# Patient Record
Sex: MALE | Race: BLACK OR AFRICAN AMERICAN | URBAN - METROPOLITAN AREA
[De-identification: names, ages, dates, MRNs, and addresses within clinical notes are randomized per-mention and may not be internally consistent; named-entity substitution may affect disease eponyms.]

---

## 2024-07-23 ENCOUNTER — LAB REQUISITION (OUTPATIENT)
Dept: LAB | Facility: HOSPITAL | Age: 69
End: 2024-07-23

## 2024-07-23 DIAGNOSIS — Z29.9 ENCOUNTER FOR PROPHYLACTIC MEASURES, UNSPECIFIED: ICD-10-CM

## 2024-07-23 LAB — PSA SERPL-MCNC: 0.88 NG/ML

## 2024-07-23 PROCEDURE — 84153 ASSAY OF PSA TOTAL: CPT | Performed by: FAMILY MEDICINE

## 2024-08-23 ENCOUNTER — LAB REQUISITION (OUTPATIENT)
Dept: LAB | Facility: HOSPITAL | Age: 69
End: 2024-08-23

## 2024-08-23 DIAGNOSIS — E11.9 TYPE 2 DIABETES MELLITUS WITHOUT COMPLICATIONS: ICD-10-CM

## 2024-08-23 LAB — GLUCOSE SERPL-MCNC: 113 MG/DL (ref 82–115)

## 2024-08-23 PROCEDURE — 82947 ASSAY GLUCOSE BLOOD QUANT: CPT | Performed by: FAMILY MEDICINE

## 2024-09-17 ENCOUNTER — LAB REQUISITION (OUTPATIENT)
Dept: LAB | Facility: HOSPITAL | Age: 69
End: 2024-09-17

## 2024-09-17 DIAGNOSIS — E11.9 TYPE 2 DIABETES MELLITUS WITHOUT COMPLICATIONS: ICD-10-CM

## 2024-09-17 LAB — GLUCOSE SERPL-MCNC: 67 MG/DL (ref 82–115)

## 2024-09-17 PROCEDURE — 82947 ASSAY GLUCOSE BLOOD QUANT: CPT | Performed by: FAMILY MEDICINE

## 2024-10-16 ENCOUNTER — LAB REQUISITION (OUTPATIENT)
Dept: LAB | Facility: HOSPITAL | Age: 69
End: 2024-10-16
Payer: MEDICARE

## 2024-10-16 DIAGNOSIS — E11.9 TYPE 2 DIABETES MELLITUS WITHOUT COMPLICATIONS: ICD-10-CM

## 2024-10-16 LAB
EST. AVERAGE GLUCOSE BLD GHB EST-MCNC: 142.7 MG/DL
GLUCOSE SERPL-MCNC: 48 MG/DL (ref 82–115)
HBA1C MFR BLD: 6.6 %

## 2024-10-16 PROCEDURE — 82947 ASSAY GLUCOSE BLOOD QUANT: CPT | Performed by: FAMILY MEDICINE

## 2024-10-16 PROCEDURE — 83036 HEMOGLOBIN GLYCOSYLATED A1C: CPT | Performed by: FAMILY MEDICINE

## 2024-11-25 ENCOUNTER — LAB REQUISITION (OUTPATIENT)
Dept: LAB | Facility: HOSPITAL | Age: 69
End: 2024-11-25
Payer: MEDICARE

## 2024-11-25 DIAGNOSIS — E11.9 TYPE 2 DIABETES MELLITUS WITHOUT COMPLICATIONS: ICD-10-CM

## 2024-11-25 LAB — GLUCOSE SERPL-MCNC: 218 MG/DL (ref 82–115)

## 2024-11-25 PROCEDURE — 82947 ASSAY GLUCOSE BLOOD QUANT: CPT | Performed by: FAMILY MEDICINE

## 2024-12-03 ENCOUNTER — LAB REQUISITION (OUTPATIENT)
Dept: LAB | Facility: HOSPITAL | Age: 69
End: 2024-12-03
Payer: MEDICARE

## 2024-12-03 DIAGNOSIS — E11.9 TYPE 2 DIABETES MELLITUS WITHOUT COMPLICATIONS: ICD-10-CM

## 2024-12-03 LAB — GLUCOSE SERPL-MCNC: 224 MG/DL (ref 82–115)

## 2024-12-03 PROCEDURE — 82947 ASSAY GLUCOSE BLOOD QUANT: CPT | Performed by: FAMILY MEDICINE

## 2025-01-09 ENCOUNTER — LAB REQUISITION (OUTPATIENT)
Dept: LAB | Facility: HOSPITAL | Age: 70
End: 2025-01-09
Payer: MEDICARE

## 2025-01-09 DIAGNOSIS — Z29.9 ENCOUNTER FOR PROPHYLACTIC MEASURES, UNSPECIFIED: ICD-10-CM

## 2025-01-09 LAB
FLUAV AG UPPER RESP QL IA.RAPID: NOT DETECTED
FLUBV AG UPPER RESP QL IA.RAPID: NOT DETECTED

## 2025-01-09 PROCEDURE — 87502 INFLUENZA DNA AMP PROBE: CPT | Performed by: FAMILY MEDICINE

## 2025-01-24 ENCOUNTER — LAB REQUISITION (OUTPATIENT)
Dept: LAB | Facility: HOSPITAL | Age: 70
End: 2025-01-24
Payer: MEDICARE

## 2025-01-24 DIAGNOSIS — E11.9 TYPE 2 DIABETES MELLITUS WITHOUT COMPLICATIONS: ICD-10-CM

## 2025-01-24 LAB
EST. AVERAGE GLUCOSE BLD GHB EST-MCNC: 151.3 MG/DL
GLUCOSE SERPL-MCNC: 163 MG/DL (ref 82–115)
HBA1C MFR BLD: 6.9 %

## 2025-01-24 PROCEDURE — 82947 ASSAY GLUCOSE BLOOD QUANT: CPT | Performed by: FAMILY MEDICINE

## 2025-01-24 PROCEDURE — 83036 HEMOGLOBIN GLYCOSYLATED A1C: CPT | Performed by: FAMILY MEDICINE

## 2025-02-18 ENCOUNTER — LAB REQUISITION (OUTPATIENT)
Dept: LAB | Facility: HOSPITAL | Age: 70
End: 2025-02-18
Payer: MEDICARE

## 2025-02-18 DIAGNOSIS — E11.9 TYPE 2 DIABETES MELLITUS WITHOUT COMPLICATIONS: ICD-10-CM

## 2025-02-18 LAB — GLUCOSE SERPL-MCNC: 62 MG/DL (ref 82–115)

## 2025-02-18 PROCEDURE — 82947 ASSAY GLUCOSE BLOOD QUANT: CPT | Performed by: FAMILY MEDICINE

## 2025-03-14 ENCOUNTER — LAB REQUISITION (OUTPATIENT)
Dept: LAB | Facility: HOSPITAL | Age: 70
End: 2025-03-14
Payer: MEDICARE

## 2025-03-14 DIAGNOSIS — E11.9 TYPE 2 DIABETES MELLITUS WITHOUT COMPLICATIONS: ICD-10-CM

## 2025-03-14 LAB — GLUCOSE SERPL-MCNC: 190 MG/DL (ref 82–115)

## 2025-03-14 PROCEDURE — 82947 ASSAY GLUCOSE BLOOD QUANT: CPT | Performed by: FAMILY MEDICINE

## 2025-04-15 ENCOUNTER — LAB REQUISITION (OUTPATIENT)
Dept: LAB | Facility: HOSPITAL | Age: 70
End: 2025-04-15
Payer: MEDICARE

## 2025-04-15 DIAGNOSIS — E11.9 TYPE 2 DIABETES MELLITUS WITHOUT COMPLICATIONS: ICD-10-CM

## 2025-04-15 LAB
EST. AVERAGE GLUCOSE BLD GHB EST-MCNC: 174.3 MG/DL
GLUCOSE SERPL-MCNC: 88 MG/DL (ref 82–115)
HBA1C MFR BLD: 7.7 %

## 2025-04-15 PROCEDURE — 82947 ASSAY GLUCOSE BLOOD QUANT: CPT | Performed by: FAMILY MEDICINE

## 2025-04-15 PROCEDURE — 83036 HEMOGLOBIN GLYCOSYLATED A1C: CPT | Performed by: FAMILY MEDICINE

## 2025-05-21 ENCOUNTER — LAB REQUISITION (OUTPATIENT)
Dept: LAB | Facility: HOSPITAL | Age: 70
End: 2025-05-21
Payer: MEDICARE

## 2025-05-21 DIAGNOSIS — E11.9 TYPE 2 DIABETES MELLITUS WITHOUT COMPLICATIONS: ICD-10-CM

## 2025-05-21 LAB — GLUCOSE SERPL-MCNC: 106 MG/DL (ref 82–115)

## 2025-05-21 PROCEDURE — 82947 ASSAY GLUCOSE BLOOD QUANT: CPT | Performed by: FAMILY MEDICINE

## 2025-06-12 ENCOUNTER — LAB REQUISITION (OUTPATIENT)
Dept: LAB | Facility: HOSPITAL | Age: 70
End: 2025-06-12
Payer: MEDICARE

## 2025-06-12 DIAGNOSIS — E11.9 TYPE 2 DIABETES MELLITUS WITHOUT COMPLICATIONS: ICD-10-CM

## 2025-06-12 LAB — GLUCOSE SERPL-MCNC: 89 MG/DL (ref 82–115)

## 2025-06-12 PROCEDURE — 82947 ASSAY GLUCOSE BLOOD QUANT: CPT | Performed by: FAMILY MEDICINE

## 2025-06-25 ENCOUNTER — LAB REQUISITION (OUTPATIENT)
Dept: LAB | Facility: HOSPITAL | Age: 70
End: 2025-06-25
Payer: MEDICARE

## 2025-06-25 DIAGNOSIS — Z29.9 ENCOUNTER FOR PROPHYLACTIC MEASURES, UNSPECIFIED: ICD-10-CM

## 2025-06-25 LAB
ALBUMIN SERPL-MCNC: 3.2 G/DL (ref 3.4–4.8)
ALBUMIN/GLOB SERPL: 0.8 RATIO (ref 1.1–2)
ALP SERPL-CCNC: 60 UNIT/L (ref 40–150)
ALT SERPL-CCNC: 17 UNIT/L (ref 0–55)
ANION GAP SERPL CALC-SCNC: 7 MEQ/L
AST SERPL-CCNC: 19 UNIT/L (ref 11–45)
BASOPHILS # BLD AUTO: 0.04 X10(3)/MCL
BASOPHILS NFR BLD AUTO: 0.6 %
BILIRUB SERPL-MCNC: 0.3 MG/DL
BUN SERPL-MCNC: 49.4 MG/DL (ref 8.4–25.7)
CALCIUM SERPL-MCNC: 8.5 MG/DL (ref 8.8–10)
CHLORIDE SERPL-SCNC: 107 MMOL/L (ref 98–107)
CO2 SERPL-SCNC: 24 MMOL/L (ref 23–31)
CREAT SERPL-MCNC: 2.92 MG/DL (ref 0.72–1.25)
CREAT/UREA NIT SERPL: 17
EOSINOPHIL # BLD AUTO: 0.13 X10(3)/MCL (ref 0–0.9)
EOSINOPHIL NFR BLD AUTO: 1.8 %
ERYTHROCYTE [DISTWIDTH] IN BLOOD BY AUTOMATED COUNT: 14.7 % (ref 11.5–17)
GFR SERPLBLD CREATININE-BSD FMLA CKD-EPI: 23 ML/MIN/1.73/M2
GLOBULIN SER-MCNC: 3.9 GM/DL (ref 2.4–3.5)
GLUCOSE SERPL-MCNC: 37 MG/DL (ref 82–115)
HCT VFR BLD AUTO: 43.3 % (ref 42–52)
HGB BLD-MCNC: 13.5 G/DL (ref 14–18)
IMM GRANULOCYTES # BLD AUTO: 0.02 X10(3)/MCL (ref 0–0.04)
IMM GRANULOCYTES NFR BLD AUTO: 0.3 %
LYMPHOCYTES # BLD AUTO: 2.94 X10(3)/MCL (ref 0.6–4.6)
LYMPHOCYTES NFR BLD AUTO: 40.8 %
MCH RBC QN AUTO: 29.2 PG (ref 27–31)
MCHC RBC AUTO-ENTMCNC: 31.2 G/DL (ref 33–36)
MCV RBC AUTO: 93.5 FL (ref 80–94)
MONOCYTES # BLD AUTO: 0.72 X10(3)/MCL (ref 0.1–1.3)
MONOCYTES NFR BLD AUTO: 10 %
NEUTROPHILS # BLD AUTO: 3.35 X10(3)/MCL (ref 2.1–9.2)
NEUTROPHILS NFR BLD AUTO: 46.5 %
NRBC BLD AUTO-RTO: 0 %
PLATELET # BLD AUTO: 195 X10(3)/MCL (ref 130–400)
PMV BLD AUTO: 11 FL (ref 7.4–10.4)
POTASSIUM SERPL-SCNC: 6 MMOL/L (ref 3.5–5.1)
PROT SERPL-MCNC: 7.1 GM/DL (ref 5.8–7.6)
RBC # BLD AUTO: 4.63 X10(6)/MCL (ref 4.7–6.1)
SODIUM SERPL-SCNC: 138 MMOL/L (ref 136–145)
WBC # BLD AUTO: 7.2 X10(3)/MCL (ref 4.5–11.5)

## 2025-06-25 PROCEDURE — 80053 COMPREHEN METABOLIC PANEL: CPT | Performed by: FAMILY MEDICINE

## 2025-06-25 PROCEDURE — 85025 COMPLETE CBC W/AUTO DIFF WBC: CPT | Performed by: FAMILY MEDICINE

## 2025-07-02 ENCOUNTER — LAB REQUISITION (OUTPATIENT)
Dept: LAB | Facility: HOSPITAL | Age: 70
End: 2025-07-02
Payer: MEDICARE

## 2025-07-02 DIAGNOSIS — Z29.9 ENCOUNTER FOR PROPHYLACTIC MEASURES, UNSPECIFIED: ICD-10-CM

## 2025-07-02 LAB
ALBUMIN SERPL-MCNC: 3 G/DL (ref 3.4–4.8)
ALBUMIN/GLOB SERPL: 0.7 RATIO (ref 1.1–2)
ALP SERPL-CCNC: 53 UNIT/L (ref 40–150)
ALT SERPL-CCNC: 14 UNIT/L (ref 0–55)
ANION GAP SERPL CALC-SCNC: 7 MEQ/L
AST SERPL-CCNC: 10 UNIT/L (ref 11–45)
BASOPHILS # BLD AUTO: 0.03 X10(3)/MCL
BASOPHILS NFR BLD AUTO: 0.4 %
BILIRUB SERPL-MCNC: 0.5 MG/DL
BUN SERPL-MCNC: 44.8 MG/DL (ref 8.4–25.7)
CALCIUM SERPL-MCNC: 8.3 MG/DL (ref 8.8–10)
CHLORIDE SERPL-SCNC: 106 MMOL/L (ref 98–107)
CHOLEST SERPL-MCNC: 169 MG/DL
CHOLEST/HDLC SERPL: 6 {RATIO} (ref 0–5)
CO2 SERPL-SCNC: 26 MMOL/L (ref 23–31)
CREAT SERPL-MCNC: 1.65 MG/DL (ref 0.72–1.25)
CREAT/UREA NIT SERPL: 27
EOSINOPHIL # BLD AUTO: 0.16 X10(3)/MCL (ref 0–0.9)
EOSINOPHIL NFR BLD AUTO: 2 %
ERYTHROCYTE [DISTWIDTH] IN BLOOD BY AUTOMATED COUNT: 14.3 % (ref 11.5–17)
EST. AVERAGE GLUCOSE BLD GHB EST-MCNC: 162.8 MG/DL
GFR SERPLBLD CREATININE-BSD FMLA CKD-EPI: 45 ML/MIN/1.73/M2
GLOBULIN SER-MCNC: 4.1 GM/DL (ref 2.4–3.5)
GLUCOSE SERPL-MCNC: 118 MG/DL (ref 82–115)
HBA1C MFR BLD: 7.3 %
HCT VFR BLD AUTO: 42.4 % (ref 42–52)
HDLC SERPL-MCNC: 30 MG/DL (ref 35–60)
HGB BLD-MCNC: 13.2 G/DL (ref 14–18)
IMM GRANULOCYTES # BLD AUTO: 0.02 X10(3)/MCL (ref 0–0.04)
IMM GRANULOCYTES NFR BLD AUTO: 0.2 %
LDLC SERPL CALC-MCNC: 117 MG/DL (ref 50–140)
LYMPHOCYTES # BLD AUTO: 1.83 X10(3)/MCL (ref 0.6–4.6)
LYMPHOCYTES NFR BLD AUTO: 22.4 %
MCH RBC QN AUTO: 29.1 PG (ref 27–31)
MCHC RBC AUTO-ENTMCNC: 31.1 G/DL (ref 33–36)
MCV RBC AUTO: 93.4 FL (ref 80–94)
MONOCYTES # BLD AUTO: 1.01 X10(3)/MCL (ref 0.1–1.3)
MONOCYTES NFR BLD AUTO: 12.4 %
NEUTROPHILS # BLD AUTO: 5.11 X10(3)/MCL (ref 2.1–9.2)
NEUTROPHILS NFR BLD AUTO: 62.6 %
NRBC BLD AUTO-RTO: 0 %
PHOSPHATE SERPL-MCNC: 3.5 MG/DL (ref 2.3–4.7)
PLATELET # BLD AUTO: 173 X10(3)/MCL (ref 130–400)
PMV BLD AUTO: 11 FL (ref 7.4–10.4)
POTASSIUM SERPL-SCNC: 5.6 MMOL/L (ref 3.5–5.1)
PREALB SERPL-MCNC: 19.8 MG/DL (ref 16–42)
PROT SERPL-MCNC: 7.1 GM/DL (ref 5.8–7.6)
RBC # BLD AUTO: 4.54 X10(6)/MCL (ref 4.7–6.1)
SODIUM SERPL-SCNC: 139 MMOL/L (ref 136–145)
TRIGL SERPL-MCNC: 108 MG/DL (ref 34–140)
TSH SERPL-ACNC: 0.24 UIU/ML (ref 0.35–4.94)
VLDLC SERPL CALC-MCNC: 22 MG/DL
WBC # BLD AUTO: 8.16 X10(3)/MCL (ref 4.5–11.5)

## 2025-07-02 PROCEDURE — 84134 ASSAY OF PREALBUMIN: CPT | Performed by: FAMILY MEDICINE

## 2025-07-02 PROCEDURE — 80061 LIPID PANEL: CPT | Performed by: FAMILY MEDICINE

## 2025-07-02 PROCEDURE — 83036 HEMOGLOBIN GLYCOSYLATED A1C: CPT | Performed by: FAMILY MEDICINE

## 2025-07-02 PROCEDURE — 84100 ASSAY OF PHOSPHORUS: CPT | Performed by: FAMILY MEDICINE

## 2025-07-02 PROCEDURE — 84443 ASSAY THYROID STIM HORMONE: CPT | Performed by: FAMILY MEDICINE

## 2025-07-02 PROCEDURE — 85025 COMPLETE CBC W/AUTO DIFF WBC: CPT | Performed by: FAMILY MEDICINE

## 2025-07-02 PROCEDURE — 80053 COMPREHEN METABOLIC PANEL: CPT | Performed by: FAMILY MEDICINE

## 2025-07-03 ENCOUNTER — LAB REQUISITION (OUTPATIENT)
Dept: LAB | Facility: HOSPITAL | Age: 70
End: 2025-07-03
Payer: MEDICARE

## 2025-07-03 DIAGNOSIS — Z29.9 ENCOUNTER FOR PROPHYLACTIC MEASURES, UNSPECIFIED: ICD-10-CM

## 2025-07-03 LAB — AMMONIA PLAS-MSCNC: 104 UMOL/L (ref 18–72)

## 2025-07-03 PROCEDURE — 82140 ASSAY OF AMMONIA: CPT | Performed by: NURSE PRACTITIONER

## 2025-07-05 ENCOUNTER — HOSPITAL ENCOUNTER (INPATIENT)
Facility: HOSPITAL | Age: 70
LOS: 33 days | Discharge: NURSING, REHAB OR LONG-TERM CARE | DRG: 207 | End: 2025-08-07
Attending: EMERGENCY MEDICINE | Admitting: INTERNAL MEDICINE
Payer: MEDICARE

## 2025-07-05 DIAGNOSIS — J96.00 ACUTE RESPIRATORY FAILURE: ICD-10-CM

## 2025-07-05 DIAGNOSIS — R53.1 GENERALIZED WEAKNESS: Primary | ICD-10-CM

## 2025-07-05 DIAGNOSIS — J96.02 ACUTE RESPIRATORY FAILURE WITH HYPERCAPNIA: ICD-10-CM

## 2025-07-05 DIAGNOSIS — N17.9 AKI (ACUTE KIDNEY INJURY): ICD-10-CM

## 2025-07-05 DIAGNOSIS — R33.8 ACUTE URINARY RETENTION: ICD-10-CM
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
## 2025-07-05 DIAGNOSIS — R13.10 DYSPHAGIA, UNSPECIFIED TYPE: ICD-10-CM

## 2025-07-05 DIAGNOSIS — R94.31 QT PROLONGATION: ICD-10-CM

## 2025-07-05 DIAGNOSIS — R50.9 PERSISTENT FEVER: ICD-10-CM

## 2025-07-05 LAB
ALBUMIN SERPL-MCNC: 3 G/DL (ref 3.4–4.8)
ALBUMIN/GLOB SERPL: 0.6 RATIO (ref 1.1–2)
ALLENS TEST BLOOD GAS (OHS): YES
ALLENS TEST BLOOD GAS (OHS): YES
ALP SERPL-CCNC: 54 UNIT/L (ref 40–150)
ALT SERPL-CCNC: 13 UNIT/L (ref 0–55)
AMMONIA PLAS-MSCNC: 23.9 UMOL/L (ref 18–72)
ANION GAP SERPL CALC-SCNC: 6 MEQ/L
APTT PPP: 21.5 SECONDS (ref 23.2–33.7)
AST SERPL-CCNC: 16 UNIT/L (ref 11–45)
B PERT.PT PRMT NPH QL NAA+NON-PROBE: NOT DETECTED
BACTERIA #/AREA URNS AUTO: ABNORMAL /HPF
BASE EXCESS BLD CALC-SCNC: -1.6 MMOL/L (ref -2–2)
BASE EXCESS BLD CALC-SCNC: -2 MMOL/L
BASOPHILS # BLD AUTO: 0.02 X10(3)/MCL
BASOPHILS NFR BLD AUTO: 0.4 %
BILIRUB SERPL-MCNC: 0.2 MG/DL
BILIRUB UR QL STRIP.AUTO: NEGATIVE
BLOOD GAS SAMPLE TYPE (OHS): ABNORMAL
BLOOD GAS SAMPLE TYPE (OHS): ABNORMAL
BNP BLD-MCNC: 98.8 PG/ML
BUN SERPL-MCNC: 75 MG/DL (ref 8.4–25.7)
C PNEUM DNA NPH QL NAA+NON-PROBE: NOT DETECTED
CA-I BLD-SCNC: 1.12 MMOL/L (ref 1.12–1.23)
CA-I BLD-SCNC: 1.18 MMOL/L (ref 1.12–1.23)
CALCIUM SERPL-MCNC: 8.5 MG/DL (ref 8.8–10)
CHLORIDE SERPL-SCNC: 105 MMOL/L (ref 98–107)
CLARITY UR: CLEAR
CO2 BLDA-SCNC: 26.2 MMOL/L
CO2 BLDA-SCNC: 30.1 MMOL/L
CO2 SERPL-SCNC: 26 MMOL/L (ref 23–31)
COHGB MFR BLDA: 2.5 % (ref 0.5–1.5)
COLOR UR AUTO: YELLOW
CREAT SERPL-MCNC: 2.5 MG/DL (ref 0.72–1.25)
CREAT UR-MCNC: 94.2 MG/DL (ref 63–166)
CREAT/UREA NIT SERPL: 30
DRAWN BY BLOOD GAS (OHS): ABNORMAL
DRAWN BY BLOOD GAS (OHS): ABNORMAL
EOSINOPHIL # BLD AUTO: 0.12 X10(3)/MCL (ref 0–0.9)
EOSINOPHIL NFR BLD AUTO: 2.2 %
ERYTHROCYTE [DISTWIDTH] IN BLOOD BY AUTOMATED COUNT: 14.2 % (ref 11.5–17)
FLUAV AG UPPER RESP QL IA.RAPID: NOT DETECTED
FLUBV AG UPPER RESP QL IA.RAPID: NOT DETECTED
GFR SERPLBLD CREATININE-BSD FMLA CKD-EPI: 27 ML/MIN/1.73/M2
GLOBULIN SER-MCNC: 4.9 GM/DL (ref 2.4–3.5)
GLUCOSE SERPL-MCNC: 114 MG/DL (ref 82–115)
GLUCOSE UR QL STRIP: ABNORMAL
HADV DNA NPH QL NAA+NON-PROBE: NOT DETECTED
HCO3 BLDA-SCNC: 24.7 MMOL/L (ref 22–26)
HCO3 BLDA-SCNC: 28 MMOL/L (ref 22–26)
HCOV 229E RNA NPH QL NAA+NON-PROBE: NOT DETECTED
HCOV HKU1 RNA NPH QL NAA+NON-PROBE: NOT DETECTED
HCOV NL63 RNA NPH QL NAA+NON-PROBE: NOT DETECTED
HCOV OC43 RNA NPH QL NAA+NON-PROBE: NOT DETECTED
HCT VFR BLD AUTO: 43.5 % (ref 42–52)
HGB BLD-MCNC: 13.4 G/DL (ref 14–18)
HGB UR QL STRIP: ABNORMAL
HMPV RNA NPH QL NAA+NON-PROBE: NOT DETECTED
HPIV1 RNA NPH QL NAA+NON-PROBE: NOT DETECTED
HPIV2 RNA NPH QL NAA+NON-PROBE: NOT DETECTED
HPIV3 RNA NPH QL NAA+NON-PROBE: NOT DETECTED
HPIV4 RNA NPH QL NAA+NON-PROBE: NOT DETECTED
HYALINE CASTS #/AREA URNS LPF: ABNORMAL /LPF
IMM GRANULOCYTES # BLD AUTO: 0.02 X10(3)/MCL (ref 0–0.04)
IMM GRANULOCYTES NFR BLD AUTO: 0.4 %
INHALED O2 CONCENTRATION: 80 %
INR PPP: 1.2
KETONES UR QL STRIP: NEGATIVE
LACTATE SERPL-SCNC: 1.1 MMOL/L (ref 0.5–2.2)
LEUKOCYTE ESTERASE UR QL STRIP: 75
LPM (OHS): 4
LYMPHOCYTES # BLD AUTO: 2.03 X10(3)/MCL (ref 0.6–4.6)
LYMPHOCYTES NFR BLD AUTO: 37.7 %
M PNEUMO DNA NPH QL NAA+NON-PROBE: NOT DETECTED
MAGNESIUM SERPL-MCNC: 2.9 MG/DL (ref 1.6–2.6)
MCH RBC QN AUTO: 29.1 PG (ref 27–31)
MCHC RBC AUTO-ENTMCNC: 30.8 G/DL (ref 33–36)
MCV RBC AUTO: 94.6 FL (ref 80–94)
MECH RR (OHS): 22 B/MIN
METHGB MFR BLDA: 1.5 % (ref 0.4–1.5)
MODE (OHS): AC
MONOCYTES # BLD AUTO: 0.55 X10(3)/MCL (ref 0.1–1.3)
MONOCYTES NFR BLD AUTO: 10.2 %
MUCOUS THREADS URNS QL MICRO: ABNORMAL /LPF
NEUTROPHILS # BLD AUTO: 2.65 X10(3)/MCL (ref 2.1–9.2)
NEUTROPHILS NFR BLD AUTO: 49.1 %
NITRITE UR QL STRIP: NEGATIVE
NRBC BLD AUTO-RTO: 0 %
O2 HB BLOOD GAS (OHS): 92.9 % (ref 94–97)
OHS QRS DURATION: 80 MS
OHS QTC CALCULATION: 417 MS
OXYGEN DEVICE BLOOD GAS (OHS): ABNORMAL
OXYHGB MFR BLDA: 13.9 G/DL (ref 12–16)
PCO2 BLDA: 49 MMHG (ref 35–45)
PCO2 BLDA: 70 MMHG (ref 35–45)
PEEP RESPIRATORY: 8 CMH2O
PH BLDA: 7.21 [PH] (ref 7.35–7.45)
PH BLDA: 7.31 [PH] (ref 7.35–7.45)
PH UR STRIP: 5.5 [PH]
PLATELET # BLD AUTO: 199 X10(3)/MCL (ref 130–400)
PMV BLD AUTO: 10.4 FL (ref 7.4–10.4)
PO2 BLDA: 66 MMHG (ref 80–100)
PO2 BLDA: 85 MMHG (ref 80–100)
POCT GLUCOSE: 146 MG/DL (ref 70–110)
POCT GLUCOSE: 346 MG/DL (ref 70–110)
POCT GLUCOSE: 360 MG/DL (ref 70–110)
POCT GLUCOSE: 414 MG/DL (ref 70–110)
POCT GLUCOSE: 416 MG/DL (ref 70–110)
POCT GLUCOSE: 416 MG/DL (ref 70–110)
POTASSIUM BLOOD GAS (OHS): 4.7 MMOL/L (ref 3.5–5)
POTASSIUM BLOOD GAS (OHS): 5.2 MMOL/L (ref 3.5–5)
POTASSIUM SERPL-SCNC: 5.5 MMOL/L (ref 3.5–5.1)
PROT SERPL-MCNC: 7.9 GM/DL (ref 5.8–7.6)
PROT UR QL STRIP: ABNORMAL
PROT UR STRIP-MCNC: 20.2 MG/DL
PROTHROMBIN TIME: 15.1 SECONDS (ref 12.5–14.5)
RBC # BLD AUTO: 4.6 X10(6)/MCL (ref 4.7–6.1)
RBC #/AREA URNS AUTO: ABNORMAL /HPF
RSV A 5' UTR RNA NPH QL NAA+PROBE: NOT DETECTED
RSV RNA NPH QL NAA+NON-PROBE: NOT DETECTED
RV+EV RNA NPH QL NAA+NON-PROBE: NOT DETECTED
SAMPLE SITE BLOOD GAS (OHS): ABNORMAL
SAMPLE SITE BLOOD GAS (OHS): ABNORMAL
SAO2 % BLDA: 91 %
SAO2 % BLDA: 94 %
SARS-COV-2 RNA RESP QL NAA+PROBE: NOT DETECTED
SODIUM BLOOD GAS (OHS): 133 MMOL/L (ref 137–145)
SODIUM BLOOD GAS (OHS): 135 MMOL/L (ref 137–145)
SODIUM SERPL-SCNC: 137 MMOL/L (ref 136–145)
SODIUM UR-SCNC: 64 MMOL/L
SP GR UR STRIP.AUTO: 1.02 (ref 1–1.03)
SPONT+MECH VT ON VENT: 500 ML
SQUAMOUS #/AREA URNS LPF: ABNORMAL /HPF
TROPONIN I SERPL-MCNC: 0.04 NG/ML (ref 0–0.04)
TSH SERPL-ACNC: 0.53 UIU/ML (ref 0.35–4.94)
URINE PROTEIN/CREATININE RATIO (OLG): 0.2
UROBILINOGEN UR STRIP-ACNC: NORMAL
WBC # BLD AUTO: 5.39 X10(3)/MCL (ref 4.5–11.5)
WBC #/AREA URNS AUTO: ABNORMAL /HPF

## 2025-07-05 PROCEDURE — 85730 THROMBOPLASTIN TIME PARTIAL: CPT | Performed by: EMERGENCY MEDICINE

## 2025-07-05 PROCEDURE — 93005 ELECTROCARDIOGRAM TRACING: CPT

## 2025-07-05 PROCEDURE — 0BH17EZ INSERTION OF ENDOTRACHEAL AIRWAY INTO TRACHEA, VIA NATURAL OR ARTIFICIAL OPENING: ICD-10-PCS | Performed by: EMERGENCY MEDICINE

## 2025-07-05 PROCEDURE — 99900031 HC PATIENT EDUCATION (STAT)

## 2025-07-05 PROCEDURE — 82570 ASSAY OF URINE CREATININE: CPT | Performed by: INTERNAL MEDICINE

## 2025-07-05 PROCEDURE — 63600175 PHARM REV CODE 636 W HCPCS

## 2025-07-05 PROCEDURE — 25000003 PHARM REV CODE 250: Performed by: EMERGENCY MEDICINE

## 2025-07-05 PROCEDURE — 36600 WITHDRAWAL OF ARTERIAL BLOOD: CPT

## 2025-07-05 PROCEDURE — 87070 CULTURE OTHR SPECIMN AEROBIC: CPT | Performed by: INTERNAL MEDICINE

## 2025-07-05 PROCEDURE — 27000221 HC OXYGEN, UP TO 24 HOURS

## 2025-07-05 PROCEDURE — 84300 ASSAY OF URINE SODIUM: CPT | Performed by: INTERNAL MEDICINE

## 2025-07-05 PROCEDURE — 83880 ASSAY OF NATRIURETIC PEPTIDE: CPT | Performed by: EMERGENCY MEDICINE

## 2025-07-05 PROCEDURE — 87086 URINE CULTURE/COLONY COUNT: CPT | Performed by: EMERGENCY MEDICINE

## 2025-07-05 PROCEDURE — 31500 INSERT EMERGENCY AIRWAY: CPT

## 2025-07-05 PROCEDURE — 94002 VENT MGMT INPAT INIT DAY: CPT

## 2025-07-05 PROCEDURE — 87040 BLOOD CULTURE FOR BACTERIA: CPT | Performed by: EMERGENCY MEDICINE

## 2025-07-05 PROCEDURE — 63600175 PHARM REV CODE 636 W HCPCS: Performed by: INTERNAL MEDICINE

## 2025-07-05 PROCEDURE — 94761 N-INVAS EAR/PLS OXIMETRY MLT: CPT | Mod: XB

## 2025-07-05 PROCEDURE — 81001 URINALYSIS AUTO W/SCOPE: CPT | Performed by: EMERGENCY MEDICINE

## 2025-07-05 PROCEDURE — 84484 ASSAY OF TROPONIN QUANT: CPT | Performed by: EMERGENCY MEDICINE

## 2025-07-05 PROCEDURE — 25000003 PHARM REV CODE 250: Performed by: INTERNAL MEDICINE

## 2025-07-05 PROCEDURE — 99900035 HC TECH TIME PER 15 MIN (STAT)

## 2025-07-05 PROCEDURE — 93010 ELECTROCARDIOGRAM REPORT: CPT | Mod: ,,, | Performed by: INTERNAL MEDICINE

## 2025-07-05 PROCEDURE — 5A1955Z RESPIRATORY VENTILATION, GREATER THAN 96 CONSECUTIVE HOURS: ICD-10-PCS | Performed by: INTERNAL MEDICINE

## 2025-07-05 PROCEDURE — 27100171 HC OXYGEN HIGH FLOW UP TO 24 HOURS

## 2025-07-05 PROCEDURE — 82140 ASSAY OF AMMONIA: CPT | Performed by: EMERGENCY MEDICINE

## 2025-07-05 PROCEDURE — 99900026 HC AIRWAY MAINTENANCE (STAT)

## 2025-07-05 PROCEDURE — 83605 ASSAY OF LACTIC ACID: CPT | Performed by: EMERGENCY MEDICINE

## 2025-07-05 PROCEDURE — 94640 AIRWAY INHALATION TREATMENT: CPT

## 2025-07-05 PROCEDURE — 85025 COMPLETE CBC W/AUTO DIFF WBC: CPT | Performed by: EMERGENCY MEDICINE

## 2025-07-05 PROCEDURE — 27000190 HC CPAP FULL FACE MASK W/VALVE

## 2025-07-05 PROCEDURE — 27200966 HC CLOSED SUCTION SYSTEM

## 2025-07-05 PROCEDURE — 80053 COMPREHEN METABOLIC PANEL: CPT | Performed by: EMERGENCY MEDICINE

## 2025-07-05 PROCEDURE — 94760 N-INVAS EAR/PLS OXIMETRY 1: CPT

## 2025-07-05 PROCEDURE — 43753 TX GASTRO INTUB W/ASP: CPT

## 2025-07-05 PROCEDURE — 87486 CHLMYD PNEUM DNA AMP PROBE: CPT | Performed by: INTERNAL MEDICINE

## 2025-07-05 PROCEDURE — 82803 BLOOD GASES ANY COMBINATION: CPT

## 2025-07-05 PROCEDURE — 63600175 PHARM REV CODE 636 W HCPCS: Mod: JZ,TB | Performed by: EMERGENCY MEDICINE

## 2025-07-05 PROCEDURE — 25000003 PHARM REV CODE 250

## 2025-07-05 PROCEDURE — 3E1G78Z IRRIGATION OF UPPER GI USING IRRIGATING SUBSTANCE, VIA NATURAL OR ARTIFICIAL OPENING: ICD-10-PCS | Performed by: EMERGENCY MEDICINE

## 2025-07-05 PROCEDURE — 87637 SARSCOV2&INF A&B&RSV AMP PRB: CPT | Performed by: EMERGENCY MEDICINE

## 2025-07-05 PROCEDURE — 25500020 PHARM REV CODE 255: Performed by: INTERNAL MEDICINE

## 2025-07-05 PROCEDURE — 85610 PROTHROMBIN TIME: CPT | Performed by: EMERGENCY MEDICINE

## 2025-07-05 PROCEDURE — 96374 THER/PROPH/DIAG INJ IV PUSH: CPT

## 2025-07-05 PROCEDURE — 99291 CRITICAL CARE FIRST HOUR: CPT

## 2025-07-05 PROCEDURE — 94660 CPAP INITIATION&MGMT: CPT | Mod: XB

## 2025-07-05 PROCEDURE — 0T9B70Z DRAINAGE OF BLADDER WITH DRAINAGE DEVICE, VIA NATURAL OR ARTIFICIAL OPENING: ICD-10-PCS | Performed by: EMERGENCY MEDICINE

## 2025-07-05 PROCEDURE — 25000242 PHARM REV CODE 250 ALT 637 W/ HCPCS: Performed by: EMERGENCY MEDICINE

## 2025-07-05 PROCEDURE — 63600175 PHARM REV CODE 636 W HCPCS: Performed by: EMERGENCY MEDICINE

## 2025-07-05 PROCEDURE — 83735 ASSAY OF MAGNESIUM: CPT | Performed by: EMERGENCY MEDICINE

## 2025-07-05 PROCEDURE — 84443 ASSAY THYROID STIM HORMONE: CPT | Performed by: INTERNAL MEDICINE

## 2025-07-05 PROCEDURE — 20000000 HC ICU ROOM

## 2025-07-05 PROCEDURE — 5A09357 ASSISTANCE WITH RESPIRATORY VENTILATION, LESS THAN 24 CONSECUTIVE HOURS, CONTINUOUS POSITIVE AIRWAY PRESSURE: ICD-10-PCS | Performed by: EMERGENCY MEDICINE

## 2025-07-05 RX ORDER — INSULIN ASPART 100 [IU]/ML
10 INJECTION, SOLUTION INTRAVENOUS; SUBCUTANEOUS ONCE
Status: COMPLETED | OUTPATIENT
Start: 2025-07-05 | End: 2025-07-05

## 2025-07-05 RX ORDER — INSULIN GLARGINE 100 [IU]/ML
10 INJECTION, SOLUTION SUBCUTANEOUS NIGHTLY
Status: DISCONTINUED | OUTPATIENT
Start: 2025-07-05 | End: 2025-07-06

## 2025-07-05 RX ORDER — GLUCAGON 1 MG
1 KIT INJECTION
Status: DISCONTINUED | OUTPATIENT
Start: 2025-07-05 | End: 2025-08-07 | Stop reason: HOSPADM

## 2025-07-05 RX ORDER — ETOMIDATE 2 MG/ML
INJECTION INTRAVENOUS CODE/TRAUMA/SEDATION MEDICATION
Status: DISCONTINUED | OUTPATIENT
Start: 2025-07-05 | End: 2025-07-05

## 2025-07-05 RX ORDER — INSULIN ASPART 100 [IU]/ML
0-10 INJECTION, SOLUTION INTRAVENOUS; SUBCUTANEOUS EVERY 6 HOURS PRN
Status: DISCONTINUED | OUTPATIENT
Start: 2025-07-05 | End: 2025-07-08

## 2025-07-05 RX ORDER — PROPOFOL 10 MG/ML
INJECTION, EMULSION INTRAVENOUS
Status: COMPLETED
Start: 2025-07-05 | End: 2025-07-05

## 2025-07-05 RX ORDER — IPRATROPIUM BROMIDE AND ALBUTEROL SULFATE 2.5; .5 MG/3ML; MG/3ML
3 SOLUTION RESPIRATORY (INHALATION)
Status: COMPLETED | OUTPATIENT
Start: 2025-07-05 | End: 2025-07-05

## 2025-07-05 RX ORDER — GLUCAGON 1 MG
1 KIT INJECTION
Status: DISCONTINUED | OUTPATIENT
Start: 2025-07-05 | End: 2025-07-08

## 2025-07-05 RX ORDER — DEXMEDETOMIDINE HYDROCHLORIDE 4 UG/ML
INJECTION, SOLUTION INTRAVENOUS
Status: COMPLETED
Start: 2025-07-05 | End: 2025-07-05

## 2025-07-05 RX ORDER — DEXMEDETOMIDINE HYDROCHLORIDE 4 UG/ML
0-1.4 INJECTION, SOLUTION INTRAVENOUS CONTINUOUS
Status: DISCONTINUED | OUTPATIENT
Start: 2025-07-05 | End: 2025-07-27

## 2025-07-05 RX ORDER — MUPIROCIN 20 MG/G
OINTMENT TOPICAL 2 TIMES DAILY
Status: COMPLETED | OUTPATIENT
Start: 2025-07-06 | End: 2025-07-10

## 2025-07-05 RX ORDER — METHYLPREDNISOLONE SOD SUCC 125 MG
125 VIAL (EA) INJECTION
Status: COMPLETED | OUTPATIENT
Start: 2025-07-05 | End: 2025-07-05

## 2025-07-05 RX ORDER — PROPOFOL 10 MG/ML
0-50 INJECTION, EMULSION INTRAVENOUS CONTINUOUS
Status: DISCONTINUED | OUTPATIENT
Start: 2025-07-05 | End: 2025-07-27

## 2025-07-05 RX ORDER — ROCURONIUM BROMIDE 10 MG/ML
INJECTION, SOLUTION INTRAVENOUS CODE/TRAUMA/SEDATION MEDICATION
Status: DISCONTINUED | OUTPATIENT
Start: 2025-07-05 | End: 2025-07-05

## 2025-07-05 RX ADMIN — SODIUM CHLORIDE 1000 ML: 9 INJECTION, SOLUTION INTRAVENOUS at 08:07

## 2025-07-05 RX ADMIN — PIPERACILLIN SODIUM AND TAZOBACTAM SODIUM 4.5 G: 4; .5 INJECTION, POWDER, LYOPHILIZED, FOR SOLUTION INTRAVENOUS at 07:07

## 2025-07-05 RX ADMIN — PIPERACILLIN SODIUM AND TAZOBACTAM SODIUM 4.5 G: 4; .5 INJECTION, POWDER, LYOPHILIZED, FOR SOLUTION INTRAVENOUS at 11:07

## 2025-07-05 RX ADMIN — DEXMEDETOMIDINE HYDROCHLORIDE 1 MCG/KG/HR: 400 INJECTION INTRAVENOUS at 10:07

## 2025-07-05 RX ADMIN — PERFLUTREN 1 ML: 6.52 INJECTION, SUSPENSION INTRAVENOUS at 04:07

## 2025-07-05 RX ADMIN — ROCURONIUM BROMIDE 100 MG: 10 SOLUTION INTRAVENOUS at 08:07

## 2025-07-05 RX ADMIN — PROPOFOL 50 MCG/KG/MIN: 10 INJECTION, EMULSION INTRAVENOUS at 05:07

## 2025-07-05 RX ADMIN — DEXMEDETOMIDINE HYDROCHLORIDE 0.6 MCG/KG/HR: 4 INJECTION, SOLUTION INTRAVENOUS at 07:07

## 2025-07-05 RX ADMIN — INSULIN ASPART 10 UNITS: 100 INJECTION, SOLUTION INTRAVENOUS; SUBCUTANEOUS at 08:07

## 2025-07-05 RX ADMIN — PROPOFOL 30 MCG/KG/MIN: 10 INJECTION, EMULSION INTRAVENOUS at 02:07

## 2025-07-05 RX ADMIN — PROPOFOL 40 MCG/KG/MIN: 10 INJECTION, EMULSION INTRAVENOUS at 08:07

## 2025-07-05 RX ADMIN — INSULIN ASPART 10 UNITS: 100 INJECTION, SOLUTION INTRAVENOUS; SUBCUTANEOUS at 05:07

## 2025-07-05 RX ADMIN — DEXMEDETOMIDINE HYDROCHLORIDE 0.6 MCG/KG/HR: 4 INJECTION, SOLUTION INTRAVENOUS at 02:07

## 2025-07-05 RX ADMIN — PROPOFOL 10 MCG/KG/MIN: 10 INJECTION, EMULSION INTRAVENOUS at 08:07

## 2025-07-05 RX ADMIN — METHYLPREDNISOLONE SODIUM SUCCINATE 125 MG: 125 INJECTION, POWDER, FOR SOLUTION INTRAMUSCULAR; INTRAVENOUS at 08:07

## 2025-07-05 RX ADMIN — INSULIN ASPART 4 UNITS: 100 INJECTION, SOLUTION INTRAVENOUS; SUBCUTANEOUS at 11:07

## 2025-07-05 RX ADMIN — DEXMEDETOMIDINE HYDROCHLORIDE 0.6 MCG/KG/HR: 4 INJECTION, SOLUTION INTRAVENOUS at 08:07

## 2025-07-05 RX ADMIN — ETOMIDATE 20 MG: 2 INJECTION INTRAVENOUS at 08:07

## 2025-07-05 RX ADMIN — INSULIN GLARGINE 10 UNITS: 100 INJECTION, SOLUTION SUBCUTANEOUS at 09:07

## 2025-07-05 RX ADMIN — AZITHROMYCIN MONOHYDRATE 500 MG: 500 INJECTION, POWDER, LYOPHILIZED, FOR SOLUTION INTRAVENOUS at 11:07

## 2025-07-05 RX ADMIN — DEXMEDETOMIDINE HYDROCHLORIDE 1 MCG/KG/HR: 4 INJECTION, SOLUTION INTRAVENOUS at 10:07

## 2025-07-05 RX ADMIN — IPRATROPIUM BROMIDE AND ALBUTEROL SULFATE 3 ML: .5; 3 SOLUTION RESPIRATORY (INHALATION) at 08:07

## 2025-07-05 NOTE — H&P
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  He presents to ED per EMS 2025 after multiple falls at nursing home with reported generalized weakness.  He is reported as having a Benítez catheter placed about 1 week ago for urinary retention.  Patient was reported as having pulled this out himself while at nursing home, on a.m. of presentation.  Benítez catheter was placed in ED, returning 700 cc of urine.  While in ER, he was reported as becoming more somnolent with poor overall respiratory effort and decreasing level of consciousness.  ABG revealed acute respiratory acidosis.  He was intubated at 8:55 a.m., placed on mechanical ventilatory support.  CT brain obtained in ED reveals chronic microvascular ischemic changes with no acute intracranial abnormalities.  SARS-COVID-2 PCR negative, influenza A/B PCR negative.  He is now admitted to ICU for ongoing medical care.    Allergies:  Review of patient's allergies indicates:  No Known Allergies    Home Medications:  Prior to Admission medications    Not on File       Past Medical History:  No past medical history on file.    Past Surgical History:  No past surgical history on file.    Family History:  No family history on file.    Social History:  Social History[1]        Review of Systems   Unobtainable     Objective:   Last 24 Hour Vital Signs:  BP  Min: 103/73  Max: 174/98  Temp  Av.3 °F (36.3  "°C)  Min: 97.3 °F (36.3 °C)  Max: 97.3 °F (36.3 °C)  Pulse  Av.9  Min: 74  Max: 91  Resp  Av.4  Min: 13  Max: 25  SpO2  Av.7 %  Min: 90 %  Max: 100 %  Height  Av' 8" (172.7 cm)  Min: 5' 8" (172.7 cm)  Max: 5' 8" (172.7 cm)  Weight  Av.4 kg (250 lb)  Min: 113.4 kg (250 lb)  Max: 113.4 kg (250 lb)  Body mass index is 38.01 kg/m².  No intake/output data recorded.      Physical Exam   Constitutional:   Intubated on mechanical ventilatory support, sedated on propofol   HENT:   Mouth/Throat: Endotracheal tube secured to external os  Eyes: Pupils are equal, round, and reactive to light.   Cardiovascular: Normal rate and regular rhythm.   No murmur heard.Pulmonary:      Breath sounds: Rhonchi (Scattered bilateral) present. No wheezing or rales.     Abdominal: Soft. Bowel sounds are normal. He exhibits distension (Moderate). There is no abdominal tenderness.   Musculoskeletal:      Right lower leg: No edema.      Left lower leg: No edema.   Lymphadenopathy:     He has no cervical adenopathy.   Neurological:   Sedated on propofol, intubated on mechanical ventilatory support.   Skin: Skin is warm and dry.        Laboratory:  Lab Results   Component Value Date/Time    WBC 5.39 2025 07:27 AM    HGB 13.4 (L) 2025 07:27 AM    HCT 43.5 2025 07:27 AM     2025 07:27 AM    MCV 94.6 (H) 2025 07:27 AM    RDW 14.2 2025 07:27 AM     Lab Results   Component Value Date/Time     2025 06:36 AM    K 5.5 (H) 2025 06:36 AM     2025 06:36 AM    CO2 26 2025 06:36 AM    BUN 75.0 (H) 2025 06:36 AM    CREATININE 2.50 (H) 2025 06:36 AM     2025 06:36 AM    CALCIUM 8.5 (L) 2025 06:36 AM    MG 2.90 (H) 2025 06:36 AM    PHOS 3.5 2025 07:06 AM    HGBA1C 7.3 (H) 2025 07:06 AM     Lab Results   Component Value Date/Time    PROT 7.9 (H) 2025 06:36 AM    ALBUMIN 3.0 (L) 2025 06:36 AM    BILITOT " 0.2 07/05/2025 06:36 AM    AST 16 07/05/2025 06:36 AM    ALKPHOS 54 07/05/2025 06:36 AM    ALT 13 07/05/2025 06:36 AM     Recent Labs   Lab 07/05/25 0636   TROPONINI 0.035   BNP 98.8     Lab Results   Component Value Date/Time    INR 1.2 07/05/2025 07:27 AM    PROTIME 15.1 (H) 07/05/2025 07:27 AM     Lab Results   Component Value Date/Time    TSH 0.238 (L) 07/02/2025 07:06 AM       Urinalysis:   Lab Results   Component Value Date/Time    COLORU Yellow 07/05/2025 07:18 AM    NITRITE Negative 07/05/2025 07:18 AM    UROBILINOGEN Normal 07/05/2025 07:18 AM    WBCUA 11-20 (A) 07/05/2025 07:18 AM       Microbiology Data:  Microbiology Results (last 7 days)       Procedure Component Value Units Date/Time    Blood culture #2 **CANNOT BE ORDERED STAT** [[id number]] Collected: 07/05/25 0823    Order Status: Sent Specimen: Blood from Hand, Right Updated: 07/05/25 0827    Blood culture #1 **CANNOT BE ORDERED STAT** [[id number]] Collected: 07/05/25 0823    Order Status: Sent Specimen: Blood from Forearm, Left Updated: 07/05/25 0827    Urine culture [[id number]] Collected: 07/05/25 0718    Order Status: Sent Specimen: Urine Updated: 07/05/25 0737          Radiology:  Chest x-ray (07/05/2025, my reading of images):  Endotracheal tube is adequate in placement.  Prominent bilateral pulmonary vascular markings with patchy infiltrates scattered bilateral.  Costophrenic angles appear sharp.      EKG (07/05/2025):  Sinus rhythm with normal axis and no ischemia or injury currents    Assessment:     Acute combined hypoxic and hypercapnic respiratory failure.  Chest x-ray with bilateral patchy infiltrates, differential diagnostic possibilities including hydrostatic/cardiogenic versus non hydrostatic pulmonary edema versus infection, aspiration, etc.  History of cerebrovascular disease with right hemiparesis  Acute kidney injury superimposed on stage II chronic kidney disease.  He has a reported obstructive uropathy with Benítez catheter in  place at nursing home, although no details of this are available.  Type 2 diabetes mellitus with polyneuropathy  Hypertension  Hyperlipidemia  Glaucoma    Plan:     Transthoracic echocardiogram and serial cardiac enzymes  Benítez catheter has been replaced, currently with good urine output.  Will obtain renal ultrasound and urine indices.  Blood and urine cultures obtained, begin broad-spectrum IV antibiotic coverage with IV Zosyn and IV azithromycin.  MRSA PCR pending  Continue current mechanical ventilatory settings and level of sedation for time being.  Repeat ABG pending    35 minutes of critical care was time spent personally by me on the following activities: development of treatment plan with patient or surrogate and bedside caregivers, discussions with consultants, evaluation of patient's response to treatment, examination of patient, ordering and performing treatments and interventions, ordering and review of laboratory studies, ordering and review of radiographic studies, pulse oximetry, re-evaluation of patient's condition.  This patient demonstrates a high probability for further clinical decompensation due to ongoing critical illness.  Critical care time did not overlap with that of any other provider or involve time for any procedures.      Jamee Alanis MD, FCCP  Pulmonary/Critical Care       [1]

## 2025-07-05 NOTE — ED NOTES
One gold and one silver ring removed from the ring and pinky finger of the left hand. Secured and handed off to security to be locked in safe.

## 2025-07-05 NOTE — ED NOTES
Respiratory at bedside to apply BIPAP. Pt is breathing irregularly and lung sounds are diminished. Patient not staying awake and has to be coached on taking deeper breaths.

## 2025-07-06 LAB
ALBUMIN SERPL-MCNC: 2.9 G/DL (ref 3.4–4.8)
ALBUMIN SERPL-MCNC: 2.9 G/DL (ref 3.4–4.8)
ALBUMIN/GLOB SERPL: 0.6 RATIO (ref 1.1–2)
ALBUMIN/GLOB SERPL: 0.7 RATIO (ref 1.1–2)
ALLENS TEST BLOOD GAS (OHS): YES
ALP SERPL-CCNC: 50 UNIT/L (ref 40–150)
ALP SERPL-CCNC: 54 UNIT/L (ref 40–150)
ALT SERPL-CCNC: 17 UNIT/L (ref 0–55)
ALT SERPL-CCNC: 18 UNIT/L (ref 0–55)
ANION GAP SERPL CALC-SCNC: 6 MEQ/L
ANION GAP SERPL CALC-SCNC: 7 MEQ/L
AORTIC SIZE INDEX (SOV): 1.5 CM/M2
APICAL FOUR CHAMBER EJECTION FRACTION: 43 %
APICAL TWO CHAMBER EJECTION FRACTION: 55 %
AST SERPL-CCNC: 12 UNIT/L (ref 11–45)
AST SERPL-CCNC: 15 UNIT/L (ref 11–45)
AV INDEX (PROSTH): 0.73
AV MEAN GRADIENT: 2 MMHG
AV PEAK GRADIENT: 3 MMHG
AV VALVE AREA BY VELOCITY RATIO: 2.1 CM²
AV VALVE AREA: 2.3 CM²
AV VELOCITY RATIO: 0.67
BASE EXCESS BLD CALC-SCNC: -1.8 MMOL/L
BASOPHILS # BLD AUTO: 0.01 X10(3)/MCL
BASOPHILS NFR BLD AUTO: 0.1 %
BILIRUB SERPL-MCNC: 0.2 MG/DL
BILIRUB SERPL-MCNC: 0.2 MG/DL
BLOOD GAS SAMPLE TYPE (OHS): ABNORMAL
BSA FOR ECHO PROCEDURE: 2.33 M2
BUN SERPL-MCNC: 70.4 MG/DL (ref 8.4–25.7)
BUN SERPL-MCNC: 72.5 MG/DL (ref 8.4–25.7)
CA-I BLD-SCNC: 1.07 MMOL/L (ref 1.12–1.23)
CALCIUM SERPL-MCNC: 8.2 MG/DL (ref 8.8–10)
CALCIUM SERPL-MCNC: 8.2 MG/DL (ref 8.8–10)
CHLORIDE SERPL-SCNC: 106 MMOL/L (ref 98–107)
CHLORIDE SERPL-SCNC: 108 MMOL/L (ref 98–107)
CO2 BLDA-SCNC: 24.3 MMOL/L
CO2 SERPL-SCNC: 22 MMOL/L (ref 23–31)
CO2 SERPL-SCNC: 22 MMOL/L (ref 23–31)
CREAT SERPL-MCNC: 2.08 MG/DL (ref 0.72–1.25)
CREAT SERPL-MCNC: 2.18 MG/DL (ref 0.72–1.25)
CREAT/UREA NIT SERPL: 33
CREAT/UREA NIT SERPL: 34
CV ECHO LV RWT: 0.5 CM
DOP CALC AO PEAK VEL: 0.9 M/S
DOP CALC AO VTI: 19.8 CM
DOP CALC LVOT AREA: 3.1 CM2
DOP CALC LVOT DIAMETER: 2 CM
DOP CALC LVOT PEAK VEL: 0.6 M/S
DOP CALC LVOT STROKE VOLUME: 45.2 CM3
DOP CALC MV VTI: 31.7 CM
DOP CALCLVOT PEAK VEL VTI: 14.4 CM
DRAWN BY BLOOD GAS (OHS): ABNORMAL
E WAVE DECELERATION TIME: 185 MSEC
E/A RATIO: 1.56
E/E' RATIO: 11 M/S
ECHO LV POSTERIOR WALL: 1.2 CM (ref 0.6–1.1)
EJECTION FRACTION: 50 %
EOSINOPHIL # BLD AUTO: 0 X10(3)/MCL (ref 0–0.9)
EOSINOPHIL NFR BLD AUTO: 0 %
ERYTHROCYTE [DISTWIDTH] IN BLOOD BY AUTOMATED COUNT: 14.3 % (ref 11.5–17)
FRACTIONAL SHORTENING: 18.8 % (ref 28–44)
GFR SERPLBLD CREATININE-BSD FMLA CKD-EPI: 32 ML/MIN/1.73/M2
GFR SERPLBLD CREATININE-BSD FMLA CKD-EPI: 34 ML/MIN/1.73/M2
GLOBULIN SER-MCNC: 4.4 GM/DL (ref 2.4–3.5)
GLOBULIN SER-MCNC: 4.7 GM/DL (ref 2.4–3.5)
GLUCOSE SERPL-MCNC: 261 MG/DL (ref 82–115)
GLUCOSE SERPL-MCNC: 327 MG/DL (ref 82–115)
HCO3 BLDA-SCNC: 23.1 MMOL/L (ref 22–26)
HCT VFR BLD AUTO: 44.1 % (ref 42–52)
HGB BLD-MCNC: 14.3 G/DL (ref 14–18)
HR MV ECHO: 64 BPM
IMM GRANULOCYTES # BLD AUTO: 0.03 X10(3)/MCL (ref 0–0.04)
IMM GRANULOCYTES NFR BLD AUTO: 0.4 %
INHALED O2 CONCENTRATION: 40 %
INTERVENTRICULAR SEPTUM: 1.1 CM (ref 0.6–1.1)
LEFT ATRIUM AREA SYSTOLIC (APICAL 2 CHAMBER): 23.9 CM2
LEFT ATRIUM AREA SYSTOLIC (APICAL 4 CHAMBER): 19.1 CM2
LEFT ATRIUM SIZE: 3.8 CM
LEFT ATRIUM VOLUME INDEX MOD: 26 ML/M2
LEFT ATRIUM VOLUME MOD: 58 ML
LEFT INTERNAL DIMENSION IN SYSTOLE: 3.9 CM (ref 2.1–4)
LEFT VENTRICLE DIASTOLIC VOLUME INDEX: 48.89 ML/M2
LEFT VENTRICLE DIASTOLIC VOLUME: 110 ML
LEFT VENTRICLE END DIASTOLIC VOLUME APICAL 2 CHAMBER: 136 ML
LEFT VENTRICLE END DIASTOLIC VOLUME APICAL 4 CHAMBER: 101 ML
LEFT VENTRICLE END SYSTOLIC VOLUME APICAL 2 CHAMBER: 76.9 ML
LEFT VENTRICLE END SYSTOLIC VOLUME APICAL 4 CHAMBER: 45.6 ML
LEFT VENTRICLE MASS INDEX: 91.7 G/M2
LEFT VENTRICLE SYSTOLIC VOLUME INDEX: 29.3 ML/M2
LEFT VENTRICLE SYSTOLIC VOLUME: 66 ML
LEFT VENTRICULAR INTERNAL DIMENSION IN DIASTOLE: 4.8 CM (ref 3.5–6)
LEFT VENTRICULAR MASS: 206.4 G
LV LATERAL E/E' RATIO: 9.9 M/S
LV SEPTAL E/E' RATIO: 12.7 M/S
LVED V (TEICH): 110 ML
LVES V (TEICH): 66.3 ML
LVOT MG: 1 MMHG
LVOT MV: 0.42 CM/S
LYMPHOCYTES # BLD AUTO: 1.4 X10(3)/MCL (ref 0.6–4.6)
LYMPHOCYTES NFR BLD AUTO: 20.8 %
MAGNESIUM SERPL-MCNC: 2.6 MG/DL (ref 1.6–2.6)
MCH RBC QN AUTO: 28.8 PG (ref 27–31)
MCHC RBC AUTO-ENTMCNC: 32.4 G/DL (ref 33–36)
MCV RBC AUTO: 88.9 FL (ref 80–94)
MECH RR (OHS): 22 B/MIN
MODE (OHS): AC
MONOCYTES # BLD AUTO: 0.77 X10(3)/MCL (ref 0.1–1.3)
MONOCYTES NFR BLD AUTO: 11.4 %
MV MEAN GRADIENT: 2 MMHG
MV PEAK A VEL: 0.57 M/S
MV PEAK E VEL: 0.89 M/S
MV PEAK GRADIENT: 4 MMHG
MV STENOSIS PRESSURE HALF TIME: 102 MS
MV VALVE AREA BY CONTINUITY EQUATION: 1.43 CM2
MV VALVE AREA P 1/2 METHOD: 2.16 CM2
NEUTROPHILS # BLD AUTO: 4.53 X10(3)/MCL (ref 2.1–9.2)
NEUTROPHILS NFR BLD AUTO: 67.3 %
NRBC BLD AUTO-RTO: 0 %
OHS LV EJECTION FRACTION SIMPSONS BIPLANE MOD: 50 %
OXYGEN DEVICE BLOOD GAS (OHS): ABNORMAL
PCO2 BLDA: 39 MMHG (ref 35–45)
PEEP RESPIRATORY: 5 CMH2O
PH BLDA: 7.38 [PH] (ref 7.35–7.45)
PHOSPHATE SERPL-MCNC: 2.3 MG/DL (ref 2.3–4.7)
PISA TR MAX VEL: 2.6 M/S
PLATELET # BLD AUTO: 221 X10(3)/MCL (ref 130–400)
PMV BLD AUTO: 10.6 FL (ref 7.4–10.4)
PO2 BLDA: 59 MMHG (ref 80–100)
POCT GLUCOSE: 167 MG/DL (ref 70–110)
POCT GLUCOSE: 212 MG/DL (ref 70–110)
POCT GLUCOSE: 213 MG/DL (ref 70–110)
POCT GLUCOSE: 272 MG/DL (ref 70–110)
POCT GLUCOSE: 300 MG/DL (ref 70–110)
POCT GLUCOSE: 333 MG/DL (ref 70–110)
POTASSIUM BLOOD GAS (OHS): 5.7 MMOL/L (ref 3.5–5)
POTASSIUM SERPL-SCNC: 5.7 MMOL/L (ref 3.5–5.1)
POTASSIUM SERPL-SCNC: 6.1 MMOL/L (ref 3.5–5.1)
POTASSIUM SERPL-SCNC: 6.6 MMOL/L (ref 3.5–5.1)
PROT SERPL-MCNC: 7.3 GM/DL (ref 5.8–7.6)
PROT SERPL-MCNC: 7.6 GM/DL (ref 5.8–7.6)
RA PRESSURE ESTIMATED: 3 MMHG
RBC # BLD AUTO: 4.96 X10(6)/MCL (ref 4.7–6.1)
RV TB RVSP: 6 MMHG
SAMPLE SITE BLOOD GAS (OHS): ABNORMAL
SAO2 % BLDA: 90 %
SINUS: 3.4 CM
SODIUM BLOOD GAS (OHS): 134 MMOL/L (ref 137–145)
SODIUM SERPL-SCNC: 134 MMOL/L (ref 136–145)
SODIUM SERPL-SCNC: 137 MMOL/L (ref 136–145)
SPONT+MECH VT ON VENT: 500 ML
TDI LATERAL: 0.09 M/S
TDI SEPTAL: 0.07 M/S
TDI: 0.08 M/S
TR MAX PG: 27 MMHG
TRICUSPID ANNULAR PLANE SYSTOLIC EXCURSION: 1.9 CM
TRIGL SERPL-MCNC: 276 MG/DL (ref 34–140)
TV REST PULMONARY ARTERY PRESSURE: 30 MMHG
WBC # BLD AUTO: 6.74 X10(3)/MCL (ref 4.5–11.5)
Z-SCORE OF LEFT VENTRICULAR DIMENSION IN END DIASTOLE: -5.29
Z-SCORE OF LEFT VENTRICULAR DIMENSION IN END SYSTOLE: -1.86

## 2025-07-06 PROCEDURE — 99900035 HC TECH TIME PER 15 MIN (STAT)

## 2025-07-06 PROCEDURE — 82803 BLOOD GASES ANY COMBINATION: CPT

## 2025-07-06 PROCEDURE — 84100 ASSAY OF PHOSPHORUS: CPT | Performed by: INTERNAL MEDICINE

## 2025-07-06 PROCEDURE — 63600175 PHARM REV CODE 636 W HCPCS

## 2025-07-06 PROCEDURE — 20000000 HC ICU ROOM

## 2025-07-06 PROCEDURE — 25000003 PHARM REV CODE 250

## 2025-07-06 PROCEDURE — 94761 N-INVAS EAR/PLS OXIMETRY MLT: CPT | Mod: XB

## 2025-07-06 PROCEDURE — 80053 COMPREHEN METABOLIC PANEL: CPT | Performed by: INTERNAL MEDICINE

## 2025-07-06 PROCEDURE — 83735 ASSAY OF MAGNESIUM: CPT | Performed by: INTERNAL MEDICINE

## 2025-07-06 PROCEDURE — 63600175 PHARM REV CODE 636 W HCPCS: Performed by: EMERGENCY MEDICINE

## 2025-07-06 PROCEDURE — 36415 COLL VENOUS BLD VENIPUNCTURE: CPT | Performed by: INTERNAL MEDICINE

## 2025-07-06 PROCEDURE — 94003 VENT MGMT INPAT SUBQ DAY: CPT

## 2025-07-06 PROCEDURE — 36600 WITHDRAWAL OF ARTERIAL BLOOD: CPT

## 2025-07-06 PROCEDURE — 94760 N-INVAS EAR/PLS OXIMETRY 1: CPT | Mod: XB

## 2025-07-06 PROCEDURE — 84132 ASSAY OF SERUM POTASSIUM: CPT | Performed by: INTERNAL MEDICINE

## 2025-07-06 PROCEDURE — 99900031 HC PATIENT EDUCATION (STAT)

## 2025-07-06 PROCEDURE — 25000003 PHARM REV CODE 250: Performed by: INTERNAL MEDICINE

## 2025-07-06 PROCEDURE — 84478 ASSAY OF TRIGLYCERIDES: CPT | Performed by: INTERNAL MEDICINE

## 2025-07-06 PROCEDURE — 85025 COMPLETE CBC W/AUTO DIFF WBC: CPT | Performed by: INTERNAL MEDICINE

## 2025-07-06 PROCEDURE — 31720 CLEARANCE OF AIRWAYS: CPT

## 2025-07-06 PROCEDURE — 99900026 HC AIRWAY MAINTENANCE (STAT)

## 2025-07-06 PROCEDURE — 36415 COLL VENOUS BLD VENIPUNCTURE: CPT

## 2025-07-06 PROCEDURE — 63600175 PHARM REV CODE 636 W HCPCS: Performed by: INTERNAL MEDICINE

## 2025-07-06 PROCEDURE — 80053 COMPREHEN METABOLIC PANEL: CPT

## 2025-07-06 PROCEDURE — 27100171 HC OXYGEN HIGH FLOW UP TO 24 HOURS

## 2025-07-06 RX ORDER — ACETAMINOPHEN 325 MG/1
650 TABLET ORAL EVERY 6 HOURS PRN
Status: DISCONTINUED | OUTPATIENT
Start: 2025-07-06 | End: 2025-07-17

## 2025-07-06 RX ORDER — INSULIN GLARGINE 100 [IU]/ML
15 INJECTION, SOLUTION SUBCUTANEOUS 2 TIMES DAILY
Status: DISCONTINUED | OUTPATIENT
Start: 2025-07-06 | End: 2025-07-11

## 2025-07-06 RX ORDER — NOREPINEPHRINE BITARTRATE/D5W 8 MG/250ML
0-3 PLASTIC BAG, INJECTION (ML) INTRAVENOUS CONTINUOUS
Status: DISCONTINUED | OUTPATIENT
Start: 2025-07-06 | End: 2025-07-27

## 2025-07-06 RX ORDER — CALCIUM GLUCONATE 20 MG/ML
1 INJECTION, SOLUTION INTRAVENOUS ONCE
Status: COMPLETED | OUTPATIENT
Start: 2025-07-06 | End: 2025-07-06

## 2025-07-06 RX ADMIN — PROPOFOL 40 MCG/KG/MIN: 10 INJECTION, EMULSION INTRAVENOUS at 12:07

## 2025-07-06 RX ADMIN — INSULIN GLARGINE 15 UNITS: 100 INJECTION, SOLUTION SUBCUTANEOUS at 07:07

## 2025-07-06 RX ADMIN — PROPOFOL 50 MCG/KG/MIN: 10 INJECTION, EMULSION INTRAVENOUS at 10:07

## 2025-07-06 RX ADMIN — MUPIROCIN: 20 OINTMENT TOPICAL at 09:07

## 2025-07-06 RX ADMIN — PIPERACILLIN SODIUM AND TAZOBACTAM SODIUM 4.5 G: 4; .5 INJECTION, POWDER, LYOPHILIZED, FOR SOLUTION INTRAVENOUS at 02:07

## 2025-07-06 RX ADMIN — PROPOFOL 25 MCG/KG/MIN: 10 INJECTION, EMULSION INTRAVENOUS at 03:07

## 2025-07-06 RX ADMIN — ACETAMINOPHEN 650 MG: 325 TABLET ORAL at 04:07

## 2025-07-06 RX ADMIN — DEXMEDETOMIDINE HYDROCHLORIDE 0.4 MCG/KG/HR: 4 INJECTION, SOLUTION INTRAVENOUS at 02:07

## 2025-07-06 RX ADMIN — DEXMEDETOMIDINE HYDROCHLORIDE 0.4 MCG/KG/HR: 4 INJECTION, SOLUTION INTRAVENOUS at 07:07

## 2025-07-06 RX ADMIN — PROPOFOL 40 MCG/KG/MIN: 10 INJECTION, EMULSION INTRAVENOUS at 04:07

## 2025-07-06 RX ADMIN — DEXMEDETOMIDINE HYDROCHLORIDE 0.4 MCG/KG/HR: 4 INJECTION, SOLUTION INTRAVENOUS at 12:07

## 2025-07-06 RX ADMIN — NOREPINEPHRINE BITARTRATE 0.02 MCG/KG/MIN: 8 INJECTION, SOLUTION INTRAVENOUS at 07:07

## 2025-07-06 RX ADMIN — PROPOFOL 45 MCG/KG/MIN: 10 INJECTION, EMULSION INTRAVENOUS at 02:07

## 2025-07-06 RX ADMIN — INSULIN GLARGINE 15 UNITS: 100 INJECTION, SOLUTION SUBCUTANEOUS at 08:07

## 2025-07-06 RX ADMIN — AZITHROMYCIN MONOHYDRATE 500 MG: 500 INJECTION, POWDER, LYOPHILIZED, FOR SOLUTION INTRAVENOUS at 11:07

## 2025-07-06 RX ADMIN — INSULIN ASPART 4 UNITS: 100 INJECTION, SOLUTION INTRAVENOUS; SUBCUTANEOUS at 12:07

## 2025-07-06 RX ADMIN — PIPERACILLIN SODIUM AND TAZOBACTAM SODIUM 4.5 G: 4; .5 INJECTION, POWDER, LYOPHILIZED, FOR SOLUTION INTRAVENOUS at 11:07

## 2025-07-06 RX ADMIN — INSULIN ASPART 4 UNITS: 100 INJECTION, SOLUTION INTRAVENOUS; SUBCUTANEOUS at 04:07

## 2025-07-06 RX ADMIN — SODIUM ZIRCONIUM CYCLOSILICATE 10 G: 10 POWDER, FOR SUSPENSION ORAL at 07:07

## 2025-07-06 RX ADMIN — CALCIUM GLUCONATE 1 G: 20 INJECTION, SOLUTION INTRAVENOUS at 04:07

## 2025-07-06 RX ADMIN — DEXMEDETOMIDINE HYDROCHLORIDE 0.6 MCG/KG/HR: 4 INJECTION, SOLUTION INTRAVENOUS at 02:07

## 2025-07-06 RX ADMIN — SODIUM ZIRCONIUM CYCLOSILICATE 10 G: 10 POWDER, FOR SUSPENSION ORAL at 04:07

## 2025-07-06 RX ADMIN — PROPOFOL 25 MCG/KG/MIN: 10 INJECTION, EMULSION INTRAVENOUS at 07:07

## 2025-07-06 RX ADMIN — HUMAN INSULIN 10 UNITS: 100 INJECTION, SOLUTION SUBCUTANEOUS at 04:07

## 2025-07-06 RX ADMIN — PIPERACILLIN SODIUM AND TAZOBACTAM SODIUM 4.5 G: 4; .5 INJECTION, POWDER, LYOPHILIZED, FOR SOLUTION INTRAVENOUS at 07:07

## 2025-07-06 RX ADMIN — INSULIN ASPART 6 UNITS: 100 INJECTION, SOLUTION INTRAVENOUS; SUBCUTANEOUS at 06:07

## 2025-07-06 RX ADMIN — MUPIROCIN: 20 OINTMENT TOPICAL at 08:07

## 2025-07-06 NOTE — PROGRESS NOTES
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  He presents to ED per EMS 07/05/2025 after multiple falls at nursing home with reported generalized weakness.  He is reported as having a Benítez catheter placed about 1 week ago for urinary retention.  Patient was reported as having pulled this out himself while at nursing home, on a.m. of presentation.  Benítez catheter was placed in ED, returning 700 cc of urine.  While in ER, he was reported as becoming more somnolent with poor overall respiratory effort and decreasing level of consciousness.  ABG revealed acute respiratory acidosis.  He was intubated at 8:55 a.m., placed on mechanical ventilatory support.  CT brain obtained in ED reveals chronic microvascular ischemic changes with no acute intracranial abnormalities.  SARS-COVID-2 PCR negative, influenza A/B PCR negative.  He is now admitted to ICU for ongoing medical care.     Hospital Course:  07/05/2025: Intubation/mechanical ventilation   TTE (07/05/2025: Normal LV size with decreased LV SF, EF 45-50%; trace MR, mild TR and mild PI.  PASP estimated 30 mm Hg; no pericardial effusion      24hr Interval History:  T-max 100.9° oral over the previous 24 hours.  Intake 2490 cc, output 3550 cc over the previous 24 hours.  Influenza A/B PCR negative, RSV antigen negative, respiratory PCR panel negative.  He requires sedation with propofol 30 mcg/kg per minute and Precedex 0.6 mcg/kg per hour due to ventilatory discordance with coughing paroxysms and desaturation.  He is not follow commands, exam is  clouded by need for sedation.  He remains sinus rhythm with no hypotension and no vasopressor requirements.  Small amount of nonpurulent secretions per ET.  CBGS noted greater than 400 yesterday, now to 03/30/2072 on current insulin regimen.    AC 22/500 cc/peep +5/40%    Scheduled Medications:   azithromycin  500 mg Intravenous Q24H    insulin glargine U-100  10 Units Subcutaneous QHS    mupirocin   Nasal BID    piperacillin-tazobactam (Zosyn) IV (PEDS and ADULTS) (extended infusion is not appropriate)  4.5 g Intravenous Q8H     PRN Medications:    Current Facility-Administered Medications:     acetaminophen, 650 mg, Per OG tube, Q6H PRN    dextrose 50%, 12.5 g, Intravenous, PRN    dextrose 50%, 12.5 g, Intravenous, PRN    glucagon (human recombinant), 1 mg, Intramuscular, PRN    glucagon (human recombinant), 1 mg, Intramuscular, PRN    insulin aspart U-100, 0-10 Units, Subcutaneous, Q6H PRN  Continuous Infusions:   dexmedeTOMIDine (Precedex) infusion (titrating)  0-1.4 mcg/kg/hr Intravenous Continuous 16.95 mL/hr at 07/06/25 0627 0.6 mcg/kg/hr at 07/06/25 0627    propofoL  0-50 mcg/kg/min (Dosing Weight) Intravenous Continuous 20.3 mL/hr at 07/06/25 0627 30 mcg/kg/min at 07/06/25 0627       No past medical history on file.    No past surgical history on file.    Objective:     Input/output:    Intake/Output Summary (Last 24 hours) at 7/6/2025 0658  Last data filed at 7/6/2025 0627  Gross per 24 hour   Intake 2491.48 ml   Output 3550 ml   Net -1058.52 ml       Vital Signs (Most Recent):  Temp: 100 °F (37.8 °C) (07/06/25 0615)  Pulse: 63 (07/06/25 0600)  Resp: (!) 22 (07/06/25 0600)  BP: 129/81 (07/06/25 0600)  SpO2: (!) 92 % (07/06/25 0600)  Body mass index is 38.01 kg/m².  Weight: 113.4 kg (250 lb) Vital Signs (24h Range):  Temp:  [97.3 °F (36.3 °C)-100.9 °F (38.3 °C)] 100 °F (37.8 °C)  Pulse:  [59-89] 63  Resp:  [13-25] 22  SpO2:  [91 %-100 %] 92 %  BP: ()/(61-98) 129/81     Physical Exam  Constitutional:        Comments: Intubated on mechanical ventilatory support, sedated on propofol and Precedex.   HENT:      Mouth/Throat:      Comments: ET secured to external os  Eyes:      Pupils: Pupils are equal, round, and reactive to light.   Cardiovascular:      Rate and Rhythm: Normal rate and regular rhythm.      Heart sounds: No murmur heard.  Pulmonary:      Breath sounds: Rhonchi (Bilateral scattered) present. No wheezing or rales.   Abdominal:      General: Bowel sounds are normal. There is distension (Moderate distention, soft.  Easily reducible ventral hernia.).      Palpations: Abdomen is soft.      Comments: Obese   Musculoskeletal:      Right lower leg: No edema.      Left lower leg: No edema.   Neurological:      Comments: Neurologic exam clouded by need for sedation with propofol and Precedex.  Pupils equal round at 1 mm and reactive  Resists eye opening.  Spontaneous respiratory effort noted.  Cough reflex intact to deep endotracheal suctioning  Minimal left upper and left lower extremity movement to pain         Lines/Drains/Airways       Drain  Duration                  NG/OG Tube 07/05/25 0851 [place] sump 18 Fr. Left mouth <1 day         Urethral Catheter 07/05/25 0800 Non-latex 16 Fr. <1 day              Airway  Duration                  Airway - Non-Surgical 07/05/25 0848 Endotracheal Tube <1 day              Peripheral Intravenous Line  Duration             Peripheral IV Single Lumen 07/05/25 0638 18 G Left;Posterior Forearm 1 day    Peripheral IV Single Lumen 07/05/25 0847 18 G Right Antecubital <1 day                    Vent:  Vent Mode: A/C (07/06/25 0407)  Ventilator Initiated: Yes (07/05/25 0850)  Set Rate: 22 BPM (07/06/25 0407)  Vt Set: 500 mL (07/06/25 0407)  PEEP/CPAP: 5 cmH20 (07/06/25 0407)  Oxygen Concentration (%): 40 (07/06/25 0407)  Peak Airway Pressure: 23 cmH20 (07/06/25 0407)  Total Ve: 11.2 L/m (07/06/25 0407)  F/VT Ratio<105 (RSBI): (!) 43.82 (07/06/25 0407)    ABGs:  Lab Results    Component Value Date    PH 7.380 07/06/2025    PO2 59.0 (L) 07/06/2025    PCO2 39.0 07/06/2025         Significant Labs:    Lab Results   Component Value Date    WBC 6.74 07/06/2025    HGB 14.3 07/06/2025    HCT 44.1 07/06/2025    MCV 88.9 07/06/2025     07/06/2025         Recent Labs   Lab 07/05/25  0727 07/06/25  0316   NA  --  134*   K  --  6.6*   CL  --  106   CO2  --  22*   BUN  --  72.5*   CREATININE  --  2.18*   CALCIUM  --  8.2*   MG  --  2.60   PHOS  --  2.3   TRIG  --  276*   AST  --  12   ALT  --  18   ALKPHOS  --  54   ALBUMIN  --  2.9*   INR 1.2  --    PROTIME 15.1*  --      Imaging:   Chest x-ray (07/06/2025, my reading of images):  ET appears adequate in placement.  Poor inspiration with vascular crowding and bilateral linear basilar atelectasis.  Mild decreased penetration of the bases, can not rule out bilateral basilar infiltrates left greater than right.    Assessment:     Acute combined hypoxic and hypercapnic respiratory failure.  Chest x-ray with bilateral patchy infiltrates, differential diagnostic possibilities including hydrostatic/cardiogenic versus non hydrostatic pulmonary edema versus infection, aspiration, etc.  History of cerebrovascular disease with right hemiparesis  Acute kidney injury superimposed on stage II chronic kidney disease, obstructive uropathy component.  He remains nonoliguric, some improvement in BUN/creatinine over the past 24 hours.  Type 2 diabetes mellitus with polyneuropathy, marked hyperglycemia  Hyperkalemia associated with combination of above  Hypertension  Hyperlipidemia  Glaucoma    Plan:     Continue IV Zosyn (day 2) and IV azithromycin (day 2).  Continue to closely follow all cultures.  Single dose of Lokelma this a.m.  Increase Lantus dosage to q.12 hours, continuing high-dose regular insulin sliding scale.  Continue current mechanical ventilatory support  Attempt to wean sedation as tolerated.       35 minutes of critical care was time spent  personally by me on the following activities: development of treatment plan with patient or surrogate and bedside caregivers, discussions with consultants, evaluation of patient's response to treatment, examination of patient, ordering and performing treatments and interventions, ordering and review of laboratory studies, ordering and review of radiographic studies, pulse oximetry, re-evaluation of patient's condition.  This patient demonstrates a high probability for further clinical decompensation due to ongoing critical illness.  Critical care time did not overlap with that of any other provider or involve time for any procedures.       Jamee Alanis MD, [facility]  Pulmonary/Critical Care

## 2025-07-06 NOTE — PLAN OF CARE
Problem: Infection  Goal: Absence of Infection Signs and Symptoms  Outcome: Progressing     Problem: Adult Inpatient Plan of Care  Goal: Plan of Care Review  Outcome: Progressing  Goal: Patient-Specific Goal (Individualized)  Outcome: Progressing  Goal: Absence of Hospital-Acquired Illness or Injury  Outcome: Progressing  Goal: Optimal Comfort and Wellbeing  Outcome: Progressing  Goal: Readiness for Transition of Care  Outcome: Progressing     Problem: Mechanical Ventilation Invasive  Goal: Effective Communication  Outcome: Progressing  Goal: Optimal Device Function  Outcome: Progressing  Goal: Mechanical Ventilation Liberation  Outcome: Progressing  Goal: Optimal Nutrition Delivery  Outcome: Progressing  Goal: Absence of Device-Related Skin and Tissue Injury  Outcome: Progressing  Goal: Absence of Ventilator-Induced Lung Injury  Outcome: Progressing     Problem: Artificial Airway  Goal: Effective Communication  Outcome: Progressing  Goal: Optimal Device Function  Outcome: Progressing  Goal: Absence of Device-Related Skin or Tissue Injury  Outcome: Progressing     Problem: Noninvasive Ventilation Acute  Goal: Effective Unassisted Ventilation and Oxygenation  Outcome: Progressing     Problem: Skin Injury Risk Increased  Goal: Skin Health and Integrity  Outcome: Progressing

## 2025-07-07 LAB
ALBUMIN SERPL-MCNC: 2.8 G/DL (ref 3.4–4.8)
ALBUMIN/GLOB SERPL: 0.6 RATIO (ref 1.1–2)
ALLENS TEST BLOOD GAS (OHS): YES
ALP SERPL-CCNC: 53 UNIT/L (ref 40–150)
ALT SERPL-CCNC: 15 UNIT/L (ref 0–55)
ANION GAP SERPL CALC-SCNC: 7 MEQ/L
AST SERPL-CCNC: 10 UNIT/L (ref 11–45)
BACTERIA SPT CULT: NORMAL
BACTERIA UR CULT: NORMAL
BASE EXCESS BLD CALC-SCNC: -1.3 MMOL/L
BASOPHILS # BLD AUTO: 0.04 X10(3)/MCL
BASOPHILS NFR BLD AUTO: 0.4 %
BILIRUB SERPL-MCNC: 0.3 MG/DL
BLOOD GAS SAMPLE TYPE (OHS): ABNORMAL
BUN SERPL-MCNC: 60.8 MG/DL (ref 8.4–25.7)
CA-I BLD-SCNC: 1.14 MMOL/L (ref 1.12–1.23)
CALCIUM SERPL-MCNC: 8.3 MG/DL (ref 8.8–10)
CHLORIDE SERPL-SCNC: 110 MMOL/L (ref 98–107)
CO2 BLDA-SCNC: 25.6 MMOL/L
CO2 SERPL-SCNC: 21 MMOL/L (ref 23–31)
CREAT SERPL-MCNC: 1.81 MG/DL (ref 0.72–1.25)
CREAT/UREA NIT SERPL: 34
DRAWN BY BLOOD GAS (OHS): ABNORMAL
EOSINOPHIL # BLD AUTO: 0.13 X10(3)/MCL (ref 0–0.9)
EOSINOPHIL NFR BLD AUTO: 1.3 %
ERYTHROCYTE [DISTWIDTH] IN BLOOD BY AUTOMATED COUNT: 14.5 % (ref 11.5–17)
GFR SERPLBLD CREATININE-BSD FMLA CKD-EPI: 40 ML/MIN/1.73/M2
GLOBULIN SER-MCNC: 4.5 GM/DL (ref 2.4–3.5)
GLUCOSE SERPL-MCNC: 167 MG/DL (ref 82–115)
GRAM STN SPEC: NORMAL
GRAM STN SPEC: NORMAL
HCO3 BLDA-SCNC: 24.3 MMOL/L (ref 22–26)
HCT VFR BLD AUTO: 46.1 % (ref 42–52)
HGB BLD-MCNC: 14.3 G/DL (ref 14–18)
IMM GRANULOCYTES # BLD AUTO: 0.03 X10(3)/MCL (ref 0–0.04)
IMM GRANULOCYTES NFR BLD AUTO: 0.3 %
INHALED O2 CONCENTRATION: 40 %
LYMPHOCYTES # BLD AUTO: 2.68 X10(3)/MCL (ref 0.6–4.6)
LYMPHOCYTES NFR BLD AUTO: 26.3 %
MAGNESIUM SERPL-MCNC: 2.4 MG/DL (ref 1.6–2.6)
MCH RBC QN AUTO: 28.5 PG (ref 27–31)
MCHC RBC AUTO-ENTMCNC: 31 G/DL (ref 33–36)
MCV RBC AUTO: 92 FL (ref 80–94)
MECH RR (OHS): 22 B/MIN
MODE (OHS): AC
MONOCYTES # BLD AUTO: 0.93 X10(3)/MCL (ref 0.1–1.3)
MONOCYTES NFR BLD AUTO: 9.1 %
NEUTROPHILS # BLD AUTO: 6.37 X10(3)/MCL (ref 2.1–9.2)
NEUTROPHILS NFR BLD AUTO: 62.6 %
NRBC BLD AUTO-RTO: 0 %
OXYGEN DEVICE BLOOD GAS (OHS): ABNORMAL
PCO2 BLDA: 43 MMHG (ref 35–45)
PEEP RESPIRATORY: 5 CMH2O
PH BLDA: 7.36 [PH] (ref 7.35–7.45)
PHOSPHATE SERPL-MCNC: 2.7 MG/DL (ref 2.3–4.7)
PLATELET # BLD AUTO: 203 X10(3)/MCL (ref 130–400)
PMV BLD AUTO: 10.6 FL (ref 7.4–10.4)
PO2 BLDA: 60 MMHG (ref 80–100)
POCT GLUCOSE: 161 MG/DL (ref 70–110)
POCT GLUCOSE: 161 MG/DL (ref 70–110)
POCT GLUCOSE: 162 MG/DL (ref 70–110)
POCT GLUCOSE: 184 MG/DL (ref 70–110)
POCT GLUCOSE: 188 MG/DL (ref 70–110)
POTASSIUM BLOOD GAS (OHS): 4.7 MMOL/L (ref 3.5–5)
POTASSIUM SERPL-SCNC: 4.8 MMOL/L (ref 3.5–5.1)
PROT SERPL-MCNC: 7.3 GM/DL (ref 5.8–7.6)
RBC # BLD AUTO: 5.01 X10(6)/MCL (ref 4.7–6.1)
SAMPLE SITE BLOOD GAS (OHS): ABNORMAL
SAO2 % BLDA: 89 %
SODIUM BLOOD GAS (OHS): 139 MMOL/L (ref 137–145)
SODIUM SERPL-SCNC: 138 MMOL/L (ref 136–145)
SPONT+MECH VT ON VENT: 500 ML
TRIGL SERPL-MCNC: 424 MG/DL (ref 34–140)
WBC # BLD AUTO: 10.18 X10(3)/MCL (ref 4.5–11.5)

## 2025-07-07 PROCEDURE — 63600175 PHARM REV CODE 636 W HCPCS: Performed by: INTERNAL MEDICINE

## 2025-07-07 PROCEDURE — 99900035 HC TECH TIME PER 15 MIN (STAT)

## 2025-07-07 PROCEDURE — 94003 VENT MGMT INPAT SUBQ DAY: CPT

## 2025-07-07 PROCEDURE — 27200966 HC CLOSED SUCTION SYSTEM

## 2025-07-07 PROCEDURE — 63600175 PHARM REV CODE 636 W HCPCS

## 2025-07-07 PROCEDURE — 85025 COMPLETE CBC W/AUTO DIFF WBC: CPT | Performed by: INTERNAL MEDICINE

## 2025-07-07 PROCEDURE — 99900031 HC PATIENT EDUCATION (STAT)

## 2025-07-07 PROCEDURE — 63600175 PHARM REV CODE 636 W HCPCS: Performed by: EMERGENCY MEDICINE

## 2025-07-07 PROCEDURE — 99900026 HC AIRWAY MAINTENANCE (STAT)

## 2025-07-07 PROCEDURE — 94761 N-INVAS EAR/PLS OXIMETRY MLT: CPT | Mod: XB

## 2025-07-07 PROCEDURE — 82803 BLOOD GASES ANY COMBINATION: CPT

## 2025-07-07 PROCEDURE — 94760 N-INVAS EAR/PLS OXIMETRY 1: CPT | Mod: XB

## 2025-07-07 PROCEDURE — 25000003 PHARM REV CODE 250: Performed by: INTERNAL MEDICINE

## 2025-07-07 PROCEDURE — 25000003 PHARM REV CODE 250

## 2025-07-07 PROCEDURE — 80053 COMPREHEN METABOLIC PANEL: CPT | Performed by: INTERNAL MEDICINE

## 2025-07-07 PROCEDURE — 36600 WITHDRAWAL OF ARTERIAL BLOOD: CPT

## 2025-07-07 PROCEDURE — 27100171 HC OXYGEN HIGH FLOW UP TO 24 HOURS

## 2025-07-07 PROCEDURE — 84478 ASSAY OF TRIGLYCERIDES: CPT | Performed by: INTERNAL MEDICINE

## 2025-07-07 PROCEDURE — 36415 COLL VENOUS BLD VENIPUNCTURE: CPT | Performed by: INTERNAL MEDICINE

## 2025-07-07 PROCEDURE — 20000000 HC ICU ROOM

## 2025-07-07 PROCEDURE — 84100 ASSAY OF PHOSPHORUS: CPT | Performed by: INTERNAL MEDICINE

## 2025-07-07 PROCEDURE — 83735 ASSAY OF MAGNESIUM: CPT | Performed by: INTERNAL MEDICINE

## 2025-07-07 RX ORDER — FENTANYL CITRATE 50 UG/ML
100 INJECTION, SOLUTION INTRAMUSCULAR; INTRAVENOUS
Refills: 0 | Status: DISCONTINUED | OUTPATIENT
Start: 2025-07-07 | End: 2025-07-18

## 2025-07-07 RX ADMIN — AZITHROMYCIN MONOHYDRATE 500 MG: 500 INJECTION, POWDER, LYOPHILIZED, FOR SOLUTION INTRAVENOUS at 10:07

## 2025-07-07 RX ADMIN — DEXMEDETOMIDINE HYDROCHLORIDE 0.8 MCG/KG/HR: 4 INJECTION, SOLUTION INTRAVENOUS at 07:07

## 2025-07-07 RX ADMIN — DEXMEDETOMIDINE HYDROCHLORIDE 0.8 MCG/KG/HR: 4 INJECTION, SOLUTION INTRAVENOUS at 02:07

## 2025-07-07 RX ADMIN — INSULIN ASPART 2 UNITS: 100 INJECTION, SOLUTION INTRAVENOUS; SUBCUTANEOUS at 05:07

## 2025-07-07 RX ADMIN — FENTANYL CITRATE 100 MCG: 50 INJECTION INTRAMUSCULAR; INTRAVENOUS at 01:07

## 2025-07-07 RX ADMIN — INSULIN GLARGINE 15 UNITS: 100 INJECTION, SOLUTION SUBCUTANEOUS at 08:07

## 2025-07-07 RX ADMIN — PIPERACILLIN SODIUM AND TAZOBACTAM SODIUM 4.5 G: 4; .5 INJECTION, POWDER, LYOPHILIZED, FOR SOLUTION INTRAVENOUS at 10:07

## 2025-07-07 RX ADMIN — PROPOFOL 50 MCG/KG/MIN: 10 INJECTION, EMULSION INTRAVENOUS at 04:07

## 2025-07-07 RX ADMIN — PIPERACILLIN SODIUM AND TAZOBACTAM SODIUM 4.5 G: 4; .5 INJECTION, POWDER, LYOPHILIZED, FOR SOLUTION INTRAVENOUS at 07:07

## 2025-07-07 RX ADMIN — DEXMEDETOMIDINE HYDROCHLORIDE 0.8 MCG/KG/HR: 4 INJECTION, SOLUTION INTRAVENOUS at 11:07

## 2025-07-07 RX ADMIN — INSULIN ASPART 1 UNITS: 100 INJECTION, SOLUTION INTRAVENOUS; SUBCUTANEOUS at 12:07

## 2025-07-07 RX ADMIN — DEXMEDETOMIDINE HYDROCHLORIDE 0.6 MCG/KG/HR: 4 INJECTION, SOLUTION INTRAVENOUS at 02:07

## 2025-07-07 RX ADMIN — MUPIROCIN: 20 OINTMENT TOPICAL at 08:07

## 2025-07-07 RX ADMIN — PROPOFOL 50 MCG/KG/MIN: 10 INJECTION, EMULSION INTRAVENOUS at 12:07

## 2025-07-07 RX ADMIN — PROPOFOL 50 MCG/KG/MIN: 10 INJECTION, EMULSION INTRAVENOUS at 09:07

## 2025-07-07 RX ADMIN — PROPOFOL 50 MCG/KG/MIN: 10 INJECTION, EMULSION INTRAVENOUS at 02:07

## 2025-07-07 RX ADMIN — PROPOFOL 50 MCG/KG/MIN: 10 INJECTION, EMULSION INTRAVENOUS at 11:07

## 2025-07-07 RX ADMIN — INSULIN ASPART 2 UNITS: 100 INJECTION, SOLUTION INTRAVENOUS; SUBCUTANEOUS at 11:07

## 2025-07-07 RX ADMIN — PROPOFOL 50 MCG/KG/MIN: 10 INJECTION, EMULSION INTRAVENOUS at 07:07

## 2025-07-07 RX ADMIN — PIPERACILLIN SODIUM AND TAZOBACTAM SODIUM 4.5 G: 4; .5 INJECTION, POWDER, LYOPHILIZED, FOR SOLUTION INTRAVENOUS at 02:07

## 2025-07-07 NOTE — PLAN OF CARE
Resident at [facility]  Initial DC Assessment info obtained for pt's dgt Suzanne  Pt is ; 4 living children; Dgt Suzannesailaja Padilla [facility]  Pt currently on vent   07/07/25 0976   Discharge Assessment   Assessment Type Discharge Planning Assessment   Confirmed/corrected address, phone number and insurance Yes   Confirmed Demographics Correct on Facesheet   Source of Information family   Communicated JANELL with patient/caregiver Date not available/Unable to determine   People in Home facility resident   Facility Arrived From: [facility]   Do you expect to return to your current living situation? Yes   Current cognitive status: Coma/Sedated/Intubated   Walking or Climbing Stairs Difficulty yes   Walking or Climbing Stairs ambulation difficulty, requires equipment;ambulation difficulty, assistance 1 person   Mobility Management per dgt pt was walking with rollator for Father's day this year and then the next week has been in w/c with need of assistance for transfers.   Dressing/Bathing Difficulty yes   Dressing/Bathing bathing difficulty, assistance 1 person;dressing difficulty, assistance 1 person   Equipment Currently Used at Home wheelchair   Readmission within 30 days? Yes   Are you on dialysis? No   Do you take coumadin? No   Discharge Plan A Return to nursing home   Discharge Plan B Return to Nursing Home   Discharge Plan discussed with: Adult children   Transition of Care Barriers None   Financial Resource Strain   How hard is it for you to pay for the very basics like food, housing, medical care, and heating? Pt Unable   Housing Stability   At any time in the past 12 months, were you homeless or living in a shelter (including now)? Pt Unable   Transportation Needs   In the past 12 months, has lack of transportation kept you from medical appointments or from getting medications? Pt Unable   In the past 12 months, has lack of transportation kept you from meetings, work, or from getting things needed for daily living? Pt Unable    Food Insecurity   Within the past 12 months, you worried that your food would run out before you got the money to buy more. Pt Unable   Within the past 12 months, the food you bought just didn't last and you didn't have money to get more. Pt Unable   Stress   Do you feel stress - tense, restless, nervous, or anxious, or unable to sleep at night because your mind is troubled all the time - these days? Pt Unable   Social Isolation   How often do you feel lonely or isolated from those around you?  Patient unable to answer   Alcohol Use   Q1: How often do you have a drink containing alcohol? Pt Unable   Q2: How many drinks containing alcohol do you have on a typical day when you are drinking? Pt Unable   Q3: How often do you have six or more drinks on one occasion? Pt Unable   Utilities   In the past 12 months has the electric, gas, oil, or water company threatened to shut off services in your home? Pt Unable   Health Literacy   How often do you need to have someone help you when you read instructions, pamphlets, or other written material from your doctor or pharmacy? Patient unable to respond

## 2025-07-07 NOTE — CONSULTS
Inpatient Nutrition Assessment    Admit Date: 7/5/2025   Total duration of encounter: 2 days   Patient Age: 69 y.o.    Nutrition Recommendation/Prescription     Start tube feeding as appropriate. Recommend:  Peptamen Intense VHP @ 65 mL/hr + 1 pckt ProSource TF20 to provide:  1380 kcals   (100% needs, 104% needs with Diprivan considered)  140 g Pro   (100% needs)  98 g CHO  (70% needs)  1092 mL free water  (48% needs)  Estimated based off 20 hr/day run time.     Recommend 30 mL/hr FWF to aid in meeting fluid needs. Provides 1692 mL total free water daily, 75% estimated needs.     Communication of Recommendations: reviewed with nurse    Nutrition Assessment     Malnutrition Assessment/Nutrition-Focused Physical Exam       Malnutrition Level: other (see comments) (Does not meet criteria) (07/07/25 1041)                                                        A minimum of two characteristics is recommended for diagnosis of either severe or non-severe malnutrition.    Chart Review    Reason Seen: continuous nutrition monitoring and physician consult for RD to calculate goal TF rate and optimal formula    Malnutrition Screening Tool Results   Have you recently lost weight without trying?: No  Have you been eating poorly because of a decreased appetite?: No   MST Score: 0   Diagnosis:  Acute combined hypoxic and hypercapnic respiratory failure.    History of cerebrovascular disease with right hemiparesis  Acute kidney injury superimposed on stage II chronic kidney disease, obstructive uropathy component.  He remains nonoliguric, daily improvement in BUN/creatinine   Type 2 diabetes mellitus with polyneuropathy, hyperglycemia improved.  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, glaucoma, hypertension, and hyperlipidemia     Scheduled  Medications:  azithromycin, 500 mg, Q24H  insulin glargine U-100, 15 Units, BID  mupirocin, , BID  piperacillin-tazobactam (Zosyn) IV (PEDS and ADULTS) (extended infusion is not appropriate), 4.5 g, Q8H    Continuous Infusions:  dexmedeTOMIDine (Precedex) infusion (titrating), Last Rate: 0.4 mcg/kg/hr (07/07/25 0631)  NORepinephrine bitartrate-D5W, Last Rate: 0.02 mcg/kg/min (07/07/25 0631)  propofoL, Last Rate: 35 mcg/kg/min (07/07/25 0631)    PRN Medications:  acetaminophen, 650 mg, Q6H PRN  dextrose 50%, 12.5 g, PRN  dextrose 50%, 12.5 g, PRN  glucagon (human recombinant), 1 mg, PRN  glucagon (human recombinant), 1 mg, PRN  insulin aspart U-100, 0-10 Units, Q6H PRN    Calorie Containing IV Medications: Diprivan @ 23.7 ml/hr (provides 266 kcal/d)    Recent Labs   Lab 07/02/25  0706 07/03/25  1330 07/05/25  0636 07/05/25  0727 07/05/25  0941 07/06/25  0316 07/06/25  0649 07/06/25  1404 07/07/25  0311     --  137  --   --  134* 137  --  138   K 5.6*  --  5.5*  --   --  6.6* 6.1* 5.7* 4.8   CALCIUM 8.3*  --  8.5*  --   --  8.2* 8.2*  --  8.3*   PHOS 3.5  --   --   --   --  2.3  --   --  2.7   MG  --   --  2.90*  --   --  2.60  --   --  2.40     --  105  --   --  106 108*  --  110*   CO2 26  --  26  --   --  22* 22*  --  21*   BUN 44.8*  --  75.0*  --   --  72.5* 70.4*  --  60.8*   CREATININE 1.65*  --  2.50*  --   --  2.18* 2.08*  --  1.81*   EGFRNORACEVR 45  --  27  --   --  32 34  --  40   *  --  114  --   --  327* 261*  --  167*   BILITOT 0.5  --  0.2  --   --  0.2 0.2  --  0.3   ALKPHOS 53  --  54  --   --  54 50  --  53   ALT 14  --  13  --   --  18 17  --  15   AST 10*  --  16  --   --  12 15  --  10*   ALBUMIN 3.0*  --  3.0*  --   --  2.9* 2.9*  --  2.8*   PREALB 19.8  --   --   --   --   --   --   --   --    TRIG 108  --   --   --   --  276*  --   --  424*   HGBA1C 7.3*  --   --   --   --   --   --   --   --    AMMONIA  --  104.0*  --   --  23.9  --   --   --   --    WBC 8.16  --   --  5.39  " --  6.74  --   --  10.18   HGB 13.2*  --   --  13.4*  --  14.3  --   --  14.3   HCT 42.4  --   --  43.5  --  44.1  --   --  46.1     Nutrition Orders:  No diet orders on file  Tube Feedings/Formulas 25; 500; Peptamen AF; OG; 50; Every 4 hours    Appetite/Oral Intake: NPO/NPO  Factors Affecting Nutritional Intake: NPO and on mechanical ventilation  Social Needs Impacting Access to Food: none identified; NH resident   Food/Rastafarian/Cultural Preferences: unable to obtain  Food Allergies: no known food allergies  Last Bowel Movement:  (ISI-intubated/sedated)  Wound(s):  no partial or full thickness pressure injuries documented at this time.     Comments    7/7/25: Pt intubated on mechanical ventilation, receiving kcals from diprivan. Not currently receiving enteral nutrition. Tube feeding orders updated.     Anthropometrics    Height: 5' 8" (172.7 cm), Height Method: Stated  Last Weight: 113.4 kg (250 lb) (07/05/25 0605), Weight Method: Stated  BMI (Calculated): 38  BMI Classification: obese grade II (BMI 35-39.9)        Ideal Body Weight (IBW), Male: 154 lb     % Ideal Body Weight, Male (lb): 162.34 %                          Usual Weight Provided By: EMR weight history    Wt Readings from Last 5 Encounters:   07/05/25 113.4 kg (250 lb)     Weight Change(s) Since Admission:   Wt Readings from Last 1 Encounters:   07/05/25 0605 113.4 kg (250 lb)   Admit Weight: 113.4 kg (250 lb) (07/05/25 0605), Weight Method: Stated    Estimated Needs    Weight Used For Calorie Calculations: 113.4 kg (250 lb)  Energy Calorie Requirements (kcal): 2507-5098 kcals (11-14 kcal/kg)  Energy Need Method: Kcal/kg  Weight Used For Protein Calculations: 70 kg (154 lb 5.2 oz) (IBW)  Protein Requirements: 140 g (2 g/kg IBW)  Fluid Requirements (mL): 2268 mL (20 mL/kg)  CHO Requirement: 140-178 g/day (45% of total EEN)     Enteral Nutrition     Patient not receiving enteral nutrition at this time.    Parenteral Nutrition     Patient not receiving " parenteral nutrition support at this time.    Evaluation of Received Nutrient Intake    Calories: not meeting estimated needs  Protein: not meeting estimated needs    Patient Education     Not applicable.    Nutrition Diagnosis     PES: Inadequate energy intake related to inability to consume sufficient nutrients as evidenced by on mechanical ventilation, need for NPO status. (new)     PES:            Nutrition Interventions     Intervention(s): modified composition of enteral nutrition, modified rate of enteral nutrition, and collaboration with other providers  Intervention(s):      Goal: Meet greater than 80% of nutritional needs by follow-up. (new)  Goal: Tolerate enteral feeding at goal rate by follow-up. (new)    Nutrition Goals & Monitoring     Dietitian will monitor: energy intake, enteral nutrition intake, weight, electrolyte/renal panel, glucose/endocrine profile, and gastrointestinal profile  Discharge planning: too early to determine; pending clinical course  Nutrition Risk/Follow-Up: patient at increased nutrition risk; dietitian will follow-up twice weekly   Please consult if re-assessment needed sooner.

## 2025-07-07 NOTE — PROGRESS NOTES
[facility] - 7th Floor ICU  Pulmonary/Critical Care  Progress Note  7/7/2025    Patient Name: Charly Padilla  MRN: [id number]  Admission Date: 7/5/2025  Code Status: No Order      Subjective:     HPI:  The patient is a 69-year-old male nursing home resident with a history of type 2 diabetes mellitus with polyneuropathy, stage II chronic kidney disease, cerebrovascular disease with previous stroke and right hemiparesis, glaucoma, hypertension, and hyperlipidemia.  He presents to ED per EMS 07/05/2025 after multiple falls at nursing home with reported generalized weakness.  He is reported as having a Benítez catheter placed about 1 week ago for urinary retention.  Patient was reported as having pulled this out himself while at nursing home, on a.m. of presentation.  Benítez catheter was placed in ED, returning 700 cc of urine.  While in ER, he was reported as becoming more somnolent with poor overall respiratory effort and decreasing level of consciousness.  ABG revealed acute respiratory acidosis.  He was intubated at 8:55 a.m., placed on mechanical ventilatory support.  CT brain obtained in ED reveals chronic microvascular ischemic changes with no acute intracranial abnormalities.  SARS-COVID-2 PCR negative, influenza A/B PCR negative.  He is now admitted to ICU for ongoing medical care.     Hospital Course:  07/05/2025: Intubation/mechanical ventilation   TTE (07/05/2025: Normal LV size with decreased LV SF, EF 45-50%; trace MR, mild TR and mild PI.  PASP estimated 30 mm Hg; no pericardial effusion      24hr Interval History:  T-max 100.8° oral over the previous 24 hours.  Intake 930 cc, output 1550 cc over the previous 24 hours.  Blood cultures no growth, respiratory cultures normal URF.  He remains on Zosyn and Zithromax.  CBGS improved, running 161-212.  He remains sinus rhythm/sinus bradycardia.  He remains sedated on propofol at 35 mcg/kg per minute and Precedex 0.4 mcg/kg per hour.  He has occasional  coughing and respiratory discordance with desaturation requiring re sedation on attempts at holding.  Higher doses of propofol result in hypotension which required Levophed infusion.  Suctioning moderate amount of clear to yellow secretions per ET.    AC 22/500 cc/peep +5/40%    Scheduled Medications:   azithromycin  500 mg Intravenous Q24H    insulin glargine U-100  15 Units Subcutaneous BID    mupirocin   Nasal BID    piperacillin-tazobactam (Zosyn) IV (PEDS and ADULTS) (extended infusion is not appropriate)  4.5 g Intravenous Q8H     PRN Medications:    Current Facility-Administered Medications:     acetaminophen, 650 mg, Per OG tube, Q6H PRN    dextrose 50%, 12.5 g, Intravenous, PRN    dextrose 50%, 12.5 g, Intravenous, PRN    glucagon (human recombinant), 1 mg, Intramuscular, PRN    glucagon (human recombinant), 1 mg, Intramuscular, PRN    insulin aspart U-100, 0-10 Units, Subcutaneous, Q6H PRN  Continuous Infusions:   dexmedeTOMIDine (Precedex) infusion (titrating)  0-1.4 mcg/kg/hr Intravenous Continuous 17.01 mL/hr at 07/07/25 0246 0.6 mcg/kg/hr at 07/07/25 0246    NORepinephrine bitartrate-D5W  0-3 mcg/kg/min (Dosing Weight) Intravenous Continuous   Stopped at 07/06/25 2131    propofoL  0-50 mcg/kg/min (Dosing Weight) Intravenous Continuous 34 mL/hr at 07/07/25 0433 50 mcg/kg/min at 07/07/25 0433       No past medical history on file.    No past surgical history on file.    Objective:     Input/output:    Intake/Output Summary (Last 24 hours) at 7/7/2025 0620  Last data filed at 7/7/2025 0000  Gross per 24 hour   Intake 1217.95 ml   Output 1550 ml   Net -332.05 ml       Vital Signs (Most Recent):  Temp: 98.7 °F (37.1 °C) (07/07/25 0400)  Pulse: (!) 56 (07/07/25 0604)  Resp: (!) 24 (07/07/25 0604)  BP: (!) 144/79 (07/07/25 0604)  SpO2: 96 % (07/07/25 0604)  Body mass index is 38.01 kg/m².  Weight: 113.4 kg (250 lb) Vital Signs (24h Range):  Temp:  [98.5 °F (36.9 °C)-100.8 °F (38.2 °C)] 98.7 °F (37.1  °C)  Pulse:  [51-73] 56  Resp:  [17-31] 24  SpO2:  [87 %-96 %] 96 %  BP: ()/(46-94) 144/79     Physical Exam  Constitutional:       Comments: Intubated on mechanical ventilatory support, sedated on propofol and Precedex.   HENT:      Mouth/Throat:      Comments: ET secured to external os  Eyes:      Pupils: Pupils are equal, round, and reactive to light.   Cardiovascular:      Rate and Rhythm: Normal rate and regular rhythm.      Heart sounds: No murmur heard.  Pulmonary:      Breath sounds: Rhonchi (Bilateral scattered) present. No wheezing or rales.   Abdominal:      General: Bowel sounds are normal. There is distension (Moderate distention, soft.  Easily reducible ventral hernia.).      Palpations: Abdomen is soft.      Comments: Obese   Musculoskeletal:      Right lower leg: No edema.      Left lower leg: No edema.   Neurological:      Comments: Neurologic exam clouded by need for sedation with propofol and Precedex.  Pupils equal round at 1 mm and reactive  Resists eye opening.  Spontaneous respiratory effort noted.  Cough reflex intact to deep endotracheal suctioning  Minimal left upper and left lower extremity movement to pain         Lines/Drains/Airways       Drain  Duration                  NG/OG Tube 07/05/25 0851 [place] sump 18 Fr. Left mouth 1 day         Urethral Catheter 07/05/25 0800 Non-latex 16 Fr. 1 day              Airway  Duration                  Airway - Non-Surgical 07/05/25 0848 Endotracheal Tube 1 day              Peripheral Intravenous Line  Duration             Peripheral IV Single Lumen 07/05/25 0638 18 G Left;Posterior Forearm 1 day    Peripheral IV Single Lumen 07/05/25 0847 18 G Right Antecubital 1 day    Peripheral IV Single Lumen 07/06/25 2000 20 G Left;Posterior Hand <1 day                    Vent:  Vent Mode: A/C (07/07/25 0604)  Ventilator Initiated: Yes (07/05/25 0850)  Set Rate: 22 BPM (07/07/25 0604)  Vt Set: 500 mL (07/07/25 0604)  PEEP/CPAP: 5 cmH20 (07/07/25  0604)  Oxygen Concentration (%): 40 (07/07/25 0604)  Peak Airway Pressure: 21 cmH20 (07/07/25 0604)  Total Ve: 12 L/m (07/07/25 0604)  F/VT Ratio<105 (RSBI): (!) 50.1 (07/07/25 0604)    ABGs:  Lab Results   Component Value Date    PH 7.380 07/06/2025    PO2 59.0 (L) 07/06/2025    PCO2 39.0 07/06/2025         Significant Labs:    Lab Results   Component Value Date    WBC 10.18 07/07/2025    HGB 14.3 07/07/2025    HCT 46.1 07/07/2025    MCV 92.0 07/07/2025     07/07/2025         Recent Labs   Lab 07/07/25  0311      K 4.8   *   CO2 21*   BUN 60.8*   CREATININE 1.81*   CALCIUM 8.3*   MG 2.40   PHOS 2.7   TRIG 424*   AST 10*   ALT 15   ALKPHOS 53   ALBUMIN 2.8*     Imaging:   Chest x-ray (07/07/2025, my reading of images):  ET adequate in placement.  Decreased penetration of film.  Mild prominence of bilateral pulmonary vascular markings, slight blunting of .    Assessment:     Acute combined hypoxic and hypercapnic respiratory failure.  Chest x-ray with bilateral patchy infiltrates, differential diagnostic possibilities including hydrostatic/cardiogenic versus non hydrostatic pulmonary edema versus infection, aspiration, etc.  History of cerebrovascular disease with right hemiparesis  Acute kidney injury superimposed on stage II chronic kidney disease, obstructive uropathy component.  He remains nonoliguric, daily improvement in BUN/creatinine   Type 2 diabetes mellitus with polyneuropathy, hyperglycemia improved.  Hypertension  Hyperlipidemia  Glaucoma    Plan:     Continue IV Zosyn (day 3) and IV azithromycin (day 3).  Continue to closely follow all cultures.  Increase Lantus dosage to q.12 hours, continuing high-dose regular insulin sliding scale.  Continue current mechanical ventilatory support, not ready for significant decrease at this point in time.  Continue attempts at weaning off sedation as tolerated.       35 minutes of critical care was time spent personally by me on the following  activities: development of treatment plan with patient or surrogate and bedside caregivers, discussions with consultants, evaluation of patient's response to treatment, examination of patient, ordering and performing treatments and interventions, ordering and review of laboratory studies, ordering and review of radiographic studies, pulse oximetry, re-evaluation of patient's condition.  This patient demonstrates a high probability for further clinical decompensation due to ongoing critical illness.  Critical care time did not overlap with that of any other provider or involve time for any procedures.       Jamee Alanis MD, [facility]  Pulmonary/Critical Care

## 2025-07-07 NOTE — LOPA/MORA/SWTA/AOC/AEB
[facility] ([facility])  Notification of Referral  [facility] Contact # [phone]        Thank you for the referral of this patient to determine suitability for organ, tissue, and eye donation.  A chart review has been conducted (date):2025 at (time) 11:37 AM.    ? Potential candidate for organ donation - JACOB following patient. Any changes in patients condition, discussion of withdrawing the vent or brain death exams, or family mention of donation immediately call [phone]. Refer all organ referrals within 1 hour of meeting the clinical triger of a patient with a neurological, anoxic, or life threatening injury and ONE of the following:    * GCS </= 8    * Loss of 2 or more brain stem reflexes    * Hypothermic Protocol Initiated    * Withdrawal of support discussion regardless of GCS    * Family mention of Donation    ? Potential for candidate for tissue and eye donation- call JACOB at [phone] within 2 hours of death for screening as a potential tissue and/or eye donor.      ? Potential candidate for eye donation - call JACOB at [phone] within 2 hours of death for screening as a potential eye donor.    ? NOT a candidate for organ/tissue/eye donation- call JACOB at [phone] within 2 hours of death to report the time of death.    ? Potential candidate for donation/ Referral Closed- Any changes in patients condition, GCS of 5 or less, discussion of withdrawing the vent, brain death exams, or family mention of donation immediately call [phone].      Screened by: Wojciech Reddy    [facility] Referral Number:  [id number]    Suitable for:  [x]Organ  [x] Tissue  [x] Eye  []Not suitable for Donation    Rule out Reason:     Patient Name: Charly Padilla                   69 y.o. male  Patient MRN: [id number]  : 1955  DOD:  TOD:  Cause of Death:     [x]Vented Patient  [facility] representative to approach family if appropriate  []DNR status obtained Referral of critical care patients  when family initiates Do Not Resuscitate  []Cardiac Death   Clinical Support Center to approach family via telephone if appropriate  [x]Donor Registry  Patient is listed in the Donor Registry    Completed by: Wojciech Reddy

## 2025-07-08 LAB
ALLENS TEST BLOOD GAS (OHS): YES
ANION GAP SERPL CALC-SCNC: 8 MEQ/L
BASE EXCESS BLD CALC-SCNC: -1.5 MMOL/L
BLOOD GAS SAMPLE TYPE (OHS): ABNORMAL
BUN SERPL-MCNC: 49.7 MG/DL (ref 8.4–25.7)
CA-I BLD-SCNC: 1.15 MMOL/L (ref 1.12–1.23)
CALCIUM SERPL-MCNC: 8 MG/DL (ref 8.8–10)
CHLORIDE SERPL-SCNC: 109 MMOL/L (ref 98–107)
CO2 BLDA-SCNC: 25.7 MMOL/L
CO2 SERPL-SCNC: 22 MMOL/L (ref 23–31)
CREAT SERPL-MCNC: 1.76 MG/DL (ref 0.72–1.25)
CREAT/UREA NIT SERPL: 28
DRAWN BY BLOOD GAS (OHS): ABNORMAL
GFR SERPLBLD CREATININE-BSD FMLA CKD-EPI: 42 ML/MIN/1.73/M2
GLUCOSE SERPL-MCNC: 186 MG/DL (ref 82–115)
HCO3 BLDA-SCNC: 24.3 MMOL/L (ref 22–26)
INHALED O2 CONCENTRATION: 40 %
ITIME (SEC) (OHS): 1 SEC
MAGNESIUM SERPL-MCNC: 2.3 MG/DL (ref 1.6–2.6)
MECH RR (OHS): 22 B/MIN
MODE (OHS): AC
OXYGEN DEVICE BLOOD GAS (OHS): ABNORMAL
PCO2 BLDA: 44 MMHG (ref 35–45)
PEEP RESPIRATORY: 5 CMH2O
PH BLDA: 7.35 [PH] (ref 7.35–7.45)
PHOSPHATE SERPL-MCNC: 3.6 MG/DL (ref 2.3–4.7)
PO2 BLDA: 58 MMHG (ref 80–100)
POCT GLUCOSE: 159 MG/DL (ref 70–110)
POCT GLUCOSE: 179 MG/DL (ref 70–110)
POCT GLUCOSE: 185 MG/DL (ref 70–110)
POCT GLUCOSE: 192 MG/DL (ref 70–110)
POCT GLUCOSE: 197 MG/DL (ref 70–110)
POCT GLUCOSE: 228 MG/DL (ref 70–110)
POTASSIUM BLOOD GAS (OHS): 5.1 MMOL/L (ref 3.5–5)
POTASSIUM SERPL-SCNC: 5.3 MMOL/L (ref 3.5–5.1)
SAMPLE SITE BLOOD GAS (OHS): ABNORMAL
SAO2 % BLDA: 88 %
SODIUM BLOOD GAS (OHS): 135 MMOL/L (ref 137–145)
SODIUM SERPL-SCNC: 139 MMOL/L (ref 136–145)
SPONT+MECH VT ON VENT: 500 ML
TRIGL SERPL-MCNC: 596 MG/DL (ref 34–140)

## 2025-07-08 PROCEDURE — 27200966 HC CLOSED SUCTION SYSTEM

## 2025-07-08 PROCEDURE — 99900026 HC AIRWAY MAINTENANCE (STAT)

## 2025-07-08 PROCEDURE — 99900035 HC TECH TIME PER 15 MIN (STAT)

## 2025-07-08 PROCEDURE — 94760 N-INVAS EAR/PLS OXIMETRY 1: CPT | Mod: XB

## 2025-07-08 PROCEDURE — 63600175 PHARM REV CODE 636 W HCPCS: Performed by: INTERNAL MEDICINE

## 2025-07-08 PROCEDURE — 80048 BASIC METABOLIC PNL TOTAL CA: CPT | Performed by: INTERNAL MEDICINE

## 2025-07-08 PROCEDURE — 84100 ASSAY OF PHOSPHORUS: CPT | Performed by: INTERNAL MEDICINE

## 2025-07-08 PROCEDURE — 63600175 PHARM REV CODE 636 W HCPCS: Performed by: EMERGENCY MEDICINE

## 2025-07-08 PROCEDURE — 27100171 HC OXYGEN HIGH FLOW UP TO 24 HOURS

## 2025-07-08 PROCEDURE — 94003 VENT MGMT INPAT SUBQ DAY: CPT

## 2025-07-08 PROCEDURE — 25000003 PHARM REV CODE 250

## 2025-07-08 PROCEDURE — 20000000 HC ICU ROOM

## 2025-07-08 PROCEDURE — 83735 ASSAY OF MAGNESIUM: CPT | Performed by: INTERNAL MEDICINE

## 2025-07-08 PROCEDURE — 94761 N-INVAS EAR/PLS OXIMETRY MLT: CPT | Mod: XB

## 2025-07-08 PROCEDURE — 99900031 HC PATIENT EDUCATION (STAT)

## 2025-07-08 PROCEDURE — 25000003 PHARM REV CODE 250: Performed by: INTERNAL MEDICINE

## 2025-07-08 PROCEDURE — 36415 COLL VENOUS BLD VENIPUNCTURE: CPT | Performed by: INTERNAL MEDICINE

## 2025-07-08 PROCEDURE — 63600175 PHARM REV CODE 636 W HCPCS

## 2025-07-08 PROCEDURE — 82803 BLOOD GASES ANY COMBINATION: CPT

## 2025-07-08 PROCEDURE — 84478 ASSAY OF TRIGLYCERIDES: CPT | Performed by: INTERNAL MEDICINE

## 2025-07-08 PROCEDURE — 36600 WITHDRAWAL OF ARTERIAL BLOOD: CPT

## 2025-07-08 RX ORDER — INSULIN ASPART 100 [IU]/ML
0-10 INJECTION, SOLUTION INTRAVENOUS; SUBCUTANEOUS EVERY 6 HOURS PRN
Status: DISCONTINUED | OUTPATIENT
Start: 2025-07-08 | End: 2025-08-07 | Stop reason: HOSPADM

## 2025-07-08 RX ORDER — GLUCAGON 1 MG
1 KIT INJECTION
Status: DISCONTINUED | OUTPATIENT
Start: 2025-07-08 | End: 2025-08-07 | Stop reason: HOSPADM

## 2025-07-08 RX ADMIN — MUPIROCIN: 20 OINTMENT TOPICAL at 08:07

## 2025-07-08 RX ADMIN — DEXMEDETOMIDINE HYDROCHLORIDE 1.4 MCG/KG/HR: 4 INJECTION, SOLUTION INTRAVENOUS at 09:07

## 2025-07-08 RX ADMIN — DEXMEDETOMIDINE HYDROCHLORIDE 1 MCG/KG/HR: 4 INJECTION, SOLUTION INTRAVENOUS at 11:07

## 2025-07-08 RX ADMIN — PROPOFOL 35 MCG/KG/MIN: 10 INJECTION, EMULSION INTRAVENOUS at 05:07

## 2025-07-08 RX ADMIN — MUPIROCIN: 20 OINTMENT TOPICAL at 09:07

## 2025-07-08 RX ADMIN — PROPOFOL 20 MCG/KG/MIN: 10 INJECTION, EMULSION INTRAVENOUS at 11:07

## 2025-07-08 RX ADMIN — DEXMEDETOMIDINE HYDROCHLORIDE 1.4 MCG/KG/HR: 4 INJECTION, SOLUTION INTRAVENOUS at 04:07

## 2025-07-08 RX ADMIN — INSULIN GLARGINE 15 UNITS: 100 INJECTION, SOLUTION SUBCUTANEOUS at 08:07

## 2025-07-08 RX ADMIN — INSULIN ASPART 2 UNITS: 100 INJECTION, SOLUTION INTRAVENOUS; SUBCUTANEOUS at 11:07

## 2025-07-08 RX ADMIN — PROPOFOL 50 MCG/KG/MIN: 10 INJECTION, EMULSION INTRAVENOUS at 05:07

## 2025-07-08 RX ADMIN — PIPERACILLIN SODIUM AND TAZOBACTAM SODIUM 4.5 G: 4; .5 INJECTION, POWDER, LYOPHILIZED, FOR SOLUTION INTRAVENOUS at 03:07

## 2025-07-08 RX ADMIN — INSULIN ASPART 2 UNITS: 100 INJECTION, SOLUTION INTRAVENOUS; SUBCUTANEOUS at 02:07

## 2025-07-08 RX ADMIN — INSULIN ASPART 2 UNITS: 100 INJECTION, SOLUTION INTRAVENOUS; SUBCUTANEOUS at 08:07

## 2025-07-08 RX ADMIN — DEXMEDETOMIDINE HYDROCHLORIDE 1.4 MCG/KG/HR: 4 INJECTION, SOLUTION INTRAVENOUS at 08:07

## 2025-07-08 RX ADMIN — DEXMEDETOMIDINE HYDROCHLORIDE 1.4 MCG/KG/HR: 4 INJECTION, SOLUTION INTRAVENOUS at 05:07

## 2025-07-08 RX ADMIN — DEXMEDETOMIDINE HYDROCHLORIDE 0.8 MCG/KG/HR: 4 INJECTION, SOLUTION INTRAVENOUS at 02:07

## 2025-07-08 RX ADMIN — INSULIN ASPART 2 UNITS: 100 INJECTION, SOLUTION INTRAVENOUS; SUBCUTANEOUS at 05:07

## 2025-07-08 RX ADMIN — PROPOFOL 35 MCG/KG/MIN: 10 INJECTION, EMULSION INTRAVENOUS at 04:07

## 2025-07-08 RX ADMIN — ACETAMINOPHEN 650 MG: 325 TABLET ORAL at 08:07

## 2025-07-08 RX ADMIN — PIPERACILLIN SODIUM AND TAZOBACTAM SODIUM 4.5 G: 4; .5 INJECTION, POWDER, LYOPHILIZED, FOR SOLUTION INTRAVENOUS at 11:07

## 2025-07-08 RX ADMIN — AZITHROMYCIN MONOHYDRATE 500 MG: 500 INJECTION, POWDER, LYOPHILIZED, FOR SOLUTION INTRAVENOUS at 10:07

## 2025-07-08 RX ADMIN — PROPOFOL 50 MCG/KG/MIN: 10 INJECTION, EMULSION INTRAVENOUS at 02:07

## 2025-07-08 RX ADMIN — PIPERACILLIN SODIUM AND TAZOBACTAM SODIUM 4.5 G: 4; .5 INJECTION, POWDER, LYOPHILIZED, FOR SOLUTION INTRAVENOUS at 07:07

## 2025-07-08 NOTE — PLAN OF CARE
Problem: Infection  Goal: Absence of Infection Signs and Symptoms  Outcome: Progressing     Problem: Adult Inpatient Plan of Care  Goal: Plan of Care Review  Outcome: Progressing  Goal: Patient-Specific Goal (Individualized)  Outcome: Progressing  Goal: Absence of Hospital-Acquired Illness or Injury  Outcome: Progressing  Goal: Optimal Comfort and Wellbeing  Outcome: Progressing  Goal: Readiness for Transition of Care  Outcome: Progressing     Problem: Mechanical Ventilation Invasive  Goal: Effective Communication  Outcome: Progressing  Goal: Optimal Device Function  Outcome: Progressing  Goal: Mechanical Ventilation Liberation  Outcome: Progressing  Goal: Optimal Nutrition Delivery  Outcome: Progressing  Goal: Absence of Device-Related Skin and Tissue Injury  Outcome: Progressing  Goal: Absence of Ventilator-Induced Lung Injury  Outcome: Progressing     Problem: Artificial Airway  Goal: Effective Communication  Outcome: Progressing  Goal: Optimal Device Function  Outcome: Progressing  Goal: Absence of Device-Related Skin or Tissue Injury  Outcome: Progressing     Problem: Noninvasive Ventilation Acute  Goal: Effective Unassisted Ventilation and Oxygenation  Outcome: Progressing     Problem: Skin Injury Risk Increased  Goal: Skin Health and Integrity  Outcome: Progressing     Problem: Fall Injury Risk  Goal: Absence of Fall and Fall-Related Injury  Outcome: Progressing     Problem: Delirium  Goal: Optimal Coping  Outcome: Progressing  Goal: Improved Behavioral Control  Outcome: Progressing  Goal: Improved Attention and Thought Clarity  Outcome: Progressing  Goal: Improved Sleep  Outcome: Progressing

## 2025-07-08 NOTE — PROGRESS NOTES
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  He presents to ED per EMS 07/05/2025 after multiple falls at nursing home with reported generalized weakness.  He is reported as having a Benítez catheter placed about 1 week ago for urinary retention.  Patient was reported as having pulled this out himself while at nursing home, on a.m. of presentation.  Benítez catheter was placed in ED, returning 700 cc of urine.  While in ER, he was reported as becoming more somnolent with poor overall respiratory effort and decreasing level of consciousness.  ABG revealed acute respiratory acidosis.  He was intubated at 8:55 a.m., placed on mechanical ventilatory support.  CT brain obtained in ED reveals chronic microvascular ischemic changes with no acute intracranial abnormalities.  SARS-COVID-2 PCR negative, influenza A/B PCR negative.  He is now admitted to ICU for ongoing medical care.     Hospital Course:  07/05/2025: Intubation/mechanical ventilation   TTE (07/05/2025: Normal LV size with decreased LV SF, EF 45-50%; trace MR, mild TR and mild PI.  PASP estimated 30 mm Hg; no pericardial effusion      24hr Interval History:  T-max 100.1° oral over the previous 24 hours.  Intake 1760 cc, output 1150 cc over the previous 24 hours.  Blood cultures 07/05/2025 no growth, respiratory cultures normal URF.  He remains on Zosyn and Zithromax.  CBGs improved, running 161-186.  He remains sinus rhythm/sinus bradycardia.  He remains sedated on propofol at 50 mcg/kg per minute and Precedex 0.8 mcg/kg per hour.  Attempts  at decreasing propofol infusion results in severe coughing paroxysms with ventilatory discordance and desaturation, resulting in need for re sedation.  He has not tolerated sedation holiday to determine underlying neurologic status due to respiratory decompensation on each attempt at decreasing.  Higher doses of propofol result in hypotension which required Levophed infusion (required about 1 hour yesterday).  Small amount of nonpurulent secretions per ET.  He is receiving Peptamen VHP at 20 cc/hour, 30 cc q.1h water flushes.  No bowel movements past 24 hours.    AC 22/500 cc/peep +5/40%    Scheduled Medications:   azithromycin  500 mg Intravenous Q24H    insulin glargine U-100  15 Units Subcutaneous BID    mupirocin   Nasal BID    piperacillin-tazobactam (Zosyn) IV (PEDS and ADULTS) (extended infusion is not appropriate)  4.5 g Intravenous Q8H     PRN Medications:    Current Facility-Administered Medications:     acetaminophen, 650 mg, Per OG tube, Q6H PRN    dextrose 50%, 12.5 g, Intravenous, PRN    dextrose 50%, 12.5 g, Intravenous, PRN    fentaNYL, 100 mcg, Intravenous, Q1H PRN    glucagon (human recombinant), 1 mg, Intramuscular, PRN    glucagon (human recombinant), 1 mg, Intramuscular, PRN    insulin aspart U-100, 0-10 Units, Subcutaneous, Q6H PRN  Continuous Infusions:   dexmedeTOMIDine (Precedex) infusion (titrating)  0-1.4 mcg/kg/hr Intravenous Continuous 22.6 mL/hr at 07/08/25 0351 0.8 mcg/kg/hr at 07/08/25 0351    NORepinephrine bitartrate-D5W  0-3 mcg/kg/min (Dosing Weight) Intravenous Continuous   Stopped at 07/07/25 0704    propofoL  0-50 mcg/kg/min (Dosing Weight) Intravenous Continuous 34 mL/hr at 07/08/25 0538 50 mcg/kg/min at 07/08/25 0538       No past medical history on file.    No past surgical history on file.    Objective:     Input/output:    Intake/Output Summary (Last 24 hours) at 7/8/2025 0703  Last data filed at 7/8/2025 0600  Gross per 24 hour   Intake 1580.5 ml   Output 1150 ml   Net  430.5 ml       Vital Signs (Most Recent):  Temp: 98.9 °F (37.2 °C) (07/08/25 0400)  Pulse: (!) 57 (07/08/25 0630)  Resp: (!) 25 (07/08/25 0630)  BP: 107/66 (07/08/25 0630)  SpO2: 95 % (07/08/25 0630)  Body mass index is 38.01 kg/m².  Weight: 113.4 kg (250 lb) Vital Signs (24h Range):  Temp:  [98.9 °F (37.2 °C)-100.1 °F (37.8 °C)] 98.9 °F (37.2 °C)  Pulse:  [57-67] 57  Resp:  [20-30] 25  SpO2:  [90 %-96 %] 95 %  BP: ()/() 107/66     Physical Exam  Constitutional:       Comments: Intubated on mechanical ventilatory support, sedated on propofol and Precedex.   HENT:      Mouth/Throat:      Comments: ET secured to external os  Eyes:      Pupils: Pupils are equal, round, and reactive to light.   Cardiovascular:      Rate and Rhythm: Normal rate and regular rhythm.      Heart sounds: No murmur heard.  Pulmonary:      Breath sounds: Rhonchi (Bilateral scattered) present. No wheezing or rales.   Abdominal:      General: Bowel sounds are normal. There is distension (Moderate distention, soft.  Easily reducible ventral hernia.).      Palpations: Abdomen is soft.      Comments: Obese   Musculoskeletal:      Right lower leg: No edema.      Left lower leg: No edema.   Neurological:      Comments: Neurologic exam clouded by need for sedation with propofol and Precedex.  Pupils equal round at 1 mm and reactive  Resists eye opening.  Spontaneous respiratory effort noted.  Cough reflex intact to deep endotracheal suctioning  Minimal left upper and left lower extremity movement to pain         Lines/Drains/Airways       Drain  Duration                  Urethral Catheter 07/05/25 0800 Non-latex 16 Fr. 2 days              Airway  Duration                  Airway - Non-Surgical 07/05/25 0848 Endotracheal Tube 2 days              Peripheral Intravenous Line  Duration             Peripheral IV Single Lumen 07/05/25 0638 18 G Left;Posterior Forearm 3 days    Peripheral IV Single Lumen 07/05/25 0847 18 G Right Antecubital 2 days     Peripheral IV Single Lumen 07/06/25 2000 20 G Left;Posterior Hand 1 day                    Vent:  Vent Mode: A/C (07/08/25 0443)  Ventilator Initiated: Yes (07/05/25 0850)  Set Rate: 22 BPM (07/08/25 0443)  Vt Set: 500 mL (07/08/25 0443)  PEEP/CPAP: 5 cmH20 (07/08/25 0443)  Oxygen Concentration (%): 40 (07/08/25 0443)  Peak Airway Pressure: 18 cmH20 (07/08/25 0443)  Total Ve: 12.2 L/m (07/08/25 0443)  F/VT Ratio<105 (RSBI): (!) 42.75 (07/08/25 0443)    ABGs:  Lab Results   Component Value Date    PH 7.350 07/08/2025    PO2 58.0 (L) 07/08/2025    PCO2 44.0 07/08/2025         Significant Labs:    Lab Results   Component Value Date    WBC 10.18 07/07/2025    HGB 14.3 07/07/2025    HCT 46.1 07/07/2025    MCV 92.0 07/07/2025     07/07/2025         Recent Labs   Lab 07/08/25  0250      K 5.3*   *   CO2 22*   BUN 49.7*   CREATININE 1.76*   CALCIUM 8.0*   MG 2.30   PHOS 3.6   TRIG 596*     Imaging:   None this a.m.    Assessment:     Acute combined hypoxic and hypercapnic respiratory failure.  Chest x-ray with bilateral patchy infiltrates, differential diagnostic possibilities including hydrostatic/cardiogenic versus non hydrostatic pulmonary edema versus infection, aspiration, etc.  History of cerebrovascular disease with right hemiparesis.  Current neurologic exam is clouded by need for heavy sedation due to ventilatory discordance on attempts at decreasing.  Acute kidney injury superimposed on stage II chronic kidney disease, obstructive uropathy component.  He remains nonoliguric, daily improvement in BUN/creatinine   Type 2 diabetes mellitus with polyneuropathy, hyperglycemia improved.  Hypertension  Hyperlipidemia  Glaucoma    Plan:     Continue IV Zosyn (day 4) and IV azithromycin (day 4).  Continue to closely follow all cultures.  Decrease minute ventilation as tolerated.  Continue attempts at decreasing sedation as tolerated.       35 minutes of critical care was time spent personally by me on  the following activities: development of treatment plan with patient or surrogate and bedside caregivers, discussions with consultants, evaluation of patient's response to treatment, examination of patient, ordering and performing treatments and interventions, ordering and review of laboratory studies, ordering and review of radiographic studies, pulse oximetry, re-evaluation of patient's condition.  This patient demonstrates a high probability for further clinical decompensation due to ongoing critical illness.  Critical care time did not overlap with that of any other provider or involve time for any procedures.       Jamee Alanis MD, [facility]  Pulmonary/Critical Care

## 2025-07-09 LAB
ALLENS TEST BLOOD GAS (OHS): YES
ANION GAP SERPL CALC-SCNC: 11 MEQ/L
BASE EXCESS BLD CALC-SCNC: -1 MMOL/L
BASOPHILS # BLD AUTO: 0.02 X10(3)/MCL
BASOPHILS NFR BLD AUTO: 0.2 %
BLOOD GAS SAMPLE TYPE (OHS): ABNORMAL
BUN SERPL-MCNC: 40.8 MG/DL (ref 8.4–25.7)
CA-I BLD-SCNC: 1.14 MMOL/L (ref 1.12–1.23)
CALCIUM SERPL-MCNC: 8.3 MG/DL (ref 8.8–10)
CHLORIDE SERPL-SCNC: 109 MMOL/L (ref 98–107)
CO2 BLDA-SCNC: 25.6 MMOL/L
CO2 SERPL-SCNC: 18 MMOL/L (ref 23–31)
CREAT SERPL-MCNC: 1.34 MG/DL (ref 0.72–1.25)
CREAT/UREA NIT SERPL: 30
DRAWN BY BLOOD GAS (OHS): ABNORMAL
EOSINOPHIL # BLD AUTO: 0.21 X10(3)/MCL (ref 0–0.9)
EOSINOPHIL NFR BLD AUTO: 1.9 %
ERYTHROCYTE [DISTWIDTH] IN BLOOD BY AUTOMATED COUNT: 14.6 % (ref 11.5–17)
GFR SERPLBLD CREATININE-BSD FMLA CKD-EPI: 58 ML/MIN/1.73/M2
GLUCOSE SERPL-MCNC: 204 MG/DL (ref 82–115)
HCO3 BLDA-SCNC: 24.3 MMOL/L (ref 22–26)
HCT VFR BLD AUTO: 43.3 % (ref 42–52)
HGB BLD-MCNC: 13.6 G/DL (ref 14–18)
IMM GRANULOCYTES # BLD AUTO: 0.04 X10(3)/MCL (ref 0–0.04)
IMM GRANULOCYTES NFR BLD AUTO: 0.4 %
INHALED O2 CONCENTRATION: 45 %
ITIME (SEC) (OHS): 1 SEC
LYMPHOCYTES # BLD AUTO: 1.69 X10(3)/MCL (ref 0.6–4.6)
LYMPHOCYTES NFR BLD AUTO: 15.5 %
MAGNESIUM SERPL-MCNC: 2.1 MG/DL (ref 1.6–2.6)
MCH RBC QN AUTO: 28.6 PG (ref 27–31)
MCHC RBC AUTO-ENTMCNC: 31.4 G/DL (ref 33–36)
MCV RBC AUTO: 91.2 FL (ref 80–94)
MECH RR (OHS): 12 B/MIN
MODE (OHS): AC
MONOCYTES # BLD AUTO: 0.77 X10(3)/MCL (ref 0.1–1.3)
MONOCYTES NFR BLD AUTO: 7.1 %
NEUTROPHILS # BLD AUTO: 8.18 X10(3)/MCL (ref 2.1–9.2)
NEUTROPHILS NFR BLD AUTO: 74.9 %
NRBC BLD AUTO-RTO: 0 %
OXYGEN DEVICE BLOOD GAS (OHS): ABNORMAL
PCO2 BLDA: 42 MMHG (ref 35–45)
PEEP RESPIRATORY: 5 CMH2O
PH BLDA: 7.37 [PH] (ref 7.35–7.45)
PLATELET # BLD AUTO: 160 X10(3)/MCL (ref 130–400)
PMV BLD AUTO: 11.3 FL (ref 7.4–10.4)
PO2 BLDA: 53 MMHG (ref 80–100)
POCT GLUCOSE: 184 MG/DL (ref 70–110)
POCT GLUCOSE: 189 MG/DL (ref 70–110)
POCT GLUCOSE: 200 MG/DL (ref 70–110)
POCT GLUCOSE: 212 MG/DL (ref 70–110)
POCT GLUCOSE: 219 MG/DL (ref 70–110)
POTASSIUM BLOOD GAS (OHS): 5.1 MMOL/L (ref 3.5–5)
POTASSIUM SERPL-SCNC: 5.1 MMOL/L (ref 3.5–5.1)
RBC # BLD AUTO: 4.75 X10(6)/MCL (ref 4.7–6.1)
SAMPLE SITE BLOOD GAS (OHS): ABNORMAL
SAO2 % BLDA: 86 %
SODIUM BLOOD GAS (OHS): 135 MMOL/L (ref 137–145)
SODIUM SERPL-SCNC: 138 MMOL/L (ref 136–145)
SPONT+MECH VT ON VENT: 500 ML
WBC # BLD AUTO: 10.91 X10(3)/MCL (ref 4.5–11.5)

## 2025-07-09 PROCEDURE — 63600175 PHARM REV CODE 636 W HCPCS: Performed by: INTERNAL MEDICINE

## 2025-07-09 PROCEDURE — 25000003 PHARM REV CODE 250: Performed by: INTERNAL MEDICINE

## 2025-07-09 PROCEDURE — 36415 COLL VENOUS BLD VENIPUNCTURE: CPT | Performed by: INTERNAL MEDICINE

## 2025-07-09 PROCEDURE — 94761 N-INVAS EAR/PLS OXIMETRY MLT: CPT | Mod: XB

## 2025-07-09 PROCEDURE — 25000003 PHARM REV CODE 250

## 2025-07-09 PROCEDURE — 99900035 HC TECH TIME PER 15 MIN (STAT)

## 2025-07-09 PROCEDURE — 99900026 HC AIRWAY MAINTENANCE (STAT)

## 2025-07-09 PROCEDURE — 63600175 PHARM REV CODE 636 W HCPCS: Performed by: EMERGENCY MEDICINE

## 2025-07-09 PROCEDURE — 83735 ASSAY OF MAGNESIUM: CPT | Performed by: INTERNAL MEDICINE

## 2025-07-09 PROCEDURE — 27200966 HC CLOSED SUCTION SYSTEM

## 2025-07-09 PROCEDURE — 80048 BASIC METABOLIC PNL TOTAL CA: CPT | Performed by: INTERNAL MEDICINE

## 2025-07-09 PROCEDURE — 94003 VENT MGMT INPAT SUBQ DAY: CPT

## 2025-07-09 PROCEDURE — 82803 BLOOD GASES ANY COMBINATION: CPT

## 2025-07-09 PROCEDURE — 99900031 HC PATIENT EDUCATION (STAT)

## 2025-07-09 PROCEDURE — 63600175 PHARM REV CODE 636 W HCPCS

## 2025-07-09 PROCEDURE — 36600 WITHDRAWAL OF ARTERIAL BLOOD: CPT

## 2025-07-09 PROCEDURE — 20000000 HC ICU ROOM

## 2025-07-09 PROCEDURE — 27100171 HC OXYGEN HIGH FLOW UP TO 24 HOURS

## 2025-07-09 PROCEDURE — 85025 COMPLETE CBC W/AUTO DIFF WBC: CPT | Performed by: INTERNAL MEDICINE

## 2025-07-09 PROCEDURE — 94760 N-INVAS EAR/PLS OXIMETRY 1: CPT

## 2025-07-09 RX ORDER — FAMOTIDINE 20 MG/1
20 TABLET, FILM COATED ORAL 2 TIMES DAILY
Status: DISCONTINUED | OUTPATIENT
Start: 2025-07-09 | End: 2025-08-07 | Stop reason: HOSPADM

## 2025-07-09 RX ORDER — ENOXAPARIN SODIUM 100 MG/ML
40 INJECTION SUBCUTANEOUS EVERY 24 HOURS
Status: DISCONTINUED | OUTPATIENT
Start: 2025-07-09 | End: 2025-08-07 | Stop reason: HOSPADM

## 2025-07-09 RX ADMIN — INSULIN ASPART 1 UNITS: 100 INJECTION, SOLUTION INTRAVENOUS; SUBCUTANEOUS at 12:07

## 2025-07-09 RX ADMIN — DEXMEDETOMIDINE HYDROCHLORIDE 1.4 MCG/KG/HR: 4 INJECTION, SOLUTION INTRAVENOUS at 03:07

## 2025-07-09 RX ADMIN — ENOXAPARIN SODIUM 40 MG: 40 INJECTION SUBCUTANEOUS at 04:07

## 2025-07-09 RX ADMIN — PROPOFOL 35 MCG/KG/MIN: 10 INJECTION, EMULSION INTRAVENOUS at 11:07

## 2025-07-09 RX ADMIN — PIPERACILLIN SODIUM AND TAZOBACTAM SODIUM 4.5 G: 4; .5 INJECTION, POWDER, LYOPHILIZED, FOR SOLUTION INTRAVENOUS at 07:07

## 2025-07-09 RX ADMIN — FAMOTIDINE 20 MG: 20 TABLET, FILM COATED ORAL at 09:07

## 2025-07-09 RX ADMIN — MUPIROCIN: 20 OINTMENT TOPICAL at 08:07

## 2025-07-09 RX ADMIN — PROPOFOL 35 MCG/KG/MIN: 10 INJECTION, EMULSION INTRAVENOUS at 04:07

## 2025-07-09 RX ADMIN — PROPOFOL 35 MCG/KG/MIN: 10 INJECTION, EMULSION INTRAVENOUS at 09:07

## 2025-07-09 RX ADMIN — DEXMEDETOMIDINE HYDROCHLORIDE 1.4 MCG/KG/HR: 4 INJECTION, SOLUTION INTRAVENOUS at 08:07

## 2025-07-09 RX ADMIN — PROPOFOL 35 MCG/KG/MIN: 10 INJECTION, EMULSION INTRAVENOUS at 03:07

## 2025-07-09 RX ADMIN — DEXMEDETOMIDINE HYDROCHLORIDE 1.4 MCG/KG/HR: 4 INJECTION, SOLUTION INTRAVENOUS at 07:07

## 2025-07-09 RX ADMIN — INSULIN GLARGINE 15 UNITS: 100 INJECTION, SOLUTION SUBCUTANEOUS at 08:07

## 2025-07-09 RX ADMIN — DEXMEDETOMIDINE HYDROCHLORIDE 1.4 MCG/KG/HR: 4 INJECTION, SOLUTION INTRAVENOUS at 10:07

## 2025-07-09 RX ADMIN — FENTANYL CITRATE 100 MCG: 50 INJECTION INTRAMUSCULAR; INTRAVENOUS at 09:07

## 2025-07-09 RX ADMIN — INSULIN ASPART 4 UNITS: 100 INJECTION, SOLUTION INTRAVENOUS; SUBCUTANEOUS at 12:07

## 2025-07-09 RX ADMIN — INSULIN ASPART 2 UNITS: 100 INJECTION, SOLUTION INTRAVENOUS; SUBCUTANEOUS at 08:07

## 2025-07-09 RX ADMIN — DEXMEDETOMIDINE HYDROCHLORIDE 1.4 MCG/KG/HR: 4 INJECTION, SOLUTION INTRAVENOUS at 11:07

## 2025-07-09 RX ADMIN — INSULIN ASPART 2 UNITS: 100 INJECTION, SOLUTION INTRAVENOUS; SUBCUTANEOUS at 05:07

## 2025-07-09 RX ADMIN — DEXMEDETOMIDINE HYDROCHLORIDE 1.4 MCG/KG/HR: 4 INJECTION, SOLUTION INTRAVENOUS at 05:07

## 2025-07-09 RX ADMIN — PIPERACILLIN SODIUM AND TAZOBACTAM SODIUM 4.5 G: 4; .5 INJECTION, POWDER, LYOPHILIZED, FOR SOLUTION INTRAVENOUS at 11:07

## 2025-07-09 RX ADMIN — INSULIN ASPART 2 UNITS: 100 INJECTION, SOLUTION INTRAVENOUS; SUBCUTANEOUS at 06:07

## 2025-07-09 RX ADMIN — ACETAMINOPHEN 650 MG: 325 TABLET ORAL at 08:07

## 2025-07-09 RX ADMIN — FENTANYL CITRATE 100 MCG: 50 INJECTION INTRAMUSCULAR; INTRAVENOUS at 10:07

## 2025-07-09 RX ADMIN — FAMOTIDINE 20 MG: 20 TABLET, FILM COATED ORAL at 08:07

## 2025-07-09 RX ADMIN — DEXMEDETOMIDINE HYDROCHLORIDE 1.4 MCG/KG/HR: 4 INJECTION, SOLUTION INTRAVENOUS at 01:07

## 2025-07-09 RX ADMIN — PIPERACILLIN SODIUM AND TAZOBACTAM SODIUM 4.5 G: 4; .5 INJECTION, POWDER, LYOPHILIZED, FOR SOLUTION INTRAVENOUS at 03:07

## 2025-07-09 RX ADMIN — AZITHROMYCIN MONOHYDRATE 500 MG: 500 INJECTION, POWDER, LYOPHILIZED, FOR SOLUTION INTRAVENOUS at 11:07

## 2025-07-09 RX ADMIN — PROPOFOL 35 MCG/KG/MIN: 10 INJECTION, EMULSION INTRAVENOUS at 01:07

## 2025-07-09 NOTE — PLAN OF CARE
Problem: Infection  Goal: Absence of Infection Signs and Symptoms  Outcome: Progressing     Problem: Adult Inpatient Plan of Care  Goal: Plan of Care Review  Outcome: Progressing  Goal: Patient-Specific Goal (Individualized)  Outcome: Progressing  Goal: Absence of Hospital-Acquired Illness or Injury  Outcome: Progressing  Goal: Optimal Comfort and Wellbeing  Outcome: Progressing  Goal: Readiness for Transition of Care  Outcome: Progressing     Problem: Mechanical Ventilation Invasive  Goal: Absence of Device-Related Skin and Tissue Injury  Outcome: Progressing  Goal: Absence of Ventilator-Induced Lung Injury  Outcome: Progressing     Problem: Fall Injury Risk  Goal: Absence of Fall and Fall-Related Injury  Outcome: Progressing     Problem: Delirium  Goal: Optimal Coping  Outcome: Progressing  Goal: Improved Behavioral Control  Outcome: Progressing  Goal: Improved Attention and Thought Clarity  Outcome: Progressing  Goal: Improved Sleep  Outcome: Progressing     Problem: Mechanical Ventilation Invasive  Goal: Mechanical Ventilation Liberation  Outcome: Not Progressing

## 2025-07-09 NOTE — PROGRESS NOTES
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  He presents to ED per EMS 07/05/2025 after multiple falls at nursing home with reported generalized weakness.  He is reported as having a Benítez catheter placed about 1 week ago for urinary retention.  Patient was reported as having pulled this out himself while at nursing home, on a.m. of presentation.  Benítez catheter was placed in ED, returning 700 cc of urine.  While in ER, he was reported as becoming more somnolent with poor overall respiratory effort and decreasing level of consciousness.  ABG revealed acute respiratory acidosis.  He was intubated at 8:55 a.m., placed on mechanical ventilatory support.  CT brain obtained in ED reveals chronic microvascular ischemic changes with no acute intracranial abnormalities.  SARS-COVID-2 PCR negative, influenza A/B PCR negative.  He is now admitted to ICU for ongoing medical care.     Hospital Course:  07/05/2025: Intubation/mechanical ventilation   TTE (07/05/2025: Normal LV size with decreased LV SF, EF 45-50%; trace MR, mild TR and mild PI.  PASP estimated 30 mm Hg; no pericardial effusion      24hr Interval History:  T-max 101.0° oral over the previous 24 hours.  Intake 2730 cc, output 1575cc over the previous 24 hours.  Blood cultures 07/05/2025 no growth, respiratory cultures normal URF.  He remains on Zosyn and Zithromax.  CBGs 170-228.  He remains sinus rhythm/sinus bradycardia.  Propofol on hold over the past few hours.  Precedex 1.4 mcg/kg per hour infusion.  He is now beginning to follow some  commands.  He remains sinus rhythm, moderate amount of yellow to white thick secretions per ET with good cough effort.  He is receiving Peptamen VHP at 40 cc/hour, 30 cc q.1h water flushes.  No bowel movements past 24 hours.    PRVC AC 12/500 cc/peep +5/40%    Scheduled Medications:   azithromycin  500 mg Intravenous Q24H    insulin glargine U-100  15 Units Subcutaneous BID    mupirocin   Nasal BID    piperacillin-tazobactam (Zosyn) IV (PEDS and ADULTS) (extended infusion is not appropriate)  4.5 g Intravenous Q8H     PRN Medications:    Current Facility-Administered Medications:     acetaminophen, 650 mg, Per OG tube, Q6H PRN    dextrose 50%, 12.5 g, Intravenous, PRN    dextrose 50%, 12.5 g, Intravenous, PRN    fentaNYL, 100 mcg, Intravenous, Q1H PRN    glucagon (human recombinant), 1 mg, Intramuscular, PRN    glucagon (human recombinant), 1 mg, Intramuscular, PRN    insulin aspart U-100, 0-10 Units, Subcutaneous, Q6H PRN  Continuous Infusions:   dexmedeTOMIDine (Precedex) infusion (titrating)  0-1.4 mcg/kg/hr Intravenous Continuous 39.69 mL/hr at 07/09/25 0550 1.4 mcg/kg/hr at 07/09/25 0550    NORepinephrine bitartrate-D5W  0-3 mcg/kg/min (Dosing Weight) Intravenous Continuous   Stopped at 07/07/25 0704    propofoL  0-50 mcg/kg/min (Dosing Weight) Intravenous Continuous 23.8 mL/hr at 07/09/25 0343 35 mcg/kg/min at 07/09/25 0343       No past medical history on file.    No past surgical history on file.    Objective:     Input/output:    Intake/Output Summary (Last 24 hours) at 7/9/2025 0759  Last data filed at 7/9/2025 0500  Gross per 24 hour   Intake 2702.87 ml   Output 1575 ml   Net 1127.87 ml       Vital Signs (Most Recent):  Temp: 99 °F (37.2 °C) (07/09/25 0400)  Pulse: (!) 55 (07/09/25 0600)  Resp: (!) 23 (07/09/25 0600)  BP: 129/75 (07/09/25 0600)  SpO2: 96 % (07/09/25 0600)  Body mass index is 38.01 kg/m².  Weight: 113.4 kg (250 lb) Vital Signs (24h Range):  Temp:  [98.6 °F (37 °C)-101 °F (38.3 °C)] 99 °F  (37.2 °C)  Pulse:  [55-73] 55  Resp:  [21-30] 23  SpO2:  [96 %-99 %] 96 %  BP: (117-139)/(62-78) 129/75     Physical Exam  Constitutional:       Comments: Intubated on mechanical ventilatory support, on Precedex infusion.   HENT:      Mouth/Throat:      Comments: ET secured to external os  Eyes:      Pupils: Pupils are equal, round, and reactive to light.   Cardiovascular:      Rate and Rhythm: Normal rate and regular rhythm.      Heart sounds: No murmur heard.  Pulmonary:      Breath sounds: Rhonchi (Bilateral scattered) present. No wheezing or rales.   Abdominal:      General: Bowel sounds are normal. There is distension (Moderate distention, soft.  Easily reducible ventral hernia.).      Palpations: Abdomen is soft.      Comments: Obese   Musculoskeletal:      Right lower leg: No edema.      Left lower leg: No edema.   Neurological:      Comments: Intubated on Precedex infusion.  Eyes are open, fixes and follows, and follows simple commands.  Pupils equal round at 1-2 mm and reactive  Spontaneous respiratory effort noted.  Moves lower extremities and left upper extremity to command         Lines/Drains/Airways       Drain  Duration                  NG/OG Tube 07/05/25 0848 3 days         Urethral Catheter 07/05/25 0800 Non-latex 16 Fr. 3 days              Airway  Duration                  Airway - Non-Surgical 07/05/25 0848 Endotracheal Tube 3 days              Peripheral Intravenous Line  Duration             Peripheral IV Single Lumen 07/05/25 0638 18 G Left;Posterior Forearm 4 days    Peripheral IV Single Lumen 07/05/25 0847 18 G Right Antecubital 3 days    Peripheral IV Single Lumen 07/06/25 2000 20 G Left;Posterior Hand 2 days                    Vent:  Vent Mode: A/C (07/09/25 0355)  Ventilator Initiated: Yes (07/05/25 0850)  Set Rate: 12 BPM (07/09/25 0355)  Vt Set: 500 mL (07/09/25 0355)  PEEP/CPAP: 5 cmH20 (07/09/25 0355)  Oxygen Concentration (%): 60 (07/09/25 0400)  Peak Airway Pressure: 21 cmH20  (07/09/25 0355)  Total Ve: 12.2 L/m (07/09/25 0355)  F/VT Ratio<105 (RSBI): (!) 44.78 (07/08/25 1724)    ABGs:  Lab Results   Component Value Date    PH 7.370 07/09/2025    PO2 53.0 (LL) 07/09/2025    PCO2 42.0 07/09/2025         Significant Labs:    Lab Results   Component Value Date    WBC 10.91 07/09/2025    HGB 13.6 (L) 07/09/2025    HCT 43.3 07/09/2025    MCV 91.2 07/09/2025     07/09/2025         Recent Labs   Lab 07/09/25  0300      K 5.1   *   CO2 18*   BUN 40.8*   CREATININE 1.34*   CALCIUM 8.3*   MG 2.10     Imaging:   Chest x-ray (07/09/2025, my reading of images):  ET high in trachea.  Bilateral reticular predominant infiltrates bases.    Assessment:     Acute combined hypoxic and hypercapnic respiratory failure.  Chest x-ray with bilateral patchy infiltrates, differential diagnostic possibilities including hydrostatic/cardiogenic versus non hydrostatic pulmonary edema versus infection, aspiration, etc.  History of cerebrovascular disease with right hemiparesis.  Neurologic status significantly improved today, now following commands.    Acute kidney injury superimposed on stage II chronic kidney disease, obstructive uropathy component.  He remains nonoliguric, daily improvement in BUN/creatinine   Type 2 diabetes mellitus with polyneuropathy, hyperglycemia improved.  Hypertension  Hyperlipidemia  Glaucoma    Plan:     Discontinue IV Zosyn (day 5) and IV azithromycin (day 5) after today's doses.  Continue to closely follow all cultures.  Continue to hold propofol as tolerated  Continue attempts at decreasing sedation as tolerated.       35 minutes of critical care was time spent personally by me on the following activities: development of treatment plan with patient or surrogate and bedside caregivers, discussions with consultants, evaluation of patient's response to treatment, examination of patient, ordering and performing treatments and interventions, ordering and review of  laboratory studies, ordering and review of radiographic studies, pulse oximetry, re-evaluation of patient's condition.  This patient demonstrates a high probability for further clinical decompensation due to ongoing critical illness.  Critical care time did not overlap with that of any other provider or involve time for any procedures.       Jamee Alanis MD, [facility]  Pulmonary/Critical Care

## 2025-07-10 LAB
ALLENS TEST BLOOD GAS (OHS): YES
ALLENS TEST BLOOD GAS (OHS): YES
ANION GAP SERPL CALC-SCNC: 9 MEQ/L
BACTERIA BLD CULT: NORMAL
BACTERIA BLD CULT: NORMAL
BASE EXCESS BLD CALC-SCNC: -2 MMOL/L
BASE EXCESS BLD CALC-SCNC: -3 MMOL/L
BLOOD GAS SAMPLE TYPE (OHS): ABNORMAL
BLOOD GAS SAMPLE TYPE (OHS): ABNORMAL
BUN SERPL-MCNC: 35.5 MG/DL (ref 8.4–25.7)
CA-I BLD-SCNC: 1.15 MMOL/L (ref 1.12–1.23)
CA-I BLD-SCNC: 1.17 MMOL/L (ref 1.12–1.23)
CALCIUM SERPL-MCNC: 7.9 MG/DL (ref 8.8–10)
CHLORIDE SERPL-SCNC: 110 MMOL/L (ref 98–107)
CO2 BLDA-SCNC: 25 MMOL/L
CO2 BLDA-SCNC: 25.5 MMOL/L
CO2 SERPL-SCNC: 20 MMOL/L (ref 23–31)
CREAT SERPL-MCNC: 1.28 MG/DL (ref 0.72–1.25)
CREAT/UREA NIT SERPL: 28
DRAWN BY BLOOD GAS (OHS): ABNORMAL
DRAWN BY BLOOD GAS (OHS): ABNORMAL
GFR SERPLBLD CREATININE-BSD FMLA CKD-EPI: >60 ML/MIN/1.73/M2
GLUCOSE SERPL-MCNC: 229 MG/DL (ref 82–115)
HCO3 BLDA-SCNC: 23.7 MMOL/L (ref 22–26)
HCO3 BLDA-SCNC: 24 MMOL/L (ref 22–26)
INHALED O2 CONCENTRATION: 45 %
INHALED O2 CONCENTRATION: 50 %
ITIME (SEC) (OHS): 1 SEC
MAGNESIUM SERPL-MCNC: 2 MG/DL (ref 1.6–2.6)
MECH RR (OHS): 12 B/MIN
MODE (OHS): ABNORMAL
MODE (OHS): AC
OXYGEN DEVICE BLOOD GAS (OHS): ABNORMAL
OXYGEN DEVICE BLOOD GAS (OHS): ABNORMAL
PCO2 BLDA: 43 MMHG (ref 35–45)
PCO2 BLDA: 50 MMHG (ref 35–45)
PEEP RESPIRATORY: 5 CMH2O
PEEP RESPIRATORY: 5 CMH2O
PH BLDA: 7.29 [PH] (ref 7.35–7.45)
PH BLDA: 7.35 [PH] (ref 7.35–7.45)
PHOSPHATE SERPL-MCNC: 2.5 MG/DL (ref 2.3–4.7)
PO2 BLDA: 60 MMHG (ref 80–100)
PO2 BLDA: 76 MMHG (ref 80–100)
POCT GLUCOSE: 197 MG/DL (ref 70–110)
POCT GLUCOSE: 198 MG/DL (ref 70–110)
POCT GLUCOSE: 211 MG/DL (ref 70–110)
POCT GLUCOSE: 217 MG/DL (ref 70–110)
POCT GLUCOSE: 230 MG/DL (ref 70–110)
POTASSIUM BLOOD GAS (OHS): 4.9 MMOL/L (ref 3.5–5)
POTASSIUM BLOOD GAS (OHS): 5 MMOL/L (ref 3.5–5)
POTASSIUM SERPL-SCNC: 5.1 MMOL/L (ref 3.5–5.1)
PS (OHS): 10 CMH2O
SAMPLE SITE BLOOD GAS (OHS): ABNORMAL
SAMPLE SITE BLOOD GAS (OHS): ABNORMAL
SAO2 % BLDA: 89 %
SAO2 % BLDA: 93 %
SODIUM BLOOD GAS (OHS): 135 MMOL/L (ref 137–145)
SODIUM BLOOD GAS (OHS): 135 MMOL/L (ref 137–145)
SODIUM SERPL-SCNC: 139 MMOL/L (ref 136–145)
SPONT+MECH VT ON VENT: 500 ML

## 2025-07-10 PROCEDURE — 25000003 PHARM REV CODE 250: Performed by: INTERNAL MEDICINE

## 2025-07-10 PROCEDURE — 82803 BLOOD GASES ANY COMBINATION: CPT

## 2025-07-10 PROCEDURE — 94003 VENT MGMT INPAT SUBQ DAY: CPT

## 2025-07-10 PROCEDURE — 25000003 PHARM REV CODE 250

## 2025-07-10 PROCEDURE — 83735 ASSAY OF MAGNESIUM: CPT | Performed by: INTERNAL MEDICINE

## 2025-07-10 PROCEDURE — 27100171 HC OXYGEN HIGH FLOW UP TO 24 HOURS

## 2025-07-10 PROCEDURE — 99900026 HC AIRWAY MAINTENANCE (STAT)

## 2025-07-10 PROCEDURE — 27200966 HC CLOSED SUCTION SYSTEM

## 2025-07-10 PROCEDURE — 99900031 HC PATIENT EDUCATION (STAT)

## 2025-07-10 PROCEDURE — 36415 COLL VENOUS BLD VENIPUNCTURE: CPT | Performed by: INTERNAL MEDICINE

## 2025-07-10 PROCEDURE — 94760 N-INVAS EAR/PLS OXIMETRY 1: CPT | Mod: XB

## 2025-07-10 PROCEDURE — 99900035 HC TECH TIME PER 15 MIN (STAT)

## 2025-07-10 PROCEDURE — 20000000 HC ICU ROOM

## 2025-07-10 PROCEDURE — 84100 ASSAY OF PHOSPHORUS: CPT | Performed by: INTERNAL MEDICINE

## 2025-07-10 PROCEDURE — 94761 N-INVAS EAR/PLS OXIMETRY MLT: CPT | Mod: XB

## 2025-07-10 PROCEDURE — 63600175 PHARM REV CODE 636 W HCPCS: Performed by: EMERGENCY MEDICINE

## 2025-07-10 PROCEDURE — 63600175 PHARM REV CODE 636 W HCPCS: Performed by: INTERNAL MEDICINE

## 2025-07-10 PROCEDURE — 80048 BASIC METABOLIC PNL TOTAL CA: CPT | Performed by: INTERNAL MEDICINE

## 2025-07-10 PROCEDURE — 36600 WITHDRAWAL OF ARTERIAL BLOOD: CPT

## 2025-07-10 RX ADMIN — INSULIN ASPART 1 UNITS: 100 INJECTION, SOLUTION INTRAVENOUS; SUBCUTANEOUS at 01:07

## 2025-07-10 RX ADMIN — INSULIN GLARGINE 15 UNITS: 100 INJECTION, SOLUTION SUBCUTANEOUS at 09:07

## 2025-07-10 RX ADMIN — DEXMEDETOMIDINE HYDROCHLORIDE 0.8 MCG/KG/HR: 4 INJECTION, SOLUTION INTRAVENOUS at 04:07

## 2025-07-10 RX ADMIN — DEXMEDETOMIDINE HYDROCHLORIDE 1.4 MCG/KG/HR: 4 INJECTION, SOLUTION INTRAVENOUS at 03:07

## 2025-07-10 RX ADMIN — FAMOTIDINE 20 MG: 20 TABLET, FILM COATED ORAL at 09:07

## 2025-07-10 RX ADMIN — DEXMEDETOMIDINE HYDROCHLORIDE 1.2 MCG/KG/HR: 4 INJECTION, SOLUTION INTRAVENOUS at 06:07

## 2025-07-10 RX ADMIN — MUPIROCIN: 20 OINTMENT TOPICAL at 09:07

## 2025-07-10 RX ADMIN — DEXMEDETOMIDINE HYDROCHLORIDE 0.8 MCG/KG/HR: 4 INJECTION, SOLUTION INTRAVENOUS at 08:07

## 2025-07-10 RX ADMIN — INSULIN ASPART 4 UNITS: 100 INJECTION, SOLUTION INTRAVENOUS; SUBCUTANEOUS at 05:07

## 2025-07-10 RX ADMIN — PROPOFOL 30 MCG/KG/MIN: 10 INJECTION, EMULSION INTRAVENOUS at 03:07

## 2025-07-10 RX ADMIN — PROPOFOL 30 MCG/KG/MIN: 10 INJECTION, EMULSION INTRAVENOUS at 04:07

## 2025-07-10 RX ADMIN — FAMOTIDINE 20 MG: 20 TABLET, FILM COATED ORAL at 08:07

## 2025-07-10 RX ADMIN — PROPOFOL 40 MCG/KG/MIN: 10 INJECTION, EMULSION INTRAVENOUS at 03:07

## 2025-07-10 RX ADMIN — MUPIROCIN: 20 OINTMENT TOPICAL at 08:07

## 2025-07-10 RX ADMIN — ACETAMINOPHEN 650 MG: 325 TABLET ORAL at 08:07

## 2025-07-10 RX ADMIN — PROPOFOL 30 MCG/KG/MIN: 10 INJECTION, EMULSION INTRAVENOUS at 08:07

## 2025-07-10 RX ADMIN — ENOXAPARIN SODIUM 40 MG: 40 INJECTION SUBCUTANEOUS at 04:07

## 2025-07-10 RX ADMIN — DEXMEDETOMIDINE HYDROCHLORIDE 0.8 MCG/KG/HR: 4 INJECTION, SOLUTION INTRAVENOUS at 03:07

## 2025-07-10 RX ADMIN — INSULIN ASPART 4 UNITS: 100 INJECTION, SOLUTION INTRAVENOUS; SUBCUTANEOUS at 06:07

## 2025-07-10 RX ADMIN — INSULIN GLARGINE 15 UNITS: 100 INJECTION, SOLUTION SUBCUTANEOUS at 08:07

## 2025-07-10 RX ADMIN — INSULIN ASPART 4 UNITS: 100 INJECTION, SOLUTION INTRAVENOUS; SUBCUTANEOUS at 12:07

## 2025-07-10 RX ADMIN — DEXMEDETOMIDINE HYDROCHLORIDE 1 MCG/KG/HR: 4 INJECTION, SOLUTION INTRAVENOUS at 12:07

## 2025-07-10 NOTE — PLAN OF CARE
Problem: Infection  Goal: Absence of Infection Signs and Symptoms  Outcome: Not Progressing     Problem: Adult Inpatient Plan of Care  Goal: Plan of Care Review  Outcome: Not Progressing  Goal: Patient-Specific Goal (Individualized)  Outcome: Not Progressing  Goal: Absence of Hospital-Acquired Illness or Injury  Outcome: Not Progressing  Goal: Optimal Comfort and Wellbeing  Outcome: Not Progressing  Goal: Readiness for Transition of Care  Outcome: Not Progressing     Problem: Mechanical Ventilation Invasive  Goal: Effective Communication  Outcome: Not Progressing  Goal: Optimal Device Function  Outcome: Not Progressing

## 2025-07-10 NOTE — PROGRESS NOTES
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  He presents to ED per EMS 07/05/2025 after multiple falls at nursing home with reported generalized weakness.  He is reported as having a Benítez catheter placed about 1 week ago for urinary retention.  Patient was reported as having pulled this out himself while at nursing home, on a.m. of presentation.  Benítez catheter was placed in ED, returning 700 cc of urine.  While in ER, he was reported as becoming more somnolent with poor overall respiratory effort and decreasing level of consciousness.  ABG revealed acute respiratory acidosis.  He was intubated at 8:55 a.m., placed on mechanical ventilatory support.  CT brain obtained in ED reveals chronic microvascular ischemic changes with no acute intracranial abnormalities.  SARS-COVID-2 PCR negative, influenza A/B PCR negative.  He is now admitted to ICU for ongoing medical care.     Hospital Course:  07/05/2025: Intubation/mechanical ventilation   TTE (07/05/2025: Normal LV size with decreased LV SF, EF 45-50%; trace MR, mild TR and mild PI.  PASP estimated 30 mm Hg; no pericardial effusion      24hr Interval History:  T-max 101.2° oral over the previous 24 hours.  Intake 2845 cc, output 1850cc over the previous 24 hours.  Blood cultures 07/05/2025 no growth, respiratory cultures normal URF.  Zosyn and Zithromax discontinued last p.m..  CBGs 190-230.  He is currently receiving propofol 35 mcg/kg per minute and Precedex 1 mcg/kg per hour infusions, both necessary due to ventilatory discordance with  coughing paroxysms.  He does follow simple commands on holding.  Secretions reported as small-to-moderate amount, nonpurulent.  He is receiving Peptamen VHP at 60 cc/hour, 30 cc q.1h water flushes.  No bowel movements past 24 hours.    PRVC AC 12/500 cc/peep +5/45%    Scheduled Medications:   enoxparin  40 mg Subcutaneous Q24H (prophylaxis, 1700)    famotidine  20 mg Per NG tube BID    insulin glargine U-100  15 Units Subcutaneous BID    mupirocin   Nasal BID     PRN Medications:    Current Facility-Administered Medications:     acetaminophen, 650 mg, Per OG tube, Q6H PRN    dextrose 50%, 12.5 g, Intravenous, PRN    dextrose 50%, 12.5 g, Intravenous, PRN    fentaNYL, 100 mcg, Intravenous, Q1H PRN    glucagon (human recombinant), 1 mg, Intramuscular, PRN    glucagon (human recombinant), 1 mg, Intramuscular, PRN    insulin aspart U-100, 0-10 Units, Subcutaneous, Q6H PRN  Continuous Infusions:   dexmedeTOMIDine (Precedex) infusion (titrating)  0-1.4 mcg/kg/hr Intravenous Continuous 33.9 mL/hr at 07/10/25 0609 1.2 mcg/kg/hr at 07/10/25 0609    NORepinephrine bitartrate-D5W  0-3 mcg/kg/min (Dosing Weight) Intravenous Continuous   Stopped at 07/07/25 0704    propofoL  0-50 mcg/kg/min (Dosing Weight) Intravenous Continuous 20.3 mL/hr at 07/10/25 0609 30 mcg/kg/min at 07/10/25 0609       No past medical history on file.    No past surgical history on file.    Objective:     Input/output:    Intake/Output Summary (Last 24 hours) at 7/10/2025 0820  Last data filed at 7/10/2025 0609  Gross per 24 hour   Intake 2845.39 ml   Output 1850 ml   Net 995.39 ml       Vital Signs (Most Recent):  Temp: 98.6 °F (37 °C) (07/10/25 0400)  Pulse: (!) 57 (07/10/25 0615)  Resp: (!) 21 (07/10/25 0615)  BP: 124/70 (07/10/25 0600)  SpO2: (!) 94 % (07/10/25 0615)  Body mass index is 38.01 kg/m².  Weight: 113.4 kg (250 lb) Vital Signs (24h Range):  Temp:  [98.6 °F (37 °C)-101.2 °F (38.4 °C)] 98.6 °F (37 °C)  Pulse:  [52-79] 57  Resp:  [12-33]  21  SpO2:  [90 %-99 %] 94 %  BP: (110-174)/(60-90) 124/70     Physical Exam  Constitutional:       Comments: Intubated on mechanical ventilatory support, on propofol and Precedex infusions.   HENT:      Mouth/Throat:      Comments: ET secured to external os  Eyes:      Pupils: Pupils are equal, round, and reactive to light.   Cardiovascular:      Rate and Rhythm: Normal rate and regular rhythm.      Heart sounds: No murmur heard.  Pulmonary:      Breath sounds: Rhonchi (Bilateral scattered) present. No wheezing or rales.   Abdominal:      General: Bowel sounds are normal. There is distension (Moderate distention, soft.  Easily reducible ventral hernia.).      Palpations: Abdomen is soft.      Comments: Obese   Musculoskeletal:      Right lower leg: No edema.      Left lower leg: No edema.   Neurological:      Comments: Intubated on Precedex and propofol infusions.  Eyes are open, fixes and follows, and follows simple commands.  Pupils equal round at 1-2 mm and reactive  Spontaneous respiratory effort noted.  Moves lower extremities and left upper extremity to command         Lines/Drains/Airways       Drain  Duration                  Urethral Catheter 07/05/25 0800 Non-latex 16 Fr. 5 days         NG/OG Tube 07/05/25 0848 4 days              Airway  Duration                  Airway - Non-Surgical 07/05/25 0848 Endotracheal Tube 4 days              Peripheral Intravenous Line  Duration             Peripheral IV Single Lumen 07/05/25 0638 18 G Left;Posterior Forearm 5 days    Peripheral IV Single Lumen 07/05/25 0847 18 G Right Antecubital 4 days    Peripheral IV Single Lumen 07/06/25 2000 20 G Left;Posterior Hand 3 days                    Vent:  Vent Mode: A/C (07/10/25 0447)  Ventilator Initiated: Yes (07/05/25 0850)  Set Rate: 12 BPM (07/10/25 0447)  Vt Set: 500 mL (07/10/25 0447)  PEEP/CPAP: 5 cmH20 (07/10/25 0447)  Oxygen Concentration (%): 50 (07/10/25 0447)  Peak Airway Pressure: 23 cmH20 (07/10/25 0447)  Total  Ve: 10.2 L/m (07/10/25 0447)  F/VT Ratio<105 (RSBI): (!) 49.04 (07/09/25 1642)    ABGs:  Lab Results   Component Value Date    PH 7.350 07/10/2025    PO2 60.0 (L) 07/10/2025    PCO2 43.0 07/10/2025         Significant Labs:    Lab Results   Component Value Date    WBC 10.91 07/09/2025    HGB 13.6 (L) 07/09/2025    HCT 43.3 07/09/2025    MCV 91.2 07/09/2025     07/09/2025         Recent Labs   Lab 07/10/25  0252      K 5.1   *   CO2 20*   BUN 35.5*   CREATININE 1.28*   CALCIUM 7.9*   MG 2.00   PHOS 2.5     Imaging:   Chest x-ray (07/10/2025, my reading of images):  ET is adequate in placement.  Borderline inspiration.  Decreased penetration of film with mild prominence of bilateral pulmonary vascular markings, unable to accurately image left base due to LV structures.    Assessment:     Acute combined hypoxic and hypercapnic respiratory failure.  Chest x-ray with bilateral patchy infiltrates, differential diagnostic possibilities including hydrostatic/cardiogenic versus non hydrostatic pulmonary edema versus infection, aspiration, etc.  History of cerebrovascular disease with right hemiparesis.  Neurologic status significantly improved today, now following commands.    Acute kidney injury superimposed on stage II chronic kidney disease, obstructive uropathy component.  He remains nonoliguric, daily improvement in BUN/creatinine   Type 2 diabetes mellitus with polyneuropathy, hyperglycemia improved.  Hypertension  Hyperlipidemia  Glaucoma    Plan:     Continue attempts at decreasing sedation as tolerated  Decrease mechanical ventilation to SBT as tolerated     35 minutes of critical care was time spent personally by me on the following activities: development of treatment plan with patient or surrogate and bedside caregivers, discussions with consultants, evaluation of patient's response to treatment, examination of patient, ordering and performing treatments and interventions, ordering and review  of laboratory studies, ordering and review of radiographic studies, pulse oximetry, re-evaluation of patient's condition.  This patient demonstrates a high probability for further clinical decompensation due to ongoing critical illness.  Critical care time did not overlap with that of any other provider or involve time for any procedures.       Jamee Alanis MD, [facility]  Pulmonary/Critical Care

## 2025-07-11 LAB
ALLENS TEST BLOOD GAS (OHS): YES
ANION GAP SERPL CALC-SCNC: 8 MEQ/L
BASE EXCESS BLD CALC-SCNC: -0.5 MMOL/L
BLOOD GAS SAMPLE TYPE (OHS): ABNORMAL
BUN SERPL-MCNC: 32.7 MG/DL (ref 8.4–25.7)
CA-I BLD-SCNC: 1.16 MMOL/L (ref 1.12–1.23)
CALCIUM SERPL-MCNC: 8.1 MG/DL (ref 8.8–10)
CHLORIDE SERPL-SCNC: 108 MMOL/L (ref 98–107)
CO2 BLDA-SCNC: 26.2 MMOL/L
CO2 SERPL-SCNC: 21 MMOL/L (ref 23–31)
CREAT SERPL-MCNC: 1.08 MG/DL (ref 0.72–1.25)
CREAT/UREA NIT SERPL: 30
DRAWN BY BLOOD GAS (OHS): ABNORMAL
GFR SERPLBLD CREATININE-BSD FMLA CKD-EPI: >60 ML/MIN/1.73/M2
GLUCOSE SERPL-MCNC: 231 MG/DL (ref 82–115)
HCO3 BLDA-SCNC: 24.9 MMOL/L (ref 22–26)
INHALED O2 CONCENTRATION: 45 %
MAGNESIUM SERPL-MCNC: 2.2 MG/DL (ref 1.6–2.6)
MECH RR (OHS): 18 B/MIN
MODE (OHS): AC
PCO2 BLDA: 43 MMHG (ref 35–45)
PEEP RESPIRATORY: 5 CMH2O
PH BLDA: 7.37 [PH] (ref 7.35–7.45)
PHOSPHATE SERPL-MCNC: 2.1 MG/DL (ref 2.3–4.7)
PO2 BLDA: 70 MMHG (ref 80–100)
POCT GLUCOSE: 200 MG/DL (ref 70–110)
POCT GLUCOSE: 200 MG/DL (ref 70–110)
POCT GLUCOSE: 216 MG/DL (ref 70–110)
POCT GLUCOSE: 220 MG/DL (ref 70–110)
POCT GLUCOSE: 223 MG/DL (ref 70–110)
POCT GLUCOSE: 250 MG/DL (ref 70–110)
POTASSIUM BLOOD GAS (OHS): 4.6 MMOL/L (ref 3.5–5)
POTASSIUM SERPL-SCNC: 4.8 MMOL/L (ref 3.5–5.1)
SAMPLE SITE BLOOD GAS (OHS): ABNORMAL
SAO2 % BLDA: 93 %
SODIUM BLOOD GAS (OHS): 133 MMOL/L (ref 137–145)
SODIUM SERPL-SCNC: 137 MMOL/L (ref 136–145)
SPONT+MECH VT ON VENT: 500 ML
TRIGL SERPL-MCNC: 429 MG/DL (ref 34–140)

## 2025-07-11 PROCEDURE — 87070 CULTURE OTHR SPECIMN AEROBIC: CPT | Performed by: INTERNAL MEDICINE

## 2025-07-11 PROCEDURE — 20000000 HC ICU ROOM

## 2025-07-11 PROCEDURE — 36600 WITHDRAWAL OF ARTERIAL BLOOD: CPT

## 2025-07-11 PROCEDURE — 25000003 PHARM REV CODE 250: Performed by: INTERNAL MEDICINE

## 2025-07-11 PROCEDURE — 83735 ASSAY OF MAGNESIUM: CPT | Performed by: INTERNAL MEDICINE

## 2025-07-11 PROCEDURE — 36415 COLL VENOUS BLD VENIPUNCTURE: CPT | Performed by: INTERNAL MEDICINE

## 2025-07-11 PROCEDURE — 27200966 HC CLOSED SUCTION SYSTEM

## 2025-07-11 PROCEDURE — 80048 BASIC METABOLIC PNL TOTAL CA: CPT | Performed by: INTERNAL MEDICINE

## 2025-07-11 PROCEDURE — 82803 BLOOD GASES ANY COMBINATION: CPT

## 2025-07-11 PROCEDURE — 84100 ASSAY OF PHOSPHORUS: CPT | Performed by: INTERNAL MEDICINE

## 2025-07-11 PROCEDURE — 99900026 HC AIRWAY MAINTENANCE (STAT)

## 2025-07-11 PROCEDURE — 63600175 PHARM REV CODE 636 W HCPCS: Performed by: INTERNAL MEDICINE

## 2025-07-11 PROCEDURE — 25000003 PHARM REV CODE 250

## 2025-07-11 PROCEDURE — 87040 BLOOD CULTURE FOR BACTERIA: CPT | Performed by: INTERNAL MEDICINE

## 2025-07-11 PROCEDURE — 94760 N-INVAS EAR/PLS OXIMETRY 1: CPT

## 2025-07-11 PROCEDURE — 27100171 HC OXYGEN HIGH FLOW UP TO 24 HOURS

## 2025-07-11 PROCEDURE — 84478 ASSAY OF TRIGLYCERIDES: CPT | Performed by: INTERNAL MEDICINE

## 2025-07-11 PROCEDURE — 99900031 HC PATIENT EDUCATION (STAT)

## 2025-07-11 PROCEDURE — 94761 N-INVAS EAR/PLS OXIMETRY MLT: CPT

## 2025-07-11 PROCEDURE — 94003 VENT MGMT INPAT SUBQ DAY: CPT

## 2025-07-11 PROCEDURE — 63600175 PHARM REV CODE 636 W HCPCS: Performed by: EMERGENCY MEDICINE

## 2025-07-11 PROCEDURE — 99900035 HC TECH TIME PER 15 MIN (STAT)

## 2025-07-11 RX ORDER — INSULIN GLARGINE 100 [IU]/ML
20 INJECTION, SOLUTION SUBCUTANEOUS 2 TIMES DAILY
Status: DISCONTINUED | OUTPATIENT
Start: 2025-07-11 | End: 2025-07-13

## 2025-07-11 RX ORDER — POLYETHYLENE GLYCOL 3350 17 G/17G
17 POWDER, FOR SOLUTION ORAL DAILY
Status: DISCONTINUED | OUTPATIENT
Start: 2025-07-11 | End: 2025-07-14

## 2025-07-11 RX ORDER — AMOXICILLIN 250 MG
2 CAPSULE ORAL DAILY
Status: DISCONTINUED | OUTPATIENT
Start: 2025-07-11 | End: 2025-08-07 | Stop reason: HOSPADM

## 2025-07-11 RX ADMIN — INSULIN ASPART 4 UNITS: 100 INJECTION, SOLUTION INTRAVENOUS; SUBCUTANEOUS at 12:07

## 2025-07-11 RX ADMIN — FAMOTIDINE 20 MG: 20 TABLET, FILM COATED ORAL at 09:07

## 2025-07-11 RX ADMIN — INSULIN ASPART 4 UNITS: 100 INJECTION, SOLUTION INTRAVENOUS; SUBCUTANEOUS at 05:07

## 2025-07-11 RX ADMIN — DEXMEDETOMIDINE HYDROCHLORIDE 0.8 MCG/KG/HR: 4 INJECTION, SOLUTION INTRAVENOUS at 12:07

## 2025-07-11 RX ADMIN — DEXMEDETOMIDINE HYDROCHLORIDE 0.8 MCG/KG/HR: 4 INJECTION, SOLUTION INTRAVENOUS at 05:07

## 2025-07-11 RX ADMIN — INSULIN GLARGINE 15 UNITS: 100 INJECTION, SOLUTION SUBCUTANEOUS at 09:07

## 2025-07-11 RX ADMIN — INSULIN ASPART 1 UNITS: 100 INJECTION, SOLUTION INTRAVENOUS; SUBCUTANEOUS at 12:07

## 2025-07-11 RX ADMIN — PROPOFOL 35 MCG/KG/MIN: 10 INJECTION, EMULSION INTRAVENOUS at 09:07

## 2025-07-11 RX ADMIN — SENNOSIDES, DOCUSATE SODIUM 2 TABLET: 8.6; 5 TABLET ORAL at 03:07

## 2025-07-11 RX ADMIN — PROPOFOL 30 MCG/KG/MIN: 10 INJECTION, EMULSION INTRAVENOUS at 12:07

## 2025-07-11 RX ADMIN — ENOXAPARIN SODIUM 40 MG: 40 INJECTION SUBCUTANEOUS at 05:07

## 2025-07-11 RX ADMIN — PROPOFOL 35 MCG/KG/MIN: 10 INJECTION, EMULSION INTRAVENOUS at 05:07

## 2025-07-11 RX ADMIN — POLYETHYLENE GLYCOL 3350 17 G: 17 POWDER, FOR SOLUTION ORAL at 03:07

## 2025-07-11 RX ADMIN — DEXMEDETOMIDINE HYDROCHLORIDE 1 MCG/KG/HR: 4 INJECTION, SOLUTION INTRAVENOUS at 12:07

## 2025-07-11 RX ADMIN — PROPOFOL 40 MCG/KG/MIN: 10 INJECTION, EMULSION INTRAVENOUS at 12:07

## 2025-07-11 RX ADMIN — DEXMEDETOMIDINE HYDROCHLORIDE 0.8 MCG/KG/HR: 4 INJECTION, SOLUTION INTRAVENOUS at 09:07

## 2025-07-11 RX ADMIN — PROPOFOL 30 MCG/KG/MIN: 10 INJECTION, EMULSION INTRAVENOUS at 05:07

## 2025-07-11 RX ADMIN — INSULIN GLARGINE 20 UNITS: 100 INJECTION, SOLUTION SUBCUTANEOUS at 09:07

## 2025-07-11 NOTE — PLAN OF CARE
Problem: Infection  Goal: Absence of Infection Signs and Symptoms  Outcome: Not Progressing     Problem: Adult Inpatient Plan of Care  Goal: Plan of Care Review  Outcome: Not Progressing  Goal: Patient-Specific Goal (Individualized)  Outcome: Not Progressing  Goal: Absence of Hospital-Acquired Illness or Injury  Outcome: Not Progressing  Goal: Optimal Comfort and Wellbeing  Outcome: Not Progressing  Goal: Readiness for Transition of Care  Outcome: Not Progressing     Problem: Mechanical Ventilation Invasive  Goal: Effective Communication  Outcome: Not Progressing  Goal: Optimal Device Function  Outcome: Not Progressing  Goal: Mechanical Ventilation Liberation  Outcome: Not Progressing  Goal: Optimal Nutrition Delivery  Outcome: Not Progressing  Goal: Absence of Device-Related Skin and Tissue Injury  Outcome: Not Progressing  Goal: Absence of Ventilator-Induced Lung Injury  Outcome: Not Progressing     Problem: Artificial Airway  Goal: Effective Communication  Outcome: Not Progressing  Goal: Optimal Device Function  Outcome: Not Progressing

## 2025-07-11 NOTE — PROGRESS NOTES
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  He presents to ED per EMS 07/05/2025 after multiple falls at nursing home with reported generalized weakness.  He is reported as having a Benítez catheter placed about 1 week ago for urinary retention.  Patient was reported as having pulled this out himself while at nursing home, on a.m. of presentation.  Benítez catheter was placed in ED, returning 700 cc of urine.  While in ER, he was reported as becoming more somnolent with poor overall respiratory effort and decreasing level of consciousness.  ABG revealed acute respiratory acidosis.  He was intubated at 8:55 a.m., placed on mechanical ventilatory support.  CT brain obtained in ED reveals chronic microvascular ischemic changes with no acute intracranial abnormalities.  SARS-COVID-2 PCR negative, influenza A/B PCR negative.  He is now admitted to ICU for ongoing medical care.     Hospital Course:  07/05/2025: Intubation/mechanical ventilation   TTE (07/05/2025: Normal LV size with decreased LV SF, EF 45-50%; trace MR, mild TR and mild PI.  PASP estimated 30 mm Hg; no pericardial effusion      24hr Interval History:  Remains intubated and sedated.  Febrile overnight with T-max 101.4° F. last culture data 07/05/2025 remains negative.      T-max 101.2° oral over the previous 24 hours.  Intake 2845 cc, output 1850cc over the previous 24 hours.  Blood cultures 07/05/2025 no growth, respiratory cultures normal URF.  Zosyn and Zithromax discontinued last p.m..  CBGs 190-230.  He is currently receiving  propofol 35 mcg/kg per minute and Precedex 1 mcg/kg per hour infusions, both necessary due to ventilatory discordance with coughing paroxysms.  He does follow simple commands on holding.  Secretions reported as small-to-moderate amount, nonpurulent.  He is receiving Peptamen VHP at 60 cc/hour, 30 cc q.1h water flushes.  No bowel movements past 24 hours.    PRVC AC 12/500 cc/peep +5/45%    Scheduled Medications:   enoxparin  40 mg Subcutaneous Q24H (prophylaxis, 1700)    famotidine  20 mg Per NG tube BID    insulin glargine U-100  15 Units Subcutaneous BID     PRN Medications:    Current Facility-Administered Medications:     acetaminophen, 650 mg, Per OG tube, Q6H PRN    dextrose 50%, 12.5 g, Intravenous, PRN    dextrose 50%, 12.5 g, Intravenous, PRN    fentaNYL, 100 mcg, Intravenous, Q1H PRN    glucagon (human recombinant), 1 mg, Intramuscular, PRN    glucagon (human recombinant), 1 mg, Intramuscular, PRN    insulin aspart U-100, 0-10 Units, Subcutaneous, Q6H PRN  Continuous Infusions:   dexmedeTOMIDine (Precedex) infusion (titrating)  0-1.4 mcg/kg/hr Intravenous Continuous 28.35 mL/hr at 07/11/25 1200 1 mcg/kg/hr at 07/11/25 1200    NORepinephrine bitartrate-D5W  0-3 mcg/kg/min (Dosing Weight) Intravenous Continuous   Stopped at 07/07/25 0704    propofoL  0-50 mcg/kg/min (Dosing Weight) Intravenous Continuous 27.2 mL/hr at 07/11/25 1200 40 mcg/kg/min at 07/11/25 1200       No past medical history on file.    No past surgical history on file.    Objective:     Input/output:    Intake/Output Summary (Last 24 hours) at 7/11/2025 1237  Last data filed at 7/11/2025 1000  Gross per 24 hour   Intake 4689.19 ml   Output 1950 ml   Net 2739.19 ml       Vital Signs (Most Recent):  Temp: 98.4 °F (36.9 °C) (07/11/25 0800)  Pulse: (!) 59 (07/11/25 1100)  Resp: (!) 27 (07/11/25 1100)  BP: (!) 157/84 (07/11/25 1100)  SpO2: (!) 94 % (07/11/25 1100)  Body mass index is 38.01 kg/m².  Weight: 113.4 kg (250 lb) Vital Signs (24h  Range):  Temp:  [98.4 °F (36.9 °C)-101.4 °F (38.6 °C)] 98.4 °F (36.9 °C)  Pulse:  [55-70] 59  Resp:  [] 27  SpO2:  [91 %-96 %] 94 %  BP: ()/(54-84) 157/84       Physical exam:  Gen- A intubated, minimal unresponsive on no sedation  HENT- ATNC, MMM, ETT in place  CV- RRR  Resp- CTA, compliant with mechanical ventilation  MSK- WWP, trace edema  Neuro- intubated, sedated  Psych- unable to assess 2/2 intubation, sedation       Lines/Drains/Airways       Drain  Duration                  NG/OG Tube 07/05/25 0848 6 days         Urethral Catheter 07/05/25 0800 Non-latex 16 Fr. 6 days              Airway  Duration                  Airway - Non-Surgical 07/05/25 0848 Endotracheal Tube 6 days              Peripheral Intravenous Line  Duration             Peripheral IV Single Lumen 07/05/25 0638 18 G Left;Posterior Forearm 6 days    Peripheral IV Single Lumen 07/05/25 0847 18 G Right Antecubital 6 days    Peripheral IV Single Lumen 07/06/25 2000 20 G Left;Posterior Hand 4 days                    Vent:  Vent Mode: A/C (07/11/25 1027)  Ventilator Initiated: Yes (07/05/25 0850)  Set Rate: 18 BPM (07/11/25 1027)  Vt Set: 500 mL (07/11/25 1027)  Pressure Support: 10 cmH20 (07/10/25 0951)  PEEP/CPAP: 5 cmH20 (07/11/25 1027)  Oxygen Concentration (%): 45 (07/11/25 1027)  Peak Airway Pressure: 29 cmH20 (07/11/25 1027)  Total Ve: 13.7 L/m (07/11/25 1027)  F/VT Ratio<105 (RSBI): (!) 61.44 (07/11/25 1027)    ABGs:  Lab Results   Component Value Date    PH 7.370 07/11/2025    PO2 70.0 (L) 07/11/2025    PCO2 43.0 07/11/2025       Significant Labs:  Lab Results   Component Value Date    WBC 10.91 07/09/2025    HGB 13.6 (L) 07/09/2025    HCT 43.3 07/09/2025    MCV 91.2 07/09/2025     07/09/2025       Recent Labs   Lab 07/11/25  0234      K 4.8   *   CO2 21*   BUN 32.7*   CREATININE 1.08   CALCIUM 8.1*   MG 2.20   PHOS 2.1*   TRIG 429*         Assessment:     Acute combined hypoxic and hypercapnic respiratory  failure.  Chest x-ray with bilateral patchy infiltrates, differential diagnostic possibilities including hydrostatic/cardiogenic versus non hydrostatic pulmonary edema versus infection, aspiration, etc.  History of cerebrovascular disease with right hemiparesis.  Neurologic status significantly improved today, now following commands.    Acute kidney injury superimposed on stage II chronic kidney disease, obstructive uropathy component.  He remains nonoliguric, daily improvement in BUN/creatinine   Type 2 diabetes mellitus with polyneuropathy, hyperglycemia improved.  Hypertension  Hyperlipidemia  Glaucoma    Plan:     Continue attempts at decreasing sedation as tolerated  Repeat blood and respiratory cultures ordered today given persistent fevers, repeat CBC ordered for tomorrow  Also order RUQ U/S and BLE DVT scans for evaluation of persistent fever   Decrease mechanical ventilation to SBT as tolerated       DVT ppx: LMWH   GI ppx: famotidine       I spent 33 minutes providing critical care services to this patient.  This does not include time spent for separately billed procedures.       Jose Antonio Arora MD  Pulmonary/Critical Care

## 2025-07-11 NOTE — PROGRESS NOTES
Inpatient Nutrition Assessment    Admit Date: 7/5/2025   Total duration of encounter: 6 days   Patient Age: 69 y.o.    Nutrition Recommendation/Prescription     Decrease tube feeding rate to 45 ml/hr and increase protein modular frequency to three times daily:  Peptamen Intense VHP goal rate 45 ml/hr   [id number] three times daily  to provide  1140 kcal 91% needs, 111% with propofol  143 g protein 98% needs  68 g CHO 49% needs  756 ml water 33% needs, 60% with current 30 ml/hr water flushes  1362 mg K  620 mg phos  calculations based on estimated 20 hour run time     Communication of Recommendations: reviewed with nurse    Nutrition Assessment     Malnutrition Assessment/Nutrition-Focused Physical Exam       Malnutrition Level: other (see comments) (Does not meet criteria) (07/07/25 1041)                                                        A minimum of two characteristics is recommended for diagnosis of either severe or non-severe malnutrition.    Chart Review    Reason Seen: follow-up    Malnutrition Screening Tool Results   Have you recently lost weight without trying?: No  Have you been eating poorly because of a decreased appetite?: No   MST Score: 0   Diagnosis:  Acute combined hypoxic and hypercapnic respiratory failure  History of cerebrovascular disease with right hemiparesis  Acute kidney injury superimposed on stage II chronic kidney disease, obstructive uropathy component.  He remains nonoliguric, daily improvement in BUN/creatinine   Type 2 diabetes mellitus with polyneuropathy, hyperglycemia improved.  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, glaucoma, hypertension, and hyperlipidemia     Scheduled Medications:  enoxparin, 40 mg, Q24H (prophylaxis, 1700)  famotidine, 20 mg, BID  insulin glargine U-100, 15 Units,  BID    Continuous Infusions:  dexmedeTOMIDine (Precedex) infusion (titrating), Last Rate: 0.8 mcg/kg/hr (07/11/25 0649)  NORepinephrine bitartrate-D5W, Last Rate: Stopped (07/07/25 0704)  propofoL, Last Rate: 30 mcg/kg/min (07/11/25 0649)    PRN Medications:  acetaminophen, 650 mg, Q6H PRN  dextrose 50%, 12.5 g, PRN  dextrose 50%, 12.5 g, PRN  fentaNYL, 100 mcg, Q1H PRN  glucagon (human recombinant), 1 mg, PRN  glucagon (human recombinant), 1 mg, PRN  insulin aspart U-100, 0-10 Units, Q6H PRN    Calorie Containing IV Medications: Diprivan @ 23.7 ml/hr (provides 626 kcal/d)    Recent Labs   Lab 07/05/25  0636 07/05/25  0727 07/05/25  0941 07/06/25  0316 07/06/25  0649 07/06/25  1404 07/07/25  0311 07/08/25  0250 07/09/25  0300 07/10/25  0252 07/11/25  0234     --   --  134* 137  --  138 139 138 139 137   K 5.5*  --   --  6.6* 6.1* 5.7* 4.8 5.3* 5.1 5.1 4.8   CALCIUM 8.5*  --   --  8.2* 8.2*  --  8.3* 8.0* 8.3* 7.9* 8.1*   PHOS  --   --   --  2.3  --   --  2.7 3.6  --  2.5 2.1*   MG 2.90*  --   --  2.60  --   --  2.40 2.30 2.10 2.00 2.20     --   --  106 108*  --  110* 109* 109* 110* 108*   CO2 26  --   --  22* 22*  --  21* 22* 18* 20* 21*   BUN 75.0*  --   --  72.5* 70.4*  --  60.8* 49.7* 40.8* 35.5* 32.7*   CREATININE 2.50*  --   --  2.18* 2.08*  --  1.81* 1.76* 1.34* 1.28* 1.08   EGFRNORACEVR 27  --   --  32 34  --  40 42 58 >60 >60     --   --  327* 261*  --  167* 186* 204* 229* 231*   BILITOT 0.2  --   --  0.2 0.2  --  0.3  --   --   --   --    ALKPHOS 54  --   --  54 50  --  53  --   --   --   --    ALT 13  --   --  18 17  --  15  --   --   --   --    AST 16  --   --  12 15  --  10*  --   --   --   --    ALBUMIN 3.0*  --   --  2.9* 2.9*  --  2.8*  --   --   --   --    TRIG  --   --   --  276*  --   --  424* 596*  --   --  429*   AMMONIA  --   --  23.9  --   --   --   --   --   --   --   --    WBC  --  5.39  --  6.74  --   --  10.18  --  10.91  --   --    HGB  --  13.4*  --  14.3  --   --  14.3  " --  13.6*  --   --    HCT  --  43.5  --  44.1  --   --  46.1  --  43.3  --   --      Nutrition Orders:  No diet orders on file  Tube Feedings/Formulas 65; 1,300; Peptamen Intense VHP; OG; ProSource TF20; 1 time daily; 30; Every hour    Appetite/Oral Intake: NPO/NPO  Factors Affecting Nutritional Intake: NPO and on mechanical ventilation  Social Needs Impacting Access to Food: none identified; NH resident   Food/Caodaism/Cultural Preferences: unable to obtain  Food Allergies: no known food allergies  Last Bowel Movement: 07/05/25  Wound(s):  no partial or full thickness pressure injuries documented at this time.     Comments    7/7/25: Pt intubated on mechanical ventilation, receiving kcals from diprivan. Not currently receiving enteral nutrition. Tube feeding orders updated.     7/11/25 Patient remains on ventilator with propofol, tube feeding at previous goal rate; recommend decreasing tube feeding rate due to overfeeding with propofol currently, will need to increase protein modular.    Anthropometrics    Height: 5' 8" (172.7 cm), Height Method: Stated  Last Weight: 113.4 kg (250 lb) (07/05/25 0605), Weight Method: Stated  BMI (Calculated): 38  BMI Classification: obese grade II (BMI 35-39.9)        Ideal Body Weight (IBW), Male: 154 lb     % Ideal Body Weight, Male (lb): 162.34 %                          Usual Weight Provided By: EMR weight history    Wt Readings from Last 5 Encounters:   07/05/25 113.4 kg (250 lb)     Weight Change(s) Since Admission:   Wt Readings from Last 1 Encounters:   07/05/25 0605 113.4 kg (250 lb)   Admit Weight: 113.4 kg (250 lb) (07/05/25 0605), Weight Method: Stated    Estimated Needs    Weight Used For Calorie Calculations: 113.4 kg (250 lb)  Energy Calorie Requirements (kcal): 1957-2056 kcals (11-14 kcal/kg)  Energy Need Method: Kcal/kg  Weight Used For Protein Calculations: 70 kg (154 lb 5.2 oz) (IBW)  Protein Requirements: 140 g (2 g/kg IBW)  Fluid Requirements (mL): 2268 mL (20 " mL/kg)  CHO Requirement: 140-178 g/day (45% of total EEN)     Enteral Nutrition     Formula: Peptamen Intense VHP  Rate/Volume: 65 ml/hr  Water Flushes: 30 ml/hr  Additives/Modulars: Prosource TF20 once daily  Route: orogastric tube  Method: continuous  Total Nutrition Provided by Tube Feeding, Additives, and Flushes:  Calories Provided  1380 kcal/d, 100% needs   Protein Provided  140 g/d, 100% needs   Fluid Provided  1692 ml/d, 75% needs   Continuous feeding calculations based on estimated 20 hr/d run time unless otherwise stated.    Parenteral Nutrition     Patient not receiving parenteral nutrition support at this time.    Evaluation of Received Nutrient Intake    Calories: exceeding estimated needs  Protein: meeting estimated needs    Patient Education     Not applicable.    Nutrition Diagnosis     PES: Inadequate energy intake related to inability to consume sufficient nutrients as evidenced by on mechanical ventilation, need for NPO status. (resolved)     PES:            Nutrition Interventions     Intervention(s): Enteral nutrition management  Intervention(s):      Goal: Meet greater than 80% of nutritional needs by follow-up. (goal met)  Goal: Tolerate enteral feeding at goal rate by follow-up. (goal met)    Nutrition Goals & Monitoring     Dietitian will monitor: energy intake, enteral nutrition intake, weight, electrolyte/renal panel, glucose/endocrine profile, and gastrointestinal profile  Discharge planning: too early to determine; pending clinical course  Nutrition Risk/Follow-Up: patient at increased nutrition risk; dietitian will follow-up twice weekly   Please consult if re-assessment needed sooner.

## 2025-07-12 LAB
ALBUMIN SERPL-MCNC: 2.2 G/DL (ref 3.4–4.8)
ALBUMIN/GLOB SERPL: 0.5 RATIO (ref 1.1–2)
ALLENS TEST BLOOD GAS (OHS): YES
ALP SERPL-CCNC: 52 UNIT/L (ref 40–150)
ALT SERPL-CCNC: 21 UNIT/L (ref 0–55)
ANION GAP SERPL CALC-SCNC: 9 MEQ/L
AST SERPL-CCNC: 19 UNIT/L (ref 11–45)
BASE EXCESS BLD CALC-SCNC: -2.4 MMOL/L (ref -2–2)
BASOPHILS # BLD AUTO: 0.04 X10(3)/MCL
BASOPHILS NFR BLD AUTO: 0.5 %
BILIRUB SERPL-MCNC: 0.4 MG/DL
BLOOD GAS SAMPLE TYPE (OHS): ABNORMAL
BUN SERPL-MCNC: 34.9 MG/DL (ref 8.4–25.7)
CA-I BLD-SCNC: 1.23 MMOL/L (ref 1.12–1.23)
CALCIUM SERPL-MCNC: 8.2 MG/DL (ref 8.8–10)
CHLORIDE SERPL-SCNC: 107 MMOL/L (ref 98–107)
CO2 BLDA-SCNC: 25.1 MMOL/L
CO2 SERPL-SCNC: 21 MMOL/L (ref 23–31)
COHGB MFR BLDA: 1.8 % (ref 0.5–1.5)
CREAT SERPL-MCNC: 0.96 MG/DL (ref 0.72–1.25)
CREAT/UREA NIT SERPL: 36
DRAWN BY BLOOD GAS (OHS): ABNORMAL
EOSINOPHIL # BLD AUTO: 0.27 X10(3)/MCL (ref 0–0.9)
EOSINOPHIL NFR BLD AUTO: 3.3 %
ERYTHROCYTE [DISTWIDTH] IN BLOOD BY AUTOMATED COUNT: 14.6 % (ref 11.5–17)
GFR SERPLBLD CREATININE-BSD FMLA CKD-EPI: >60 ML/MIN/1.73/M2
GLOBULIN SER-MCNC: 4.6 GM/DL (ref 2.4–3.5)
GLUCOSE SERPL-MCNC: 228 MG/DL (ref 82–115)
HCO3 BLDA-SCNC: 23.7 MMOL/L (ref 22–26)
HCT VFR BLD AUTO: 39.4 % (ref 42–52)
HGB BLD-MCNC: 12.4 G/DL (ref 14–18)
IMM GRANULOCYTES # BLD AUTO: 0.03 X10(3)/MCL (ref 0–0.04)
IMM GRANULOCYTES NFR BLD AUTO: 0.4 %
INHALED O2 CONCENTRATION: 45 %
LYMPHOCYTES # BLD AUTO: 1.66 X10(3)/MCL (ref 0.6–4.6)
LYMPHOCYTES NFR BLD AUTO: 20.4 %
MCH RBC QN AUTO: 28.6 PG (ref 27–31)
MCHC RBC AUTO-ENTMCNC: 31.5 G/DL (ref 33–36)
MCV RBC AUTO: 90.8 FL (ref 80–94)
MECH RR (OHS): 18 B/MIN
METHGB MFR BLDA: 1.2 % (ref 0.4–1.5)
MODE (OHS): AC
MONOCYTES # BLD AUTO: 0.59 X10(3)/MCL (ref 0.1–1.3)
MONOCYTES NFR BLD AUTO: 7.3 %
NEUTROPHILS # BLD AUTO: 5.53 X10(3)/MCL (ref 2.1–9.2)
NEUTROPHILS NFR BLD AUTO: 68.1 %
NRBC BLD AUTO-RTO: 0 %
O2 HB BLOOD GAS (OHS): 90.8 % (ref 94–97)
OXYHGB MFR BLDA: 13.5 G/DL (ref 12–16)
PCO2 BLDA: 45 MMHG (ref 35–45)
PEEP RESPIRATORY: 5 CMH2O
PH BLDA: 7.33 [PH] (ref 7.35–7.45)
PLATELET # BLD AUTO: 147 X10(3)/MCL (ref 130–400)
PMV BLD AUTO: 11 FL (ref 7.4–10.4)
PO2 BLDA: 66 MMHG (ref 80–100)
POCT GLUCOSE: 217 MG/DL (ref 70–110)
POCT GLUCOSE: 220 MG/DL (ref 70–110)
POCT GLUCOSE: 232 MG/DL (ref 70–110)
POCT GLUCOSE: 248 MG/DL (ref 70–110)
POTASSIUM BLOOD GAS (OHS): 5.1 MMOL/L (ref 3.5–5)
POTASSIUM SERPL-SCNC: 5 MMOL/L (ref 3.5–5.1)
PROT SERPL-MCNC: 6.8 GM/DL (ref 5.8–7.6)
RBC # BLD AUTO: 4.34 X10(6)/MCL (ref 4.7–6.1)
SAMPLE SITE BLOOD GAS (OHS): ABNORMAL
SAO2 % BLDA: 91.1 %
SODIUM BLOOD GAS (OHS): 133 MMOL/L (ref 137–145)
SODIUM SERPL-SCNC: 137 MMOL/L (ref 136–145)
SPONT+MECH VT ON VENT: 500 ML
WBC # BLD AUTO: 8.12 X10(3)/MCL (ref 4.5–11.5)

## 2025-07-12 PROCEDURE — 99900031 HC PATIENT EDUCATION (STAT)

## 2025-07-12 PROCEDURE — 25000003 PHARM REV CODE 250: Performed by: INTERNAL MEDICINE

## 2025-07-12 PROCEDURE — 36600 WITHDRAWAL OF ARTERIAL BLOOD: CPT

## 2025-07-12 PROCEDURE — 94003 VENT MGMT INPAT SUBQ DAY: CPT

## 2025-07-12 PROCEDURE — 63600175 PHARM REV CODE 636 W HCPCS: Performed by: EMERGENCY MEDICINE

## 2025-07-12 PROCEDURE — 85025 COMPLETE CBC W/AUTO DIFF WBC: CPT | Performed by: INTERNAL MEDICINE

## 2025-07-12 PROCEDURE — 99900035 HC TECH TIME PER 15 MIN (STAT)

## 2025-07-12 PROCEDURE — 36415 COLL VENOUS BLD VENIPUNCTURE: CPT | Performed by: INTERNAL MEDICINE

## 2025-07-12 PROCEDURE — 80053 COMPREHEN METABOLIC PANEL: CPT | Performed by: INTERNAL MEDICINE

## 2025-07-12 PROCEDURE — 20000000 HC ICU ROOM

## 2025-07-12 PROCEDURE — 63600175 PHARM REV CODE 636 W HCPCS: Performed by: INTERNAL MEDICINE

## 2025-07-12 PROCEDURE — 82803 BLOOD GASES ANY COMBINATION: CPT

## 2025-07-12 PROCEDURE — 94761 N-INVAS EAR/PLS OXIMETRY MLT: CPT

## 2025-07-12 PROCEDURE — 27200966 HC CLOSED SUCTION SYSTEM

## 2025-07-12 PROCEDURE — 25000003 PHARM REV CODE 250

## 2025-07-12 PROCEDURE — 27100171 HC OXYGEN HIGH FLOW UP TO 24 HOURS

## 2025-07-12 PROCEDURE — 94760 N-INVAS EAR/PLS OXIMETRY 1: CPT

## 2025-07-12 PROCEDURE — 99900026 HC AIRWAY MAINTENANCE (STAT)

## 2025-07-12 RX ORDER — OXYCODONE HYDROCHLORIDE 10 MG/1
10 TABLET ORAL EVERY 6 HOURS
Refills: 0 | Status: DISCONTINUED | OUTPATIENT
Start: 2025-07-12 | End: 2025-07-17

## 2025-07-12 RX ADMIN — PROPOFOL 40 MCG/KG/MIN: 10 INJECTION, EMULSION INTRAVENOUS at 05:07

## 2025-07-12 RX ADMIN — ENOXAPARIN SODIUM 40 MG: 40 INJECTION SUBCUTANEOUS at 05:07

## 2025-07-12 RX ADMIN — PROPOFOL 15 MCG/KG/MIN: 10 INJECTION, EMULSION INTRAVENOUS at 01:07

## 2025-07-12 RX ADMIN — INSULIN GLARGINE 20 UNITS: 100 INJECTION, SOLUTION SUBCUTANEOUS at 09:07

## 2025-07-12 RX ADMIN — PROPOFOL 40 MCG/KG/MIN: 10 INJECTION, EMULSION INTRAVENOUS at 04:07

## 2025-07-12 RX ADMIN — OXYCODONE HYDROCHLORIDE 10 MG: 10 TABLET ORAL at 05:07

## 2025-07-12 RX ADMIN — DEXMEDETOMIDINE HYDROCHLORIDE 0.8 MCG/KG/HR: 4 INJECTION, SOLUTION INTRAVENOUS at 07:07

## 2025-07-12 RX ADMIN — DEXMEDETOMIDINE HYDROCHLORIDE 0.8 MCG/KG/HR: 4 INJECTION, SOLUTION INTRAVENOUS at 11:07

## 2025-07-12 RX ADMIN — POLYETHYLENE GLYCOL 3350 17 G: 17 POWDER, FOR SOLUTION ORAL at 09:07

## 2025-07-12 RX ADMIN — OXYCODONE HYDROCHLORIDE 10 MG: 10 TABLET ORAL at 11:07

## 2025-07-12 RX ADMIN — DEXMEDETOMIDINE HYDROCHLORIDE 1 MCG/KG/HR: 4 INJECTION, SOLUTION INTRAVENOUS at 01:07

## 2025-07-12 RX ADMIN — DEXMEDETOMIDINE HYDROCHLORIDE 0.8 MCG/KG/HR: 4 INJECTION, SOLUTION INTRAVENOUS at 04:07

## 2025-07-12 RX ADMIN — INSULIN ASPART 4 UNITS: 100 INJECTION, SOLUTION INTRAVENOUS; SUBCUTANEOUS at 01:07

## 2025-07-12 RX ADMIN — SENNOSIDES, DOCUSATE SODIUM 2 TABLET: 8.6; 5 TABLET ORAL at 09:07

## 2025-07-12 RX ADMIN — FAMOTIDINE 20 MG: 20 TABLET, FILM COATED ORAL at 09:07

## 2025-07-12 RX ADMIN — INSULIN ASPART 4 UNITS: 100 INJECTION, SOLUTION INTRAVENOUS; SUBCUTANEOUS at 05:07

## 2025-07-12 RX ADMIN — PROPOFOL 20 MCG/KG/MIN: 10 INJECTION, EMULSION INTRAVENOUS at 09:07

## 2025-07-12 RX ADMIN — INSULIN ASPART 2 UNITS: 100 INJECTION, SOLUTION INTRAVENOUS; SUBCUTANEOUS at 12:07

## 2025-07-12 RX ADMIN — OXYCODONE HYDROCHLORIDE 10 MG: 10 TABLET ORAL at 01:07

## 2025-07-12 NOTE — PROGRESS NOTES
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  He presents to ED per EMS 07/05/2025 after multiple falls at nursing home with reported generalized weakness.  He is reported as having a Benítez catheter placed about 1 week ago for urinary retention.  Patient was reported as having pulled this out himself while at nursing home, on a.m. of presentation.  Benítez catheter was placed in ED, returning 700 cc of urine.  While in ER, he was reported as becoming more somnolent with poor overall respiratory effort and decreasing level of consciousness.  ABG revealed acute respiratory acidosis.  He was intubated at 8:55 a.m., placed on mechanical ventilatory support.  CT brain obtained in ED reveals chronic microvascular ischemic changes with no acute intracranial abnormalities.  SARS-COVID-2 PCR negative, influenza A/B PCR negative.  He is now admitted to ICU for ongoing medical care.     Hospital Course:  07/05/2025: Intubation/mechanical ventilation   TTE (07/05/2025: Normal LV size with decreased LV SF, EF 45-50%; trace MR, mild TR and mild PI.  PASP estimated 30 mm Hg; no pericardial effusion      24hr Interval History:  Remains intubated and sedated.  Febrile overnight with T-max 101.4° F. last culture data 07/05/2025 remains negative.        Review of systems unobtainable due to intubation, sedation.        Scheduled Medications:   enoxparin  40 mg Subcutaneous Q24H (prophylaxis, 1700)    famotidine  20 mg Per NG tube BID    insulin glargine U-100  20 Units Subcutaneous BID    polyethylene glycol  17  g Per NG tube Daily    senna-docusate  2 tablet Per NG tube Daily     PRN Medications:    Current Facility-Administered Medications:     acetaminophen, 650 mg, Per OG tube, Q6H PRN    dextrose 50%, 12.5 g, Intravenous, PRN    dextrose 50%, 12.5 g, Intravenous, PRN    fentaNYL, 100 mcg, Intravenous, Q1H PRN    glucagon (human recombinant), 1 mg, Intramuscular, PRN    glucagon (human recombinant), 1 mg, Intramuscular, PRN    insulin aspart U-100, 0-10 Units, Subcutaneous, Q6H PRN  Continuous Infusions:   dexmedeTOMIDine (Precedex) infusion (titrating)  0-1.4 mcg/kg/hr Intravenous Continuous 22.6 mL/hr at 07/12/25 0538 0.8 mcg/kg/hr at 07/12/25 0538    NORepinephrine bitartrate-D5W  0-3 mcg/kg/min (Dosing Weight) Intravenous Continuous   Stopped at 07/07/25 0704    propofoL  0-50 mcg/kg/min (Dosing Weight) Intravenous Continuous 27.1 mL/hr at 07/12/25 0538 40 mcg/kg/min at 07/12/25 0538       No past medical history on file.    No past surgical history on file.    Objective:     Input/output:    Intake/Output Summary (Last 24 hours) at 7/12/2025 0837  Last data filed at 7/12/2025 0538  Gross per 24 hour   Intake 3037.61 ml   Output 1650 ml   Net 1387.61 ml       Vital Signs (Most Recent):  Temp: 98.5 °F (36.9 °C) (07/12/25 0400)  Pulse: (!) 58 (07/12/25 0529)  Resp: (!) 27 (07/12/25 0529)  BP: 122/68 (07/12/25 0430)  SpO2: (!) 93 % (07/12/25 0529)  Body mass index is 38.01 kg/m².  Weight: 113.4 kg (250 lb) Vital Signs (24h Range):  Temp:  [98.4 °F (36.9 °C)-98.8 °F (37.1 °C)] 98.5 °F (36.9 °C)  Pulse:  [53-69] 58  Resp:  [16-30] 27  SpO2:  [90 %-96 %] 93 %  BP: (119-157)/(67-84) 122/68       Physical exam:  Gen- intubated, minimally responsive on no sedation  HENT- ATNC, MMM, ETT in place  CV- RRR  Resp- CTA, compliant with mechanical ventilation  MSK- WWP, trace edema  Neuro- intubated, sedated  Psych- unable to assess 2/2 intubation, sedation       Lines/Drains/Airways       Drain  Duration                  Urethral  Catheter 07/05/25 0800 Non-latex 16 Fr. 7 days         NG/OG Tube 07/05/25 0848 6 days              Airway  Duration                  Airway - Non-Surgical 07/05/25 0848 Endotracheal Tube 6 days              Peripheral Intravenous Line  Duration             Peripheral IV Single Lumen 07/05/25 0638 18 G Left;Posterior Forearm 7 days    Peripheral IV Single Lumen 07/05/25 0847 18 G Right Antecubital 6 days    Peripheral IV Single Lumen 07/06/25 2000 20 G Left;Posterior Hand 5 days                    Vent:  Vent Mode: A/C (07/12/25 0529)  Ventilator Initiated: Yes (07/05/25 0850)  Set Rate: 18 BPM (07/12/25 0529)  Vt Set: 500 mL (07/12/25 0529)  Pressure Support: 10 cmH20 (07/10/25 0951)  PEEP/CPAP: 5 cmH20 (07/12/25 0529)  Oxygen Concentration (%): 45 (07/12/25 0529)  Peak Airway Pressure: 22 cmH20 (07/12/25 0529)  Total Ve: 12.4 L/m (07/12/25 0529)  F/VT Ratio<105 (RSBI): (!) 54.88 (07/12/25 0529)    ABGs:  Lab Results   Component Value Date    PH 7.330 (L) 07/12/2025    PO2 66.0 (L) 07/12/2025    PCO2 45.0 07/12/2025       Significant Labs:  Lab Results   Component Value Date    WBC 8.12 07/12/2025    HGB 12.4 (L) 07/12/2025    HCT 39.4 (L) 07/12/2025    MCV 90.8 07/12/2025     07/12/2025       Recent Labs   Lab 07/12/25  0250      K 5.0      CO2 21*   BUN 34.9*   CREATININE 0.96   CALCIUM 8.2*   AST 19   ALT 21   ALKPHOS 52   ALBUMIN 2.2*         Assessment:     Acute combined hypoxic and hypercapnic respiratory failure.  Chest x-ray with bilateral patchy infiltrates, differential diagnostic possibilities including hydrostatic/cardiogenic versus non hydrostatic pulmonary edema versus infection, aspiration, etc.  History of cerebrovascular disease with right hemiparesis  Acute kidney injury superimposed on stage II chronic kidney disease, obstructive uropathy component.  He remains nonoliguric, daily improvement in BUN/creatinine   Type 2 diabetes mellitus with polyneuropathy, hyperglycemia  improved.  Hypertension  Hyperlipidemia  Glaucoma    Plan:     Continue attempts at decreasing sedation as tolerated by agitation--> start scheduled oxycodone today and wean propofol as able  Repeat blood and respiratory cultures negative without significant leukocytosis today, now afebrile  Abdominal ultrasound for evaluation of acalculous cholecystitis performed with final read pending, no DVT identified on scans  Decrease mechanical ventilation to SBT as tolerated       DVT ppx: LMWH   GI ppx: famotidine       I spent 32 minutes providing critical care services to this patient.  This does not include time spent for separately billed procedures.       Jose Antonio Arora MD  Pulmonary/Critical Care

## 2025-07-12 NOTE — PLAN OF CARE
Problem: Infection  Goal: Absence of Infection Signs and Symptoms  Outcome: Not Progressing     Problem: Adult Inpatient Plan of Care  Goal: Plan of Care Review  Outcome: Not Progressing  Goal: Patient-Specific Goal (Individualized)  Outcome: Not Progressing  Goal: Absence of Hospital-Acquired Illness or Injury  Outcome: Not Progressing  Goal: Optimal Comfort and Wellbeing  Outcome: Not Progressing  Goal: Readiness for Transition of Care  Outcome: Not Progressing     Problem: Mechanical Ventilation Invasive  Goal: Effective Communication  Outcome: Not Progressing  Goal: Optimal Device Function  Outcome: Not Progressing  Goal: Mechanical Ventilation Liberation  Outcome: Not Progressing  Goal: Optimal Nutrition Delivery  Outcome: Not Progressing  Goal: Absence of Device-Related Skin and Tissue Injury  Outcome: Not Progressing  Goal: Absence of Ventilator-Induced Lung Injury  Outcome: Not Progressing     Problem: Artificial Airway  Goal: Effective Communication  Outcome: Not Progressing  Goal: Optimal Device Function  Outcome: Not Progressing  Goal: Absence of Device-Related Skin or Tissue Injury  Outcome: Not Progressing

## 2025-07-13 LAB
ALBUMIN SERPL-MCNC: 2.2 G/DL (ref 3.4–4.8)
ALBUMIN/GLOB SERPL: 0.5 RATIO (ref 1.1–2)
ALLENS TEST BLOOD GAS (OHS): YES
ALP SERPL-CCNC: 56 UNIT/L (ref 40–150)
ALT SERPL-CCNC: 39 UNIT/L (ref 0–55)
ANION GAP SERPL CALC-SCNC: 7 MEQ/L
AST SERPL-CCNC: 30 UNIT/L (ref 11–45)
BACTERIA SPT CULT: NORMAL
BASE EXCESS BLD CALC-SCNC: -3.6 MMOL/L (ref -2–2)
BASOPHILS # BLD AUTO: 0.04 X10(3)/MCL
BASOPHILS NFR BLD AUTO: 0.4 %
BILIRUB SERPL-MCNC: 0.3 MG/DL
BLOOD GAS SAMPLE TYPE (OHS): ABNORMAL
BUN SERPL-MCNC: 42.9 MG/DL (ref 8.4–25.7)
CA-I BLD-SCNC: 1.2 MMOL/L (ref 1.12–1.23)
CALCIUM SERPL-MCNC: 8.3 MG/DL (ref 8.8–10)
CHLORIDE SERPL-SCNC: 105 MMOL/L (ref 98–107)
CO2 BLDA-SCNC: 24.5 MMOL/L
CO2 SERPL-SCNC: 21 MMOL/L (ref 23–31)
COHGB MFR BLDA: 2.5 % (ref 0.5–1.5)
CREAT SERPL-MCNC: 1.14 MG/DL (ref 0.72–1.25)
CREAT/UREA NIT SERPL: 38
DRAWN BY BLOOD GAS (OHS): ABNORMAL
EOSINOPHIL # BLD AUTO: 0.24 X10(3)/MCL (ref 0–0.9)
EOSINOPHIL NFR BLD AUTO: 2.7 %
ERYTHROCYTE [DISTWIDTH] IN BLOOD BY AUTOMATED COUNT: 14.6 % (ref 11.5–17)
GFR SERPLBLD CREATININE-BSD FMLA CKD-EPI: >60 ML/MIN/1.73/M2
GLOBULIN SER-MCNC: 4.3 GM/DL (ref 2.4–3.5)
GLUCOSE SERPL-MCNC: 232 MG/DL (ref 82–115)
GRAM STN SPEC: NORMAL
GRAM STN SPEC: NORMAL
HCO3 BLDA-SCNC: 23.1 MMOL/L (ref 22–26)
HCT VFR BLD AUTO: 39.6 % (ref 42–52)
HGB BLD-MCNC: 12.2 G/DL (ref 14–18)
IMM GRANULOCYTES # BLD AUTO: 0.03 X10(3)/MCL (ref 0–0.04)
IMM GRANULOCYTES NFR BLD AUTO: 0.3 %
INHALED O2 CONCENTRATION: 55 %
LYMPHOCYTES # BLD AUTO: 2.26 X10(3)/MCL (ref 0.6–4.6)
LYMPHOCYTES NFR BLD AUTO: 25.2 %
MAGNESIUM SERPL-MCNC: 1.8 MG/DL (ref 1.6–2.6)
MCH RBC QN AUTO: 28.6 PG (ref 27–31)
MCHC RBC AUTO-ENTMCNC: 30.8 G/DL (ref 33–36)
MCV RBC AUTO: 92.7 FL (ref 80–94)
MECH RR (OHS): 18 B/MIN
METHGB MFR BLDA: 1.5 % (ref 0.4–1.5)
MODE (OHS): AC
MONOCYTES # BLD AUTO: 0.86 X10(3)/MCL (ref 0.1–1.3)
MONOCYTES NFR BLD AUTO: 9.6 %
NEUTROPHILS # BLD AUTO: 5.54 X10(3)/MCL (ref 2.1–9.2)
NEUTROPHILS NFR BLD AUTO: 61.8 %
NRBC BLD AUTO-RTO: 0 %
O2 HB BLOOD GAS (OHS): 91.1 % (ref 94–97)
OXYHGB MFR BLDA: 14.2 G/DL (ref 12–16)
PCO2 BLDA: 47 MMHG (ref 35–45)
PEEP RESPIRATORY: 5 CMH2O
PH BLDA: 7.3 [PH] (ref 7.35–7.45)
PHOSPHATE SERPL-MCNC: 3.4 MG/DL (ref 2.3–4.7)
PLATELET # BLD AUTO: 178 X10(3)/MCL (ref 130–400)
PMV BLD AUTO: 11.3 FL (ref 7.4–10.4)
PO2 BLDA: 66 MMHG (ref 80–100)
POCT GLUCOSE: 187 MG/DL (ref 70–110)
POCT GLUCOSE: 225 MG/DL (ref 70–110)
POCT GLUCOSE: 247 MG/DL (ref 70–110)
POTASSIUM BLOOD GAS (OHS): 5.1 MMOL/L (ref 3.5–5)
POTASSIUM SERPL-SCNC: 5.4 MMOL/L (ref 3.5–5.1)
PROT SERPL-MCNC: 6.5 GM/DL (ref 5.8–7.6)
RBC # BLD AUTO: 4.27 X10(6)/MCL (ref 4.7–6.1)
SAMPLE SITE BLOOD GAS (OHS): ABNORMAL
SAO2 % BLDA: 90.4 %
SODIUM BLOOD GAS (OHS): 130 MMOL/L (ref 137–145)
SODIUM SERPL-SCNC: 133 MMOL/L (ref 136–145)
SPONT+MECH VT ON VENT: 500 ML
WBC # BLD AUTO: 8.97 X10(3)/MCL (ref 4.5–11.5)

## 2025-07-13 PROCEDURE — 94761 N-INVAS EAR/PLS OXIMETRY MLT: CPT

## 2025-07-13 PROCEDURE — 25000003 PHARM REV CODE 250: Performed by: INTERNAL MEDICINE

## 2025-07-13 PROCEDURE — 99900031 HC PATIENT EDUCATION (STAT)

## 2025-07-13 PROCEDURE — 84100 ASSAY OF PHOSPHORUS: CPT

## 2025-07-13 PROCEDURE — 25000003 PHARM REV CODE 250: Performed by: NURSE PRACTITIONER

## 2025-07-13 PROCEDURE — 27100171 HC OXYGEN HIGH FLOW UP TO 24 HOURS

## 2025-07-13 PROCEDURE — 99900035 HC TECH TIME PER 15 MIN (STAT)

## 2025-07-13 PROCEDURE — 94003 VENT MGMT INPAT SUBQ DAY: CPT

## 2025-07-13 PROCEDURE — 63600175 PHARM REV CODE 636 W HCPCS: Performed by: INTERNAL MEDICINE

## 2025-07-13 PROCEDURE — 36415 COLL VENOUS BLD VENIPUNCTURE: CPT

## 2025-07-13 PROCEDURE — 94760 N-INVAS EAR/PLS OXIMETRY 1: CPT | Mod: XB

## 2025-07-13 PROCEDURE — 99900026 HC AIRWAY MAINTENANCE (STAT)

## 2025-07-13 PROCEDURE — 83735 ASSAY OF MAGNESIUM: CPT

## 2025-07-13 PROCEDURE — 63600175 PHARM REV CODE 636 W HCPCS: Performed by: EMERGENCY MEDICINE

## 2025-07-13 PROCEDURE — 20000000 HC ICU ROOM

## 2025-07-13 PROCEDURE — 85025 COMPLETE CBC W/AUTO DIFF WBC: CPT

## 2025-07-13 PROCEDURE — 25000003 PHARM REV CODE 250

## 2025-07-13 PROCEDURE — 80053 COMPREHEN METABOLIC PANEL: CPT

## 2025-07-13 PROCEDURE — 36600 WITHDRAWAL OF ARTERIAL BLOOD: CPT

## 2025-07-13 PROCEDURE — 82803 BLOOD GASES ANY COMBINATION: CPT

## 2025-07-13 RX ORDER — LABETALOL HYDROCHLORIDE 5 MG/ML
20 INJECTION, SOLUTION INTRAVENOUS EVERY 6 HOURS PRN
Status: DISCONTINUED | OUTPATIENT
Start: 2025-07-13 | End: 2025-08-07 | Stop reason: HOSPADM

## 2025-07-13 RX ORDER — HYDRALAZINE HYDROCHLORIDE 20 MG/ML
10 INJECTION INTRAMUSCULAR; INTRAVENOUS EVERY 6 HOURS PRN
Status: DISCONTINUED | OUTPATIENT
Start: 2025-07-13 | End: 2025-08-07 | Stop reason: HOSPADM

## 2025-07-13 RX ORDER — INSULIN GLARGINE 100 [IU]/ML
25 INJECTION, SOLUTION SUBCUTANEOUS 2 TIMES DAILY
Status: DISCONTINUED | OUTPATIENT
Start: 2025-07-13 | End: 2025-07-14

## 2025-07-13 RX ORDER — LISINOPRIL 20 MG/1
20 TABLET ORAL DAILY
Status: DISCONTINUED | OUTPATIENT
Start: 2025-07-13 | End: 2025-07-27

## 2025-07-13 RX ORDER — AMLODIPINE BESYLATE 5 MG/1
5 TABLET ORAL DAILY
Status: DISCONTINUED | OUTPATIENT
Start: 2025-07-13 | End: 2025-08-07 | Stop reason: HOSPADM

## 2025-07-13 RX ADMIN — LISINOPRIL 20 MG: 20 TABLET ORAL at 08:07

## 2025-07-13 RX ADMIN — INSULIN ASPART 2 UNITS: 100 INJECTION, SOLUTION INTRAVENOUS; SUBCUTANEOUS at 12:07

## 2025-07-13 RX ADMIN — SENNOSIDES, DOCUSATE SODIUM 2 TABLET: 8.6; 5 TABLET ORAL at 10:07

## 2025-07-13 RX ADMIN — DEXMEDETOMIDINE HYDROCHLORIDE 1 MCG/KG/HR: 4 INJECTION, SOLUTION INTRAVENOUS at 06:07

## 2025-07-13 RX ADMIN — INSULIN GLARGINE 20 UNITS: 100 INJECTION, SOLUTION SUBCUTANEOUS at 09:07

## 2025-07-13 RX ADMIN — INSULIN GLARGINE 25 UNITS: 100 INJECTION, SOLUTION SUBCUTANEOUS at 08:07

## 2025-07-13 RX ADMIN — POLYETHYLENE GLYCOL 3350 17 G: 17 POWDER, FOR SOLUTION ORAL at 10:07

## 2025-07-13 RX ADMIN — PROPOFOL 30 MCG/KG/MIN: 10 INJECTION, EMULSION INTRAVENOUS at 02:07

## 2025-07-13 RX ADMIN — DEXMEDETOMIDINE HYDROCHLORIDE 1.2 MCG/KG/HR: 4 INJECTION, SOLUTION INTRAVENOUS at 09:07

## 2025-07-13 RX ADMIN — INSULIN ASPART 4 UNITS: 100 INJECTION, SOLUTION INTRAVENOUS; SUBCUTANEOUS at 01:07

## 2025-07-13 RX ADMIN — OXYCODONE HYDROCHLORIDE 10 MG: 10 TABLET ORAL at 12:07

## 2025-07-13 RX ADMIN — SODIUM ZIRCONIUM CYCLOSILICATE 5 G: 5 POWDER, FOR SUSPENSION ORAL at 02:07

## 2025-07-13 RX ADMIN — DEXMEDETOMIDINE HYDROCHLORIDE 1.2 MCG/KG/HR: 4 INJECTION, SOLUTION INTRAVENOUS at 02:07

## 2025-07-13 RX ADMIN — DEXMEDETOMIDINE HYDROCHLORIDE 1.2 MCG/KG/HR: 4 INJECTION, SOLUTION INTRAVENOUS at 12:07

## 2025-07-13 RX ADMIN — OXYCODONE HYDROCHLORIDE 10 MG: 10 TABLET ORAL at 11:07

## 2025-07-13 RX ADMIN — INSULIN ASPART 4 UNITS: 100 INJECTION, SOLUTION INTRAVENOUS; SUBCUTANEOUS at 05:07

## 2025-07-13 RX ADMIN — DEXMEDETOMIDINE HYDROCHLORIDE 1 MCG/KG/HR: 4 INJECTION, SOLUTION INTRAVENOUS at 11:07

## 2025-07-13 RX ADMIN — FAMOTIDINE 20 MG: 20 TABLET, FILM COATED ORAL at 08:07

## 2025-07-13 RX ADMIN — ENOXAPARIN SODIUM 40 MG: 40 INJECTION SUBCUTANEOUS at 05:07

## 2025-07-13 RX ADMIN — OXYCODONE HYDROCHLORIDE 10 MG: 10 TABLET ORAL at 05:07

## 2025-07-13 RX ADMIN — AMLODIPINE BESYLATE 5 MG: 5 TABLET ORAL at 08:07

## 2025-07-13 RX ADMIN — FENTANYL CITRATE 100 MCG: 50 INJECTION INTRAMUSCULAR; INTRAVENOUS at 12:07

## 2025-07-13 RX ADMIN — DEXMEDETOMIDINE HYDROCHLORIDE 0.8 MCG/KG/HR: 4 INJECTION, SOLUTION INTRAVENOUS at 02:07

## 2025-07-13 RX ADMIN — FAMOTIDINE 20 MG: 20 TABLET, FILM COATED ORAL at 10:07

## 2025-07-13 RX ADMIN — DEXMEDETOMIDINE HYDROCHLORIDE 1 MCG/KG/HR: 4 INJECTION, SOLUTION INTRAVENOUS at 09:07

## 2025-07-13 NOTE — PROGRESS NOTES
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  He presents to ED per EMS 07/05/2025 after multiple falls at nursing home with reported generalized weakness.  He is reported as having a Benítez catheter placed about 1 week ago for urinary retention.  Patient was reported as having pulled this out himself while at nursing home, on a.m. of presentation.  Benítez catheter was placed in ED, returning 700 cc of urine.  While in ER, he was reported as becoming more somnolent with poor overall respiratory effort and decreasing level of consciousness.  ABG revealed acute respiratory acidosis.  He was intubated at 8:55 a.m., placed on mechanical ventilatory support.  CT brain obtained in ED reveals chronic microvascular ischemic changes with no acute intracranial abnormalities.  SARS-COVID-2 PCR negative, influenza A/B PCR negative.  He is now admitted to ICU for ongoing medical care.     Hospital Course:  07/05/2025: Intubation/mechanical ventilation   TTE (07/05/2025: Normal LV size with decreased LV SF, EF 45-50%; trace MR, mild TR and mild PI.  PASP estimated 30 mm Hg; no pericardial effusion      24hr Interval History:  Remains intubated and sedated. Propofol off, now only on precedex. Not following commands on my exam but nursing reports intermittently following commands. ABG this AM with worsening acidosis. CXR with continued bilateral lower airspace opacities and ET tube above the level of clavicular heads.         Review of systems unobtainable due to intubation, sedation.        Scheduled  Medications:   enoxparin  40 mg Subcutaneous Q24H (prophylaxis, 1700)    famotidine  20 mg Per NG tube BID    insulin glargine U-100  20 Units Subcutaneous BID    oxyCODONE  10 mg Oral Q6H    polyethylene glycol  17 g Per NG tube Daily    senna-docusate  2 tablet Per NG tube Daily     PRN Medications:    Current Facility-Administered Medications:     acetaminophen, 650 mg, Per OG tube, Q6H PRN    dextrose 50%, 12.5 g, Intravenous, PRN    dextrose 50%, 12.5 g, Intravenous, PRN    fentaNYL, 100 mcg, Intravenous, Q1H PRN    glucagon (human recombinant), 1 mg, Intramuscular, PRN    glucagon (human recombinant), 1 mg, Intramuscular, PRN    insulin aspart U-100, 0-10 Units, Subcutaneous, Q6H PRN  Continuous Infusions:   dexmedeTOMIDine (Precedex) infusion (titrating)  0-1.4 mcg/kg/hr Intravenous Continuous 34.02 mL/hr at 07/13/25 0900 1.2 mcg/kg/hr at 07/13/25 0900    NORepinephrine bitartrate-D5W  0-3 mcg/kg/min (Dosing Weight) Intravenous Continuous   Stopped at 07/07/25 0704    propofoL  0-50 mcg/kg/min (Dosing Weight) Intravenous Continuous 20.4 mL/hr at 07/13/25 0244 30 mcg/kg/min at 07/13/25 0244       No past medical history on file.    No past surgical history on file.    Objective:     Input/output:    Intake/Output Summary (Last 24 hours) at 7/13/2025 1106  Last data filed at 7/13/2025 1000  Gross per 24 hour   Intake 2405.36 ml   Output 2100 ml   Net 305.36 ml       Vital Signs (Most Recent):  Temp: 99 °F (37.2 °C) (07/13/25 0800)  Pulse: 66 (07/13/25 1000)  Resp: (!) 22 (07/13/25 1000)  BP: (!) 142/118 (07/13/25 1000)  SpO2: 96 % (07/13/25 1000)  Body mass index is 38.01 kg/m².  Weight: 113.4 kg (250 lb) Vital Signs (24h Range):  Temp:  [99 °F (37.2 °C)-100 °F (37.8 °C)] 99 °F (37.2 °C)  Pulse:  [51-74] 66  Resp:  [17-34] 22  SpO2:  [91 %-96 %] 96 %  BP: (124-158)/() 142/118       Physical exam:  Gen- intubated, minimally responsive on no sedation  HENT- ATNC, MMM, ETT in place  CV- RRR  Resp- CTA,  compliant with mechanical ventilation  MSK- WWP, trace edema  Neuro- intubated, sedated  Psych- unable to assess 2/2 intubation, sedation       Lines/Drains/Airways       Drain  Duration                  NG/OG Tube 07/05/25 0848 8 days         Urethral Catheter 07/05/25 0800 Non-latex 16 Fr. 8 days              Airway  Duration                  Airway - Non-Surgical 07/05/25 0848 Endotracheal Tube 8 days              Peripheral Intravenous Line  Duration             Peripheral IV Single Lumen 07/05/25 0638 18 G Left;Posterior Forearm 8 days    Peripheral IV Single Lumen 07/05/25 0847 18 G Right Antecubital 8 days                    Vent:  Vent Mode: A/C (07/13/25 0747)  Ventilator Initiated: Yes (07/05/25 0850)  Set Rate: 18 BPM (07/13/25 0747)  Vt Set: 500 mL (07/13/25 0747)  Pressure Support: 10 cmH20 (07/10/25 0951)  PEEP/CPAP: 5 cmH20 (07/13/25 0747)  Oxygen Concentration (%): 55 (07/13/25 0800)  Peak Airway Pressure: 19 cmH20 (07/13/25 0747)  Total Ve: 10.4 L/m (07/13/25 0747)  F/VT Ratio<105 (RSBI): (!) 36.07 (07/13/25 0747)    ABGs:  Lab Results   Component Value Date    PH 7.300 (L) 07/13/2025    PO2 66.0 (L) 07/13/2025    PCO2 47.0 (H) 07/13/2025       Significant Labs:  Lab Results   Component Value Date    WBC 8.97 07/13/2025    HGB 12.2 (L) 07/13/2025    HCT 39.6 (L) 07/13/2025    MCV 92.7 07/13/2025     07/13/2025       Recent Labs   Lab 07/13/25 0620   *   K 5.4*      CO2 21*   BUN 42.9*   CREATININE 1.14   CALCIUM 8.3*   MG 1.80   PHOS 3.4   AST 30   ALT 39   ALKPHOS 56   ALBUMIN 2.2*         Assessment:     Acute combined hypoxic and hypercapnic respiratory failure.  Chest x-ray with bilateral patchy infiltrates, differential diagnostic possibilities including hydrostatic/cardiogenic versus non hydrostatic pulmonary edema versus infection, aspiration, etc.  History of cerebrovascular disease with right hemiparesis  Acute kidney injury superimposed on stage II chronic kidney  disease, obstructive uropathy component.  He remains nonoliguric, daily improvement in BUN/creatinine   Type 2 diabetes mellitus with polyneuropathy, hyperglycemia improved.  Hypertension  Hyperlipidemia  Glaucoma    Plan:     Completed zosyn and zithromax  Increased vent rate based on AM ABGs  Continue attempts at decreasing precedex  Tube feeds for nutrition  Continue bowel regimen  Continue sliding scale and long acting, may need to increase long acting dose again  Will treat with lokelma today for potassium level  Advance ET tube 2-3 cm based off AM cxr       DVT ppx: LMWH   GI ppx: famotidine       I spent 32 minutes providing critical care services to this patient.  This does not include time spent for separately billed procedures.       Sherry Barrera, TANI  Pulmonary/Critical Care

## 2025-07-13 NOTE — PLAN OF CARE
Problem: Infection  Goal: Absence of Infection Signs and Symptoms  Outcome: Not Progressing     Problem: Adult Inpatient Plan of Care  Goal: Plan of Care Review  Outcome: Not Progressing  Goal: Patient-Specific Goal (Individualized)  Outcome: Not Progressing  Goal: Absence of Hospital-Acquired Illness or Injury  Outcome: Not Progressing  Goal: Optimal Comfort and Wellbeing  Outcome: Not Progressing  Goal: Readiness for Transition of Care  Outcome: Not Progressing     Problem: Mechanical Ventilation Invasive  Goal: Effective Communication  Outcome: Not Progressing  Goal: Optimal Device Function  Outcome: Not Progressing  Goal: Mechanical Ventilation Liberation  Outcome: Not Progressing  Goal: Optimal Nutrition Delivery  Outcome: Not Progressing  Goal: Absence of Device-Related Skin and Tissue Injury  Outcome: Not Progressing  Goal: Absence of Ventilator-Induced Lung Injury  Outcome: Not Progressing

## 2025-07-14 LAB
ALBUMIN SERPL-MCNC: 2.3 G/DL (ref 3.4–4.8)
ALBUMIN/GLOB SERPL: 0.5 RATIO (ref 1.1–2)
ALLENS TEST BLOOD GAS (OHS): YES
ALP SERPL-CCNC: 60 UNIT/L (ref 40–150)
ALT SERPL-CCNC: 41 UNIT/L (ref 0–55)
ANION GAP SERPL CALC-SCNC: 9 MEQ/L
AST SERPL-CCNC: 24 UNIT/L (ref 11–45)
BASE EXCESS BLD CALC-SCNC: -0.4 MMOL/L (ref -2–2)
BASOPHILS # BLD AUTO: 0.04 X10(3)/MCL
BASOPHILS NFR BLD AUTO: 0.5 %
BILIRUB SERPL-MCNC: 0.6 MG/DL
BLOOD GAS SAMPLE TYPE (OHS): ABNORMAL
BUN SERPL-MCNC: 46.2 MG/DL (ref 8.4–25.7)
CA-I BLD-SCNC: 1.28 MMOL/L (ref 1.12–1.23)
CALCIUM SERPL-MCNC: 8.7 MG/DL (ref 8.8–10)
CHLORIDE SERPL-SCNC: 104 MMOL/L (ref 98–107)
CO2 BLDA-SCNC: 26.8 MMOL/L
CO2 SERPL-SCNC: 21 MMOL/L (ref 23–31)
COHGB MFR BLDA: 1.6 % (ref 0.5–1.5)
CREAT SERPL-MCNC: 0.99 MG/DL (ref 0.72–1.25)
CREAT/UREA NIT SERPL: 47
DRAWN BY BLOOD GAS (OHS): ABNORMAL
EOSINOPHIL # BLD AUTO: 0.24 X10(3)/MCL (ref 0–0.9)
EOSINOPHIL NFR BLD AUTO: 2.7 %
ERYTHROCYTE [DISTWIDTH] IN BLOOD BY AUTOMATED COUNT: 14.3 % (ref 11.5–17)
GFR SERPLBLD CREATININE-BSD FMLA CKD-EPI: >60 ML/MIN/1.73/M2
GLOBULIN SER-MCNC: 4.5 GM/DL (ref 2.4–3.5)
GLUCOSE SERPL-MCNC: 245 MG/DL (ref 82–115)
HCO3 BLDA-SCNC: 25.4 MMOL/L (ref 22–26)
HCT VFR BLD AUTO: 39.7 % (ref 42–52)
HGB BLD-MCNC: 12.5 G/DL (ref 14–18)
IMM GRANULOCYTES # BLD AUTO: 0.04 X10(3)/MCL (ref 0–0.04)
IMM GRANULOCYTES NFR BLD AUTO: 0.5 %
INHALED O2 CONCENTRATION: 50 %
LYMPHOCYTES # BLD AUTO: 1.82 X10(3)/MCL (ref 0.6–4.6)
LYMPHOCYTES NFR BLD AUTO: 20.6 %
MAGNESIUM SERPL-MCNC: 1.7 MG/DL (ref 1.6–2.6)
MCH RBC QN AUTO: 28.7 PG (ref 27–31)
MCHC RBC AUTO-ENTMCNC: 31.5 G/DL (ref 33–36)
MCV RBC AUTO: 91.1 FL (ref 80–94)
MECH RR (OHS): 22 B/MIN
METHGB MFR BLDA: 1.7 % (ref 0.4–1.5)
MODE (OHS): AC
MONOCYTES # BLD AUTO: 0.74 X10(3)/MCL (ref 0.1–1.3)
MONOCYTES NFR BLD AUTO: 8.4 %
NEUTROPHILS # BLD AUTO: 5.97 X10(3)/MCL (ref 2.1–9.2)
NEUTROPHILS NFR BLD AUTO: 67.3 %
NRBC BLD AUTO-RTO: 0 %
O2 HB BLOOD GAS (OHS): 92.4 % (ref 94–97)
OXYHGB MFR BLDA: 13.2 G/DL (ref 12–16)
PCO2 BLDA: 45 MMHG (ref 35–45)
PEEP RESPIRATORY: 5 CMH2O
PH BLDA: 7.36 [PH] (ref 7.35–7.45)
PHOSPHATE SERPL-MCNC: 2.6 MG/DL (ref 2.3–4.7)
PLATELET # BLD AUTO: 197 X10(3)/MCL (ref 130–400)
PMV BLD AUTO: 11.5 FL (ref 7.4–10.4)
PO2 BLDA: 75 MMHG (ref 80–100)
POCT GLUCOSE: 201 MG/DL (ref 70–110)
POCT GLUCOSE: 219 MG/DL (ref 70–110)
POCT GLUCOSE: 243 MG/DL (ref 70–110)
POCT GLUCOSE: 245 MG/DL (ref 70–110)
POTASSIUM BLOOD GAS (OHS): 5.4 MMOL/L (ref 3.5–5)
POTASSIUM SERPL-SCNC: 5 MMOL/L (ref 3.5–5.1)
PROT SERPL-MCNC: 6.8 GM/DL (ref 5.8–7.6)
RBC # BLD AUTO: 4.36 X10(6)/MCL (ref 4.7–6.1)
SAMPLE SITE BLOOD GAS (OHS): ABNORMAL
SAO2 % BLDA: 94.3 %
SODIUM BLOOD GAS (OHS): 133 MMOL/L (ref 137–145)
SODIUM SERPL-SCNC: 134 MMOL/L (ref 136–145)
SPONT+MECH VT ON VENT: 500 ML
WBC # BLD AUTO: 8.85 X10(3)/MCL (ref 4.5–11.5)

## 2025-07-14 PROCEDURE — 80053 COMPREHEN METABOLIC PANEL: CPT

## 2025-07-14 PROCEDURE — 25000003 PHARM REV CODE 250: Performed by: INTERNAL MEDICINE

## 2025-07-14 PROCEDURE — 27200966 HC CLOSED SUCTION SYSTEM

## 2025-07-14 PROCEDURE — 94003 VENT MGMT INPAT SUBQ DAY: CPT

## 2025-07-14 PROCEDURE — 99900031 HC PATIENT EDUCATION (STAT)

## 2025-07-14 PROCEDURE — 84100 ASSAY OF PHOSPHORUS: CPT

## 2025-07-14 PROCEDURE — 94760 N-INVAS EAR/PLS OXIMETRY 1: CPT

## 2025-07-14 PROCEDURE — 83735 ASSAY OF MAGNESIUM: CPT

## 2025-07-14 PROCEDURE — 82803 BLOOD GASES ANY COMBINATION: CPT

## 2025-07-14 PROCEDURE — 20000000 HC ICU ROOM

## 2025-07-14 PROCEDURE — 63600175 PHARM REV CODE 636 W HCPCS: Performed by: INTERNAL MEDICINE

## 2025-07-14 PROCEDURE — 99900035 HC TECH TIME PER 15 MIN (STAT)

## 2025-07-14 PROCEDURE — 36600 WITHDRAWAL OF ARTERIAL BLOOD: CPT

## 2025-07-14 PROCEDURE — 94761 N-INVAS EAR/PLS OXIMETRY MLT: CPT

## 2025-07-14 PROCEDURE — 25000003 PHARM REV CODE 250

## 2025-07-14 PROCEDURE — 27100171 HC OXYGEN HIGH FLOW UP TO 24 HOURS

## 2025-07-14 PROCEDURE — 36415 COLL VENOUS BLD VENIPUNCTURE: CPT

## 2025-07-14 PROCEDURE — 99900026 HC AIRWAY MAINTENANCE (STAT)

## 2025-07-14 PROCEDURE — 85025 COMPLETE CBC W/AUTO DIFF WBC: CPT

## 2025-07-14 RX ORDER — POLYETHYLENE GLYCOL 3350 17 G/17G
17 POWDER, FOR SOLUTION ORAL 2 TIMES DAILY
Status: DISCONTINUED | OUTPATIENT
Start: 2025-07-14 | End: 2025-08-07 | Stop reason: HOSPADM

## 2025-07-14 RX ORDER — LACTULOSE 10 G/15ML
200 SOLUTION ORAL; RECTAL ONCE
Status: COMPLETED | OUTPATIENT
Start: 2025-07-14 | End: 2025-07-14

## 2025-07-14 RX ORDER — INSULIN GLARGINE 100 [IU]/ML
30 INJECTION, SOLUTION SUBCUTANEOUS 2 TIMES DAILY
Status: DISCONTINUED | OUTPATIENT
Start: 2025-07-14 | End: 2025-08-07 | Stop reason: HOSPADM

## 2025-07-14 RX ADMIN — INSULIN ASPART 4 UNITS: 100 INJECTION, SOLUTION INTRAVENOUS; SUBCUTANEOUS at 06:07

## 2025-07-14 RX ADMIN — OXYCODONE HYDROCHLORIDE 10 MG: 10 TABLET ORAL at 06:07

## 2025-07-14 RX ADMIN — DEXMEDETOMIDINE HYDROCHLORIDE 0.8 MCG/KG/HR: 4 INJECTION, SOLUTION INTRAVENOUS at 02:07

## 2025-07-14 RX ADMIN — FAMOTIDINE 20 MG: 20 TABLET, FILM COATED ORAL at 09:07

## 2025-07-14 RX ADMIN — OXYCODONE HYDROCHLORIDE 10 MG: 10 TABLET ORAL at 01:07

## 2025-07-14 RX ADMIN — FENTANYL CITRATE 100 MCG: 50 INJECTION INTRAMUSCULAR; INTRAVENOUS at 09:07

## 2025-07-14 RX ADMIN — INSULIN GLARGINE 30 UNITS: 100 INJECTION, SOLUTION SUBCUTANEOUS at 08:07

## 2025-07-14 RX ADMIN — DEXMEDETOMIDINE HYDROCHLORIDE 1.4 MCG/KG/HR: 4 INJECTION, SOLUTION INTRAVENOUS at 10:07

## 2025-07-14 RX ADMIN — INSULIN GLARGINE 25 UNITS: 100 INJECTION, SOLUTION SUBCUTANEOUS at 09:07

## 2025-07-14 RX ADMIN — DEXMEDETOMIDINE HYDROCHLORIDE 1 MCG/KG/HR: 4 INJECTION, SOLUTION INTRAVENOUS at 04:07

## 2025-07-14 RX ADMIN — LACTULOSE SOLUTION USP, 10 G/15 ML 200 G: 10 SOLUTION ORAL; RECTAL at 10:07

## 2025-07-14 RX ADMIN — LISINOPRIL 20 MG: 20 TABLET ORAL at 09:07

## 2025-07-14 RX ADMIN — SENNOSIDES, DOCUSATE SODIUM 2 TABLET: 8.6; 5 TABLET ORAL at 09:07

## 2025-07-14 RX ADMIN — DEXMEDETOMIDINE HYDROCHLORIDE 0.6 MCG/KG/HR: 4 INJECTION, SOLUTION INTRAVENOUS at 09:07

## 2025-07-14 RX ADMIN — ENOXAPARIN SODIUM 40 MG: 40 INJECTION SUBCUTANEOUS at 06:07

## 2025-07-14 RX ADMIN — OXYCODONE HYDROCHLORIDE 10 MG: 10 TABLET ORAL at 05:07

## 2025-07-14 RX ADMIN — AMLODIPINE BESYLATE 5 MG: 5 TABLET ORAL at 09:07

## 2025-07-14 RX ADMIN — INSULIN ASPART 4 UNITS: 100 INJECTION, SOLUTION INTRAVENOUS; SUBCUTANEOUS at 01:07

## 2025-07-14 RX ADMIN — POLYETHYLENE GLYCOL 3350 17 G: 17 POWDER, FOR SOLUTION ORAL at 09:07

## 2025-07-14 RX ADMIN — INSULIN ASPART 4 UNITS: 100 INJECTION, SOLUTION INTRAVENOUS; SUBCUTANEOUS at 12:07

## 2025-07-14 NOTE — PLAN OF CARE
Problem: Infection  Goal: Absence of Infection Signs and Symptoms  Outcome: Progressing     Problem: Adult Inpatient Plan of Care  Goal: Plan of Care Review  Outcome: Progressing  Goal: Patient-Specific Goal (Individualized)  Outcome: Progressing  Goal: Absence of Hospital-Acquired Illness or Injury  Outcome: Progressing  Goal: Optimal Comfort and Wellbeing  Outcome: Progressing  Goal: Readiness for Transition of Care  Outcome: Progressing     Problem: Mechanical Ventilation Invasive  Goal: Effective Communication  Outcome: Progressing  Goal: Optimal Device Function  Outcome: Progressing  Goal: Mechanical Ventilation Liberation  Outcome: Progressing  Goal: Optimal Nutrition Delivery  Outcome: Progressing  Goal: Absence of Device-Related Skin and Tissue Injury  Outcome: Progressing  Goal: Absence of Ventilator-Induced Lung Injury  Outcome: Progressing     Problem: Artificial Airway  Goal: Effective Communication  Outcome: Progressing  Goal: Optimal Device Function  Outcome: Progressing  Goal: Absence of Device-Related Skin or Tissue Injury  Outcome: Progressing     Problem: Noninvasive Ventilation Acute  Goal: Effective Unassisted Ventilation and Oxygenation  Outcome: Progressing

## 2025-07-14 NOTE — PLAN OF CARE
in to apply colectomy bag. Site is clean and dry, skin intact and bag reapplied with out issue. [facility]

## 2025-07-14 NOTE — PROGRESS NOTES
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  He presents to ED per EMS 07/05/2025 after multiple falls at nursing home with reported generalized weakness.  He is reported as having a Benítez catheter placed about 1 week ago for urinary retention.  Patient was reported as having pulled this out himself while at nursing home, on a.m. of presentation.  Benítez catheter was placed in ED, returning 700 cc of urine.  While in ER, he was reported as becoming more somnolent with poor overall respiratory effort and decreasing level of consciousness.  ABG revealed acute respiratory acidosis.  He was intubated at 8:55 a.m., placed on mechanical ventilatory support.  CT brain obtained in ED reveals chronic microvascular ischemic changes with no acute intracranial abnormalities.  SARS-COVID-2 PCR negative, influenza A/B PCR negative.  He is now admitted to ICU for ongoing medical care.     Hospital Course:  07/05/2025: Intubation/mechanical ventilation   TTE (07/05/2025: Normal LV size with decreased LV SF, EF 45-50%; trace MR, mild TR and mild PI.  PASP estimated 30 mm Hg; no pericardial effusion      24hr Interval History:  Remains intubated and sedated. Propofol off, now only on precedex. Not following commands on my exam but nursing reports intermittently following commands. ABG this AM with worsening acidosis. CXR with continued bilateral lower airspace opacities and ET tube above the level of clavicular heads.         Review of systems unobtainable due to intubation, sedation.        Scheduled  Medications:   amLODIPine  5 mg Per NG tube Daily    enoxparin  40 mg Subcutaneous Q24H (prophylaxis, 1700)    famotidine  20 mg Per NG tube BID    insulin glargine U-100  25 Units Subcutaneous BID    lisinopriL  20 mg Per NG tube Daily    oxyCODONE  10 mg Oral Q6H    polyethylene glycol  17 g Per NG tube Daily    senna-docusate  2 tablet Per NG tube Daily     PRN Medications:    Current Facility-Administered Medications:     acetaminophen, 650 mg, Per OG tube, Q6H PRN    dextrose 50%, 12.5 g, Intravenous, PRN    dextrose 50%, 12.5 g, Intravenous, PRN    fentaNYL, 100 mcg, Intravenous, Q1H PRN    glucagon (human recombinant), 1 mg, Intramuscular, PRN    glucagon (human recombinant), 1 mg, Intramuscular, PRN    hydrALAZINE, 10 mg, Intravenous, Q6H PRN    insulin aspart U-100, 0-10 Units, Subcutaneous, Q6H PRN    labetalol, 20 mg, Intravenous, Q6H PRN  Continuous Infusions:   dexmedeTOMIDine (Precedex) infusion (titrating)  0-1.4 mcg/kg/hr Intravenous Continuous 17.01 mL/hr at 07/14/25 0905 0.6 mcg/kg/hr at 07/14/25 0905    NORepinephrine bitartrate-D5W  0-3 mcg/kg/min (Dosing Weight) Intravenous Continuous   Stopped at 07/07/25 0704    propofoL  0-50 mcg/kg/min (Dosing Weight) Intravenous Continuous   Stopped at 07/13/25 1046       No past medical history on file.    No past surgical history on file.    Objective:     Input/output:    Intake/Output Summary (Last 24 hours) at 7/14/2025 1151  Last data filed at 7/14/2025 0800  Gross per 24 hour   Intake 2552.32 ml   Output 2400 ml   Net 152.32 ml       Vital Signs (Most Recent):  Temp: 99.3 °F (37.4 °C) (07/14/25 0800)  Pulse: 72 (07/14/25 1016)  Resp: (!) 22 (07/14/25 1016)  BP: (!) 143/67 (07/14/25 1000)  SpO2: (!) 94 % (07/14/25 1016)  Body mass index is 41 kg/m².  Weight: 122.3 kg (269 lb 10 oz) Vital Signs (24h Range):  Temp:  [99.3 °F (37.4 °C)-99.7 °F (37.6 °C)] 99.3 °F (37.4 °C)  Pulse:  [57-72] 72  Resp:  [16-23] 22  SpO2:  [93 %-99 %] 94 %  BP:  (116164)/(66-85) 143/67       Physical exam:  Gen- intubated, NAD  HENT- ATNC, MMM, ETT in place  CV- RRR  Resp- CTA, compliant with mechanical ventilation  MSK- WWP, trace edema  Neuro- intubated, lightly sedated on precedex but awakens and follows simple commands   Psych- calm but otherwise unable to assess 2/2 intubation, sedation       Lines/Drains/Airways       Drain  Duration                  NG/OG Tube 07/05/25 0848 9 days         Urethral Catheter 07/05/25 0800 Non-latex 16 Fr. 9 days              Airway  Duration                  Airway - Non-Surgical 07/05/25 0848 Endotracheal Tube 9 days              Peripheral Intravenous Line  Duration             Peripheral IV Single Lumen 07/05/25 0638 18 G Left;Posterior Forearm 9 days    Peripheral IV Single Lumen 07/05/25 0847 18 G Right Antecubital 9 days                    Vent:  Vent Mode: A/C (07/14/25 1016)  Ventilator Initiated: Yes (07/05/25 0850)  Set Rate: 22 BPM (07/14/25 1016)  Vt Set: 500 mL (07/14/25 1016)  Pressure Support: 10 cmH20 (07/10/25 0951)  PEEP/CPAP: 5 cmH20 (07/14/25 1016)  Oxygen Concentration (%): 45 (07/14/25 1016)  Peak Airway Pressure: 20 cmH20 (07/14/25 1016)  Total Ve: 13.1 L/m (07/14/25 1016)  F/VT Ratio<105 (RSBI): (!) 34.98 (07/14/25 1016)    ABGs:  Lab Results   Component Value Date    PH 7.360 07/14/2025    PO2 75.0 (L) 07/14/2025    PCO2 45.0 07/14/2025       Significant Labs:  Lab Results   Component Value Date    WBC 8.85 07/14/2025    HGB 12.5 (L) 07/14/2025    HCT 39.7 (L) 07/14/2025    MCV 91.1 07/14/2025     07/14/2025       Recent Labs   Lab 07/14/25  0244   *   K 5.0      CO2 21*   BUN 46.2*   CREATININE 0.99   CALCIUM 8.7*   MG 1.70   PHOS 2.6   AST 24   ALT 41   ALKPHOS 60   ALBUMIN 2.3*         Assessment:     Acute combined hypoxic and hypercapnic respiratory failure.  Chest x-ray with bilateral patchy infiltrates, differential diagnostic possibilities including hydrostatic/cardiogenic versus non  hydrostatic pulmonary edema versus infection, aspiration, etc.  History of cerebrovascular disease with right hemiparesis  Acute kidney injury superimposed on stage II chronic kidney disease, obstructive uropathy component.  He remains nonoliguric, daily improvement in BUN/creatinine   Type 2 diabetes mellitus with polyneuropathy, hyperglycemia improved.  Hypertension  Hyperlipidemia  Glaucoma    Plan:     Completed zosyn and zithromax  Mental status continues to improve, awake and following commands this morning--> attempt pressure support trial today   Hyperkalemia improved s/p lokelma yesterday   Potential trial of extubation if does well on SBT this AM   Increase basal insulin today   Called and discussed with patient's daughter today, she would like reintubation in the event that he has recurrent respiratory failure with attempts at extubation        DVT ppx: LMWH   GI ppx: famotidine       I spent 32 minutes providing critical care services to this patient.  This does not include time spent for separately billed procedures.       Jose Antonio Arora MD  Pulmonary/Critical Care

## 2025-07-14 NOTE — PLAN OF CARE
Problem: Infection  Goal: Absence of Infection Signs and Symptoms  Outcome: Progressing     Problem: Adult Inpatient Plan of Care  Goal: Plan of Care Review  Outcome: Progressing  Goal: Patient-Specific Goal (Individualized)  Outcome: Progressing  Goal: Absence of Hospital-Acquired Illness or Injury  Outcome: Progressing  Goal: Optimal Comfort and Wellbeing  Outcome: Progressing  Goal: Readiness for Transition of Care  Outcome: Progressing     Problem: Mechanical Ventilation Invasive  Goal: Effective Communication  Outcome: Progressing  Goal: Optimal Device Function  Outcome: Progressing  Goal: Mechanical Ventilation Liberation  Outcome: Progressing  Goal: Optimal Nutrition Delivery  Outcome: Progressing  Goal: Absence of Device-Related Skin and Tissue Injury  Outcome: Progressing  Goal: Absence of Ventilator-Induced Lung Injury  Outcome: Progressing     Problem: Artificial Airway  Goal: Effective Communication  Outcome: Progressing  Goal: Optimal Device Function  Outcome: Progressing  Goal: Absence of Device-Related Skin or Tissue Injury  Outcome: Progressing     Problem: Noninvasive Ventilation Acute  Goal: Effective Unassisted Ventilation and Oxygenation  Outcome: Progressing     Problem: Skin Injury Risk Increased  Goal: Skin Health and Integrity  Outcome: Progressing     Problem: Fall Injury Risk  Goal: Absence of Fall and Fall-Related Injury  Outcome: Progressing     Problem: Delirium  Goal: Optimal Coping  Outcome: Progressing  Goal: Improved Behavioral Control  Outcome: Progressing  Goal: Improved Attention and Thought Clarity  Outcome: Progressing  Goal: Improved Sleep  Outcome: Progressing     Problem: Bariatric Environmental Safety  Goal: Safety Maintained with Care  Outcome: Progressing

## 2025-07-15 LAB
ALBUMIN SERPL-MCNC: 2.4 G/DL (ref 3.4–4.8)
ALBUMIN/GLOB SERPL: 0.5 RATIO (ref 1.1–2)
ALLENS TEST BLOOD GAS (OHS): YES
ALP SERPL-CCNC: 60 UNIT/L (ref 40–150)
ALT SERPL-CCNC: 39 UNIT/L (ref 0–55)
ANION GAP SERPL CALC-SCNC: 9 MEQ/L
AST SERPL-CCNC: 19 UNIT/L (ref 11–45)
BASE EXCESS BLD CALC-SCNC: 1.6 MMOL/L
BASOPHILS # BLD AUTO: 0.05 X10(3)/MCL
BASOPHILS NFR BLD AUTO: 0.5 %
BILIRUB SERPL-MCNC: 0.6 MG/DL
BLOOD GAS SAMPLE TYPE (OHS): ABNORMAL
BUN SERPL-MCNC: 46.6 MG/DL (ref 8.4–25.7)
CA-I BLD-SCNC: 1.28 MMOL/L (ref 1.12–1.23)
CALCIUM SERPL-MCNC: 9 MG/DL (ref 8.8–10)
CHLORIDE SERPL-SCNC: 105 MMOL/L (ref 98–107)
CO2 BLDA-SCNC: 28.6 MMOL/L
CO2 SERPL-SCNC: 23 MMOL/L (ref 23–31)
CREAT SERPL-MCNC: 1.13 MG/DL (ref 0.72–1.25)
CREAT/UREA NIT SERPL: 41
DRAWN BY BLOOD GAS (OHS): ABNORMAL
EOSINOPHIL # BLD AUTO: 0.21 X10(3)/MCL (ref 0–0.9)
EOSINOPHIL NFR BLD AUTO: 2.1 %
ERYTHROCYTE [DISTWIDTH] IN BLOOD BY AUTOMATED COUNT: 14.2 % (ref 11.5–17)
GFR SERPLBLD CREATININE-BSD FMLA CKD-EPI: >60 ML/MIN/1.73/M2
GLOBULIN SER-MCNC: 4.9 GM/DL (ref 2.4–3.5)
GLUCOSE SERPL-MCNC: 192 MG/DL (ref 82–115)
HCO3 BLDA-SCNC: 27.2 MMOL/L (ref 22–26)
HCT VFR BLD AUTO: 40.4 % (ref 42–52)
HGB BLD-MCNC: 12.8 G/DL (ref 14–18)
IMM GRANULOCYTES # BLD AUTO: 0.05 X10(3)/MCL (ref 0–0.04)
IMM GRANULOCYTES NFR BLD AUTO: 0.5 %
INHALED O2 CONCENTRATION: 55 %
LYMPHOCYTES # BLD AUTO: 1.79 X10(3)/MCL (ref 0.6–4.6)
LYMPHOCYTES NFR BLD AUTO: 17.7 %
MAGNESIUM SERPL-MCNC: 1.8 MG/DL (ref 1.6–2.6)
MCH RBC QN AUTO: 28.1 PG (ref 27–31)
MCHC RBC AUTO-ENTMCNC: 31.7 G/DL (ref 33–36)
MCV RBC AUTO: 88.6 FL (ref 80–94)
MECH RR (OHS): 22 B/MIN
MODE (OHS): AC
MONOCYTES # BLD AUTO: 0.96 X10(3)/MCL (ref 0.1–1.3)
MONOCYTES NFR BLD AUTO: 9.5 %
NEUTROPHILS # BLD AUTO: 7.05 X10(3)/MCL (ref 2.1–9.2)
NEUTROPHILS NFR BLD AUTO: 69.7 %
NRBC BLD AUTO-RTO: 0 %
PCO2 BLDA: 46 MMHG (ref 35–45)
PEEP RESPIRATORY: 5 CMH2O
PH BLDA: 7.38 [PH] (ref 7.35–7.45)
PHOSPHATE SERPL-MCNC: 3.3 MG/DL (ref 2.3–4.7)
PLATELET # BLD AUTO: 230 X10(3)/MCL (ref 130–400)
PMV BLD AUTO: 11.3 FL (ref 7.4–10.4)
PO2 BLDA: 65 MMHG (ref 80–100)
POCT GLUCOSE: 145 MG/DL (ref 70–110)
POCT GLUCOSE: 147 MG/DL (ref 70–110)
POCT GLUCOSE: 154 MG/DL (ref 70–110)
POCT GLUCOSE: 163 MG/DL (ref 70–110)
POCT GLUCOSE: 189 MG/DL (ref 70–110)
POTASSIUM BLOOD GAS (OHS): 4.7 MMOL/L (ref 3.5–5)
POTASSIUM SERPL-SCNC: 4.8 MMOL/L (ref 3.5–5.1)
PROT SERPL-MCNC: 7.3 GM/DL (ref 5.8–7.6)
RBC # BLD AUTO: 4.56 X10(6)/MCL (ref 4.7–6.1)
SAMPLE SITE BLOOD GAS (OHS): ABNORMAL
SAO2 % BLDA: 92 %
SODIUM BLOOD GAS (OHS): 134 MMOL/L (ref 137–145)
SODIUM SERPL-SCNC: 137 MMOL/L (ref 136–145)
SPONT+MECH VT ON VENT: 500 ML
WBC # BLD AUTO: 10.11 X10(3)/MCL (ref 4.5–11.5)

## 2025-07-15 PROCEDURE — 94003 VENT MGMT INPAT SUBQ DAY: CPT

## 2025-07-15 PROCEDURE — 80053 COMPREHEN METABOLIC PANEL: CPT

## 2025-07-15 PROCEDURE — 82803 BLOOD GASES ANY COMBINATION: CPT

## 2025-07-15 PROCEDURE — 36600 WITHDRAWAL OF ARTERIAL BLOOD: CPT

## 2025-07-15 PROCEDURE — 25000003 PHARM REV CODE 250: Performed by: INTERNAL MEDICINE

## 2025-07-15 PROCEDURE — 99900026 HC AIRWAY MAINTENANCE (STAT)

## 2025-07-15 PROCEDURE — 27100171 HC OXYGEN HIGH FLOW UP TO 24 HOURS

## 2025-07-15 PROCEDURE — 99900031 HC PATIENT EDUCATION (STAT)

## 2025-07-15 PROCEDURE — 27200966 HC CLOSED SUCTION SYSTEM

## 2025-07-15 PROCEDURE — 94760 N-INVAS EAR/PLS OXIMETRY 1: CPT | Mod: XB

## 2025-07-15 PROCEDURE — 63600175 PHARM REV CODE 636 W HCPCS: Performed by: INTERNAL MEDICINE

## 2025-07-15 PROCEDURE — 99900035 HC TECH TIME PER 15 MIN (STAT)

## 2025-07-15 PROCEDURE — 25000003 PHARM REV CODE 250

## 2025-07-15 PROCEDURE — 20000000 HC ICU ROOM

## 2025-07-15 PROCEDURE — 36415 COLL VENOUS BLD VENIPUNCTURE: CPT

## 2025-07-15 PROCEDURE — 94761 N-INVAS EAR/PLS OXIMETRY MLT: CPT | Mod: XB

## 2025-07-15 PROCEDURE — 84100 ASSAY OF PHOSPHORUS: CPT

## 2025-07-15 PROCEDURE — 85025 COMPLETE CBC W/AUTO DIFF WBC: CPT

## 2025-07-15 PROCEDURE — 83735 ASSAY OF MAGNESIUM: CPT

## 2025-07-15 RX ADMIN — FAMOTIDINE 20 MG: 20 TABLET, FILM COATED ORAL at 08:07

## 2025-07-15 RX ADMIN — INSULIN GLARGINE 30 UNITS: 100 INJECTION, SOLUTION SUBCUTANEOUS at 08:07

## 2025-07-15 RX ADMIN — OXYCODONE HYDROCHLORIDE 10 MG: 10 TABLET ORAL at 12:07

## 2025-07-15 RX ADMIN — DEXMEDETOMIDINE HYDROCHLORIDE 1 MCG/KG/HR: 4 INJECTION, SOLUTION INTRAVENOUS at 06:07

## 2025-07-15 RX ADMIN — POLYETHYLENE GLYCOL 3350 17 G: 17 POWDER, FOR SOLUTION ORAL at 08:07

## 2025-07-15 RX ADMIN — ENOXAPARIN SODIUM 40 MG: 40 INJECTION SUBCUTANEOUS at 05:07

## 2025-07-15 RX ADMIN — OXYCODONE HYDROCHLORIDE 10 MG: 10 TABLET ORAL at 05:07

## 2025-07-15 RX ADMIN — SENNOSIDES, DOCUSATE SODIUM 2 TABLET: 8.6; 5 TABLET ORAL at 08:07

## 2025-07-15 RX ADMIN — FENTANYL CITRATE 100 MCG: 50 INJECTION INTRAMUSCULAR; INTRAVENOUS at 08:07

## 2025-07-15 RX ADMIN — INSULIN ASPART 1 UNITS: 100 INJECTION, SOLUTION INTRAVENOUS; SUBCUTANEOUS at 01:07

## 2025-07-15 RX ADMIN — DEXMEDETOMIDINE HYDROCHLORIDE 1.4 MCG/KG/HR: 4 INJECTION, SOLUTION INTRAVENOUS at 04:07

## 2025-07-15 RX ADMIN — FAMOTIDINE 20 MG: 20 TABLET, FILM COATED ORAL at 09:07

## 2025-07-15 RX ADMIN — DEXMEDETOMIDINE HYDROCHLORIDE 1 MCG/KG/HR: 4 INJECTION, SOLUTION INTRAVENOUS at 10:07

## 2025-07-15 RX ADMIN — AMLODIPINE BESYLATE 5 MG: 5 TABLET ORAL at 08:07

## 2025-07-15 RX ADMIN — OXYCODONE HYDROCHLORIDE 10 MG: 10 TABLET ORAL at 11:07

## 2025-07-15 RX ADMIN — DEXMEDETOMIDINE HYDROCHLORIDE 1.4 MCG/KG/HR: 4 INJECTION, SOLUTION INTRAVENOUS at 11:07

## 2025-07-15 RX ADMIN — DEXMEDETOMIDINE HYDROCHLORIDE 1.4 MCG/KG/HR: 4 INJECTION, SOLUTION INTRAVENOUS at 05:07

## 2025-07-15 RX ADMIN — FENTANYL CITRATE 100 MCG: 50 INJECTION INTRAMUSCULAR; INTRAVENOUS at 11:07

## 2025-07-15 RX ADMIN — INSULIN ASPART 2 UNITS: 100 INJECTION, SOLUTION INTRAVENOUS; SUBCUTANEOUS at 05:07

## 2025-07-15 RX ADMIN — DEXMEDETOMIDINE HYDROCHLORIDE 1.4 MCG/KG/HR: 4 INJECTION, SOLUTION INTRAVENOUS at 08:07

## 2025-07-15 RX ADMIN — DEXMEDETOMIDINE HYDROCHLORIDE 1.4 MCG/KG/HR: 4 INJECTION, SOLUTION INTRAVENOUS at 02:07

## 2025-07-15 RX ADMIN — LISINOPRIL 20 MG: 20 TABLET ORAL at 08:07

## 2025-07-15 NOTE — PROGRESS NOTES
Inpatient Nutrition Assessment    Admit Date: 7/5/2025   Total duration of encounter: 10 days   Patient Age: 69 y.o.    Nutrition Recommendation/Prescription     Once able to resume tube feeding, Increase goal rate to 55 ml/hr and decrease protein modular frequency to two times daily:  Peptamen Intense VHP goal rate 55 ml/hr   [id number] two times daily  to provide  1260 kcal 100% needs  142 g protein 101% needs  83 g CHO 59% needs  924 ml water 41% needs, 67% with current 30 ml/hr water flushes  1670 mg K  1120 mg phos  calculations based on estimated 20 hour run time     Medical management of hyperglycemia.     Communication of Recommendations: reviewed with nurse    Nutrition Assessment     Malnutrition Assessment/Nutrition-Focused Physical Exam       Malnutrition Level: other (see comments) (Does not meet criteria) (07/07/25 1041)                                                        A minimum of two characteristics is recommended for diagnosis of either severe or non-severe malnutrition.    Chart Review    Reason Seen: follow-up    Malnutrition Screening Tool Results   Have you recently lost weight without trying?: No  Have you been eating poorly because of a decreased appetite?: No   MST Score: 0   Diagnosis:  Acute combined hypoxic and hypercapnic respiratory failure  History of cerebrovascular disease with right hemiparesis  Acute kidney injury superimposed on stage II chronic kidney disease, obstructive uropathy component.  He remains nonoliguric, daily improvement in BUN/creatinine   Type 2 diabetes mellitus with polyneuropathy, hyperglycemia improved.  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, glaucoma, hypertension, and hyperlipidemia     Scheduled Medications:  amLODIPine, 5 mg, Daily  enoxparin, 40 mg, Q24H (prophylaxis,  1700)  famotidine, 20 mg, BID  insulin glargine U-100, 30 Units, BID  lisinopriL, 20 mg, Daily  oxyCODONE, 10 mg, Q6H  polyethylene glycol, 17 g, BID  senna-docusate, 2 tablet, Daily    Continuous Infusions:  dexmedeTOMIDine (Precedex) infusion (titrating), Last Rate: 1.2 mcg/kg/hr (07/15/25 1200)  NORepinephrine bitartrate-D5W, Last Rate: Stopped (07/07/25 0704)  propofoL, Last Rate: Stopped (07/13/25 1046)    PRN Medications:  acetaminophen, 650 mg, Q6H PRN  dextrose 50%, 12.5 g, PRN  dextrose 50%, 12.5 g, PRN  fentaNYL, 100 mcg, Q1H PRN  glucagon (human recombinant), 1 mg, PRN  glucagon (human recombinant), 1 mg, PRN  hydrALAZINE, 10 mg, Q6H PRN  insulin aspart U-100, 0-10 Units, Q6H PRN  labetalol, 20 mg, Q6H PRN    Calorie Containing IV Medications: no significant kcals from medications at this time    Recent Labs   Lab 07/09/25  0300 07/10/25  0252 07/11/25  0234 07/12/25  0250 07/13/25  0620 07/14/25  0244 07/15/25  0353    139 137 137 133* 134* 137   K 5.1 5.1 4.8 5.0 5.4* 5.0 4.8   CALCIUM 8.3* 7.9* 8.1* 8.2* 8.3* 8.7* 9.0   PHOS  --  2.5 2.1*  --  3.4 2.6 3.3   MG 2.10 2.00 2.20  --  1.80 1.70 1.80   * 110* 108* 107 105 104 105   CO2 18* 20* 21* 21* 21* 21* 23   BUN 40.8* 35.5* 32.7* 34.9* 42.9* 46.2* 46.6*   CREATININE 1.34* 1.28* 1.08 0.96 1.14 0.99 1.13   EGFRNORACEVR 58 >60 >60 >60 >60 >60 >60   * 229* 231* 228* 232* 245* 192*   BILITOT  --   --   --  0.4 0.3 0.6 0.6   ALKPHOS  --   --   --  52 56 60 60   ALT  --   --   --  21 39 41 39   AST  --   --   --  19 30 24 19   ALBUMIN  --   --   --  2.2* 2.2* 2.3* 2.4*   TRIG  --   --  429*  --   --   --   --    WBC 10.91  --   --  8.12 8.97 8.85 10.11   HGB 13.6*  --   --  12.4* 12.2* 12.5* 12.8*   HCT 43.3  --   --  39.4* 39.6* 39.7* 40.4*     Nutrition Orders:  No diet orders on file  Tube Feedings/Formulas 45; 900; Peptamen Intense VHP; OG; 30; Every hour,Tube Feedings/Formulas Other (see comments); OG; ProSource TF20; 3 times  "daily    Appetite/Oral Intake: NPO/NPO  Factors Affecting Nutritional Intake: NPO and on mechanical ventilation  Social Needs Impacting Access to Food: none identified; NH resident   Food/Yazidi/Cultural Preferences: unable to obtain  Food Allergies: no known food allergies  Last Bowel Movement: 07/15/25  Wound(s):  no partial or full thickness pressure injuries documented at this time.     Comments    7/7/25: Pt intubated on mechanical ventilation, receiving kcals from diprivan. Not currently receiving enteral nutrition. Tube feeding orders updated.     7/11/25 Patient remains on ventilator with propofol, tube feeding at previous goal rate; recommend decreasing tube feeding rate due to overfeeding with propofol currently, will need to increase protein modular.    7/15/25: Patient remains intubated, no longer receiving kcals from propofol. TF put on hold this morning d/t worsening abdominal distention this morning. TF regimen updated for once able to resume feeds.     Anthropometrics    Height: 5' 8" (172.7 cm), Height Method: Estimated  Last Weight: 122.3 kg (269 lb 10 oz) (07/13/25 0800), Weight Method: Bed Scale  BMI (Calculated): 41  BMI Classification: obese grade II (BMI 35-39.9)        Ideal Body Weight (IBW), Male: 154 lb     % Ideal Body Weight, Male (lb): 175.08 %                          Usual Weight Provided By: EMR weight history    Wt Readings from Last 5 Encounters:   07/13/25 122.3 kg (269 lb 10 oz)     Weight Change(s) Since Admission:   Wt Readings from Last 1 Encounters:   07/13/25 0800 122.3 kg (269 lb 10 oz)   07/05/25 0605 113.4 kg (250 lb)   Admit Weight: 113.4 kg (250 lb) (07/05/25 0605), Weight Method: Stated    Estimated Needs    Weight Used For Calorie Calculations: 113.4 kg (250 lb)  Energy Calorie Requirements (kcal): 9001-4913 kcals (11-14 kcal/kg)  Energy Need Method: Kcal/kg  Weight Used For Protein Calculations: 70 kg (154 lb 5.2 oz) (IBW)  Protein Requirements: 140 g (2 g/kg " IBW)  Fluid Requirements (mL): 2268 mL (20 mL/kg)  CHO Requirement: 140-178 g/day (45% of total EEN)     Enteral Nutrition (on hold)    Formula: Peptamen Intense VHP  Rate/Volume: 65 ml/hr  Water Flushes: 30 ml/hr  Additives/Modulars: Prosource TF20 once daily  Route: orogastric tube  Method: continuous  Total Nutrition Provided by Tube Feeding, Additives, and Flushes:  Calories Provided  1380 kcal/d, 100% needs   Protein Provided  140 g/d, 100% needs   Fluid Provided  1692 ml/d, 75% needs   Continuous feeding calculations based on estimated 20 hr/d run time unless otherwise stated.    Parenteral Nutrition     Patient not receiving parenteral nutrition support at this time.    Evaluation of Received Nutrient Intake    Calories: not meeting estimated needs  Protein: not meeting estimated needs    Patient Education     Not applicable.    Nutrition Diagnosis     PES: Inadequate energy intake related to inability to consume sufficient nutrients as evidenced by on mechanical ventilation, need for NPO status. (resolved)     PES:            Nutrition Interventions     Intervention(s): Enteral nutrition management  Intervention(s):      Goal: Meet greater than 80% of nutritional needs by follow-up. (goal progressing)  Goal: Tolerate enteral feeding at goal rate by follow-up. (goal progressing)    Nutrition Goals & Monitoring     Dietitian will monitor: energy intake, enteral nutrition intake, weight, electrolyte/renal panel, glucose/endocrine profile, and gastrointestinal profile  Discharge planning: too early to determine; pending clinical course  Nutrition Risk/Follow-Up: patient at increased nutrition risk; dietitian will follow-up twice weekly   Please consult if re-assessment needed sooner.

## 2025-07-15 NOTE — PROGRESS NOTES
[facility] - 7th Floor ICU  Pulmonary/Critical Care  Progress Note  7/15/2025    Patient Name: Charly Padilla  MRN: [id number]  Admission Date: 7/5/2025  Code Status: No Order      Subjective:     HPI:  The patient is a 69-year-old male nursing home resident with a history of type 2 diabetes mellitus with polyneuropathy, stage II chronic kidney disease, cerebrovascular disease with previous stroke and right hemiparesis, glaucoma, hypertension, and hyperlipidemia.  He presents to ED per EMS 07/05/2025 after multiple falls at nursing home with reported generalized weakness.  He is reported as having a Benítez catheter placed about 1 week ago for urinary retention.  Patient was reported as having pulled this out himself while at nursing home, on a.m. of presentation.  Benítez catheter was placed in ED, returning 700 cc of urine.  While in ER, he was reported as becoming more somnolent with poor overall respiratory effort and decreasing level of consciousness.  ABG revealed acute respiratory acidosis.  He was intubated at 8:55 a.m., placed on mechanical ventilatory support.  CT brain obtained in ED reveals chronic microvascular ischemic changes with no acute intracranial abnormalities.  SARS-COVID-2 PCR negative, influenza A/B PCR negative.  He is now admitted to ICU for ongoing medical care.     Hospital Course:  07/05/2025: Intubation/mechanical ventilation   TTE (07/05/2025: Normal LV size with decreased LV SF, EF 45-50%; trace MR, mild TR and mild PI.  PASP estimated 30 mm Hg; no pericardial effusion      24hr Interval History:  Relatively good tolerance of prolonged spontaneous breathing trial yesterday, but eventually developed tachypnea and placed on controlled mechanical ventilation overnight, with some worsening of hypoxia this morning.  Remains afebrile, last culture data negative.  Some slight worsening of abdominal distention noted this morning. Liquid bowel movements noted with bowel regimen increase  yesterday afternoon/overnight.         Review of systems unobtainable due to intubation, sedation.        Scheduled Medications:   amLODIPine  5 mg Per NG tube Daily    enoxparin  40 mg Subcutaneous Q24H (prophylaxis, 1700)    famotidine  20 mg Per NG tube BID    insulin glargine U-100  30 Units Subcutaneous BID    lisinopriL  20 mg Per NG tube Daily    oxyCODONE  10 mg Oral Q6H    polyethylene glycol  17 g Per NG tube BID    senna-docusate  2 tablet Per NG tube Daily     PRN Medications:    Current Facility-Administered Medications:     acetaminophen, 650 mg, Per OG tube, Q6H PRN    dextrose 50%, 12.5 g, Intravenous, PRN    dextrose 50%, 12.5 g, Intravenous, PRN    fentaNYL, 100 mcg, Intravenous, Q1H PRN    glucagon (human recombinant), 1 mg, Intramuscular, PRN    glucagon (human recombinant), 1 mg, Intramuscular, PRN    hydrALAZINE, 10 mg, Intravenous, Q6H PRN    insulin aspart U-100, 0-10 Units, Subcutaneous, Q6H PRN    labetalol, 20 mg, Intravenous, Q6H PRN  Continuous Infusions:   dexmedeTOMIDine (Precedex) infusion (titrating)  0-1.4 mcg/kg/hr Intravenous Continuous 28.25 mL/hr at 07/15/25 0800 1 mcg/kg/hr at 07/15/25 0800    NORepinephrine bitartrate-D5W  0-3 mcg/kg/min (Dosing Weight) Intravenous Continuous   Stopped at 07/07/25 0704    propofoL  0-50 mcg/kg/min (Dosing Weight) Intravenous Continuous   Stopped at 07/13/25 1046       No past medical history on file.    No past surgical history on file.    Objective:     Input/output:    Intake/Output Summary (Last 24 hours) at 7/15/2025 0910  Last data filed at 7/15/2025 0800  Gross per 24 hour   Intake 1525.15 ml   Output 2425 ml   Net -899.85 ml       Vital Signs (Most Recent):  Temp: 98.5 °F (36.9 °C) (07/15/25 0800)  Pulse: (!) 54 (07/15/25 0800)  Resp: (!) 22 (07/15/25 0800)  BP: (!) 102/57 (07/15/25 0800)  SpO2: (!) 93 % (07/15/25 0800)  Body mass index is 41 kg/m².  Weight: 122.3 kg (269 lb 10 oz) Vital Signs (24h Range):  Temp:  [98.5 °F (36.9  °C)-99.7 °F (37.6 °C)] 98.5 °F (36.9 °C)  Pulse:  [54-83] 54  Resp:  [12-29] 22  SpO2:  [89 %-97 %] 93 %  BP: (102-158)/(57-87) 102/57       Physical exam:  Gen- intubated, NAD  HENT- ATNC, MMM, ETT in place  CV- RRR  Resp- CTA, compliant with mechanical ventilation  Abd- distended, soft with no rebound or guarding, hypoactive bowel sounds   MSK- WWP, trace edema  Neuro- intubated, lightly sedated on precedex but awakens and follows simple commands   Psych- calm but otherwise unable to assess 2/2 intubation, sedation       Lines/Drains/Airways       Drain  Duration                  NG/OG Tube 07/05/25 0848 10 days         Urethral Catheter 07/05/25 0800 Non-latex 16 Fr. 10 days              Airway  Duration                  Airway - Non-Surgical 07/05/25 0848 Endotracheal Tube 10 days              Peripheral Intravenous Line  Duration             Peripheral IV 07/15/25 0020 20 G Left;Posterior Wrist <1 day    Peripheral IV 07/15/25 0040 20 G 2 1/4 in Anterior;Left Upper Arm <1 day                  Vent:  Vent Mode: A/C (07/15/25 0519)  Ventilator Initiated: Yes (07/05/25 0850)  Set Rate: 22 BPM (07/15/25 0519)  Vt Set: 500 mL (07/15/25 0519)  Pressure Support: 10 cmH20 (07/14/25 2214)  PEEP/CPAP: 5 cmH20 (07/15/25 0519)  Oxygen Concentration (%): 60 (07/15/25 0800)  Peak Airway Pressure: 24 cmH20 (07/15/25 0519)  Total Ve: 11.1 L/m (07/15/25 0519)  F/VT Ratio<105 (RSBI): (!) 40.74 (07/15/25 0519)    ABGs:  Lab Results   Component Value Date    PH 7.380 07/15/2025    PO2 65.0 (L) 07/15/2025    PCO2 46.0 (H) 07/15/2025       Significant Labs:  Lab Results   Component Value Date    WBC 10.11 07/15/2025    HGB 12.8 (L) 07/15/2025    HCT 40.4 (L) 07/15/2025    MCV 88.6 07/15/2025     07/15/2025       Recent Labs   Lab 07/15/25  0353      K 4.8      CO2 23   BUN 46.6*   CREATININE 1.13   CALCIUM 9.0   MG 1.80   PHOS 3.3   AST 19   ALT 39   ALKPHOS 60   ALBUMIN 2.4*         Assessment:     Acute combined  hypoxic and hypercapnic respiratory failure.  Chest x-ray with bilateral patchy infiltrates, differential diagnostic possibilities including hydrostatic/cardiogenic versus non hydrostatic pulmonary edema versus infection, aspiration, etc.  History of cerebrovascular disease with right hemiparesis  Acute kidney injury superimposed on stage II chronic kidney disease, obstructive uropathy component.  He remains nonoliguric, daily improvement in BUN/creatinine   Type 2 diabetes mellitus with polyneuropathy, hyperglycemia improved.  Hypertension  Hyperlipidemia  Glaucoma    Plan:     Completed zosyn and zithromax, remains afebrile without significant leukocytosis   Mental status improving, but worsening of hypoxemia noted overnight, defer attempts at extubation this morning  Repeat CXR ordered for this AM  Some worsening abdominal distention from yesterday, with liquid bowel movements noted following increase in bowel regimen yesterday afternoon  KUB yesterday with nonspecific findings, repeat KUB ordered for today   Increased basal insulin yesterday with improved blood glucose control       DVT ppx: LMWH   GI ppx: famotidine       I spent 33 minutes providing critical care services to this patient.  This does not include time spent for separately billed procedures.       Jose Antonio Arora MD  Pulmonary/Critical Care

## 2025-07-15 NOTE — PLAN OF CARE
Problem: Adult Inpatient Plan of Care  Goal: Plan of Care Review  Outcome: Progressing  Goal: Patient-Specific Goal (Individualized)  Outcome: Progressing  Goal: Absence of Hospital-Acquired Illness or Injury  Outcome: Progressing  Goal: Optimal Comfort and Wellbeing  Outcome: Progressing  Goal: Readiness for Transition of Care  Outcome: Progressing     Problem: Mechanical Ventilation Invasive  Goal: Mechanical Ventilation Liberation  Outcome: Progressing  Goal: Optimal Nutrition Delivery  Outcome: Progressing  Goal: Absence of Device-Related Skin and Tissue Injury  Outcome: Progressing  Goal: Absence of Ventilator-Induced Lung Injury  Outcome: Progressing     Problem: Artificial Airway  Goal: Effective Communication  Outcome: Progressing  Goal: Optimal Device Function  Outcome: Progressing  Goal: Absence of Device-Related Skin or Tissue Injury  Outcome: Progressing     Problem: Noninvasive Ventilation Acute  Goal: Effective Unassisted Ventilation and Oxygenation  Outcome: Progressing     Problem: Fall Injury Risk  Goal: Absence of Fall and Fall-Related Injury  Outcome: Progressing     Problem: Delirium  Goal: Improved Sleep  Outcome: Progressing     Problem: Bariatric Environmental Safety  Goal: Safety Maintained with Care  Outcome: Progressing

## 2025-07-16 LAB
ALBUMIN SERPL-MCNC: 2.3 G/DL (ref 3.4–4.8)
ALBUMIN/GLOB SERPL: 0.5 RATIO (ref 1.1–2)
ALLENS TEST BLOOD GAS (OHS): YES
ALLENS TEST BLOOD GAS (OHS): YES
ALP SERPL-CCNC: 57 UNIT/L (ref 40–150)
ALT SERPL-CCNC: 37 UNIT/L (ref 0–55)
ANION GAP SERPL CALC-SCNC: 9 MEQ/L
AST SERPL-CCNC: 19 UNIT/L (ref 11–45)
BACTERIA BLD CULT: NORMAL
BACTERIA BLD CULT: NORMAL
BASE EXCESS BLD CALC-SCNC: 1.8 MMOL/L
BASE EXCESS BLD CALC-SCNC: 2.6 MMOL/L (ref -2–2)
BASOPHILS # BLD AUTO: 0.04 X10(3)/MCL
BASOPHILS NFR BLD AUTO: 0.4 %
BILIRUB SERPL-MCNC: 0.8 MG/DL
BLOOD GAS SAMPLE TYPE (OHS): ABNORMAL
BLOOD GAS SAMPLE TYPE (OHS): ABNORMAL
BUN SERPL-MCNC: 44.5 MG/DL (ref 8.4–25.7)
CA-I BLD-SCNC: 1.21 MMOL/L (ref 1.12–1.23)
CA-I BLD-SCNC: 1.21 MMOL/L (ref 1.12–1.23)
CALCIUM SERPL-MCNC: 8.8 MG/DL (ref 8.8–10)
CHLORIDE SERPL-SCNC: 106 MMOL/L (ref 98–107)
CO2 BLDA-SCNC: 28.6 MMOL/L
CO2 BLDA-SCNC: 29.3 MMOL/L
CO2 SERPL-SCNC: 22 MMOL/L (ref 23–31)
CREAT SERPL-MCNC: 1 MG/DL (ref 0.72–1.25)
CREAT/UREA NIT SERPL: 45
DRAWN BY BLOOD GAS (OHS): ABNORMAL
DRAWN BY BLOOD GAS (OHS): ABNORMAL
EOSINOPHIL # BLD AUTO: 0.19 X10(3)/MCL (ref 0–0.9)
EOSINOPHIL NFR BLD AUTO: 1.9 %
ERYTHROCYTE [DISTWIDTH] IN BLOOD BY AUTOMATED COUNT: 14.3 % (ref 11.5–17)
GFR SERPLBLD CREATININE-BSD FMLA CKD-EPI: >60 ML/MIN/1.73/M2
GLOBULIN SER-MCNC: 4.9 GM/DL (ref 2.4–3.5)
GLUCOSE SERPL-MCNC: 115 MG/DL (ref 82–115)
HCO3 BLDA-SCNC: 27.2 MMOL/L (ref 22–26)
HCO3 BLDA-SCNC: 27.9 MMOL/L (ref 22–26)
HCT VFR BLD AUTO: 40.8 % (ref 42–52)
HGB BLD-MCNC: 12.8 G/DL (ref 14–18)
IMM GRANULOCYTES # BLD AUTO: 0.05 X10(3)/MCL (ref 0–0.04)
IMM GRANULOCYTES NFR BLD AUTO: 0.5 %
INHALED O2 CONCENTRATION: 40 %
INHALED O2 CONCENTRATION: 60 %
LYMPHOCYTES # BLD AUTO: 2.1 X10(3)/MCL (ref 0.6–4.6)
LYMPHOCYTES NFR BLD AUTO: 21.2 %
MAGNESIUM SERPL-MCNC: 1.8 MG/DL (ref 1.6–2.6)
MCH RBC QN AUTO: 28.4 PG (ref 27–31)
MCHC RBC AUTO-ENTMCNC: 31.4 G/DL (ref 33–36)
MCV RBC AUTO: 90.7 FL (ref 80–94)
MECH RR (OHS): 22 B/MIN
MODE (OHS): ABNORMAL
MODE (OHS): AC
MONOCYTES # BLD AUTO: 1.07 X10(3)/MCL (ref 0.1–1.3)
MONOCYTES NFR BLD AUTO: 10.8 %
NEUTROPHILS # BLD AUTO: 6.47 X10(3)/MCL (ref 2.1–9.2)
NEUTROPHILS NFR BLD AUTO: 65.2 %
NRBC BLD AUTO-RTO: 0 %
OXYGEN DEVICE BLOOD GAS (OHS): ABNORMAL
PCO2 BLDA: 45 MMHG (ref 35–45)
PCO2 BLDA: 45 MMHG (ref 35–45)
PEEP RESPIRATORY: 5 CMH2O
PEEP RESPIRATORY: 8 CMH2O
PH BLDA: 7.39 [PH] (ref 7.35–7.45)
PH BLDA: 7.4 [PH] (ref 7.35–7.45)
PHOSPHATE SERPL-MCNC: 3.1 MG/DL (ref 2.3–4.7)
PLATELET # BLD AUTO: 231 X10(3)/MCL (ref 130–400)
PMV BLD AUTO: 10.9 FL (ref 7.4–10.4)
PO2 BLDA: 69 MMHG (ref 80–100)
PO2 BLDA: 77 MMHG (ref 80–100)
POCT GLUCOSE: 74 MG/DL (ref 70–110)
POCT GLUCOSE: 82 MG/DL (ref 70–110)
POCT GLUCOSE: 88 MG/DL (ref 70–110)
POTASSIUM BLOOD GAS (OHS): 4.4 MMOL/L (ref 3.5–5)
POTASSIUM BLOOD GAS (OHS): 4.6 MMOL/L (ref 3.5–5)
POTASSIUM SERPL-SCNC: 4.8 MMOL/L (ref 3.5–5.1)
PROT SERPL-MCNC: 7.2 GM/DL (ref 5.8–7.6)
PS (OHS): 10 CMH2O
RBC # BLD AUTO: 4.5 X10(6)/MCL (ref 4.7–6.1)
SAMPLE SITE BLOOD GAS (OHS): ABNORMAL
SAMPLE SITE BLOOD GAS (OHS): ABNORMAL
SAO2 % BLDA: 94 %
SAO2 % BLDA: 95 %
SODIUM BLOOD GAS (OHS): 133 MMOL/L (ref 137–145)
SODIUM BLOOD GAS (OHS): 134 MMOL/L (ref 137–145)
SODIUM SERPL-SCNC: 137 MMOL/L (ref 136–145)
SPONT+MECH VT ON VENT: 500 ML
WBC # BLD AUTO: 9.92 X10(3)/MCL (ref 4.5–11.5)

## 2025-07-16 PROCEDURE — 99900035 HC TECH TIME PER 15 MIN (STAT)

## 2025-07-16 PROCEDURE — 83735 ASSAY OF MAGNESIUM: CPT

## 2025-07-16 PROCEDURE — 85025 COMPLETE CBC W/AUTO DIFF WBC: CPT

## 2025-07-16 PROCEDURE — 94761 N-INVAS EAR/PLS OXIMETRY MLT: CPT | Mod: XB

## 2025-07-16 PROCEDURE — 25000003 PHARM REV CODE 250: Performed by: INTERNAL MEDICINE

## 2025-07-16 PROCEDURE — 99900031 HC PATIENT EDUCATION (STAT)

## 2025-07-16 PROCEDURE — 82803 BLOOD GASES ANY COMBINATION: CPT

## 2025-07-16 PROCEDURE — 63600175 PHARM REV CODE 636 W HCPCS: Performed by: INTERNAL MEDICINE

## 2025-07-16 PROCEDURE — 84100 ASSAY OF PHOSPHORUS: CPT

## 2025-07-16 PROCEDURE — 94003 VENT MGMT INPAT SUBQ DAY: CPT

## 2025-07-16 PROCEDURE — 36600 WITHDRAWAL OF ARTERIAL BLOOD: CPT

## 2025-07-16 PROCEDURE — 27200966 HC CLOSED SUCTION SYSTEM

## 2025-07-16 PROCEDURE — 80053 COMPREHEN METABOLIC PANEL: CPT

## 2025-07-16 PROCEDURE — 36415 COLL VENOUS BLD VENIPUNCTURE: CPT

## 2025-07-16 PROCEDURE — 27100171 HC OXYGEN HIGH FLOW UP TO 24 HOURS

## 2025-07-16 PROCEDURE — 25000003 PHARM REV CODE 250

## 2025-07-16 PROCEDURE — 94760 N-INVAS EAR/PLS OXIMETRY 1: CPT | Mod: XB

## 2025-07-16 PROCEDURE — 20000000 HC ICU ROOM

## 2025-07-16 RX ADMIN — DEXMEDETOMIDINE HYDROCHLORIDE 1.4 MCG/KG/HR: 4 INJECTION, SOLUTION INTRAVENOUS at 05:07

## 2025-07-16 RX ADMIN — FAMOTIDINE 20 MG: 20 TABLET, FILM COATED ORAL at 09:07

## 2025-07-16 RX ADMIN — DEXMEDETOMIDINE HYDROCHLORIDE 1.4 MCG/KG/HR: 4 INJECTION, SOLUTION INTRAVENOUS at 03:07

## 2025-07-16 RX ADMIN — INSULIN GLARGINE 30 UNITS: 100 INJECTION, SOLUTION SUBCUTANEOUS at 08:07

## 2025-07-16 RX ADMIN — POLYETHYLENE GLYCOL 3350 17 G: 17 POWDER, FOR SOLUTION ORAL at 09:07

## 2025-07-16 RX ADMIN — FAMOTIDINE 20 MG: 20 TABLET, FILM COATED ORAL at 08:07

## 2025-07-16 RX ADMIN — DEXMEDETOMIDINE HYDROCHLORIDE 1.4 MCG/KG/HR: 4 INJECTION, SOLUTION INTRAVENOUS at 10:07

## 2025-07-16 RX ADMIN — FENTANYL CITRATE 100 MCG: 50 INJECTION INTRAMUSCULAR; INTRAVENOUS at 03:07

## 2025-07-16 RX ADMIN — OXYCODONE HYDROCHLORIDE 10 MG: 10 TABLET ORAL at 05:07

## 2025-07-16 RX ADMIN — OXYCODONE HYDROCHLORIDE 10 MG: 10 TABLET ORAL at 12:07

## 2025-07-16 RX ADMIN — FENTANYL CITRATE 100 MCG: 50 INJECTION INTRAMUSCULAR; INTRAVENOUS at 08:07

## 2025-07-16 RX ADMIN — ENOXAPARIN SODIUM 40 MG: 40 INJECTION SUBCUTANEOUS at 05:07

## 2025-07-16 RX ADMIN — POLYETHYLENE GLYCOL 3350 17 G: 17 POWDER, FOR SOLUTION ORAL at 08:07

## 2025-07-16 RX ADMIN — LISINOPRIL 20 MG: 20 TABLET ORAL at 09:07

## 2025-07-16 RX ADMIN — DEXMEDETOMIDINE HYDROCHLORIDE 1 MCG/KG/HR: 4 INJECTION, SOLUTION INTRAVENOUS at 01:07

## 2025-07-16 RX ADMIN — AMLODIPINE BESYLATE 5 MG: 5 TABLET ORAL at 09:07

## 2025-07-16 RX ADMIN — SENNOSIDES, DOCUSATE SODIUM 2 TABLET: 8.6; 5 TABLET ORAL at 09:07

## 2025-07-16 RX ADMIN — DEXMEDETOMIDINE HYDROCHLORIDE 1.4 MCG/KG/HR: 4 INJECTION, SOLUTION INTRAVENOUS at 08:07

## 2025-07-16 RX ADMIN — INSULIN GLARGINE 30 UNITS: 100 INJECTION, SOLUTION SUBCUTANEOUS at 09:07

## 2025-07-16 NOTE — PLAN OF CARE
Problem: Infection  Goal: Absence of Infection Signs and Symptoms  Outcome: Progressing     Problem: Adult Inpatient Plan of Care  Goal: Plan of Care Review  Outcome: Progressing  Goal: Patient-Specific Goal (Individualized)  Outcome: Progressing  Goal: Absence of Hospital-Acquired Illness or Injury  Outcome: Progressing  Goal: Optimal Comfort and Wellbeing  Outcome: Progressing  Goal: Readiness for Transition of Care  Outcome: Progressing     Problem: Mechanical Ventilation Invasive  Goal: Absence of Ventilator-Induced Lung Injury  Outcome: Progressing     Problem: Artificial Airway  Goal: Optimal Device Function  Outcome: Progressing  Goal: Absence of Device-Related Skin or Tissue Injury  Outcome: Progressing     Problem: Fall Injury Risk  Goal: Absence of Fall and Fall-Related Injury  Outcome: Progressing     Problem: Delirium  Goal: Optimal Coping  Outcome: Progressing  Goal: Improved Behavioral Control  Outcome: Progressing  Goal: Improved Attention and Thought Clarity  Outcome: Progressing  Goal: Improved Sleep  Outcome: Progressing     Problem: Bariatric Environmental Safety  Goal: Safety Maintained with Care  Outcome: Progressing     Problem: Mechanical Ventilation Invasive  Goal: Effective Communication  Outcome: Not Progressing  Goal: Optimal Device Function  Outcome: Not Progressing  Goal: Mechanical Ventilation Liberation  Outcome: Not Progressing  Goal: Optimal Nutrition Delivery  Outcome: Not Progressing     Problem: Artificial Airway  Goal: Effective Communication  Outcome: Not Progressing     Problem: Noninvasive Ventilation Acute  Goal: Effective Unassisted Ventilation and Oxygenation  Outcome: Not Progressing

## 2025-07-16 NOTE — PROGRESS NOTES
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  He presents to ED per EMS 07/05/2025 after multiple falls at nursing home with reported generalized weakness.  He is reported as having a Benítez catheter placed about 1 week ago for urinary retention.  Patient was reported as having pulled this out himself while at nursing home, on a.m. of presentation.  Benítez catheter was placed in ED, returning 700 cc of urine.  While in ER, he was reported as becoming more somnolent with poor overall respiratory effort and decreasing level of consciousness.  ABG revealed acute respiratory acidosis.  He was intubated at 8:55 a.m., placed on mechanical ventilatory support.  CT brain obtained in ED reveals chronic microvascular ischemic changes with no acute intracranial abnormalities.  SARS-COVID-2 PCR negative, influenza A/B PCR negative.  He is now admitted to ICU for ongoing medical care.     Hospital Course:  07/05/2025: Intubation/mechanical ventilation   TTE (07/05/2025: Normal LV size with decreased LV SF, EF 45-50%; trace MR, mild TR and mild PI.  PASP estimated 30 mm Hg; no pericardial effusion      24hr Interval History:  Improving ventilator settings overnight.  Afebrile without significant leukocytosis.  Largely stable mental status, awake and alert, interactive.      Review of systems unobtainable due to intubation, sedation.      Scheduled Medications:   amLODIPine  5 mg Per NG tube Daily    enoxparin  40 mg Subcutaneous Q24H (prophylaxis, 1700)    famotidine  20 mg Per NG tube BID    insulin  glargine U-100  30 Units Subcutaneous BID    lisinopriL  20 mg Per NG tube Daily    oxyCODONE  10 mg Oral Q6H    polyethylene glycol  17 g Per NG tube BID    senna-docusate  2 tablet Per NG tube Daily     PRN Medications:    Current Facility-Administered Medications:     acetaminophen, 650 mg, Per OG tube, Q6H PRN    dextrose 50%, 12.5 g, Intravenous, PRN    dextrose 50%, 12.5 g, Intravenous, PRN    fentaNYL, 100 mcg, Intravenous, Q1H PRN    glucagon (human recombinant), 1 mg, Intramuscular, PRN    glucagon (human recombinant), 1 mg, Intramuscular, PRN    hydrALAZINE, 10 mg, Intravenous, Q6H PRN    insulin aspart U-100, 0-10 Units, Subcutaneous, Q6H PRN    labetalol, 20 mg, Intravenous, Q6H PRN  Continuous Infusions:   dexmedeTOMIDine (Precedex) infusion (titrating)  0-1.4 mcg/kg/hr Intravenous Continuous 39.69 mL/hr at 07/16/25 1044 1.4 mcg/kg/hr at 07/16/25 1044    NORepinephrine bitartrate-D5W  0-3 mcg/kg/min (Dosing Weight) Intravenous Continuous   Stopped at 07/07/25 0704    propofoL  0-50 mcg/kg/min (Dosing Weight) Intravenous Continuous   Stopped at 07/13/25 1046       No past medical history on file.    No past surgical history on file.    Objective:     Input/output:    Intake/Output Summary (Last 24 hours) at 7/16/2025 1213  Last data filed at 7/16/2025 1000  Gross per 24 hour   Intake 857.65 ml   Output 850 ml   Net 7.65 ml       Vital Signs (Most Recent):  Temp: 98.6 °F (37 °C) (07/16/25 0800)  Pulse: (!) 54 (07/16/25 1030)  Resp: (!) 22 (07/16/25 1030)  BP: 126/72 (07/16/25 1000)  SpO2: (!) 94 % (07/16/25 1030)  Body mass index is 41 kg/m².  Weight: 122.3 kg (269 lb 10 oz) Vital Signs (24h Range):  Temp:  [98.2 °F (36.8 °C)-98.8 °F (37.1 °C)] 98.6 °F (37 °C)  Pulse:  [51-63] 54  Resp:  [18-26] 22  SpO2:  [91 %-98 %] 94 %  BP: ()/(54-83) 126/72       Physical exam:  Gen- intubated, NAD  HENT- ATNC, MMM, ETT in place  CV- RRR  Resp- CTA, compliant with mechanical ventilation  Abd- distended, soft  with no rebound or guarding, hypoactive bowel sounds   MSK- WWP, trace edema  Neuro- intubated, lightly sedated on precedex but awakens and follows simple commands   Psych- calm but otherwise unable to assess 2/2 intubation, sedation       Lines/Drains/Airways       Drain  Duration                  NG/OG Tube 07/05/25 0848 11 days         Urethral Catheter 07/05/25 0800 Non-latex 16 Fr. 11 days              Airway  Duration                  Airway - Non-Surgical 07/05/25 0848 Endotracheal Tube 11 days              Peripheral Intravenous Line  Duration             Peripheral IV 07/15/25 0020 20 G Left;Posterior Wrist 1 day    Peripheral IV 07/15/25 0040 20 G 2 1/4 in Anterior;Left Upper Arm 1 day                  Vent:  Vent Mode: A/C (07/16/25 1030)  Ventilator Initiated: Yes (07/05/25 0850)  Set Rate: 22 BPM (07/16/25 1030)  Vt Set: 500 mL (07/16/25 1030)  Pressure Support: 10 cmH20 (07/14/25 2214)  PEEP/CPAP: 8 cmH20 (07/16/25 1030)  Oxygen Concentration (%): (S) 50 (07/16/25 1030)  Peak Airway Pressure: 24 cmH20 (07/16/25 1030)  Total Ve: 11.1 L/m (07/16/25 1030)  F/VT Ratio<105 (RSBI): (!) 46.81 (07/16/25 1030)    ABGs:  Lab Results   Component Value Date    PH 7.390 07/16/2025    PO2 77.0 (L) 07/16/2025    PCO2 45.0 07/16/2025       Significant Labs:  Lab Results   Component Value Date    WBC 9.92 07/16/2025    HGB 12.8 (L) 07/16/2025    HCT 40.8 (L) 07/16/2025    MCV 90.7 07/16/2025     07/16/2025       Recent Labs   Lab 07/16/25  0249      K 4.8      CO2 22*   BUN 44.5*   CREATININE 1.00   CALCIUM 8.8   MG 1.80   PHOS 3.1   AST 19   ALT 37   ALKPHOS 57   ALBUMIN 2.3*         Assessment:     Acute combined hypoxic and hypercapnic respiratory failure.  Chest x-ray with bilateral patchy infiltrates, differential diagnostic possibilities including hydrostatic/cardiogenic versus non hydrostatic pulmonary edema versus infection, aspiration, etc.  History of cerebrovascular disease with right  hemiparesis  Acute kidney injury superimposed on stage II chronic kidney disease, obstructive uropathy component.  He remains nonoliguric, daily improvement in BUN/creatinine   Type 2 diabetes mellitus with polyneuropathy, hyperglycemia improved.  Hypertension  Hyperlipidemia  Glaucoma    Plan:     Completed zosyn and zithromax, remains afebrile without significant leukocytosis   Mental status improving, awake and alert this AM   KUB yesterday with some nonspecific distention, stable abdominal distention on exam today  Placed on spontaneous breathing trial, assess suitability for extubation today,, patient's daughter prefers re-intubation in the event he suffers from recurrence of his       DVT ppx: LMWH   GI ppx: famotidine       I spent 32 minutes providing critical care services to this patient.  This does not include time spent for separately billed procedures.       Jose Antonio Arora MD  Pulmonary/Critical Care

## 2025-07-17 LAB
ALBUMIN SERPL-MCNC: 2.4 G/DL (ref 3.4–4.8)
ALBUMIN/GLOB SERPL: 0.5 RATIO (ref 1.1–2)
ALP SERPL-CCNC: 64 UNIT/L (ref 40–150)
ALT SERPL-CCNC: 38 UNIT/L (ref 0–55)
ANION GAP SERPL CALC-SCNC: 11 MEQ/L
AST SERPL-CCNC: 24 UNIT/L (ref 11–45)
BASOPHILS # BLD AUTO: 0.04 X10(3)/MCL
BASOPHILS NFR BLD AUTO: 0.4 %
BILIRUB SERPL-MCNC: 0.8 MG/DL
BUN SERPL-MCNC: 32.9 MG/DL (ref 8.4–25.7)
CALCIUM SERPL-MCNC: 8.8 MG/DL (ref 8.8–10)
CHLORIDE SERPL-SCNC: 106 MMOL/L (ref 98–107)
CO2 SERPL-SCNC: 22 MMOL/L (ref 23–31)
CREAT SERPL-MCNC: 1 MG/DL (ref 0.72–1.25)
CREAT/UREA NIT SERPL: 33
EOSINOPHIL # BLD AUTO: 0.11 X10(3)/MCL (ref 0–0.9)
EOSINOPHIL NFR BLD AUTO: 1 %
ERYTHROCYTE [DISTWIDTH] IN BLOOD BY AUTOMATED COUNT: 14.4 % (ref 11.5–17)
GFR SERPLBLD CREATININE-BSD FMLA CKD-EPI: >60 ML/MIN/1.73/M2
GLOBULIN SER-MCNC: 4.7 GM/DL (ref 2.4–3.5)
GLUCOSE SERPL-MCNC: 82 MG/DL (ref 82–115)
HCT VFR BLD AUTO: 41 % (ref 42–52)
HGB BLD-MCNC: 12.5 G/DL (ref 14–18)
IMM GRANULOCYTES # BLD AUTO: 0.06 X10(3)/MCL (ref 0–0.04)
IMM GRANULOCYTES NFR BLD AUTO: 0.6 %
LYMPHOCYTES # BLD AUTO: 1.51 X10(3)/MCL (ref 0.6–4.6)
LYMPHOCYTES NFR BLD AUTO: 14.2 %
MAGNESIUM SERPL-MCNC: 1.8 MG/DL (ref 1.6–2.6)
MCH RBC QN AUTO: 28.1 PG (ref 27–31)
MCHC RBC AUTO-ENTMCNC: 30.5 G/DL (ref 33–36)
MCV RBC AUTO: 92.1 FL (ref 80–94)
MONOCYTES # BLD AUTO: 1.15 X10(3)/MCL (ref 0.1–1.3)
MONOCYTES NFR BLD AUTO: 10.8 %
NEUTROPHILS # BLD AUTO: 7.75 X10(3)/MCL (ref 2.1–9.2)
NEUTROPHILS NFR BLD AUTO: 73 %
NRBC BLD AUTO-RTO: 0 %
PHOSPHATE SERPL-MCNC: 3.1 MG/DL (ref 2.3–4.7)
PLATELET # BLD AUTO: 259 X10(3)/MCL (ref 130–400)
PMV BLD AUTO: 11 FL (ref 7.4–10.4)
POCT GLUCOSE: 100 MG/DL (ref 70–110)
POCT GLUCOSE: 125 MG/DL (ref 70–110)
POCT GLUCOSE: 175 MG/DL (ref 70–110)
POCT GLUCOSE: 87 MG/DL (ref 70–110)
POTASSIUM SERPL-SCNC: 4.8 MMOL/L (ref 3.5–5.1)
PROT SERPL-MCNC: 7.1 GM/DL (ref 5.8–7.6)
RBC # BLD AUTO: 4.45 X10(6)/MCL (ref 4.7–6.1)
SODIUM SERPL-SCNC: 139 MMOL/L (ref 136–145)
WBC # BLD AUTO: 10.62 X10(3)/MCL (ref 4.5–11.5)

## 2025-07-17 PROCEDURE — 27100171 HC OXYGEN HIGH FLOW UP TO 24 HOURS

## 2025-07-17 PROCEDURE — 99900031 HC PATIENT EDUCATION (STAT)

## 2025-07-17 PROCEDURE — 94640 AIRWAY INHALATION TREATMENT: CPT

## 2025-07-17 PROCEDURE — 94660 CPAP INITIATION&MGMT: CPT

## 2025-07-17 PROCEDURE — 20000000 HC ICU ROOM

## 2025-07-17 PROCEDURE — 63600175 PHARM REV CODE 636 W HCPCS: Performed by: INTERNAL MEDICINE

## 2025-07-17 PROCEDURE — 25000242 PHARM REV CODE 250 ALT 637 W/ HCPCS: Performed by: INTERNAL MEDICINE

## 2025-07-17 PROCEDURE — 25000003 PHARM REV CODE 250: Performed by: INTERNAL MEDICINE

## 2025-07-17 PROCEDURE — 27000221 HC OXYGEN, UP TO 24 HOURS

## 2025-07-17 PROCEDURE — 36415 COLL VENOUS BLD VENIPUNCTURE: CPT

## 2025-07-17 PROCEDURE — 92610 EVALUATE SWALLOWING FUNCTION: CPT

## 2025-07-17 PROCEDURE — 84100 ASSAY OF PHOSPHORUS: CPT

## 2025-07-17 PROCEDURE — 27000200 HC HIGH FLOW DEL DISP CIRCUIT

## 2025-07-17 PROCEDURE — 94761 N-INVAS EAR/PLS OXIMETRY MLT: CPT

## 2025-07-17 PROCEDURE — 83735 ASSAY OF MAGNESIUM: CPT

## 2025-07-17 PROCEDURE — 85025 COMPLETE CBC W/AUTO DIFF WBC: CPT

## 2025-07-17 PROCEDURE — 94799 UNLISTED PULMONARY SVC/PX: CPT

## 2025-07-17 PROCEDURE — 25000003 PHARM REV CODE 250

## 2025-07-17 PROCEDURE — 80053 COMPREHEN METABOLIC PANEL: CPT

## 2025-07-17 PROCEDURE — 5A0935A ASSISTANCE WITH RESPIRATORY VENTILATION, LESS THAN 24 CONSECUTIVE HOURS, HIGH NASAL FLOW/VELOCITY: ICD-10-PCS | Performed by: INTERNAL MEDICINE

## 2025-07-17 PROCEDURE — 99900035 HC TECH TIME PER 15 MIN (STAT)

## 2025-07-17 PROCEDURE — 27000249 HC VAPOTHERM CIRCUIT

## 2025-07-17 PROCEDURE — 27000190 HC CPAP FULL FACE MASK W/VALVE

## 2025-07-17 RX ORDER — SODIUM CHLORIDE FOR INHALATION 3 %
4 VIAL, NEBULIZER (ML) INHALATION ONCE
Status: COMPLETED | OUTPATIENT
Start: 2025-07-17 | End: 2025-07-17

## 2025-07-17 RX ORDER — SODIUM CHLORIDE FOR INHALATION 3 %
4 VIAL, NEBULIZER (ML) INHALATION EVERY 6 HOURS
Status: DISCONTINUED | OUTPATIENT
Start: 2025-07-17 | End: 2025-07-28

## 2025-07-17 RX ORDER — OXYCODONE HYDROCHLORIDE 10 MG/1
10 TABLET ORAL EVERY 6 HOURS
Refills: 0 | Status: DISCONTINUED | OUTPATIENT
Start: 2025-07-17 | End: 2025-07-18

## 2025-07-17 RX ORDER — ACETAMINOPHEN 325 MG/1
650 TABLET ORAL EVERY 6 HOURS PRN
Status: DISCONTINUED | OUTPATIENT
Start: 2025-07-17 | End: 2025-08-07 | Stop reason: HOSPADM

## 2025-07-17 RX ADMIN — POLYETHYLENE GLYCOL 3350 17 G: 17 POWDER, FOR SOLUTION ORAL at 08:07

## 2025-07-17 RX ADMIN — INSULIN GLARGINE 30 UNITS: 100 INJECTION, SOLUTION SUBCUTANEOUS at 08:07

## 2025-07-17 RX ADMIN — SODIUM CHLORIDE SOLN NEBU 3% 4 ML: 3 NEBU SOLN at 01:07

## 2025-07-17 RX ADMIN — AMLODIPINE BESYLATE 5 MG: 5 TABLET ORAL at 10:07

## 2025-07-17 RX ADMIN — ENOXAPARIN SODIUM 40 MG: 40 INJECTION SUBCUTANEOUS at 08:07

## 2025-07-17 RX ADMIN — POLYETHYLENE GLYCOL 3350 17 G: 17 POWDER, FOR SOLUTION ORAL at 10:07

## 2025-07-17 RX ADMIN — FAMOTIDINE 20 MG: 20 TABLET, FILM COATED ORAL at 10:07

## 2025-07-17 RX ADMIN — FAMOTIDINE 20 MG: 20 TABLET, FILM COATED ORAL at 08:07

## 2025-07-17 RX ADMIN — SODIUM CHLORIDE SOLN NEBU 3% 4 ML: 3 NEBU SOLN at 08:07

## 2025-07-17 RX ADMIN — LISINOPRIL 20 MG: 20 TABLET ORAL at 10:07

## 2025-07-17 RX ADMIN — SENNOSIDES, DOCUSATE SODIUM 2 TABLET: 8.6; 5 TABLET ORAL at 10:07

## 2025-07-17 NOTE — PLAN OF CARE
Problem: SLP  Goal: SLP Goal  Description: LTG: The pt will tolerate least restrictive diet with no overt s/sx of aspiration.    STGs:  1. Patient will accept and initiate swallow with ice chip trials  2. Patient will tolerate puree trials  3. Patient will participate and MBS once appropriate    Outcome: Progressing     Patient evaluated, goals created

## 2025-07-17 NOTE — PT/OT/SLP EVAL
[facility]  Speech Language Pathology Department  Clinical Swallow Evaluation    Patient Name:  Charly Padilla   MRN:  [id number]    Recommendations     General recommendations:  dysphagia therapy  Solid texture recommendation:  NPO  Liquid consistency recommendation: NPO   Medications: NPO  Precautions: aspiration    History     Charly Padilla is a/n 69 y.o. male admitted       Prior Intubation HX:  7/05-07/16    Home diet texture/consistency: unknown (not in chart from NH)  Current method of nutrition: NPO, NG intact      Subjective     Patient awake and calm.    Spiritual/Cultural/Caodaism Beliefs/Practices that affect care: no    Pain/Comfort: Pain Rating 1: 0/10    Respiratory Status:  8LL via oxymask      Objective     ORAL MUSCULATURE  Dentition: edentulous  Secretion Management: coughing on secretions  Volitional Cough: Unable to clear secretions, attempted suction, however pt pushed with tongue and SLP unable to remove.  Volitional Swallow: visualized, coughing after swallow    Oral care provided with swab, no removal or thick secretions noted. Tongue completely white.    PO TRIALS  Consistency Fed By Oral Symptoms Pharyngeal Symptoms   Ice chips x1 SLP Bolus holding  Expectorated material Cough directly after swallow (after expectoration of ice chip)     Assessment     Patient is not currently appropriate for PO intake. SLP to follow up.    Outcome Measures     Functional Oral Intake Scale: 1 - Nothing by mouth    Goals     Multidisciplinary Problems       SLP Goals          Problem: SLP    Goal Priority Disciplines Outcome   SLP Goal     SLP Progressing   Description: LTG: The pt will tolerate least restrictive diet with no overt s/sx of aspiration.    STGs:  1. Patient will accept and initiate swallow with ice chip trials  2. Patient will tolerate puree trials  3. Patient will participate and MBS once appropriate                       Education     No learner  present/available.    Plan     SLP Follow-Up:  Yes   Patient to be seen:  daily   Plan of Care expires:  07/24/25  Plan of Care reviewed with:  patient     Time Tracking     SLP Treatment Date:   07/17/25  Speech Start Time:  0915  Speech Stop Time:  0925     Speech Total Time (min):  10 min    Billable minutes:  Swallow and Oral Function Evaluation, 10 minutes     07/17/2025

## 2025-07-17 NOTE — PROGRESS NOTES
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  He presents to ED per EMS 07/05/2025 after multiple falls at nursing home with reported generalized weakness.  He is reported as having a Benítez catheter placed about 1 week ago for urinary retention.  Patient was reported as having pulled this out himself while at nursing home, on a.m. of presentation.  Benítez catheter was placed in ED, returning 700 cc of urine.  While in ER, he was reported as becoming more somnolent with poor overall respiratory effort and decreasing level of consciousness.  ABG revealed acute respiratory acidosis.  He was intubated at 8:55 a.m., placed on mechanical ventilatory support.  CT brain obtained in ED reveals chronic microvascular ischemic changes with no acute intracranial abnormalities.  SARS-COVID-2 PCR negative, influenza A/B PCR negative.  He is now admitted to ICU for ongoing medical care.     Hospital Course:  07/05/2025: Intubation/mechanical ventilation   TTE (07/05/2025: Normal LV size with decreased LV SF, EF 45-50%; trace MR, mild TR and mild PI.  PASP estimated 30 mm Hg; no pericardial effusion      24hr Interval History:  Good tolerance of spontaneous breathing trial yesterday, extubated but requiring some elevated oxygen supplementation overnight.  Afebrile without significant leukocytosis.  Saturations mid/low 90s on 8 L face mask.      Review of systems unobtainable due to intubation, sedation.      Scheduled Medications:   amLODIPine  5 mg Per NG tube Daily    enoxparin  40 mg Subcutaneous Q24H  (prophylaxis, 1700)    famotidine  20 mg Per NG tube BID    insulin glargine U-100  30 Units Subcutaneous BID    lisinopriL  20 mg Per NG tube Daily    oxyCODONE  10 mg Oral Q6H    polyethylene glycol  17 g Per NG tube BID    senna-docusate  2 tablet Per NG tube Daily     PRN Medications:    Current Facility-Administered Medications:     acetaminophen, 650 mg, Per OG tube, Q6H PRN    dextrose 50%, 12.5 g, Intravenous, PRN    dextrose 50%, 12.5 g, Intravenous, PRN    fentaNYL, 100 mcg, Intravenous, Q1H PRN    glucagon (human recombinant), 1 mg, Intramuscular, PRN    glucagon (human recombinant), 1 mg, Intramuscular, PRN    hydrALAZINE, 10 mg, Intravenous, Q6H PRN    insulin aspart U-100, 0-10 Units, Subcutaneous, Q6H PRN    labetalol, 20 mg, Intravenous, Q6H PRN  Continuous Infusions:   dexmedeTOMIDine (Precedex) infusion (titrating)  0-1.4 mcg/kg/hr Intravenous Continuous   Stopped at 07/16/25 1332    NORepinephrine bitartrate-D5W  0-3 mcg/kg/min (Dosing Weight) Intravenous Continuous   Stopped at 07/07/25 0704    propofoL  0-50 mcg/kg/min (Dosing Weight) Intravenous Continuous   Stopped at 07/13/25 1046       No past medical history on file.    No past surgical history on file.    Objective:     Input/output:    Intake/Output Summary (Last 24 hours) at 7/17/2025 0947  Last data filed at 7/17/2025 0824  Gross per 24 hour   Intake 290.55 ml   Output 1615 ml   Net -1324.45 ml       Vital Signs (Most Recent):  Temp: 98.8 °F (37.1 °C) (07/17/25 0800)  Pulse: (!) 111 (07/17/25 0800)  Resp: (!) 27 (07/17/25 0800)  BP: 124/67 (07/17/25 0800)  SpO2: (!) 93 % (07/17/25 0800)  Body mass index is 41 kg/m².  Weight: 122.3 kg (269 lb 10 oz) Vital Signs (24h Range):  Temp:  [98.4 °F (36.9 °C)-99.1 °F (37.3 °C)] 98.8 °F (37.1 °C)  Pulse:  [] 111  Resp:  [10-27] 27  SpO2:  [90 %-99 %] 93 %  BP: ()/(57-93) 124/67       Physical exam:  Gen- awake and alert, tracks examiner around room  HENT- ATNC, MMM  CV- RRR  Resp- CTA,  unlabored respirations  Abd- distended, soft with no rebound or guarding, hypoactive bowel sounds   MSK- WWP, trace edema  Neuro- awake and alert, tracking examiner around room but not reliably following commands  Psych- calm but otherwise unable to assess 2/2 neurologic status      Lines/Drains/Airways       Drain  Duration                  NG/OG Tube 07/05/25 0848 12 days         Urethral Catheter 07/05/25 0800 Non-latex 16 Fr. 12 days              Peripheral Intravenous Line  Duration             Peripheral IV 07/15/25 0020 20 G Left;Posterior Wrist 2 days    Peripheral IV 07/15/25 0040 20 G 2 1/4 in Anterior;Left Upper Arm 2 days                  Vent:  Vent Mode: (S) CPAP / PSV (per MD) (07/16/25 1215)  Ventilator Initiated: Yes (07/05/25 0850)  Set Rate: 22 BPM (07/16/25 1030)  Vt Set: 500 mL (07/16/25 1030)  Pressure Support: (S) 5 cmH20 (decreased from 8; total PS 10) (07/16/25 1215)  PEEP/CPAP: (S) 5 cmH20 (per MD) (07/16/25 1215)  Oxygen Concentration (%): 540 (07/16/25 1215)  Peak Airway Pressure: 12 cmH20 (07/16/25 1215)  Total Ve: 12 L/m (07/16/25 1215)  F/VT Ratio<105 (RSBI): (!) 41.53 (07/16/25 1215)    ABGs:  Lab Results   Component Value Date    PH 7.400 07/16/2025    PO2 69.0 (L) 07/16/2025    PCO2 45.0 07/16/2025       Significant Labs:  Lab Results   Component Value Date    WBC 10.62 07/17/2025    HGB 12.5 (L) 07/17/2025    HCT 41.0 (L) 07/17/2025    MCV 92.1 07/17/2025     07/17/2025       Recent Labs   Lab 07/17/25  0238      K 4.8      CO2 22*   BUN 32.9*   CREATININE 1.00   CALCIUM 8.8   MG 1.80   PHOS 3.1   AST 24   ALT 38   ALKPHOS 64   ALBUMIN 2.4*         Assessment:     Acute combined hypoxic and hypercapnic respiratory failure.  Chest x-ray with bilateral patchy infiltrates, differential diagnostic possibilities including hydrostatic/cardiogenic versus non hydrostatic pulmonary edema versus infection, aspiration, etc.  History of cerebrovascular disease with right  hemiparesis  Acute kidney injury superimposed on stage II chronic kidney disease, obstructive uropathy component.  He remains nonoliguric, daily improvement in BUN/creatinine   Type 2 diabetes mellitus with polyneuropathy, hyperglycemia improved.  Hypertension  Hyperlipidemia  Glaucoma    Plan:     Completed zosyn and zithromax, remains afebrile without significant leukocytosis   Mental status improving, extubated 07/16/2025 but some worsening oxygen requirements noted overnight, repeat chest x-ray pending  KUB yesterday with some nonspecific distention, stable abdominal distention on exam today  Start inhaled mucolytics and chest physiotherapy today for assistance with clearance of secretions  Start vapotherm today for assistance with hypoxia   Discussed with patient's daughter prior to extubation, he would like re-intubation in the event that recurrent respiratory failure warrants it       DVT ppx: LMWH   GI ppx: famotidine         Jose Antonio Arora MD  Pulmonary/Critical Care

## 2025-07-18 LAB
ALBUMIN SERPL-MCNC: 2.5 G/DL (ref 3.4–4.8)
ALBUMIN/GLOB SERPL: 0.5 RATIO (ref 1.1–2)
ALLENS TEST BLOOD GAS (OHS): YES
ALP SERPL-CCNC: 79 UNIT/L (ref 40–150)
ALT SERPL-CCNC: 33 UNIT/L (ref 0–55)
ANION GAP SERPL CALC-SCNC: 13 MEQ/L
AST SERPL-CCNC: 21 UNIT/L (ref 11–45)
BASE EXCESS BLD CALC-SCNC: 0.6 MMOL/L (ref -2–2)
BASE EXCESS BLD CALC-SCNC: 2.1 MMOL/L (ref -2–2)
BASE EXCESS BLD CALC-SCNC: 3 MMOL/L
BASOPHILS # BLD AUTO: 0.05 X10(3)/MCL
BASOPHILS NFR BLD AUTO: 0.3 %
BILIRUB SERPL-MCNC: 0.8 MG/DL
BIPAP(E) BLOOD GAS (OHS): 10 CM H2O
BIPAP(E) BLOOD GAS (OHS): 8 CM H2O
BIPAP(I) BLOOD GAS (OHS): 15 CM H2O
BIPAP(I) BLOOD GAS (OHS): 15 CM H2O
BLOOD GAS SAMPLE TYPE (OHS): ABNORMAL
BUN SERPL-MCNC: 32.6 MG/DL (ref 8.4–25.7)
CA-I BLD-SCNC: 1.14 MMOL/L (ref 1.12–1.23)
CA-I BLD-SCNC: 1.21 MMOL/L (ref 1.12–1.23)
CA-I BLD-SCNC: 1.21 MMOL/L (ref 1.12–1.23)
CALCIUM SERPL-MCNC: 8.7 MG/DL (ref 8.8–10)
CHLORIDE SERPL-SCNC: 106 MMOL/L (ref 98–107)
CO2 BLDA-SCNC: 27.4 MMOL/L
CO2 BLDA-SCNC: 28.7 MMOL/L
CO2 BLDA-SCNC: 31 MMOL/L
CO2 SERPL-SCNC: 21 MMOL/L (ref 23–31)
COHGB MFR BLDA: 2.9 % (ref 0.5–1.5)
CREAT SERPL-MCNC: 1.12 MG/DL (ref 0.72–1.25)
CREAT/UREA NIT SERPL: 29
DRAWN BY BLOOD GAS (OHS): ABNORMAL
EOSINOPHIL # BLD AUTO: 0.05 X10(3)/MCL (ref 0–0.9)
EOSINOPHIL NFR BLD AUTO: 0.3 %
ERYTHROCYTE [DISTWIDTH] IN BLOOD BY AUTOMATED COUNT: 14.5 % (ref 11.5–17)
GFR SERPLBLD CREATININE-BSD FMLA CKD-EPI: >60 ML/MIN/1.73/M2
GLOBULIN SER-MCNC: 5.2 GM/DL (ref 2.4–3.5)
GLUCOSE SERPL-MCNC: 199 MG/DL (ref 82–115)
HCO3 BLDA-SCNC: 26 MMOL/L (ref 22–26)
HCO3 BLDA-SCNC: 27.3 MMOL/L (ref 22–26)
HCO3 BLDA-SCNC: 29.4 MMOL/L (ref 22–26)
HCT VFR BLD AUTO: 40.7 % (ref 42–52)
HGB BLD-MCNC: 12.8 G/DL (ref 14–18)
IMM GRANULOCYTES # BLD AUTO: 0.06 X10(3)/MCL (ref 0–0.04)
IMM GRANULOCYTES NFR BLD AUTO: 0.4 %
INHALED O2 CONCENTRATION: 45 %
INHALED O2 CONCENTRATION: 50 %
INHALED O2 CONCENTRATION: 65 %
LYMPHOCYTES # BLD AUTO: 1.42 X10(3)/MCL (ref 0.6–4.6)
LYMPHOCYTES NFR BLD AUTO: 9.4 %
MAGNESIUM SERPL-MCNC: 2.1 MG/DL (ref 1.6–2.6)
MCH RBC QN AUTO: 28.3 PG (ref 27–31)
MCHC RBC AUTO-ENTMCNC: 31.4 G/DL (ref 33–36)
MCV RBC AUTO: 89.8 FL (ref 80–94)
MECH RR (OHS): 24 B/MIN
METHGB MFR BLDA: 1.1 % (ref 0.4–1.5)
MODE (OHS): ABNORMAL
MODE (OHS): ABNORMAL
MODE (OHS): AC
MONOCYTES # BLD AUTO: 1.44 X10(3)/MCL (ref 0.1–1.3)
MONOCYTES NFR BLD AUTO: 9.5 %
NEUTROPHILS # BLD AUTO: 12.1 X10(3)/MCL (ref 2.1–9.2)
NEUTROPHILS NFR BLD AUTO: 80.1 %
NRBC BLD AUTO-RTO: 0 %
O2 HB BLOOD GAS (OHS): 95.7 % (ref 94–97)
OXYGEN DEVICE BLOOD GAS (OHS): ABNORMAL
OXYHGB MFR BLDA: 12 G/DL (ref 12–16)
PCO2 BLDA: 44 MMHG (ref 35–45)
PCO2 BLDA: 44 MMHG (ref 35–45)
PCO2 BLDA: 52 MMHG (ref 35–45)
PEEP RESPIRATORY: 10 CMH2O
PH BLDA: 7.36 [PH] (ref 7.35–7.45)
PH BLDA: 7.38 [PH] (ref 7.35–7.45)
PH BLDA: 7.4 [PH] (ref 7.35–7.45)
PHOSPHATE SERPL-MCNC: 2.6 MG/DL (ref 2.3–4.7)
PLATELET # BLD AUTO: 260 X10(3)/MCL (ref 130–400)
PMV BLD AUTO: 10.7 FL (ref 7.4–10.4)
PO2 BLDA: 106 MMHG (ref 80–100)
PO2 BLDA: 85 MMHG (ref 80–100)
PO2 BLDA: 91 MMHG (ref 80–100)
POCT GLUCOSE: 169 MG/DL (ref 70–110)
POCT GLUCOSE: 194 MG/DL (ref 70–110)
POCT GLUCOSE: 200 MG/DL (ref 70–110)
POCT GLUCOSE: 228 MG/DL (ref 70–110)
POTASSIUM BLOOD GAS (OHS): 4.7 MMOL/L (ref 3.5–5)
POTASSIUM BLOOD GAS (OHS): 4.7 MMOL/L (ref 3.5–5)
POTASSIUM BLOOD GAS (OHS): 4.9 MMOL/L (ref 3.5–5)
POTASSIUM SERPL-SCNC: 5.1 MMOL/L (ref 3.5–5.1)
PROT SERPL-MCNC: 7.7 GM/DL (ref 5.8–7.6)
RBC # BLD AUTO: 4.53 X10(6)/MCL (ref 4.7–6.1)
SAMPLE SITE BLOOD GAS (OHS): ABNORMAL
SAO2 % BLDA: 96.2 %
SAO2 % BLDA: 97 %
SAO2 % BLDA: 98 %
SODIUM BLOOD GAS (OHS): 136 MMOL/L (ref 137–145)
SODIUM BLOOD GAS (OHS): 136 MMOL/L (ref 137–145)
SODIUM BLOOD GAS (OHS): 137 MMOL/L (ref 137–145)
SODIUM SERPL-SCNC: 140 MMOL/L (ref 136–145)
SPONT+MECH VT ON VENT: 480 ML
WBC # BLD AUTO: 15.12 X10(3)/MCL (ref 4.5–11.5)

## 2025-07-18 PROCEDURE — 99900031 HC PATIENT EDUCATION (STAT)

## 2025-07-18 PROCEDURE — 31500 INSERT EMERGENCY AIRWAY: CPT

## 2025-07-18 PROCEDURE — 99900035 HC TECH TIME PER 15 MIN (STAT)

## 2025-07-18 PROCEDURE — 36415 COLL VENOUS BLD VENIPUNCTURE: CPT

## 2025-07-18 PROCEDURE — 20000000 HC ICU ROOM

## 2025-07-18 PROCEDURE — 82803 BLOOD GASES ANY COMBINATION: CPT

## 2025-07-18 PROCEDURE — 99900026 HC AIRWAY MAINTENANCE (STAT)

## 2025-07-18 PROCEDURE — 94761 N-INVAS EAR/PLS OXIMETRY MLT: CPT | Mod: XB

## 2025-07-18 PROCEDURE — 80053 COMPREHEN METABOLIC PANEL: CPT

## 2025-07-18 PROCEDURE — 63600175 PHARM REV CODE 636 W HCPCS: Performed by: INTERNAL MEDICINE

## 2025-07-18 PROCEDURE — 0BH17EZ INSERTION OF ENDOTRACHEAL AIRWAY INTO TRACHEA, VIA NATURAL OR ARTIFICIAL OPENING: ICD-10-PCS | Performed by: INTERNAL MEDICINE

## 2025-07-18 PROCEDURE — 27100171 HC OXYGEN HIGH FLOW UP TO 24 HOURS

## 2025-07-18 PROCEDURE — 94003 VENT MGMT INPAT SUBQ DAY: CPT

## 2025-07-18 PROCEDURE — 63600175 PHARM REV CODE 636 W HCPCS: Performed by: HOSPITALIST

## 2025-07-18 PROCEDURE — 63600175 PHARM REV CODE 636 W HCPCS: Performed by: EMERGENCY MEDICINE

## 2025-07-18 PROCEDURE — 94660 CPAP INITIATION&MGMT: CPT

## 2025-07-18 PROCEDURE — 25000003 PHARM REV CODE 250: Performed by: INTERNAL MEDICINE

## 2025-07-18 PROCEDURE — 85025 COMPLETE CBC W/AUTO DIFF WBC: CPT

## 2025-07-18 PROCEDURE — 25000242 PHARM REV CODE 250 ALT 637 W/ HCPCS: Performed by: INTERNAL MEDICINE

## 2025-07-18 PROCEDURE — 94760 N-INVAS EAR/PLS OXIMETRY 1: CPT | Mod: XB

## 2025-07-18 PROCEDURE — 84100 ASSAY OF PHOSPHORUS: CPT

## 2025-07-18 PROCEDURE — 94799 UNLISTED PULMONARY SVC/PX: CPT

## 2025-07-18 PROCEDURE — 94640 AIRWAY INHALATION TREATMENT: CPT

## 2025-07-18 PROCEDURE — 5A1955Z RESPIRATORY VENTILATION, GREATER THAN 96 CONSECUTIVE HOURS: ICD-10-PCS | Performed by: INTERNAL MEDICINE

## 2025-07-18 PROCEDURE — 25000003 PHARM REV CODE 250

## 2025-07-18 PROCEDURE — 36600 WITHDRAWAL OF ARTERIAL BLOOD: CPT

## 2025-07-18 PROCEDURE — 83735 ASSAY OF MAGNESIUM: CPT

## 2025-07-18 RX ORDER — VECURONIUM BROMIDE 1 MG/ML
INJECTION, POWDER, FOR SOLUTION INTRAVENOUS CODE/TRAUMA/SEDATION MEDICATION
Status: COMPLETED | OUTPATIENT
Start: 2025-07-18 | End: 2025-07-18

## 2025-07-18 RX ORDER — FENTANYL CITRATE 50 UG/ML
INJECTION, SOLUTION INTRAMUSCULAR; INTRAVENOUS CODE/TRAUMA/SEDATION MEDICATION
Status: COMPLETED | OUTPATIENT
Start: 2025-07-18 | End: 2025-07-18

## 2025-07-18 RX ORDER — CEFTRIAXONE 1 G/1
1 INJECTION, POWDER, FOR SOLUTION INTRAMUSCULAR; INTRAVENOUS
Status: COMPLETED | OUTPATIENT
Start: 2025-07-18 | End: 2025-07-24

## 2025-07-18 RX ORDER — OXYCODONE HYDROCHLORIDE 5 MG/1
5 TABLET ORAL EVERY 6 HOURS PRN
Refills: 0 | Status: DISCONTINUED | OUTPATIENT
Start: 2025-07-18 | End: 2025-08-07 | Stop reason: HOSPADM

## 2025-07-18 RX ORDER — MIDAZOLAM HYDROCHLORIDE 2 MG/2ML
2 INJECTION, SOLUTION INTRAMUSCULAR; INTRAVENOUS ONCE
Status: COMPLETED | OUTPATIENT
Start: 2025-07-18 | End: 2025-07-18

## 2025-07-18 RX ADMIN — FAMOTIDINE 20 MG: 20 TABLET, FILM COATED ORAL at 09:07

## 2025-07-18 RX ADMIN — SODIUM CHLORIDE SOLN NEBU 3% 4 ML: 3 NEBU SOLN at 02:07

## 2025-07-18 RX ADMIN — INSULIN ASPART 2 UNITS: 100 INJECTION, SOLUTION INTRAVENOUS; SUBCUTANEOUS at 05:07

## 2025-07-18 RX ADMIN — PROPOFOL 30 MCG/KG/MIN: 10 INJECTION, EMULSION INTRAVENOUS at 09:07

## 2025-07-18 RX ADMIN — INSULIN ASPART 2 UNITS: 100 INJECTION, SOLUTION INTRAVENOUS; SUBCUTANEOUS at 12:07

## 2025-07-18 RX ADMIN — VECURONIUM BROMIDE 100 MG: 1 INJECTION, POWDER, LYOPHILIZED, FOR SOLUTION INTRAVENOUS at 05:07

## 2025-07-18 RX ADMIN — SODIUM CHLORIDE SOLN NEBU 3% 4 ML: 3 NEBU SOLN at 07:07

## 2025-07-18 RX ADMIN — AMLODIPINE BESYLATE 5 MG: 5 TABLET ORAL at 09:07

## 2025-07-18 RX ADMIN — PROPOFOL 10 MCG/KG/MIN: 10 INJECTION, EMULSION INTRAVENOUS at 05:07

## 2025-07-18 RX ADMIN — SODIUM CHLORIDE SOLN NEBU 3% 4 ML: 3 NEBU SOLN at 08:07

## 2025-07-18 RX ADMIN — ACETAMINOPHEN 650 MG: 325 TABLET ORAL at 05:07

## 2025-07-18 RX ADMIN — FENTANYL CITRATE 100 MCG: 50 INJECTION, SOLUTION INTRAMUSCULAR; INTRAVENOUS at 05:07

## 2025-07-18 RX ADMIN — CEFTRIAXONE SODIUM 1 G: 1 INJECTION, POWDER, FOR SOLUTION INTRAMUSCULAR; INTRAVENOUS at 11:07

## 2025-07-18 RX ADMIN — SODIUM CHLORIDE SOLN NEBU 3% 4 ML: 3 NEBU SOLN at 01:07

## 2025-07-18 RX ADMIN — INSULIN GLARGINE 30 UNITS: 100 INJECTION, SOLUTION SUBCUTANEOUS at 09:07

## 2025-07-18 RX ADMIN — LISINOPRIL 20 MG: 20 TABLET ORAL at 09:07

## 2025-07-18 RX ADMIN — ACETAMINOPHEN 650 MG: 325 TABLET ORAL at 01:07

## 2025-07-18 RX ADMIN — ENOXAPARIN SODIUM 40 MG: 40 INJECTION SUBCUTANEOUS at 05:07

## 2025-07-18 RX ADMIN — MIDAZOLAM HYDROCHLORIDE 2 MG: 1 INJECTION, SOLUTION INTRAMUSCULAR; INTRAVENOUS at 05:07

## 2025-07-18 NOTE — PROGRESS NOTES
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  He presents to ED per EMS 07/05/2025 after multiple falls at nursing home with reported generalized weakness.  He is reported as having a Benítez catheter placed about 1 week ago for urinary retention.  Patient was reported as having pulled this out himself while at nursing home, on a.m. of presentation.  Benítez catheter was placed in ED, returning 700 cc of urine.  While in ER, he was reported as becoming more somnolent with poor overall respiratory effort and decreasing level of consciousness.  ABG revealed acute respiratory acidosis.  He was intubated at 8:55 a.m., placed on mechanical ventilatory support.  CT brain obtained in ED reveals chronic microvascular ischemic changes with no acute intracranial abnormalities.  SARS-COVID-2 PCR negative, influenza A/B PCR negative.  He is now admitted to ICU for ongoing medical care.     Hospital Course:  07/05/2025: Intubation/mechanical ventilation   TTE (07/05/2025: Normal LV size with decreased LV SF, EF 45-50%; trace MR, mild TR and mild PI.  PASP estimated 30 mm Hg; no pericardial effusion  07/16.  Extubated      24hr Interval History:  The patient has been maintain on Vapotherm overnight.  35 L and 75% FiO2 this morning.  There is some concern that he possibly aspirated his tube feeds.    Review of systems unobtainable due to intubation, sedation.      Scheduled Medications:   amLODIPine  5 mg Per NG tube Daily    enoxparin  40 mg Subcutaneous Q24H (prophylaxis, 1700)    famotidine  20 mg Per NG  tube BID    insulin glargine U-100  30 Units Subcutaneous BID    lisinopriL  20 mg Per NG tube Daily    oxyCODONE  10 mg Per NG tube Q6H    polyethylene glycol  17 g Per NG tube BID    senna-docusate  2 tablet Per NG tube Daily    sodium chloride 3%  4 mL Nebulization Q6H     PRN Medications:    Current Facility-Administered Medications:     acetaminophen, 650 mg, Per NG tube, Q6H PRN    dextrose 50%, 12.5 g, Intravenous, PRN    dextrose 50%, 12.5 g, Intravenous, PRN    fentaNYL, 100 mcg, Intravenous, Q1H PRN    glucagon (human recombinant), 1 mg, Intramuscular, PRN    glucagon (human recombinant), 1 mg, Intramuscular, PRN    hydrALAZINE, 10 mg, Intravenous, Q6H PRN    insulin aspart U-100, 0-10 Units, Subcutaneous, Q6H PRN    labetalol, 20 mg, Intravenous, Q6H PRN  Continuous Infusions:   dexmedeTOMIDine (Precedex) infusion (titrating)  0-1.4 mcg/kg/hr Intravenous Continuous   Stopped at 07/16/25 1332    NORepinephrine bitartrate-D5W  0-3 mcg/kg/min (Dosing Weight) Intravenous Continuous   Stopped at 07/07/25 0704    propofoL  0-50 mcg/kg/min (Dosing Weight) Intravenous Continuous   Stopped at 07/13/25 1046       No past medical history on file.    No past surgical history on file.    Objective:     Input/output:    Intake/Output Summary (Last 24 hours) at 7/18/2025 0950  Last data filed at 7/18/2025 0600  Gross per 24 hour   Intake 467 ml   Output 1225 ml   Net -758 ml       Vital Signs (Most Recent):  Temp: (!) 100.4 °F (38 °C) (07/18/25 0500)  Pulse: 109 (07/18/25 0751)  Resp: (!) 24 (07/18/25 0751)  BP: (!) 146/89 (07/18/25 0600)  SpO2: (!) 94 % (07/18/25 0751)  Body mass index is 38.72 kg/m².  Weight: 115.5 kg (254 lb 10.1 oz) Vital Signs (24h Range):  Temp:  [98.9 °F (37.2 °C)-100.4 °F (38 °C)] 100.4 °F (38 °C)  Pulse:  [] 109  Resp:  [16-31] 24  SpO2:  [88 %-98 %] 94 %  BP: (128-160)/(60-99) 146/89       Physical exam:  Gen- awake and alert, tracks examiner around room  HENT- ATNC, MMM  CV- RRR  Resp-  CTA, unlabored respirations  Abd- distended, soft with no rebound or guarding, hypoactive bowel sounds   MSK- WWP, trace edema  Neuro- awake and alert, tracking examiner around room but not reliably following commands  Psych- calm but otherwise unable to assess 2/2 neurologic status      Lines/Drains/Airways       Drain  Duration                  NG/OG Tube 07/05/25 0848 13 days         Urethral Catheter 07/05/25 0800 Non-latex 16 Fr. 13 days              Peripheral Intravenous Line  Duration             Peripheral IV 07/15/25 0020 20 G Left;Posterior Wrist 3 days    Peripheral IV 07/15/25 0040 20 G 2 1/4 in Anterior;Left Upper Arm 3 days                  Vent:  Vent Mode: (S) CPAP / PSV (per MD) (07/16/25 1215)  Ventilator Initiated: Yes (07/05/25 0850)  Set Rate: 22 BPM (07/16/25 1030)  Vt Set: 500 mL (07/16/25 1030)  Pressure Support: (S) 5 cmH20 (decreased from 8; total PS 10) (07/16/25 1215)  PEEP/CPAP: (S) 5 cmH20 (per MD) (07/16/25 1215)  Oxygen Concentration (%): 75 (07/18/25 0751)  Peak Airway Pressure: 12 cmH20 (07/16/25 1215)  Total Ve: 12 L/m (07/16/25 1215)  F/VT Ratio<105 (RSBI): (!) 41.53 (07/16/25 1215)    ABGs:  Lab Results   Component Value Date    PH 7.400 07/18/2025    PO2 91.0 07/18/2025    PCO2 44.0 07/18/2025       Significant Labs:  Lab Results   Component Value Date    WBC 15.12 (H) 07/18/2025    HGB 12.8 (L) 07/18/2025    HCT 40.7 (L) 07/18/2025    MCV 89.8 07/18/2025     07/18/2025       Recent Labs   Lab 07/18/25  0631      K 5.1      CO2 21*   BUN 32.6*   CREATININE 1.12   CALCIUM 8.7*   MG 2.10   PHOS 2.6   AST 21   ALT 33   ALKPHOS 79   ALBUMIN 2.5*         Assessment:     Acute combined hypoxic and hypercapnic respiratory failure.  Chest x-ray with bilateral patchy infiltrates, differential diagnostic possibilities including hydrostatic/cardiogenic versus non hydrostatic pulmonary edema versus infection, aspiration, etc.  History of cerebrovascular disease with  right hemiparesis  Acute kidney injury superimposed on stage II chronic kidney disease, obstructive uropathy component.  He remains nonoliguric, daily improvement in BUN/creatinine   Type 2 diabetes mellitus with polyneuropathy, hyperglycemia improved.  Hypertension  Hyperlipidemia  Glaucoma    Plan:     We will place on empiric Rocephin for possible aspiration of tube feeds  Continue Vapotherm.  Wean as tolerated  Start inhaled mucolytics and chest physiotherapy today for assistance with clearance of secretions  Discussed with patient's daughter prior to extubation, he would like re-intubation in the event that recurrent respiratory failure warrants it  Resume tube feeds but monitor for aspiration       DVT ppx: LMWH   GI ppx: famotidine         Franklyn Peres MD  Pulmonary/Critical Care

## 2025-07-18 NOTE — PROGRESS NOTES
Inpatient Nutrition Assessment    Admit Date: 7/5/2025   Total duration of encounter: 13 days   Patient Age: 69 y.o.    Nutrition Recommendation/Prescription     Resume tube feeding when feasible:  Peptamen Intense VHP goal rate 55 ml/hr   [id number] two times daily  to provide  1260 kcal 100% needs  142 g protein 101% needs  83 g CHO 59% needs  924 ml water 41% needs, 67% with current 30 ml/hr water flushes  calculations based on estimated 20 hour run time     Medical management of hyperglycemia.     Communication of Recommendations: reviewed with nurse    Nutrition Assessment     Malnutrition Assessment/Nutrition-Focused Physical Exam       Malnutrition Level: other (see comments) (Does not meet criteria) (07/07/25 1041)                                                        A minimum of two characteristics is recommended for diagnosis of either severe or non-severe malnutrition.    Chart Review    Reason Seen: follow-up    Malnutrition Screening Tool Results   Have you recently lost weight without trying?: No  Have you been eating poorly because of a decreased appetite?: No   MST Score: 0   Diagnosis:  Acute combined hypoxic and hypercapnic respiratory failure  History of cerebrovascular disease with right hemiparesis  Acute kidney injury superimposed on stage II chronic kidney disease, obstructive uropathy component.  He remains nonoliguric, daily improvement in BUN/creatinine   Type 2 diabetes mellitus with polyneuropathy, hyperglycemia improved.  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, glaucoma, hypertension, and hyperlipidemia     Scheduled Medications:  amLODIPine, 5 mg, Daily  cefTRIAXone (Rocephin) IV (PEDS and ADULTS), 1 g, Q24H  enoxparin, 40 mg, Q24H (prophylaxis, 1700)  famotidine, 20 mg, BID  insulin glargine U-100, 30  Units, BID  lisinopriL, 20 mg, Daily  polyethylene glycol, 17 g, BID  senna-docusate, 2 tablet, Daily  sodium chloride 3%, 4 mL, Q6H    Continuous Infusions:  dexmedeTOMIDine (Precedex) infusion (titrating), Last Rate: Stopped (07/16/25 1332)  NORepinephrine bitartrate-D5W, Last Rate: Stopped (07/07/25 0704)  propofoL, Last Rate: Stopped (07/13/25 1046)    PRN Medications:  acetaminophen, 650 mg, Q6H PRN  dextrose 50%, 12.5 g, PRN  dextrose 50%, 12.5 g, PRN  glucagon (human recombinant), 1 mg, PRN  glucagon (human recombinant), 1 mg, PRN  hydrALAZINE, 10 mg, Q6H PRN  insulin aspart U-100, 0-10 Units, Q6H PRN  labetalol, 20 mg, Q6H PRN  oxyCODONE, 5 mg, Q6H PRN    Calorie Containing IV Medications: no significant kcals from medications at this time    Recent Labs   Lab 07/12/25  0250 07/13/25  0620 07/14/25  0244 07/15/25  0353 07/16/25  0249 07/17/25  0238 07/18/25  0507 07/18/25  0631    133* 134* 137 137 139  --  140   K 5.0 5.4* 5.0 4.8 4.8 4.8  --  5.1   CALCIUM 8.2* 8.3* 8.7* 9.0 8.8 8.8  --  8.7*   PHOS  --  3.4 2.6 3.3 3.1 3.1  --  2.6   MG  --  1.80 1.70 1.80 1.80 1.80  --  2.10    105 104 105 106 106  --  106   CO2 21* 21* 21* 23 22* 22*  --  21*   BUN 34.9* 42.9* 46.2* 46.6* 44.5* 32.9*  --  32.6*   CREATININE 0.96 1.14 0.99 1.13 1.00 1.00  --  1.12   EGFRNORACEVR >60 >60 >60 >60 >60 >60  --  >60   * 232* 245* 192* 115 82  --  199*   BILITOT 0.4 0.3 0.6 0.6 0.8 0.8  --  0.8   ALKPHOS 52 56 60 60 57 64  --  79   ALT 21 39 41 39 37 38  --  33   AST 19 30 24 19 19 24  --  21   ALBUMIN 2.2* 2.2* 2.3* 2.4* 2.3* 2.4*  --  2.5*   WBC 8.12 8.97 8.85 10.11 9.92 10.62 15.12*  --    HGB 12.4* 12.2* 12.5* 12.8* 12.8* 12.5* 12.8*  --    HCT 39.4* 39.6* 39.7* 40.4* 40.8* 41.0* 40.7*  --      Nutrition Orders:  No diet orders on file  Tube Feedings/Formulas 55; 1,100; Peptamen Intense VHP; OG; 30; Every hour,Tube Feedings/Formulas Other (see comments); OG; ProSource TF20; 2 times daily    Appetite/Oral  "Intake: NPO/NPO  Factors Affecting Nutritional Intake: NPO and on mechanical ventilation  Social Needs Impacting Access to Food: none identified; NH resident   Food/Gnosticist/Cultural Preferences: unable to obtain  Food Allergies: no known food allergies  Last Bowel Movement: 07/18/25  Wound(s):  no partial or full thickness pressure injuries documented at this time.     Comments    7/7/25: Pt intubated on mechanical ventilation, receiving kcals from diprivan. Not currently receiving enteral nutrition. Tube feeding orders updated.     7/11/25 Patient remains on ventilator with propofol, tube feeding at previous goal rate; recommend decreasing tube feeding rate due to overfeeding with propofol currently, will need to increase protein modular.    7/15/25: Patient remains intubated, no longer receiving kcals from propofol. TF put on hold this morning d/t worsening abdominal distention this morning. TF regimen updated for once able to resume feeds.     7/18/25 Tube feeding held currently secondary to suspected aspiration, possibly resume today.    Anthropometrics    Height: 5' 8" (172.7 cm), Height Method: Estimated  Last Weight: 115.5 kg (254 lb 10.1 oz) (07/18/25 0600), Weight Method: Bed Scale  BMI (Calculated): 38.7  BMI Classification: obese grade II (BMI 35-39.9)        Ideal Body Weight (IBW), Male: 154 lb     % Ideal Body Weight, Male (lb): 175.08 %                          Usual Weight Provided By: EMR weight history    Wt Readings from Last 5 Encounters:   07/18/25 115.5 kg (254 lb 10.1 oz)     Weight Change(s) Since Admission:   Wt Readings from Last 1 Encounters:   07/18/25 0600 115.5 kg (254 lb 10.1 oz)   07/13/25 0800 122.3 kg (269 lb 10 oz)   07/05/25 0605 113.4 kg (250 lb)   Admit Weight: 113.4 kg (250 lb) (07/05/25 0605), Weight Method: Stated    Estimated Needs    Weight Used For Calorie Calculations: 113.4 kg (250 lb)  Energy Calorie Requirements (kcal): 8681-5132 kcals (11-14 kcal/kg)  Energy Need " Method: Kcal/kg  Weight Used For Protein Calculations: 70 kg (154 lb 5.2 oz) (IBW)  Protein Requirements: 140 g (2 g/kg IBW)  Fluid Requirements (mL): 2268 mL (20 mL/kg)  CHO Requirement: 140-178 g/day (45% of total EEN)     Enteral Nutrition Held.    Parenteral Nutrition Patient not receiving parenteral nutrition support at this time.    Evaluation of Received Nutrient Intake    Calories: not meeting estimated needs  Protein: not meeting estimated needs    Patient Education Not applicable.    Nutrition Diagnosis     PES: Inadequate energy intake related to inability to consume sufficient nutrients as evidenced by on mechanical ventilation, need for NPO status. (active)   PES: N/A            Nutrition Interventions     Intervention(s): Enteral nutrition management  Intervention(s):      Goal: Meet greater than 80% of nutritional needs by follow-up. (goal not met)  Goal: Tolerate enteral feeding at goal rate by follow-up. (goal not met)    Nutrition Goals & Monitoring     Dietitian will monitor: energy intake, enteral nutrition intake, weight, electrolyte/renal panel, glucose/endocrine profile, and gastrointestinal profile  Discharge planning: too early to determine; pending clinical course  Nutrition Risk/Follow-Up: patient at increased nutrition risk; dietitian will follow-up twice weekly   Please consult if re-assessment needed sooner.

## 2025-07-18 NOTE — PROCEDURES
"Charly Padilla is a 69 y.o. male patient.    Temp: (!) 100.6 °F (38.1 °C) (Axillary) (07/18/25 1307)  Pulse: 104 (07/18/25 1415)  Resp: (!) 23 (07/18/25 1415)  BP: (!) 146/89 (07/18/25 0600)  SpO2: 95 % (07/18/25 1556)  Weight: 115.5 kg (254 lb 10.1 oz) (07/18/25 0600)  Height: 5' 8" (172.7 cm) (07/13/25 0800)       Intubation    Date/Time: 7/18/2025 5:34 PM  Location procedure was performed: [facility]    Performed by: Edwar Garcia MD  Authorized by: Edwar Garcia MD  Pre-operative diagnosis: Respiratory failure hypercapnic and hypoxemic  Post-operative diagnosis: Same  Consent Done: Emergent Situation  Indications: respiratory distress, respiratory failure, airway protection, hypoxemia, hypercapnia and pulmonary toilet  Intubation method: direct  Patient status: paralyzed (RSI)  Preoxygenation: bag valve mask  Sedatives: fentanyl and midazolam (100 mcg fentanyl, 2 mg Versed)  Paralytic: rocuronium (100 mg rocuronium)  Laryngoscope size: Bejarano 2  Tube size: 8.0 mm  Tube type: cuffed  Number of attempts: 1  Ventilation between attempts: BVM  Cricoid pressure: no  Cords visualized: yes (Grade 2 view)  Post-procedure assessment: chest rise and CO2 detector (Tube fog, direct visualization)  Breath sounds: diminished bilaterally and rales/crackles  Cuff inflated: yes  ETT to lip: 24 cm  ETT to teeth: 23 cm  Tube secured with: ETT cabrera  Patient tolerance: Patient tolerated the procedure well with no immediate complications  Complications: No  Specimens: No  Implants: No  Comments: Chest x-ray pending at this time          7/18/2025    "

## 2025-07-19 LAB
ALBUMIN SERPL-MCNC: 2.3 G/DL (ref 3.4–4.8)
ALBUMIN/GLOB SERPL: 0.5 RATIO (ref 1.1–2)
ALP SERPL-CCNC: 57 UNIT/L (ref 40–150)
ALT SERPL-CCNC: 24 UNIT/L (ref 0–55)
ANION GAP SERPL CALC-SCNC: 11 MEQ/L
ANION GAP SERPL CALC-SCNC: 11 MEQ/L
AST SERPL-CCNC: 13 UNIT/L (ref 11–45)
B PERT.PT PRMT NPH QL NAA+NON-PROBE: NOT DETECTED
BASOPHILS # BLD AUTO: 0.04 X10(3)/MCL
BASOPHILS NFR BLD AUTO: 0.3 %
BILIRUB SERPL-MCNC: 0.5 MG/DL
BUN SERPL-MCNC: 43.3 MG/DL (ref 8.4–25.7)
BUN SERPL-MCNC: 47.3 MG/DL (ref 8.4–25.7)
C PNEUM DNA NPH QL NAA+NON-PROBE: NOT DETECTED
CALCIUM SERPL-MCNC: 8.4 MG/DL (ref 8.8–10)
CALCIUM SERPL-MCNC: 8.5 MG/DL (ref 8.8–10)
CHLORIDE SERPL-SCNC: 107 MMOL/L (ref 98–107)
CHLORIDE SERPL-SCNC: 107 MMOL/L (ref 98–107)
CO2 SERPL-SCNC: 23 MMOL/L (ref 23–31)
CO2 SERPL-SCNC: 23 MMOL/L (ref 23–31)
CREAT SERPL-MCNC: 1.59 MG/DL (ref 0.72–1.25)
CREAT SERPL-MCNC: 1.77 MG/DL (ref 0.72–1.25)
CREAT/UREA NIT SERPL: 24
CREAT/UREA NIT SERPL: 30
EOSINOPHIL # BLD AUTO: 0.11 X10(3)/MCL (ref 0–0.9)
EOSINOPHIL NFR BLD AUTO: 0.9 %
ERYTHROCYTE [DISTWIDTH] IN BLOOD BY AUTOMATED COUNT: 14.6 % (ref 11.5–17)
GFR SERPLBLD CREATININE-BSD FMLA CKD-EPI: 42 ML/MIN/1.73/M2
GFR SERPLBLD CREATININE-BSD FMLA CKD-EPI: 47 ML/MIN/1.73/M2
GLOBULIN SER-MCNC: 4.6 GM/DL (ref 2.4–3.5)
GLUCOSE SERPL-MCNC: 188 MG/DL (ref 82–115)
GLUCOSE SERPL-MCNC: 203 MG/DL (ref 82–115)
HADV DNA NPH QL NAA+NON-PROBE: NOT DETECTED
HCOV 229E RNA NPH QL NAA+NON-PROBE: NOT DETECTED
HCOV HKU1 RNA NPH QL NAA+NON-PROBE: NOT DETECTED
HCOV NL63 RNA NPH QL NAA+NON-PROBE: NOT DETECTED
HCOV OC43 RNA NPH QL NAA+NON-PROBE: NOT DETECTED
HCT VFR BLD AUTO: 37 % (ref 42–52)
HGB BLD-MCNC: 11.5 G/DL (ref 14–18)
HMPV RNA NPH QL NAA+NON-PROBE: NOT DETECTED
HPIV1 RNA NPH QL NAA+NON-PROBE: NOT DETECTED
HPIV2 RNA NPH QL NAA+NON-PROBE: NOT DETECTED
HPIV3 RNA NPH QL NAA+NON-PROBE: NOT DETECTED
HPIV4 RNA NPH QL NAA+NON-PROBE: NOT DETECTED
IMM GRANULOCYTES # BLD AUTO: 0.04 X10(3)/MCL (ref 0–0.04)
IMM GRANULOCYTES NFR BLD AUTO: 0.3 %
LACTATE SERPL-SCNC: 1 MMOL/L (ref 0.5–2.2)
LYMPHOCYTES # BLD AUTO: 1.68 X10(3)/MCL (ref 0.6–4.6)
LYMPHOCYTES NFR BLD AUTO: 14.3 %
M PNEUMO DNA NPH QL NAA+NON-PROBE: NOT DETECTED
MAGNESIUM SERPL-MCNC: 2.2 MG/DL (ref 1.6–2.6)
MCH RBC QN AUTO: 28.8 PG (ref 27–31)
MCHC RBC AUTO-ENTMCNC: 31.1 G/DL (ref 33–36)
MCV RBC AUTO: 92.5 FL (ref 80–94)
MONOCYTES # BLD AUTO: 1.13 X10(3)/MCL (ref 0.1–1.3)
MONOCYTES NFR BLD AUTO: 9.6 %
MRSA PCR SCRN (OHS): NOT DETECTED
NEUTROPHILS # BLD AUTO: 8.72 X10(3)/MCL (ref 2.1–9.2)
NEUTROPHILS NFR BLD AUTO: 74.6 %
NRBC BLD AUTO-RTO: 0 %
PHOSPHATE SERPL-MCNC: 2.5 MG/DL (ref 2.3–4.7)
PLATELET # BLD AUTO: 251 X10(3)/MCL (ref 130–400)
PMV BLD AUTO: 10.5 FL (ref 7.4–10.4)
POCT GLUCOSE: 180 MG/DL (ref 70–110)
POCT GLUCOSE: 185 MG/DL (ref 70–110)
POCT GLUCOSE: 193 MG/DL (ref 70–110)
POCT GLUCOSE: 197 MG/DL (ref 70–110)
POCT GLUCOSE: 207 MG/DL (ref 70–110)
POCT GLUCOSE: 212 MG/DL (ref 70–110)
POCT GLUCOSE: 213 MG/DL (ref 70–110)
POTASSIUM SERPL-SCNC: 4.9 MMOL/L (ref 3.5–5.1)
POTASSIUM SERPL-SCNC: 5 MMOL/L (ref 3.5–5.1)
PROT SERPL-MCNC: 6.9 GM/DL (ref 5.8–7.6)
RBC # BLD AUTO: 4 X10(6)/MCL (ref 4.7–6.1)
RSV RNA NPH QL NAA+NON-PROBE: NOT DETECTED
RV+EV RNA NPH QL NAA+NON-PROBE: NOT DETECTED
SODIUM SERPL-SCNC: 141 MMOL/L (ref 136–145)
SODIUM SERPL-SCNC: 141 MMOL/L (ref 136–145)
WBC # BLD AUTO: 11.72 X10(3)/MCL (ref 4.5–11.5)

## 2025-07-19 PROCEDURE — 80048 BASIC METABOLIC PNL TOTAL CA: CPT

## 2025-07-19 PROCEDURE — 63600175 PHARM REV CODE 636 W HCPCS: Performed by: INTERNAL MEDICINE

## 2025-07-19 PROCEDURE — 94003 VENT MGMT INPAT SUBQ DAY: CPT

## 2025-07-19 PROCEDURE — 99900035 HC TECH TIME PER 15 MIN (STAT)

## 2025-07-19 PROCEDURE — 27100171 HC OXYGEN HIGH FLOW UP TO 24 HOURS

## 2025-07-19 PROCEDURE — 25000003 PHARM REV CODE 250

## 2025-07-19 PROCEDURE — 87040 BLOOD CULTURE FOR BACTERIA: CPT

## 2025-07-19 PROCEDURE — 83605 ASSAY OF LACTIC ACID: CPT

## 2025-07-19 PROCEDURE — 85025 COMPLETE CBC W/AUTO DIFF WBC: CPT

## 2025-07-19 PROCEDURE — 94761 N-INVAS EAR/PLS OXIMETRY MLT: CPT

## 2025-07-19 PROCEDURE — 87641 MR-STAPH DNA AMP PROBE: CPT

## 2025-07-19 PROCEDURE — 99900026 HC AIRWAY MAINTENANCE (STAT)

## 2025-07-19 PROCEDURE — 84100 ASSAY OF PHOSPHORUS: CPT

## 2025-07-19 PROCEDURE — 99900031 HC PATIENT EDUCATION (STAT)

## 2025-07-19 PROCEDURE — 94640 AIRWAY INHALATION TREATMENT: CPT

## 2025-07-19 PROCEDURE — 63600175 PHARM REV CODE 636 W HCPCS

## 2025-07-19 PROCEDURE — C1751 CATH, INF, PER/CENT/MIDLINE: HCPCS

## 2025-07-19 PROCEDURE — 87070 CULTURE OTHR SPECIMN AEROBIC: CPT

## 2025-07-19 PROCEDURE — 25000003 PHARM REV CODE 250: Performed by: INTERNAL MEDICINE

## 2025-07-19 PROCEDURE — 63600175 PHARM REV CODE 636 W HCPCS: Performed by: HOSPITALIST

## 2025-07-19 PROCEDURE — 27200966 HC CLOSED SUCTION SYSTEM

## 2025-07-19 PROCEDURE — 25000242 PHARM REV CODE 250 ALT 637 W/ HCPCS: Performed by: INTERNAL MEDICINE

## 2025-07-19 PROCEDURE — 87798 DETECT AGENT NOS DNA AMP: CPT

## 2025-07-19 PROCEDURE — 83735 ASSAY OF MAGNESIUM: CPT

## 2025-07-19 PROCEDURE — 36415 COLL VENOUS BLD VENIPUNCTURE: CPT

## 2025-07-19 PROCEDURE — 94760 N-INVAS EAR/PLS OXIMETRY 1: CPT

## 2025-07-19 PROCEDURE — 63600175 PHARM REV CODE 636 W HCPCS: Performed by: EMERGENCY MEDICINE

## 2025-07-19 PROCEDURE — 20000000 HC ICU ROOM

## 2025-07-19 PROCEDURE — 36569 INSJ PICC 5 YR+ W/O IMAGING: CPT

## 2025-07-19 PROCEDURE — 80053 COMPREHEN METABOLIC PANEL: CPT

## 2025-07-19 PROCEDURE — 02HV33Z INSERTION OF INFUSION DEVICE INTO SUPERIOR VENA CAVA, PERCUTANEOUS APPROACH: ICD-10-PCS | Performed by: HOSPITALIST

## 2025-07-19 RX ORDER — SODIUM CHLORIDE, SODIUM LACTATE, POTASSIUM CHLORIDE, CALCIUM CHLORIDE 600; 310; 30; 20 MG/100ML; MG/100ML; MG/100ML; MG/100ML
INJECTION, SOLUTION INTRAVENOUS CONTINUOUS
Status: ACTIVE | OUTPATIENT
Start: 2025-07-19 | End: 2025-07-19

## 2025-07-19 RX ORDER — SODIUM CHLORIDE 0.9 % (FLUSH) 0.9 %
10 SYRINGE (ML) INJECTION EVERY 12 HOURS PRN
Status: DISCONTINUED | OUTPATIENT
Start: 2025-07-19 | End: 2025-08-07 | Stop reason: HOSPADM

## 2025-07-19 RX ORDER — FENTANYL CITRATE 50 UG/ML
50 INJECTION, SOLUTION INTRAMUSCULAR; INTRAVENOUS
Refills: 0 | Status: DISCONTINUED | OUTPATIENT
Start: 2025-07-19 | End: 2025-08-07 | Stop reason: HOSPADM

## 2025-07-19 RX ADMIN — SODIUM CHLORIDE SOLN NEBU 3% 4 ML: 3 NEBU SOLN at 11:07

## 2025-07-19 RX ADMIN — FAMOTIDINE 20 MG: 20 TABLET, FILM COATED ORAL at 08:07

## 2025-07-19 RX ADMIN — PROPOFOL 40 MCG/KG/MIN: 10 INJECTION, EMULSION INTRAVENOUS at 04:07

## 2025-07-19 RX ADMIN — NOREPINEPHRINE BITARTRATE 0.02 MCG/KG/MIN: 8 INJECTION, SOLUTION INTRAVENOUS at 01:07

## 2025-07-19 RX ADMIN — INSULIN GLARGINE 30 UNITS: 100 INJECTION, SOLUTION SUBCUTANEOUS at 08:07

## 2025-07-19 RX ADMIN — SODIUM CHLORIDE SOLN NEBU 3% 4 ML: 3 NEBU SOLN at 02:07

## 2025-07-19 RX ADMIN — PROPOFOL 50 MCG/KG/MIN: 10 INJECTION, EMULSION INTRAVENOUS at 11:07

## 2025-07-19 RX ADMIN — PROPOFOL 15 MCG/KG/MIN: 10 INJECTION, EMULSION INTRAVENOUS at 01:07

## 2025-07-19 RX ADMIN — FENTANYL CITRATE 50 MCG: 50 INJECTION INTRAMUSCULAR; INTRAVENOUS at 03:07

## 2025-07-19 RX ADMIN — SODIUM CHLORIDE SOLN NEBU 3% 4 ML: 3 NEBU SOLN at 01:07

## 2025-07-19 RX ADMIN — ACETAMINOPHEN 650 MG: 325 TABLET ORAL at 08:07

## 2025-07-19 RX ADMIN — ACETAMINOPHEN 650 MG: 325 TABLET ORAL at 04:07

## 2025-07-19 RX ADMIN — INSULIN ASPART 1 UNITS: 100 INJECTION, SOLUTION INTRAVENOUS; SUBCUTANEOUS at 11:07

## 2025-07-19 RX ADMIN — LISINOPRIL 20 MG: 20 TABLET ORAL at 08:07

## 2025-07-19 RX ADMIN — AMLODIPINE BESYLATE 5 MG: 5 TABLET ORAL at 08:07

## 2025-07-19 RX ADMIN — SODIUM CHLORIDE SOLN NEBU 3% 4 ML: 3 NEBU SOLN at 07:07

## 2025-07-19 RX ADMIN — CEFTRIAXONE SODIUM 1 G: 1 INJECTION, POWDER, FOR SOLUTION INTRAMUSCULAR; INTRAVENOUS at 10:07

## 2025-07-19 RX ADMIN — ENOXAPARIN SODIUM 40 MG: 40 INJECTION SUBCUTANEOUS at 03:07

## 2025-07-19 RX ADMIN — PROPOFOL 20 MCG/KG/MIN: 10 INJECTION, EMULSION INTRAVENOUS at 08:07

## 2025-07-19 RX ADMIN — SODIUM CHLORIDE SOLN NEBU 3% 4 ML: 3 NEBU SOLN at 08:07

## 2025-07-19 RX ADMIN — PROPOFOL 40 MCG/KG/MIN: 10 INJECTION, EMULSION INTRAVENOUS at 08:07

## 2025-07-19 RX ADMIN — SODIUM CHLORIDE, POTASSIUM CHLORIDE, SODIUM LACTATE AND CALCIUM CHLORIDE: 600; 310; 30; 20 INJECTION, SOLUTION INTRAVENOUS at 12:07

## 2025-07-19 RX ADMIN — FENTANYL CITRATE 50 MCG: 50 INJECTION INTRAMUSCULAR; INTRAVENOUS at 10:07

## 2025-07-19 NOTE — PROCEDURES
"Charly Padilla is a 69 y.o. male patient.    Temp: 99.3 °F (37.4 °C) (07/19/25 1154)  Pulse: 81 (07/19/25 1257)  Resp: (!) 24 (07/19/25 1257)  BP: 121/64 (07/19/25 1247)  SpO2: (!) 94 % (07/19/25 1257)  Weight: 115.5 kg (254 lb 10.1 oz) (07/18/25 0600)  Height: 5' 8" (172.7 cm) (07/13/25 0800)    PICC  Date/Time: 7/19/2025 2:01 PM  Performed by: James Chavez, RN  Consent Done: Yes  Time out: Immediately prior to procedure a time out was called to verify the correct patient, procedure, equipment, support staff and site/side marked as required  Indications: med administration and vascular access  Anesthesia: local infiltration  Local anesthetic: lidocaine 1% without epinephrine  Anesthetic Total (mL): 3  Preparation: skin prepped with ChloraPrep  Skin prep agent dried: skin prep agent completely dried prior to procedure  Sterile barriers: all five maximum sterile barriers used - cap, mask, sterile gown, sterile gloves, and large sterile sheet  Hand hygiene: hand hygiene performed prior to central venous catheter insertion  Location details: right brachial  Catheter type: triple lumen  Catheter size: 5 Fr  Catheter Length: 38cm    Ultrasound guidance: yes  Vessel Caliber: patent, compressibility normal  Vascular Doppler: not done  Needle advanced into vessel with real time Ultrasound guidance.  Guidewire confirmed in vessel.  Sterile sheath used.  no esophageal manometryNumber of attempts: 1  Post-procedure: sterile dressing applied, blood return through all ports and chlorhexidine patch    Assessment: placement verified by x-ray  Complications: none          James Chavez RN  7/19/2025    "

## 2025-07-19 NOTE — PLAN OF CARE
Problem: Infection  Goal: Absence of Infection Signs and Symptoms  Outcome: Progressing     Problem: Adult Inpatient Plan of Care  Goal: Absence of Hospital-Acquired Illness or Injury  Outcome: Progressing     Problem: Mechanical Ventilation Invasive  Goal: Effective Communication  Outcome: Progressing  Goal: Optimal Device Function  Outcome: Progressing  Goal: Mechanical Ventilation Liberation  Outcome: Progressing  Goal: Absence of Device-Related Skin and Tissue Injury  Outcome: Progressing  Goal: Absence of Ventilator-Induced Lung Injury  Outcome: Progressing     Problem: Artificial Airway  Goal: Effective Communication  Outcome: Progressing  Goal: Optimal Device Function  Outcome: Progressing  Goal: Absence of Device-Related Skin or Tissue Injury  Outcome: Progressing     Problem: Noninvasive Ventilation Acute  Goal: Effective Unassisted Ventilation and Oxygenation  Outcome: Progressing     Problem: Skin Injury Risk Increased  Goal: Skin Health and Integrity  Outcome: Progressing     Problem: Fall Injury Risk  Goal: Absence of Fall and Fall-Related Injury  Outcome: Progressing     Problem: Delirium  Goal: Improved Behavioral Control  Outcome: Progressing  Goal: Improved Sleep  Outcome: Progressing     Problem: Bariatric Environmental Safety  Goal: Safety Maintained with Care  Outcome: Progressing     Problem: Adult Inpatient Plan of Care  Goal: Plan of Care Review  Outcome: Not Progressing  Goal: Patient-Specific Goal (Individualized)  Outcome: Not Progressing  Goal: Optimal Comfort and Wellbeing  Outcome: Not Progressing  Goal: Readiness for Transition of Care  Outcome: Not Progressing     Problem: Mechanical Ventilation Invasive  Goal: Optimal Nutrition Delivery  Outcome: Not Progressing     Problem: Delirium  Goal: Optimal Coping  Outcome: Not Progressing  Goal: Improved Attention and Thought Clarity  Outcome: Not Progressing

## 2025-07-19 NOTE — PT/OT/SLP PROGRESS
SLP attempting to see pt for dysphagia therapy however patient had progressive deterioration in mental status and respiratory status. Was intubated yesterday afternoon as he was unable to protect his airway. This morning he is on low-dose Levophed and on propofol for sedation. ST will follow up in AM

## 2025-07-19 NOTE — PROGRESS NOTES
[facility] - 7th Floor ICU  Pulmonary/Critical Care  Progress Note  7/19/2025    Patient Name: Charly Padilla  MRN: [id number]  Admission Date: 7/5/2025  Code Status: No Order      Subjective:     HPI:  The patient is a 69-year-old male nursing home resident with a history of type 2 diabetes mellitus with polyneuropathy, stage II chronic kidney disease, cerebrovascular disease with previous stroke and right hemiparesis, glaucoma, hypertension, and hyperlipidemia.  He presents to ED per EMS 07/05/2025 after multiple falls at nursing home with reported generalized weakness.  He is reported as having a Benítez catheter placed about 1 week ago for urinary retention.  Patient was reported as having pulled this out himself while at nursing home, on a.m. of presentation.  Benítez catheter was placed in ED, returning 700 cc of urine.  While in ER, he was reported as becoming more somnolent with poor overall respiratory effort and decreasing level of consciousness.  ABG revealed acute respiratory acidosis.  He was intubated at 8:55 a.m., placed on mechanical ventilatory support.  CT brain obtained in ED reveals chronic microvascular ischemic changes with no acute intracranial abnormalities.  SARS-COVID-2 PCR negative, influenza A/B PCR negative.  He is now admitted to ICU for ongoing medical care.     Hospital Course:  07/05/2025: Intubation/mechanical ventilation   TTE (07/05/2025: Normal LV size with decreased LV SF, EF 45-50%; trace MR, mild TR and mild PI.  PASP estimated 30 mm Hg; no pericardial effusion  07/16.  Extubated  07/18.  Intubated      24hr Interval History:  Patient had progressive deterioration in mental status and respiratory status.  Was intubated yesterday afternoon as he was unable to protect his airway.  This morning he is on low-dose Levophed and on propofol for sedation.      Review of systems unobtainable due to intubation, sedation.      Scheduled Medications:   amLODIPine  5 mg Per NG tube  Daily    cefTRIAXone (Rocephin) IV (PEDS and ADULTS)  1 g Intravenous Q24H    enoxparin  40 mg Subcutaneous Q24H (prophylaxis, 1700)    famotidine  20 mg Per NG tube BID    insulin glargine U-100  30 Units Subcutaneous BID    lisinopriL  20 mg Per NG tube Daily    polyethylene glycol  17 g Per NG tube BID    senna-docusate  2 tablet Per NG tube Daily    sodium chloride 3%  4 mL Nebulization Q6H     PRN Medications:    Current Facility-Administered Medications:     acetaminophen, 650 mg, Per NG tube, Q6H PRN    dextrose 50%, 12.5 g, Intravenous, PRN    dextrose 50%, 12.5 g, Intravenous, PRN    glucagon (human recombinant), 1 mg, Intramuscular, PRN    glucagon (human recombinant), 1 mg, Intramuscular, PRN    hydrALAZINE, 10 mg, Intravenous, Q6H PRN    insulin aspart U-100, 0-10 Units, Subcutaneous, Q6H PRN    labetalol, 20 mg, Intravenous, Q6H PRN    oxyCODONE, 5 mg, Per NG tube, Q6H PRN  Continuous Infusions:   dexmedeTOMIDine (Precedex) infusion (titrating)  0-1.4 mcg/kg/hr Intravenous Continuous   Stopped at 07/16/25 1332    NORepinephrine bitartrate-D5W  0-3 mcg/kg/min (Dosing Weight) Intravenous Continuous   Stopped at 07/19/25 0658    propofoL  0-50 mcg/kg/min (Dosing Weight) Intravenous Continuous 13.6 mL/hr at 07/19/25 0851 20 mcg/kg/min at 07/19/25 0851       No past medical history on file.    No past surgical history on file.    Objective:     Input/output:    Intake/Output Summary (Last 24 hours) at 7/19/2025 0858  Last data filed at 7/19/2025 0758  Gross per 24 hour   Intake 1563.55 ml   Output 600 ml   Net 963.55 ml       Vital Signs (Most Recent):  Temp: (P) 100 °F (37.8 °C) (07/19/25 0800)  Pulse: 98 (07/19/25 0758)  Resp: (!) 24 (07/19/25 0758)  BP: 114/61 (07/19/25 0600)  SpO2: (!) 94 % (07/19/25 0758)  Body mass index is 38.72 kg/m².  Weight: 115.5 kg (254 lb 10.1 oz) Vital Signs (24h Range):  Temp:  [98.9 °F (37.2 °C)-100.6 °F (38.1 °C)] (P) 100 °F (37.8 °C)  Pulse:  [] 98  Resp:  [16-28]  24  SpO2:  [88 %-99 %] 94 %  BP: ()/() 114/61       Physical exam:  Gen- intubated and mechanically ventilated.  Sedated..  In no distress.  HENT- ATNC, MMM  CV- RRR  Resp- bilateral rhonchi  Abd- distended, soft with no rebound or guarding, hypoactive bowel sounds   MSK- WWP, trace edema  Neuro- sedated.  Psych- calm but otherwise unable to assess 2/2 neurologic status      Lines/Drains/Airways       Drain  Duration                  NG/OG Tube 07/05/25 0848 14 days         Urethral Catheter 07/05/25 0800 Non-latex 16 Fr. 14 days              Airway  Duration                  Airway - Non-Surgical 07/18/25 1721 Endotracheal Tube <1 day              Peripheral Intravenous Line  Duration             Peripheral IV 07/15/25 0020 20 G Left;Posterior Wrist 4 days    Peripheral IV 07/15/25 0040 20 G 2 1/4 in Anterior;Left Upper Arm 4 days                  Vent:  Vent Mode: A/C (07/19/25 0523)  Ventilator Initiated: Yes (07/18/25 1730)  Set Rate: 24 BPM (07/19/25 0523)  Vt Set: 480 mL (07/19/25 0523)  Pressure Support: (S) 5 cmH20 (decreased from 8; total PS 10) (07/16/25 1215)  PEEP/CPAP: 10 cmH20 (07/19/25 0523)  Oxygen Concentration (%): (P) 40 (07/19/25 0800)  Peak Airway Pressure: 21 cmH20 (07/19/25 0523)  Total Ve: 7.8 L/m (07/19/25 0523)  F/VT Ratio<105 (RSBI): (!) 72.29 (07/19/25 0523)    ABGs:  Lab Results   Component Value Date    PH 7.380 07/18/2025    PO2 85.0 07/18/2025    PCO2 44.0 07/18/2025       Significant Labs:  Lab Results   Component Value Date    WBC 11.72 (H) 07/19/2025    HGB 11.5 (L) 07/19/2025    HCT 37.0 (L) 07/19/2025    MCV 92.5 07/19/2025     07/19/2025       Recent Labs   Lab 07/19/25  0319      K 5.0      CO2 23   BUN 47.3*   CREATININE 1.59*   CALCIUM 8.4*   MG 2.20   PHOS 2.5   AST 13   ALT 24   ALKPHOS 57   ALBUMIN 2.3*         Assessment:     Acute combined hypoxic and hypercapnic respiratory failure.  Chest x-ray with left lower lobe infiltrates.  Probable  aspiration nature.    History of cerebrovascular disease with right hemiparesis  Acute kidney injury superimposed on stage II chronic kidney disease, obstructive uropathy component.  He remains nonoliguric, daily improvement in BUN/creatinine   Type 2 diabetes mellitus with polyneuropathy,   Hyperglycemia.   Hypertension  Hyperlipidemia  Glaucoma    Plan:     Continue on empiric Rocephin for possible aspiration of tube feeds  Continue  mechanical ventilation support  Propofol and Levophed as needed.  Resume tube feeds but monitor for aspiration       DVT ppx: LMWH   GI ppx: famotidine     33 minutes of critical care time was spent reviewing medical records, direct patient contact, coordinating treatment with nursing and respiratory therapy and/or discussing the case with family members    Franklyn Peres MD  Pulmonary/Critical Care

## 2025-07-20 LAB
ALBUMIN SERPL-MCNC: 2.2 G/DL (ref 3.4–4.8)
ALBUMIN/GLOB SERPL: 0.5 RATIO (ref 1.1–2)
ALP SERPL-CCNC: 53 UNIT/L (ref 40–150)
ALT SERPL-CCNC: 22 UNIT/L (ref 0–55)
ANION GAP SERPL CALC-SCNC: 8 MEQ/L
AST SERPL-CCNC: 14 UNIT/L (ref 11–45)
BASOPHILS # BLD AUTO: 0.03 X10(3)/MCL
BASOPHILS NFR BLD AUTO: 0.4 %
BILIRUB SERPL-MCNC: 0.3 MG/DL
BUN SERPL-MCNC: 57.8 MG/DL (ref 8.4–25.7)
CALCIUM SERPL-MCNC: 8.3 MG/DL (ref 8.8–10)
CHLORIDE SERPL-SCNC: 108 MMOL/L (ref 98–107)
CO2 SERPL-SCNC: 23 MMOL/L (ref 23–31)
CREAT SERPL-MCNC: 1.3 MG/DL (ref 0.72–1.25)
CREAT/UREA NIT SERPL: 44
EOSINOPHIL # BLD AUTO: 0.2 X10(3)/MCL (ref 0–0.9)
EOSINOPHIL NFR BLD AUTO: 2.7 %
ERYTHROCYTE [DISTWIDTH] IN BLOOD BY AUTOMATED COUNT: 14.7 % (ref 11.5–17)
GFR SERPLBLD CREATININE-BSD FMLA CKD-EPI: 60 ML/MIN/1.73/M2
GLOBULIN SER-MCNC: 4.6 GM/DL (ref 2.4–3.5)
GLUCOSE SERPL-MCNC: 188 MG/DL (ref 82–115)
HCT VFR BLD AUTO: 35.3 % (ref 42–52)
HGB BLD-MCNC: 11.1 G/DL (ref 14–18)
IMM GRANULOCYTES # BLD AUTO: 0.03 X10(3)/MCL (ref 0–0.04)
IMM GRANULOCYTES NFR BLD AUTO: 0.4 %
LYMPHOCYTES # BLD AUTO: 1.74 X10(3)/MCL (ref 0.6–4.6)
LYMPHOCYTES NFR BLD AUTO: 23.7 %
MAGNESIUM SERPL-MCNC: 2.4 MG/DL (ref 1.6–2.6)
MCH RBC QN AUTO: 28.4 PG (ref 27–31)
MCHC RBC AUTO-ENTMCNC: 31.4 G/DL (ref 33–36)
MCV RBC AUTO: 90.3 FL (ref 80–94)
MONOCYTES # BLD AUTO: 0.57 X10(3)/MCL (ref 0.1–1.3)
MONOCYTES NFR BLD AUTO: 7.8 %
NEUTROPHILS # BLD AUTO: 4.76 X10(3)/MCL (ref 2.1–9.2)
NEUTROPHILS NFR BLD AUTO: 65 %
NRBC BLD AUTO-RTO: 0 %
PHOSPHATE SERPL-MCNC: 2.3 MG/DL (ref 2.3–4.7)
PLATELET # BLD AUTO: 235 X10(3)/MCL (ref 130–400)
PMV BLD AUTO: 10.3 FL (ref 7.4–10.4)
POCT GLUCOSE: 151 MG/DL (ref 70–110)
POCT GLUCOSE: 153 MG/DL (ref 70–110)
POCT GLUCOSE: 159 MG/DL (ref 70–110)
POCT GLUCOSE: 168 MG/DL (ref 70–110)
POCT GLUCOSE: 170 MG/DL (ref 70–110)
POTASSIUM SERPL-SCNC: 4.1 MMOL/L (ref 3.5–5.1)
PROT SERPL-MCNC: 6.8 GM/DL (ref 5.8–7.6)
RBC # BLD AUTO: 3.91 X10(6)/MCL (ref 4.7–6.1)
SODIUM SERPL-SCNC: 139 MMOL/L (ref 136–145)
WBC # BLD AUTO: 7.33 X10(3)/MCL (ref 4.5–11.5)

## 2025-07-20 PROCEDURE — 80053 COMPREHEN METABOLIC PANEL: CPT

## 2025-07-20 PROCEDURE — 83735 ASSAY OF MAGNESIUM: CPT

## 2025-07-20 PROCEDURE — 94003 VENT MGMT INPAT SUBQ DAY: CPT

## 2025-07-20 PROCEDURE — 25000003 PHARM REV CODE 250

## 2025-07-20 PROCEDURE — 99900035 HC TECH TIME PER 15 MIN (STAT)

## 2025-07-20 PROCEDURE — 63600175 PHARM REV CODE 636 W HCPCS: Performed by: INTERNAL MEDICINE

## 2025-07-20 PROCEDURE — 27200966 HC CLOSED SUCTION SYSTEM

## 2025-07-20 PROCEDURE — 99900026 HC AIRWAY MAINTENANCE (STAT)

## 2025-07-20 PROCEDURE — 94640 AIRWAY INHALATION TREATMENT: CPT

## 2025-07-20 PROCEDURE — 94761 N-INVAS EAR/PLS OXIMETRY MLT: CPT

## 2025-07-20 PROCEDURE — 84100 ASSAY OF PHOSPHORUS: CPT

## 2025-07-20 PROCEDURE — 27100171 HC OXYGEN HIGH FLOW UP TO 24 HOURS

## 2025-07-20 PROCEDURE — 99900031 HC PATIENT EDUCATION (STAT)

## 2025-07-20 PROCEDURE — 63600175 PHARM REV CODE 636 W HCPCS: Performed by: EMERGENCY MEDICINE

## 2025-07-20 PROCEDURE — 94760 N-INVAS EAR/PLS OXIMETRY 1: CPT

## 2025-07-20 PROCEDURE — 36415 COLL VENOUS BLD VENIPUNCTURE: CPT

## 2025-07-20 PROCEDURE — 25000003 PHARM REV CODE 250: Performed by: INTERNAL MEDICINE

## 2025-07-20 PROCEDURE — 63600175 PHARM REV CODE 636 W HCPCS: Performed by: HOSPITALIST

## 2025-07-20 PROCEDURE — 85025 COMPLETE CBC W/AUTO DIFF WBC: CPT

## 2025-07-20 PROCEDURE — 20000000 HC ICU ROOM

## 2025-07-20 PROCEDURE — 25000242 PHARM REV CODE 250 ALT 637 W/ HCPCS: Performed by: INTERNAL MEDICINE

## 2025-07-20 RX ORDER — METFORMIN HYDROCHLORIDE 1000 MG/1
1000 TABLET ORAL 2 TIMES DAILY
COMMUNITY
Start: 2025-06-25

## 2025-07-20 RX ORDER — IPRATROPIUM BROMIDE 17 UG/1
1 AEROSOL, METERED RESPIRATORY (INHALATION) EVERY 4 HOURS PRN
COMMUNITY
Start: 2025-07-01

## 2025-07-20 RX ORDER — DICLOFENAC SODIUM 75 MG/1
75 TABLET, DELAYED RELEASE ORAL 2 TIMES DAILY
Status: ON HOLD | COMMUNITY
Start: 2025-05-19 | End: 2025-08-07 | Stop reason: HOSPADM

## 2025-07-20 RX ORDER — QUETIAPINE FUMARATE 25 MG/1
25 TABLET, FILM COATED ORAL NIGHTLY
Status: ON HOLD | COMMUNITY
Start: 2025-07-01 | End: 2025-08-07

## 2025-07-20 RX ORDER — LISINOPRIL 20 MG/1
20 TABLET ORAL DAILY
Status: ON HOLD | COMMUNITY
Start: 2024-07-20 | End: 2025-08-07

## 2025-07-20 RX ORDER — GUAIFENESIN 100 MG/5ML
100 LIQUID ORAL 4 TIMES DAILY PRN
Status: ON HOLD | COMMUNITY
Start: 2025-07-01 | End: 2025-08-07

## 2025-07-20 RX ORDER — GLIPIZIDE 5 MG/1
5 TABLET ORAL 2 TIMES DAILY WITH MEALS
Status: ON HOLD | COMMUNITY
Start: 2024-07-20 | End: 2025-08-07

## 2025-07-20 RX ORDER — POLYETHYLENE GLYCOL 3350 17 G/17G
17 POWDER, FOR SOLUTION ORAL DAILY
COMMUNITY
Start: 2024-08-21

## 2025-07-20 RX ORDER — METHOCARBAMOL 500 MG/1
500 TABLET, FILM COATED ORAL 2 TIMES DAILY
Status: ON HOLD | COMMUNITY
Start: 2025-06-19 | End: 2025-08-07

## 2025-07-20 RX ORDER — MOXIFLOXACIN 5 MG/ML
1 SOLUTION/ DROPS OPHTHALMIC 3 TIMES DAILY
Status: ON HOLD | COMMUNITY
Start: 2025-03-24 | End: 2025-08-07

## 2025-07-20 RX ORDER — LINAGLIPTIN 5 MG/1
5 TABLET, FILM COATED ORAL DAILY
Status: ON HOLD | COMMUNITY
Start: 2024-12-04 | End: 2025-08-07

## 2025-07-20 RX ORDER — SODIUM ZIRCONIUM CYCLOSILICATE 10 G/10G
10 POWDER, FOR SUSPENSION ORAL DAILY
Status: ON HOLD | COMMUNITY
Start: 2025-07-02 | End: 2025-08-07

## 2025-07-20 RX ORDER — OXYBUTYNIN CHLORIDE 10 MG/1
10 TABLET, EXTENDED RELEASE ORAL 2 TIMES DAILY
COMMUNITY
Start: 2025-07-02

## 2025-07-20 RX ORDER — TAMSULOSIN HYDROCHLORIDE 0.4 MG/1
0.8 CAPSULE ORAL DAILY
COMMUNITY
Start: 2025-07-02

## 2025-07-20 RX ORDER — INSULIN GLARGINE 100 [IU]/ML
20 INJECTION, SOLUTION SUBCUTANEOUS NIGHTLY
COMMUNITY
Start: 2025-05-15

## 2025-07-20 RX ORDER — ATORVASTATIN CALCIUM 20 MG/1
20 TABLET, FILM COATED ORAL DAILY
COMMUNITY
Start: 2025-06-24

## 2025-07-20 RX ORDER — GABAPENTIN 600 MG/1
600 TABLET ORAL DAILY
Status: ON HOLD | COMMUNITY
Start: 2025-06-24 | End: 2025-08-07 | Stop reason: HOSPADM

## 2025-07-20 RX ORDER — AMLODIPINE BESYLATE 5 MG/1
5 TABLET ORAL DAILY
COMMUNITY
Start: 2025-06-24

## 2025-07-20 RX ADMIN — PROPOFOL 50 MCG/KG/MIN: 10 INJECTION, EMULSION INTRAVENOUS at 11:07

## 2025-07-20 RX ADMIN — PROPOFOL 50 MCG/KG/MIN: 10 INJECTION, EMULSION INTRAVENOUS at 06:07

## 2025-07-20 RX ADMIN — PROPOFOL 50 MCG/KG/MIN: 10 INJECTION, EMULSION INTRAVENOUS at 04:07

## 2025-07-20 RX ADMIN — INSULIN ASPART 2 UNITS: 100 INJECTION, SOLUTION INTRAVENOUS; SUBCUTANEOUS at 05:07

## 2025-07-20 RX ADMIN — SODIUM CHLORIDE SOLN NEBU 3% 4 ML: 3 NEBU SOLN at 08:07

## 2025-07-20 RX ADMIN — INSULIN GLARGINE 30 UNITS: 100 INJECTION, SOLUTION SUBCUTANEOUS at 08:07

## 2025-07-20 RX ADMIN — INSULIN ASPART 2 UNITS: 100 INJECTION, SOLUTION INTRAVENOUS; SUBCUTANEOUS at 12:07

## 2025-07-20 RX ADMIN — PROPOFOL 50 MCG/KG/MIN: 10 INJECTION, EMULSION INTRAVENOUS at 12:07

## 2025-07-20 RX ADMIN — PROPOFOL 50 MCG/KG/MIN: 10 INJECTION, EMULSION INTRAVENOUS at 08:07

## 2025-07-20 RX ADMIN — CEFTRIAXONE SODIUM 1 G: 1 INJECTION, POWDER, FOR SOLUTION INTRAMUSCULAR; INTRAVENOUS at 11:07

## 2025-07-20 RX ADMIN — INSULIN ASPART 2 UNITS: 100 INJECTION, SOLUTION INTRAVENOUS; SUBCUTANEOUS at 04:07

## 2025-07-20 RX ADMIN — FAMOTIDINE 20 MG: 20 TABLET, FILM COATED ORAL at 08:07

## 2025-07-20 RX ADMIN — PROPOFOL 50 MCG/KG/MIN: 10 INJECTION, EMULSION INTRAVENOUS at 05:07

## 2025-07-20 RX ADMIN — SODIUM CHLORIDE SOLN NEBU 3% 4 ML: 3 NEBU SOLN at 12:07

## 2025-07-20 RX ADMIN — ENOXAPARIN SODIUM 40 MG: 40 INJECTION SUBCUTANEOUS at 04:07

## 2025-07-20 RX ADMIN — POLYETHYLENE GLYCOL 3350 17 G: 17 POWDER, FOR SOLUTION ORAL at 08:07

## 2025-07-20 RX ADMIN — PROPOFOL 50 MCG/KG/MIN: 10 INJECTION, EMULSION INTRAVENOUS at 02:07

## 2025-07-20 NOTE — PLAN OF CARE
Problem: Infection  Goal: Absence of Infection Signs and Symptoms  Outcome: Progressing     Problem: Adult Inpatient Plan of Care  Goal: Plan of Care Review  Outcome: Progressing  Goal: Patient-Specific Goal (Individualized)  Outcome: Progressing  Goal: Absence of Hospital-Acquired Illness or Injury  Outcome: Progressing  Goal: Optimal Comfort and Wellbeing  Outcome: Progressing  Goal: Readiness for Transition of Care  Outcome: Progressing     Problem: Mechanical Ventilation Invasive  Goal: Effective Communication  Outcome: Progressing  Goal: Optimal Device Function  Outcome: Progressing  Goal: Mechanical Ventilation Liberation  Outcome: Progressing  Goal: Optimal Nutrition Delivery  Outcome: Progressing  Goal: Absence of Device-Related Skin and Tissue Injury  Outcome: Progressing  Goal: Absence of Ventilator-Induced Lung Injury  Outcome: Progressing     Problem: Artificial Airway  Goal: Effective Communication  Outcome: Progressing  Goal: Optimal Device Function  Outcome: Progressing  Goal: Absence of Device-Related Skin or Tissue Injury  Outcome: Progressing     Problem: Noninvasive Ventilation Acute  Goal: Effective Unassisted Ventilation and Oxygenation  Outcome: Progressing     Problem: Skin Injury Risk Increased  Goal: Skin Health and Integrity  Outcome: Progressing     Problem: Fall Injury Risk  Goal: Absence of Fall and Fall-Related Injury  Outcome: Progressing     Problem: Delirium  Goal: Optimal Coping  Outcome: Progressing  Goal: Improved Behavioral Control  Outcome: Progressing  Goal: Improved Attention and Thought Clarity  Outcome: Progressing  Goal: Improved Sleep  Outcome: Progressing     Problem: Bariatric Environmental Safety  Goal: Safety Maintained with Care  Outcome: Progressing     Problem: Coping Ineffective  Goal: Effective Coping  Outcome: Progressing

## 2025-07-20 NOTE — PROGRESS NOTES
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  He presents to ED per EMS 07/05/2025 after multiple falls at nursing home with reported generalized weakness.  He is reported as having a Benítez catheter placed about 1 week ago for urinary retention.  Patient was reported as having pulled this out himself while at nursing home, on a.m. of presentation.  Benítez catheter was placed in ED, returning 700 cc of urine.  While in ER, he was reported as becoming more somnolent with poor overall respiratory effort and decreasing level of consciousness.  ABG revealed acute respiratory acidosis.  He was intubated at 8:55 a.m., placed on mechanical ventilatory support.  CT brain obtained in ED reveals chronic microvascular ischemic changes with no acute intracranial abnormalities.  SARS-COVID-2 PCR negative, influenza A/B PCR negative.  He is now admitted to ICU for ongoing medical care.     Hospital Course:  07/05/2025: Intubation/mechanical ventilation   TTE (07/05/2025: Normal LV size with decreased LV SF, EF 45-50%; trace MR, mild TR and mild PI.  PASP estimated 30 mm Hg; no pericardial effusion  07/16.  Extubated  07/18.  Intubated      24hr Interval History:   This morning he is on low-dose propofol for sedation.  Levophed has been weaned off.  He is intubated mechanically ventilated.  Currently 40% FiO2 and 10 of PEEP.  He is tolerating tube feeds with no signs of aspiration.      Review of systems unobtainable due to intubation, sedation.      Scheduled Medications:   amLODIPine  5 mg Per NG tube  Daily    cefTRIAXone (Rocephin) IV (PEDS and ADULTS)  1 g Intravenous Q24H    enoxparin  40 mg Subcutaneous Q24H (prophylaxis, 1700)    famotidine  20 mg Per NG tube BID    insulin glargine U-100  30 Units Subcutaneous BID    lisinopriL  20 mg Per NG tube Daily    polyethylene glycol  17 g Per NG tube BID    senna-docusate  2 tablet Per NG tube Daily    sodium chloride 3%  4 mL Nebulization Q6H     PRN Medications:    Current Facility-Administered Medications:     acetaminophen, 650 mg, Per NG tube, Q6H PRN    dextrose 50%, 12.5 g, Intravenous, PRN    dextrose 50%, 12.5 g, Intravenous, PRN    fentaNYL, 50 mcg, Intravenous, Q3H PRN    glucagon (human recombinant), 1 mg, Intramuscular, PRN    glucagon (human recombinant), 1 mg, Intramuscular, PRN    hydrALAZINE, 10 mg, Intravenous, Q6H PRN    insulin aspart U-100, 0-10 Units, Subcutaneous, Q6H PRN    labetalol, 20 mg, Intravenous, Q6H PRN    oxyCODONE, 5 mg, Per NG tube, Q6H PRN    Flushing PICC/Midline Protocol, , , Until Discontinued **AND** sodium chloride 0.9%, 10 mL, Intravenous, Q12H PRN  Continuous Infusions:   dexmedeTOMIDine (Precedex) infusion (titrating)  0-1.4 mcg/kg/hr Intravenous Continuous   Stopped at 07/16/25 1332    NORepinephrine bitartrate-D5W  0-3 mcg/kg/min (Dosing Weight) Intravenous Continuous   Stopped at 07/19/25 2206    propofoL  0-50 mcg/kg/min (Dosing Weight) Intravenous Continuous 34 mL/hr at 07/20/25 0808 50 mcg/kg/min at 07/20/25 0808       No past medical history on file.    No past surgical history on file.    Objective:     Input/output:    Intake/Output Summary (Last 24 hours) at 7/20/2025 0859  Last data filed at 7/20/2025 0556  Gross per 24 hour   Intake 2200.3 ml   Output 625 ml   Net 1575.3 ml       Vital Signs (Most Recent):  Temp: 98.6 °F (37 °C) (07/20/25 0400)  Pulse: 76 (07/20/25 0831)  Resp: (!) 24 (07/20/25 0831)  BP: (!) 97/59 (07/20/25 0600)  SpO2: 98 % (07/20/25 0831)  Body mass index is 38.72 kg/m².  Weight: 115.5 kg  (254 lb 10.1 oz) Vital Signs (24h Range):  Temp:  [98.4 °F (36.9 °C)-99.3 °F (37.4 °C)] 98.6 °F (37 °C)  Pulse:  [74-96] 76  Resp:  [15-29] 24  SpO2:  [89 %-98 %] 98 %  BP: ()/(30-77) 97/59       Physical exam:  Gen- intubated and mechanically ventilated.  Sedated..  In no distress.  HENT- ATNC, MMM  CV- RRR  Resp- bilateral rhonchi  Abd- distended, soft with no rebound or guarding, hypoactive bowel sounds   MSK- WWP, trace edema  Neuro- sedated.  Psych- calm but otherwise unable to assess 2/2 neurologic status      Lines/Drains/Airways       Peripherally Inserted Central Catheter Line  Duration             PICC Triple Lumen 07/19/25 1401 right brachial <1 day              Drain  Duration                  NG/OG Tube 07/05/25 0848 15 days         Urethral Catheter 07/05/25 0800 Non-latex 16 Fr. 15 days              Airway  Duration                  Airway - Non-Surgical 07/18/25 1721 Endotracheal Tube 1 day              Peripheral Intravenous Line  Duration             Peripheral IV 07/15/25 0020 20 G Left;Posterior Wrist 5 days    Peripheral IV 07/15/25 0040 20 G 2 1/4 in Anterior;Left Upper Arm 5 days    Peripheral IV Single Lumen 07/19/25 1000 20 G Anterior;Right Forearm <1 day                  Vent:  Vent Mode: A/C (07/20/25 0446)  Ventilator Initiated: Yes (07/18/25 1730)  Set Rate: 24 BPM (07/20/25 0446)  Vt Set: 480 mL (07/20/25 0446)  Pressure Support: (S) 5 cmH20 (decreased from 8; total PS 10) (07/16/25 1215)  PEEP/CPAP: 10 cmH20 (07/20/25 0446)  Oxygen Concentration (%): 40 (07/20/25 0446)  Peak Airway Pressure: 24 cmH20 (07/20/25 0446)  Total Ve: 7.7 L/m (07/20/25 0446)  F/VT Ratio<105 (RSBI): (!) 74.77 (07/20/25 0446)    ABGs:  Lab Results   Component Value Date    PH 7.380 07/18/2025    PO2 85.0 07/18/2025    PCO2 44.0 07/18/2025       Significant Labs:  Lab Results   Component Value Date    WBC 7.33 07/20/2025    HGB 11.1 (L) 07/20/2025    HCT 35.3 (L) 07/20/2025    MCV 90.3 07/20/2025      07/20/2025       Recent Labs   Lab 07/20/25  0321      K 4.1   *   CO2 23   BUN 57.8*   CREATININE 1.30*   CALCIUM 8.3*   MG 2.40   PHOS 2.3   AST 14   ALT 22   ALKPHOS 53   ALBUMIN 2.2*         Assessment:     Acute combined hypoxic and hypercapnic respiratory failure.  Chest x-ray with left lower lobe infiltrates.  Probable aspiration nature.  Sputum cultures that of normal richard.    History of cerebrovascular disease with right hemiparesis  Acute kidney injury superimposed on stage II chronic kidney disease, obstructive uropathy component.  He remains nonoliguric, daily improvement in BUN/creatinine   Type 2 diabetes mellitus with polyneuropathy,   Hyperglycemia.   Hypertension  Hyperlipidemia  Glaucoma    Plan:     Continue on empiric Rocephin for possible aspiration of tube feeds  Continue  mechanical ventilation support  Propofol and Levophed as needed.  Resume tube feeds       DVT ppx: LMWH   GI ppx: famotidine     33 minutes of critical care time was spent reviewing medical records, direct patient contact, coordinating treatment with nursing and respiratory therapy and/or discussing the case with family members    Franklyn Peres MD  Pulmonary/Critical Care

## 2025-07-21 LAB
ALBUMIN SERPL-MCNC: 2.2 G/DL (ref 3.4–4.8)
ALBUMIN/GLOB SERPL: 0.5 RATIO (ref 1.1–2)
ALP SERPL-CCNC: 53 UNIT/L (ref 40–150)
ALT SERPL-CCNC: 19 UNIT/L (ref 0–55)
ANION GAP SERPL CALC-SCNC: 9 MEQ/L
AST SERPL-CCNC: 13 UNIT/L (ref 11–45)
BACTERIA SPEC CULT: NORMAL
BASOPHILS # BLD AUTO: 0.04 X10(3)/MCL
BASOPHILS NFR BLD AUTO: 0.6 %
BILIRUB SERPL-MCNC: 0.2 MG/DL
BUN SERPL-MCNC: 56.8 MG/DL (ref 8.4–25.7)
CALCIUM SERPL-MCNC: 8.4 MG/DL (ref 8.8–10)
CHLORIDE SERPL-SCNC: 107 MMOL/L (ref 98–107)
CO2 SERPL-SCNC: 22 MMOL/L (ref 23–31)
CREAT SERPL-MCNC: 1.1 MG/DL (ref 0.72–1.25)
CREAT/UREA NIT SERPL: 52
EOSINOPHIL # BLD AUTO: 0.29 X10(3)/MCL (ref 0–0.9)
EOSINOPHIL NFR BLD AUTO: 4.5 %
ERYTHROCYTE [DISTWIDTH] IN BLOOD BY AUTOMATED COUNT: 14.6 % (ref 11.5–17)
GFR SERPLBLD CREATININE-BSD FMLA CKD-EPI: >60 ML/MIN/1.73/M2
GLOBULIN SER-MCNC: 4.7 GM/DL (ref 2.4–3.5)
GLUCOSE SERPL-MCNC: 145 MG/DL (ref 82–115)
GRAM STN SPEC: NORMAL
GRAM STN SPEC: NORMAL
HCT VFR BLD AUTO: 34.9 % (ref 42–52)
HGB BLD-MCNC: 10.8 G/DL (ref 14–18)
IMM GRANULOCYTES # BLD AUTO: 0.01 X10(3)/MCL (ref 0–0.04)
IMM GRANULOCYTES NFR BLD AUTO: 0.2 %
LYMPHOCYTES # BLD AUTO: 1.97 X10(3)/MCL (ref 0.6–4.6)
LYMPHOCYTES NFR BLD AUTO: 30.7 %
MAGNESIUM SERPL-MCNC: 2.3 MG/DL (ref 1.6–2.6)
MCH RBC QN AUTO: 28.1 PG (ref 27–31)
MCHC RBC AUTO-ENTMCNC: 30.9 G/DL (ref 33–36)
MCV RBC AUTO: 90.6 FL (ref 80–94)
MONOCYTES # BLD AUTO: 0.45 X10(3)/MCL (ref 0.1–1.3)
MONOCYTES NFR BLD AUTO: 7 %
NEUTROPHILS # BLD AUTO: 3.65 X10(3)/MCL (ref 2.1–9.2)
NEUTROPHILS NFR BLD AUTO: 57 %
NRBC BLD AUTO-RTO: 0 %
PHOSPHATE SERPL-MCNC: 3.1 MG/DL (ref 2.3–4.7)
PLATELET # BLD AUTO: 238 X10(3)/MCL (ref 130–400)
PMV BLD AUTO: 10.3 FL (ref 7.4–10.4)
POCT GLUCOSE: 129 MG/DL (ref 70–110)
POCT GLUCOSE: 146 MG/DL (ref 70–110)
POCT GLUCOSE: 149 MG/DL (ref 70–110)
POCT GLUCOSE: 157 MG/DL (ref 70–110)
POTASSIUM SERPL-SCNC: 4.4 MMOL/L (ref 3.5–5.1)
PROT SERPL-MCNC: 6.9 GM/DL (ref 5.8–7.6)
RBC # BLD AUTO: 3.85 X10(6)/MCL (ref 4.7–6.1)
SODIUM SERPL-SCNC: 138 MMOL/L (ref 136–145)
WBC # BLD AUTO: 6.41 X10(3)/MCL (ref 4.5–11.5)

## 2025-07-21 PROCEDURE — 63600175 PHARM REV CODE 636 W HCPCS: Performed by: EMERGENCY MEDICINE

## 2025-07-21 PROCEDURE — 83735 ASSAY OF MAGNESIUM: CPT

## 2025-07-21 PROCEDURE — 80053 COMPREHEN METABOLIC PANEL: CPT

## 2025-07-21 PROCEDURE — 84100 ASSAY OF PHOSPHORUS: CPT

## 2025-07-21 PROCEDURE — 94003 VENT MGMT INPAT SUBQ DAY: CPT

## 2025-07-21 PROCEDURE — 63600175 PHARM REV CODE 636 W HCPCS: Performed by: INTERNAL MEDICINE

## 2025-07-21 PROCEDURE — 36415 COLL VENOUS BLD VENIPUNCTURE: CPT

## 2025-07-21 PROCEDURE — 20000000 HC ICU ROOM

## 2025-07-21 PROCEDURE — 25000003 PHARM REV CODE 250

## 2025-07-21 PROCEDURE — 99900035 HC TECH TIME PER 15 MIN (STAT)

## 2025-07-21 PROCEDURE — 25000003 PHARM REV CODE 250: Performed by: INTERNAL MEDICINE

## 2025-07-21 PROCEDURE — 94760 N-INVAS EAR/PLS OXIMETRY 1: CPT

## 2025-07-21 PROCEDURE — 94640 AIRWAY INHALATION TREATMENT: CPT

## 2025-07-21 PROCEDURE — 27200966 HC CLOSED SUCTION SYSTEM

## 2025-07-21 PROCEDURE — 27100171 HC OXYGEN HIGH FLOW UP TO 24 HOURS

## 2025-07-21 PROCEDURE — 63600175 PHARM REV CODE 636 W HCPCS: Performed by: HOSPITALIST

## 2025-07-21 PROCEDURE — 99900031 HC PATIENT EDUCATION (STAT)

## 2025-07-21 PROCEDURE — 94761 N-INVAS EAR/PLS OXIMETRY MLT: CPT

## 2025-07-21 PROCEDURE — 99223 1ST HOSP IP/OBS HIGH 75: CPT | Mod: ,,,

## 2025-07-21 PROCEDURE — 99900026 HC AIRWAY MAINTENANCE (STAT)

## 2025-07-21 PROCEDURE — 25000242 PHARM REV CODE 250 ALT 637 W/ HCPCS: Performed by: INTERNAL MEDICINE

## 2025-07-21 PROCEDURE — 85025 COMPLETE CBC W/AUTO DIFF WBC: CPT

## 2025-07-21 RX ADMIN — INSULIN ASPART 1 UNITS: 100 INJECTION, SOLUTION INTRAVENOUS; SUBCUTANEOUS at 12:07

## 2025-07-21 RX ADMIN — SODIUM CHLORIDE SOLN NEBU 3% 4 ML: 3 NEBU SOLN at 01:07

## 2025-07-21 RX ADMIN — SODIUM CHLORIDE SOLN NEBU 3% 4 ML: 3 NEBU SOLN at 08:07

## 2025-07-21 RX ADMIN — POLYETHYLENE GLYCOL 3350 17 G: 17 POWDER, FOR SOLUTION ORAL at 08:07

## 2025-07-21 RX ADMIN — FAMOTIDINE 20 MG: 20 TABLET, FILM COATED ORAL at 08:07

## 2025-07-21 RX ADMIN — CEFTRIAXONE SODIUM 1 G: 1 INJECTION, POWDER, FOR SOLUTION INTRAMUSCULAR; INTRAVENOUS at 11:07

## 2025-07-21 RX ADMIN — LISINOPRIL 20 MG: 20 TABLET ORAL at 08:07

## 2025-07-21 RX ADMIN — INSULIN GLARGINE 30 UNITS: 100 INJECTION, SOLUTION SUBCUTANEOUS at 08:07

## 2025-07-21 RX ADMIN — SODIUM CHLORIDE SOLN NEBU 3% 4 ML: 3 NEBU SOLN at 07:07

## 2025-07-21 RX ADMIN — PROPOFOL 50 MCG/KG/MIN: 10 INJECTION, EMULSION INTRAVENOUS at 10:07

## 2025-07-21 RX ADMIN — SENNOSIDES, DOCUSATE SODIUM 2 TABLET: 8.6; 5 TABLET ORAL at 08:07

## 2025-07-21 RX ADMIN — PROPOFOL 10 MCG/KG/MIN: 10 INJECTION, EMULSION INTRAVENOUS at 08:07

## 2025-07-21 RX ADMIN — PROPOFOL 50 MCG/KG/MIN: 10 INJECTION, EMULSION INTRAVENOUS at 04:07

## 2025-07-21 RX ADMIN — AMLODIPINE BESYLATE 5 MG: 5 TABLET ORAL at 08:07

## 2025-07-21 RX ADMIN — ENOXAPARIN SODIUM 40 MG: 40 INJECTION SUBCUTANEOUS at 05:07

## 2025-07-21 RX ADMIN — PROPOFOL 40 MCG/KG/MIN: 10 INJECTION, EMULSION INTRAVENOUS at 01:07

## 2025-07-21 RX ADMIN — PROPOFOL 50 MCG/KG/MIN: 10 INJECTION, EMULSION INTRAVENOUS at 12:07

## 2025-07-21 RX ADMIN — FENTANYL CITRATE 50 MCG: 50 INJECTION INTRAMUSCULAR; INTRAVENOUS at 02:07

## 2025-07-21 NOTE — CONSULTS
Patient Name: Charly Padilla   MRN: [id number]   Admission Date: 7/5/2025   Hospital Length of Stay: 16   Attending Provider: Franklyn Peres MD   Consulting Provider: Lindsay FREIRE  Reason for Consult: Goals of Care  Primary Care Physician: Rob Liu MD     Principal Problem: <principal problem not specified>     Patient information was obtained from relative(s) and ER records.      Final diagnoses:  [W19.XXXA] Fall  [R53.1] Generalized weakness (Primary)  [N17.9] VAN (acute kidney injury)  [R33.8] Acute urinary retention  [J96.02] Acute respiratory failure with hypercapnia     Assessment/Plan:     I reviewed the patient and family's understanding of the seriousness of the illness and its expected prognosis. We discussed the patient's goals of care and treatment preferences.  I clarified current code status. I identified the surrogate decision maker or health care POA.  I answered all questions and we formulated a plan including recommendations for symptom management and how to best achieve goals of care.  Advance Care Planning     Date: 07/21/2025    [facility]  I engaged the family in a voluntary conversation about advance care planning and we specifically addressed what the goals of care would be moving forward, in light of the patient's change in clinical status, specifically current condition.  We did specifically address the patient's likely prognosis, which is poor.  We explored the patient's values and preferences for future care.  The patient and family endorses that what is most important right now is to focus on curative/life-prolongation (regardless of treatment burdens)    Accordingly, we have decided that the best plan to meet the patient's goals includes continuing with treatment     This discussion occurred on a fully voluntary basis with the verbal consent of the patient and/or family.            Called and spoke to patient's daughter, Suzanne, introduced services. Patient is  and has 3  children. Suzanne states she is the HCPOA and will bring the paperwork when she comes to visit tomorrow. His other two children are not heavily involved in his care. Suzanne states he had a stroke 3 years ago and has progressively declined since that time. He was placed in the nursing home approximately 1 year ago due to frequent falls and inability to care at home.      We discussed his current condition and prognosis. I explained to Suzanne that he remains intubated on mechanical ventilation. Since he failed his first extubation, he is at high risk for ongoing respiratory failure. I explained that if the pulmonologist is not able to wean him significantly from vent support over the next couple days then we would have to make a decision in regards to tracheostomy or palliative extubation. Suzanne states that she does not think he would want a trach or a PEG. She reported that he had stated recently after seeing a patient with a trach at the nursing home that he would not want that for himself. We also discussed code status. She is familiar with a DNR and has been giving it a lot of thought. She has spoken with her daughter as well and they have been going back and forth on the matter. She would like some more time to think about these tough decisions. Support provided.  Reflective listening, validation concerns, and normalization of fears provided.    Discussed with nursing and ICU attending    Recommendations:     Daughter to bring HCPOA, should be scanned into EMR once received. Palliative medicine will continue to follow      History of Present Illness:     69-year-old male who was a nursing home resident with a past medical history of diabetes with polyneuropathy, CKD, cerebrovascular disease with previous stroke and residual right-sided hemiparesis, glaucoma, hypertension, hyperlipidemia.  He presented to the ED on 07/05/2025 with general weakness and reported multiple falls at nursing home.  He became somnolent  "in the ED with worsening respiratory status.  ABG demonstrated respiratory acidosis therefore he was subsequently intubated and placed on mechanical ventilation.  CT of head was negative for acute intracranial abnormalities.  Admitted to ICU.  TTE demonstrated EF of 45-50%, trace MR, mild TR, mild PI.  He was extubated on 07/16 however required re-intubation on 07/18. Concern for possible aspiration of tube feedings. Started on antibiotics and required low-dose levophed, however this has now been weaned off. Palliative medicine consulted for goals of care discussion.       Active Ambulatory Problems     Diagnosis Date Noted    No Active Ambulatory Problems     Resolved Ambulatory Problems     Diagnosis Date Noted    No Resolved Ambulatory Problems     No Additional Past Medical History        No past surgical history on file.     Review of patient's allergies indicates:  No Known Allergies     Current Medications[1]       Current Facility-Administered Medications:     acetaminophen, 650 mg, Per NG tube, Q6H PRN    dextrose 50%, 12.5 g, Intravenous, PRN    dextrose 50%, 12.5 g, Intravenous, PRN    fentaNYL, 50 mcg, Intravenous, Q3H PRN    glucagon (human recombinant), 1 mg, Intramuscular, PRN    glucagon (human recombinant), 1 mg, Intramuscular, PRN    hydrALAZINE, 10 mg, Intravenous, Q6H PRN    insulin aspart U-100, 0-10 Units, Subcutaneous, Q6H PRN    labetalol, 20 mg, Intravenous, Q6H PRN    oxyCODONE, 5 mg, Per NG tube, Q6H PRN    Flushing PICC/Midline Protocol, , , Until Discontinued **AND** sodium chloride 0.9%, 10 mL, Intravenous, Q12H PRN     No family history on file.     Review of Systems   Unable to perform ROS: Intubated            Objective:   /63   Pulse 71   Temp 98.2 °F (36.8 °C) (Oral)   Resp (!) 24   Ht 5' 8" (1.727 m)   Wt 115.5 kg (254 lb 10.1 oz)   SpO2 98%   BMI 38.72 kg/m²      Physical Exam  Constitutional:       Appearance: He is obese. He is ill-appearing.      Interventions: He " is sedated and intubated.   HENT:      Head: Normocephalic and atraumatic.   Cardiovascular:      Rate and Rhythm: Normal rate and regular rhythm.   Pulmonary:      Effort: He is intubated.      Breath sounds: Rhonchi present.   Neurological:      Comments: sedated             Review of Symptoms      Symptom Assessment (ESAS 0-10 Scale)  Pain:  0  Dyspnea:  0  Anxiety:  0  Nausea:  0  Depression:  0  Anorexia:  0  Fatigue:  0  Insomnia:  0  Restlessness:  0  Agitation:  0         Performance Status:  10    Living Arrangements:  Lives in nursing home    Psychosocial/Cultural:   See Palliative Psychosocial Note: Yes  ; 3 children- daughter Suzanne reported HCPOA  **Primary  to Follow**  Palliative Care  Consult: No      Advance Care Planning   Advance Directives:     Decision Making:  Family answered questions  Goals of Care: The family endorses that what is most important right now is to focus on curative/life-prolongation (regardless of treatment burdens)    Accordingly, we have decided that the best plan to meet the patient's goals includes continuing with treatment          PAINAD: NA    Caregiver burden formerly assessed: Yes        > 50% of 70 min of encounter was spent in chart review, face to face discussion of goals of care, symptom assessment, coordination of care and emotional support.         Lindsay Gonsales, P  Palliative Medicine  [facility]           [1]   Current Facility-Administered Medications:     acetaminophen tablet 650 mg, 650 mg, Per NG tube, Q6H PRN, Jose Antonio Arora MD, 650 mg at 07/19/25 1618    amLODIPine tablet 5 mg, 5 mg, Per NG tube, Daily, Julieta Cabrera MD, 5 mg at 07/19/25 0852    cefTRIAXone injection 1 g, 1 g, Intravenous, Q24H, Franklyn Peres MD, 1 g at 07/20/25 1101    dexmedetomidine (PRECEDEX) 400mcg/100mL 0.9% NaCL infusion, 0-1.4 mcg/kg/hr, Intravenous, Continuous, Bhavik Alanis Jr., MD, [facility], Stopped at 07/16/25 1332     dextrose 50% injection 12.5 g, 12.5 g, Intravenous, PRN, Eun Woodall MD    dextrose 50% injection 12.5 g, 12.5 g, Intravenous, PRN, Bhavik Alanis Jr., MD, [facility]    enoxaparin injection 40 mg, 40 mg, Subcutaneous, Q24H (prophylaxis, 1700), [facility] Jr., [facility], 40 mg at 07/20/25 1618    famotidine tablet 20 mg, 20 mg, Per NG tube, BID, Bhavik Alanis Jr., MD, [facility], 20 mg at 07/20/25 2013    fentaNYL injection 50 mcg, 50 mcg, Intravenous, Q3H PRN, Franklyn Peres MD, 50 mcg at 07/21/25 0234    glucagon (human recombinant) injection 1 mg, 1 mg, Intramuscular, PRN, Eun Woodall MD    glucagon (human recombinant) injection 1 mg, 1 mg, Intramuscular, PRN, [facility]ik [facility] Jr[facility]    hydrALAZINE injection 10 mg, 10 mg, Intravenous, Q6H PRN, Julieta Cabrera MD    insulin aspart U-100 injection 0-10 Units, 0-10 Units, Subcutaneous, Q6H PRN, Bhavik Alanis Jr., [facility], 1 Units at 07/21/25 0011    insulin glargine U-100 (Lantus) injection 30 Units, 30 Units, Subcutaneous, BID, Jose Antonio Arora MD, 30 Units at 07/20/25 2013    labetaloL injection 20 mg, 20 mg, Intravenous, Q6H PRN, Julieta Cabrera MD    lisinopriL tablet 20 mg, 20 mg, Per NG tube, Daily, Julieta Cabrera MD, 20 mg at 07/19/25 0852    NORepinephrine 8 mg in dextrose 5% 250 mL infusion, 0-3 mcg/kg/min (Dosing Weight), Intravenous, Continuous, Julieta Cabrera MD, Stopped at 07/19/25 2206    oxyCODONE immediate release tablet 5 mg, 5 mg, Per NG tube, Q6H PRN, Franklyn Peres MD    polyethylene glycol packet 17 g, 17 g, Per NG tube, BID, Julieta Cabrera MD, 17 g at 07/20/25 2013    propofol (DIPRIVAN) 10 mg/mL infusion, 0-50 mcg/kg/min (Dosing Weight), Intravenous, Continuous, Kari Macario MD, Last Rate: 34 mL/hr at 07/21/25 0436, 50 mcg/kg/min at 07/21/25 0436    senna-docusate 8.6-50 mg per tablet 2 tablet, 2 tablet, Per NG tube, Daily, Julieta Cabrera MD, 2 tablet at 07/17/25 1000    Flushing PICC/Midline Protocol,  , , Until Discontinued **AND** sodium chloride 0.9% flush 10 mL, 10 mL, Intravenous, Q12H PRN, Franklyn Peres MD    sodium chloride 3% nebulizer solution 4 mL, 4 mL, Nebulization, Q6H, Jose Antonio Arora MD, 4 mL at 07/21/25 9452

## 2025-07-21 NOTE — PROGRESS NOTES
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  He presents to ED per EMS 07/05/2025 after multiple falls at nursing home with reported generalized weakness.  He is reported as having a Benítez catheter placed about 1 week ago for urinary retention.  Patient was reported as having pulled this out himself while at nursing home, on a.m. of presentation.  Benítez catheter was placed in ED, returning 700 cc of urine.  While in ER, he was reported as becoming more somnolent with poor overall respiratory effort and decreasing level of consciousness.  ABG revealed acute respiratory acidosis.  He was intubated at 8:55 a.m., placed on mechanical ventilatory support.  CT brain obtained in ED reveals chronic microvascular ischemic changes with no acute intracranial abnormalities.  SARS-COVID-2 PCR negative, influenza A/B PCR negative.  He is now admitted to ICU for ongoing medical care.     Hospital Course:  07/05/2025: Intubation/mechanical ventilation   TTE (07/05/2025: Normal LV size with decreased LV SF, EF 45-50%; trace MR, mild TR and mild PI.  PASP estimated 30 mm Hg; no pericardial effusion  07/16.  Extubated  07/18.  Intubated      24hr Interval History:  I reviewed the EMR as this is my 1st encounter with this patient.  Failed extubation on 07/16.  He was originally admitted to ICU on 07/05 and intubated.  Apparently lives in a nursing home and is status post previous stroke and right hemiparesis.  Not sure of his baseline mental status.  Currently on propofol at 50 mics per kilos per minute.   Tolerating tube feedings at 55 cc/hour.  Previous CT head on 07/05/2025 shows encephalomalacia in the right frontal and parietal lobes left occipital lobe and right cerebellum.  In addition there were hypodensities in the subcortical white matter basal ganglia left thalamus and cerebellum likely representing chronic microvascular ischemic changes.  No acute abnormalities reported.  Patient is currently sedated on the ventilator.      Review of systems unobtainable due to intubation, sedation.      Scheduled Medications:   amLODIPine  5 mg Per NG tube Daily    cefTRIAXone (Rocephin) IV (PEDS and ADULTS)  1 g Intravenous Q24H    enoxparin  40 mg Subcutaneous Q24H (prophylaxis, 1700)    famotidine  20 mg Per NG tube BID    insulin glargine U-100  30 Units Subcutaneous BID    lisinopriL  20 mg Per NG tube Daily    polyethylene glycol  17 g Per NG tube BID    senna-docusate  2 tablet Per NG tube Daily    sodium chloride 3%  4 mL Nebulization Q6H     PRN Medications:    Current Facility-Administered Medications:     acetaminophen, 650 mg, Per NG tube, Q6H PRN    dextrose 50%, 12.5 g, Intravenous, PRN    dextrose 50%, 12.5 g, Intravenous, PRN    fentaNYL, 50 mcg, Intravenous, Q3H PRN    glucagon (human recombinant), 1 mg, Intramuscular, PRN    glucagon (human recombinant), 1 mg, Intramuscular, PRN    hydrALAZINE, 10 mg, Intravenous, Q6H PRN    insulin aspart U-100, 0-10 Units, Subcutaneous, Q6H PRN    labetalol, 20 mg, Intravenous, Q6H PRN    oxyCODONE, 5 mg, Per NG tube, Q6H PRN    Flushing PICC/Midline Protocol, , , Until Discontinued **AND** sodium chloride 0.9%, 10 mL, Intravenous, Q12H PRN  Continuous Infusions:   dexmedeTOMIDine (Precedex) infusion (titrating)  0-1.4 mcg/kg/hr Intravenous Continuous   Stopped at 07/16/25 1332    NORepinephrine bitartrate-D5W  0-3 mcg/kg/min (Dosing Weight) Intravenous Continuous   Stopped at 07/19/25 2206    propofoL  0-50 mcg/kg/min (Dosing Weight) Intravenous Continuous 34 mL/hr at  07/21/25 0436 50 mcg/kg/min at 07/21/25 0436       No past medical history on file.    No past surgical history on file.    Objective:     Input/output:    Intake/Output Summary (Last 24 hours) at 7/21/2025 0807  Last data filed at 7/21/2025 0611  Gross per 24 hour   Intake 2203.81 ml   Output 1000 ml   Net 1203.81 ml       Vital Signs (Most Recent):  Temp: 98.2 °F (36.8 °C) (07/21/25 0400)  Pulse: 71 (07/21/25 0750)  Resp: (!) 24 (07/21/25 0750)  BP: 107/63 (07/21/25 0600)  SpO2: 98 % (07/21/25 0750)  Body mass index is 38.72 kg/m².  Weight: 115.5 kg (254 lb 10.1 oz) Vital Signs (24h Range):  Temp:  [98.2 °F (36.8 °C)-99.7 °F (37.6 °C)] 98.2 °F (36.8 °C)  Pulse:  [58-87] 71  Resp:  [20-24] 24  SpO2:  [93 %-99 %] 98 %  BP: (100-123)/(58-70) 107/63       Physical exam:  Gen- intubated and mechanically ventilated.  Sedated..  In no distress.  HENT- ATNC, MMM  CV- RRR  Resp- bilateral rhonchi  Abd- distended, soft with no rebound or guarding, hypoactive bowel sounds   MSK- WWP, trace edema  Neuro- sedated.  Psych- calm but otherwise unable to assess 2/2 neurologic status      Lines/Drains/Airways       Peripherally Inserted Central Catheter Line  Duration             PICC Triple Lumen 07/19/25 1401 right brachial 1 day              Drain  Duration                  Urethral Catheter 07/05/25 0800 Non-latex 16 Fr. 16 days         NG/OG Tube 07/05/25 0848 15 days              Airway  Duration                  Airway - Non-Surgical 07/18/25 1721 Endotracheal Tube 2 days              Peripheral Intravenous Line  Duration             Peripheral IV 07/15/25 0040 20 G 2 1/4 in Anterior;Left Upper Arm 6 days    Peripheral IV Single Lumen 07/19/25 1000 20 G Anterior;Right Forearm 1 day                  Vent:  Vent Mode: A/C (07/21/25 0525)  Ventilator Initiated: Yes (07/18/25 1730)  Set Rate: 24 BPM (07/21/25 0525)  Vt Set: 480 mL (07/21/25 0525)  Pressure Support: (S) 5 cmH20 (decreased from 8; total PS 10) (07/16/25  1215)  PEEP/CPAP: 10 cmH20 (07/21/25 0525)  Oxygen Concentration (%): 40 (07/21/25 0525)  Peak Airway Pressure: 27 cmH20 (07/21/25 0525)  Total Ve: 8.3 L/m (07/21/25 0525)  F/VT Ratio<105 (RSBI): (!) 64.52 (07/21/25 0139)    ABGs:  Lab Results   Component Value Date    PH 7.380 07/18/2025    PO2 85.0 07/18/2025    PCO2 44.0 07/18/2025       Significant Labs:  Lab Results   Component Value Date    WBC 6.41 07/21/2025    HGB 10.8 (L) 07/21/2025    HCT 34.9 (L) 07/21/2025    MCV 90.6 07/21/2025     07/21/2025       Recent Labs   Lab 07/21/25  0302      K 4.4      CO2 22*   BUN 56.8*   CREATININE 1.10   CALCIUM 8.4*   MG 2.30   PHOS 3.1   AST 13   ALT 19   ALKPHOS 53   ALBUMIN 2.2*         Assessment:     Acute combined hypoxic and hypercapnic respiratory failure-possibly obesity/hypoventilation syndrome coupled with previous stroke and pneumonia.    History of cerebrovascular disease with right hemiparesis  Acute kidney injury superimposed on stage II chronic kidney disease, obstructive uropathy component.  He remains nonoliguric, daily improvement in BUN/creatinine   Type 2 diabetes mellitus with polyneuropathy,   Previous CVA with evidence of multiple areas of ischemia  Hypertension    Plan:     Continue on empiric Rocephin for possible aspiration of tube feeds  Continue  mechanical ventilation support  Wean sedation as tolerated.  Continue tube feedings.  We will likely need a tracheostomy if family wants to continue aggressive therapy.       DVT ppx: LMWH   GI ppx: famotidine       The patient remains at high risk of decompensation and death and will remain in ICU level care    42 min of critical care time was spent reviewing the patient's chart including medications, radiographs, labs, pertinent cultures and pathology data, other consultant notes/recomendations as well as titration of vasopressors, adjustment of mechanical ventilatory or NIPPV support, as well as discussion of goals of care with  nursing staff, respiratory therapy at the bedside and with family at the bedside/via phone.     JESSIKA Whittaker MD  Pulmonary/Critical Care

## 2025-07-21 NOTE — PLAN OF CARE
Resident at [facility] currently vented and on IV antibiotics. May require tracheostomy if family agrees.

## 2025-07-21 NOTE — PLAN OF CARE
Problem: Adult Inpatient Plan of Care  Goal: Plan of Care Review  Outcome: Progressing  Goal: Patient-Specific Goal (Individualized)  Outcome: Progressing  Goal: Absence of Hospital-Acquired Illness or Injury  Outcome: Progressing  Goal: Optimal Comfort and Wellbeing  Outcome: Progressing  Goal: Readiness for Transition of Care  Outcome: Progressing     Problem: Mechanical Ventilation Invasive  Goal: Effective Communication  Outcome: Not Progressing  Goal: Optimal Device Function  Outcome: Progressing  Goal: Mechanical Ventilation Liberation  Outcome: Progressing  Goal: Optimal Nutrition Delivery  Outcome: Progressing  Goal: Absence of Device-Related Skin and Tissue Injury  Outcome: Progressing  Goal: Absence of Ventilator-Induced Lung Injury  Outcome: Progressing     Problem: Skin Injury Risk Increased  Goal: Skin Health and Integrity  Outcome: Progressing     Problem: Fall Injury Risk  Goal: Absence of Fall and Fall-Related Injury  Outcome: Progressing     Problem: Delirium  Goal: Optimal Coping  Outcome: Progressing  Goal: Improved Behavioral Control  Outcome: Progressing  Goal: Improved Attention and Thought Clarity  Outcome: Progressing  Goal: Improved Sleep  Outcome: Progressing

## 2025-07-22 LAB
ALBUMIN SERPL-MCNC: 2.4 G/DL (ref 3.4–4.8)
ALBUMIN/GLOB SERPL: 0.5 RATIO (ref 1.1–2)
ALP SERPL-CCNC: 57 UNIT/L (ref 40–150)
ALT SERPL-CCNC: 25 UNIT/L (ref 0–55)
ANION GAP SERPL CALC-SCNC: 10 MEQ/L
AST SERPL-CCNC: 17 UNIT/L (ref 11–45)
BASOPHILS # BLD AUTO: 0.05 X10(3)/MCL
BASOPHILS NFR BLD AUTO: 0.7 %
BILIRUB SERPL-MCNC: 0.3 MG/DL
BUN SERPL-MCNC: 43.1 MG/DL (ref 8.4–25.7)
CALCIUM SERPL-MCNC: 8.7 MG/DL (ref 8.8–10)
CHLORIDE SERPL-SCNC: 106 MMOL/L (ref 98–107)
CO2 SERPL-SCNC: 22 MMOL/L (ref 23–31)
CREAT SERPL-MCNC: 0.97 MG/DL (ref 0.72–1.25)
CREAT/UREA NIT SERPL: 44
EOSINOPHIL # BLD AUTO: 0.24 X10(3)/MCL (ref 0–0.9)
EOSINOPHIL NFR BLD AUTO: 3.4 %
ERYTHROCYTE [DISTWIDTH] IN BLOOD BY AUTOMATED COUNT: 14.5 % (ref 11.5–17)
GFR SERPLBLD CREATININE-BSD FMLA CKD-EPI: >60 ML/MIN/1.73/M2
GLOBULIN SER-MCNC: 5.1 GM/DL (ref 2.4–3.5)
GLUCOSE SERPL-MCNC: 143 MG/DL (ref 82–115)
HCT VFR BLD AUTO: 38.5 % (ref 42–52)
HGB BLD-MCNC: 12.1 G/DL (ref 14–18)
IMM GRANULOCYTES # BLD AUTO: 0.02 X10(3)/MCL (ref 0–0.04)
IMM GRANULOCYTES NFR BLD AUTO: 0.3 %
LYMPHOCYTES # BLD AUTO: 1.66 X10(3)/MCL (ref 0.6–4.6)
LYMPHOCYTES NFR BLD AUTO: 23.2 %
MAGNESIUM SERPL-MCNC: 2 MG/DL (ref 1.6–2.6)
MCH RBC QN AUTO: 28.7 PG (ref 27–31)
MCHC RBC AUTO-ENTMCNC: 31.4 G/DL (ref 33–36)
MCV RBC AUTO: 91.2 FL (ref 80–94)
MONOCYTES # BLD AUTO: 0.56 X10(3)/MCL (ref 0.1–1.3)
MONOCYTES NFR BLD AUTO: 7.8 %
NEUTROPHILS # BLD AUTO: 4.61 X10(3)/MCL (ref 2.1–9.2)
NEUTROPHILS NFR BLD AUTO: 64.6 %
NRBC BLD AUTO-RTO: 0 %
PHOSPHATE SERPL-MCNC: 3.2 MG/DL (ref 2.3–4.7)
PLATELET # BLD AUTO: 233 X10(3)/MCL (ref 130–400)
PMV BLD AUTO: 11.4 FL (ref 7.4–10.4)
POCT GLUCOSE: 133 MG/DL (ref 70–110)
POCT GLUCOSE: 151 MG/DL (ref 70–110)
POCT GLUCOSE: 170 MG/DL (ref 70–110)
POCT GLUCOSE: 180 MG/DL (ref 70–110)
POTASSIUM SERPL-SCNC: 4.7 MMOL/L (ref 3.5–5.1)
PROT SERPL-MCNC: 7.5 GM/DL (ref 5.8–7.6)
RBC # BLD AUTO: 4.22 X10(6)/MCL (ref 4.7–6.1)
SODIUM SERPL-SCNC: 138 MMOL/L (ref 136–145)
WBC # BLD AUTO: 7.14 X10(3)/MCL (ref 4.5–11.5)

## 2025-07-22 PROCEDURE — 94640 AIRWAY INHALATION TREATMENT: CPT

## 2025-07-22 PROCEDURE — 94761 N-INVAS EAR/PLS OXIMETRY MLT: CPT

## 2025-07-22 PROCEDURE — 99900026 HC AIRWAY MAINTENANCE (STAT)

## 2025-07-22 PROCEDURE — 27100171 HC OXYGEN HIGH FLOW UP TO 24 HOURS

## 2025-07-22 PROCEDURE — 25000003 PHARM REV CODE 250: Performed by: INTERNAL MEDICINE

## 2025-07-22 PROCEDURE — 85025 COMPLETE CBC W/AUTO DIFF WBC: CPT

## 2025-07-22 PROCEDURE — 99900035 HC TECH TIME PER 15 MIN (STAT)

## 2025-07-22 PROCEDURE — 84100 ASSAY OF PHOSPHORUS: CPT

## 2025-07-22 PROCEDURE — 63600175 PHARM REV CODE 636 W HCPCS: Performed by: INTERNAL MEDICINE

## 2025-07-22 PROCEDURE — 94003 VENT MGMT INPAT SUBQ DAY: CPT

## 2025-07-22 PROCEDURE — 25000003 PHARM REV CODE 250

## 2025-07-22 PROCEDURE — 80053 COMPREHEN METABOLIC PANEL: CPT

## 2025-07-22 PROCEDURE — 36415 COLL VENOUS BLD VENIPUNCTURE: CPT

## 2025-07-22 PROCEDURE — 25000003 PHARM REV CODE 250: Performed by: HOSPITALIST

## 2025-07-22 PROCEDURE — 63600175 PHARM REV CODE 636 W HCPCS: Performed by: HOSPITALIST

## 2025-07-22 PROCEDURE — 25000242 PHARM REV CODE 250 ALT 637 W/ HCPCS: Performed by: INTERNAL MEDICINE

## 2025-07-22 PROCEDURE — 94760 N-INVAS EAR/PLS OXIMETRY 1: CPT

## 2025-07-22 PROCEDURE — 83735 ASSAY OF MAGNESIUM: CPT

## 2025-07-22 PROCEDURE — 99900031 HC PATIENT EDUCATION (STAT)

## 2025-07-22 PROCEDURE — 27200966 HC CLOSED SUCTION SYSTEM

## 2025-07-22 PROCEDURE — 20000000 HC ICU ROOM

## 2025-07-22 RX ORDER — GLYCOPYRROLATE 1 MG/1
1 TABLET ORAL 3 TIMES DAILY
Status: DISCONTINUED | OUTPATIENT
Start: 2025-07-22 | End: 2025-08-07 | Stop reason: HOSPADM

## 2025-07-22 RX ADMIN — GLYCOPYRROLATE 1 MG: 1 TABLET ORAL at 03:07

## 2025-07-22 RX ADMIN — SENNOSIDES, DOCUSATE SODIUM 2 TABLET: 8.6; 5 TABLET ORAL at 08:07

## 2025-07-22 RX ADMIN — FAMOTIDINE 20 MG: 20 TABLET, FILM COATED ORAL at 08:07

## 2025-07-22 RX ADMIN — OXYCODONE HYDROCHLORIDE 5 MG: 5 TABLET ORAL at 12:07

## 2025-07-22 RX ADMIN — INSULIN GLARGINE 30 UNITS: 100 INJECTION, SOLUTION SUBCUTANEOUS at 09:07

## 2025-07-22 RX ADMIN — SODIUM CHLORIDE SOLN NEBU 3% 4 ML: 3 NEBU SOLN at 12:07

## 2025-07-22 RX ADMIN — LISINOPRIL 20 MG: 20 TABLET ORAL at 08:07

## 2025-07-22 RX ADMIN — GLYCOPYRROLATE 1 MG: 1 TABLET ORAL at 09:07

## 2025-07-22 RX ADMIN — CEFTRIAXONE SODIUM 1 G: 1 INJECTION, POWDER, FOR SOLUTION INTRAMUSCULAR; INTRAVENOUS at 11:07

## 2025-07-22 RX ADMIN — SODIUM CHLORIDE SOLN NEBU 3% 4 ML: 3 NEBU SOLN at 08:07

## 2025-07-22 RX ADMIN — INSULIN GLARGINE 30 UNITS: 100 INJECTION, SOLUTION SUBCUTANEOUS at 08:07

## 2025-07-22 RX ADMIN — OXYCODONE HYDROCHLORIDE 5 MG: 5 TABLET ORAL at 06:07

## 2025-07-22 RX ADMIN — INSULIN ASPART 2 UNITS: 100 INJECTION, SOLUTION INTRAVENOUS; SUBCUTANEOUS at 06:07

## 2025-07-22 RX ADMIN — FAMOTIDINE 20 MG: 20 TABLET, FILM COATED ORAL at 09:07

## 2025-07-22 RX ADMIN — AMLODIPINE BESYLATE 5 MG: 5 TABLET ORAL at 08:07

## 2025-07-22 RX ADMIN — ENOXAPARIN SODIUM 40 MG: 40 INJECTION SUBCUTANEOUS at 03:07

## 2025-07-22 RX ADMIN — ACETAMINOPHEN 650 MG: 325 TABLET ORAL at 03:07

## 2025-07-22 RX ADMIN — SODIUM CHLORIDE SOLN NEBU 3% 4 ML: 3 NEBU SOLN at 02:07

## 2025-07-22 RX ADMIN — INSULIN ASPART 2 UNITS: 100 INJECTION, SOLUTION INTRAVENOUS; SUBCUTANEOUS at 05:07

## 2025-07-22 NOTE — NURSING
[facility] Patient's daughter Suzanne is the POA and has been updated and notified.

## 2025-07-22 NOTE — PLAN OF CARE
Problem: Infection  Goal: Absence of Infection Signs and Symptoms  Outcome: Progressing     Problem: Adult Inpatient Plan of Care  Goal: Plan of Care Review  Outcome: Progressing  Goal: Patient-Specific Goal (Individualized)  Outcome: Progressing  Goal: Absence of Hospital-Acquired Illness or Injury  Outcome: Progressing  Goal: Optimal Comfort and Wellbeing  Outcome: Progressing  Goal: Readiness for Transition of Care  Outcome: Progressing     Problem: Mechanical Ventilation Invasive  Goal: Effective Communication  Outcome: Not Progressing  Goal: Optimal Device Function  Outcome: Progressing  Goal: Mechanical Ventilation Liberation  Outcome: Progressing  Goal: Optimal Nutrition Delivery  Outcome: Progressing  Goal: Absence of Device-Related Skin and Tissue Injury  Outcome: Progressing  Goal: Absence of Ventilator-Induced Lung Injury  Outcome: Progressing     Problem: Artificial Airway  Goal: Effective Communication  Outcome: Not Progressing  Goal: Optimal Device Function  Outcome: Progressing  Goal: Absence of Device-Related Skin or Tissue Injury  Outcome: Progressing     Problem: Noninvasive Ventilation Acute  Goal: Effective Unassisted Ventilation and Oxygenation  Outcome: Progressing     Problem: Skin Injury Risk Increased  Goal: Skin Health and Integrity  Outcome: Progressing     Problem: Fall Injury Risk  Goal: Absence of Fall and Fall-Related Injury  Outcome: Progressing     Problem: Delirium  Goal: Optimal Coping  Outcome: Progressing  Goal: Improved Behavioral Control  Outcome: Progressing  Goal: Improved Attention and Thought Clarity  Outcome: Progressing  Goal: Improved Sleep  Outcome: Progressing     Problem: Bariatric Environmental Safety  Goal: Safety Maintained with Care  Outcome: Progressing     Problem: Hospitalized Older Adult  Goal: Effective Urinary Elimination  Outcome: Progressing

## 2025-07-22 NOTE — PROGRESS NOTES
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  He presents to ED per EMS 07/05/2025 after multiple falls at nursing home with reported generalized weakness.  He is reported as having a Benítez catheter placed about 1 week ago for urinary retention.  Patient was reported as having pulled this out himself while at nursing home, on a.m. of presentation.  Benítez catheter was placed in ED, returning 700 cc of urine.  While in ER, he was reported as becoming more somnolent with poor overall respiratory effort and decreasing level of consciousness.  ABG revealed acute respiratory acidosis.  He was intubated at 8:55 a.m., placed on mechanical ventilatory support.  CT brain obtained in ED reveals chronic microvascular ischemic changes with no acute intracranial abnormalities.  SARS-COVID-2 PCR negative, influenza A/B PCR negative.  He is now admitted to ICU for ongoing medical care.     Hospital Course:  07/05/2025: Intubation/mechanical ventilation   TTE (07/05/2025: Normal LV size with decreased LV SF, EF 45-50%; trace MR, mild TR and mild PI.  PASP estimated 30 mm Hg; no pericardial effusion  07/16.  Extubated  07/18.  Intubated      24hr Interval History:  Propofol weaned overnight and was stopped this morning at 5:00 a.m..  Patient opens his eyes and tracks but otherwise does not follow commands.  Remains on mechanical ventilation.  Vent settings: AC 24 peep 12 FiO2 50%.  Tolerating tube feedings well.  Intake 2707 output 2275      Review of systems unobtainable due to intubation,  sedation.      Scheduled Medications:   amLODIPine  5 mg Per NG tube Daily    cefTRIAXone (Rocephin) IV (PEDS and ADULTS)  1 g Intravenous Q24H    enoxparin  40 mg Subcutaneous Q24H (prophylaxis, 1700)    famotidine  20 mg Per NG tube BID    insulin glargine U-100  30 Units Subcutaneous BID    lisinopriL  20 mg Per NG tube Daily    polyethylene glycol  17 g Per NG tube BID    senna-docusate  2 tablet Per NG tube Daily    sodium chloride 3%  4 mL Nebulization Q6H     PRN Medications:    Current Facility-Administered Medications:     acetaminophen, 650 mg, Per NG tube, Q6H PRN    dextrose 50%, 12.5 g, Intravenous, PRN    dextrose 50%, 12.5 g, Intravenous, PRN    fentaNYL, 50 mcg, Intravenous, Q3H PRN    glucagon (human recombinant), 1 mg, Intramuscular, PRN    glucagon (human recombinant), 1 mg, Intramuscular, PRN    hydrALAZINE, 10 mg, Intravenous, Q6H PRN    insulin aspart U-100, 0-10 Units, Subcutaneous, Q6H PRN    labetalol, 20 mg, Intravenous, Q6H PRN    oxyCODONE, 5 mg, Per NG tube, Q6H PRN    Flushing PICC/Midline Protocol, , , Until Discontinued **AND** sodium chloride 0.9%, 10 mL, Intravenous, Q12H PRN  Continuous Infusions:   dexmedeTOMIDine (Precedex) infusion (titrating)  0-1.4 mcg/kg/hr Intravenous Continuous   Stopped at 07/16/25 1332    NORepinephrine bitartrate-D5W  0-3 mcg/kg/min (Dosing Weight) Intravenous Continuous   Stopped at 07/19/25 2206    propofoL  0-50 mcg/kg/min (Dosing Weight) Intravenous Continuous 6.9 mL/hr at 07/22/25 0517 10 mcg/kg/min at 07/22/25 0517       No past medical history on file.    No past surgical history on file.    Objective:     Input/output:    Intake/Output Summary (Last 24 hours) at 7/22/2025 0748  Last data filed at 7/22/2025 0552  Gross per 24 hour   Intake 2706.71 ml   Output 2275 ml   Net 431.71 ml       Vital Signs (Most Recent):  Temp: 99 °F (37.2 °C) (07/22/25 0400)  Pulse: 93 (07/22/25 0506)  Resp: (!) 32 (07/22/25 0506)  BP: (!) 146/99 (07/22/25 0506)  SpO2:  (!) 94 % (07/22/25 0506)  Body mass index is 38.72 kg/m².  Weight: 115.5 kg (254 lb 10.1 oz) Vital Signs (24h Range):  Temp:  [98.3 °F (36.8 °C)-99.3 °F (37.4 °C)] 99 °F (37.2 °C)  Pulse:  [67-96] 93  Resp:  [12-32] 32  SpO2:  [87 %-98 %] 94 %  BP: (112-159)/(61-99) 146/99       Physical exam:  Gen- intubated and mechanically ventilated.  Sedated..  In no distress.  HENT- ATNC, MMM  CV- RRR  Resp- bilateral rhonchi  Abd- distended, soft with no rebound or guarding, hypoactive bowel sounds   MSK- WWP, trace edema  Neuro- sedated.  Psych- calm but otherwise unable to assess 2/2 neurologic status      Lines/Drains/Airways       Peripherally Inserted Central Catheter Line  Duration             PICC Triple Lumen 07/19/25 1401 right brachial 2 days              Drain  Duration                  NG/OG Tube 07/05/25 0848 16 days         Urethral Catheter 07/05/25 0800 Non-latex 16 Fr. 16 days              Airway  Duration                  Airway - Non-Surgical 07/18/25 1721 Endotracheal Tube 3 days              Peripheral Intravenous Line  Duration             Peripheral IV 07/15/25 0040 20 G 2 1/4 in Anterior;Left Upper Arm 7 days    Peripheral IV Single Lumen 07/19/25 1000 20 G Anterior;Right Forearm 2 days                  Vent:  Vent Mode: A/C (07/22/25 0506)  Ventilator Initiated: Yes (07/18/25 1730)  Set Rate: 24 BPM (07/22/25 0506)  Vt Set: 480 mL (07/22/25 0506)  Pressure Support: (S) 5 cmH20 (decreased from 8; total PS 10) (07/16/25 1215)  PEEP/CPAP: 12 cmH20 (07/22/25 0506)  Oxygen Concentration (%): 50 (07/22/25 0506)  Peak Airway Pressure: 23 cmH20 (07/22/25 0506)  Total Ve: 12.7 L/m (07/22/25 0506)  F/VT Ratio<105 (RSBI): (!) 81.01 (07/22/25 0506)    ABGs:  Lab Results   Component Value Date    PH 7.380 07/18/2025    PO2 85.0 07/18/2025    PCO2 44.0 07/18/2025       Significant Labs:  Lab Results   Component Value Date    WBC 7.14 07/22/2025    HGB 12.1 (L) 07/22/2025    HCT 38.5 (L) 07/22/2025    MCV 91.2  07/22/2025     07/22/2025       Recent Labs   Lab 07/22/25  0340      K 4.7      CO2 22*   BUN 43.1*   CREATININE 0.97   CALCIUM 8.7*   MG 2.00   PHOS 3.2   AST 17   ALT 25   ALKPHOS 57   ALBUMIN 2.4*         Assessment:     Acute combined hypoxic and hypercapnic respiratory failure-possibly obesity/hypoventilation syndrome coupled with previous stroke and pneumonia.    History of cerebrovascular disease with right hemiparesis  Acute kidney injury superimposed on stage II chronic kidney disease, obstructive uropathy component.  He remains nonoliguric, daily improvement in BUN/creatinine   Type 2 diabetes mellitus with polyneuropathy,   Previous CVA with evidence of multiple areas of ischemia  Hypertension    Plan:     Continue on empiric Rocephin for possible aspiration of tube feeds  Continue  mechanical ventilation support  Continue tube feedings.  Per palliative care patient's daughter does not think he would want a trach and PEG.  Does not appear ready for additional ventilator weaning at present.  We will continue to monitor to see if he wakes up more since propofol is now off.       DVT ppx: LMWH   GI ppx: famotidine       The patient remains at high risk of decompensation and death and will remain in ICU level care    40 min of critical care time was spent reviewing the patient's chart including medications, radiographs, labs, pertinent cultures and pathology data, other consultant notes/recomendations as well as titration of vasopressors, adjustment of mechanical ventilatory or NIPPV support, as well as discussion of goals of care with nursing staff, respiratory therapy at the bedside and with family at the bedside/via phone.     JESSIKA Whittaker MD  Pulmonary/Critical Care

## 2025-07-22 NOTE — PROGRESS NOTES
Inpatient Nutrition Assessment    Admit Date: 7/5/2025   Total duration of encounter: 17 days   Patient Age: 69 y.o.    Nutrition Recommendation/Prescription     Resume tube feeding when feasible:  Peptamen Intense VHP goal rate 55 ml/hr   [id number] two times daily  to provide  1260 kcal 100% needs  142 g protein 101% needs  83 g CHO 59% needs  924 ml water 41% needs, 67% with current 30 ml/hr water flushes  calculations based on estimated 20 hour run time     Medical management of hyperglycemia.     Communication of Recommendations: reviewed with nurse    Nutrition Assessment     Malnutrition Assessment/Nutrition-Focused Physical Exam       Malnutrition Level: other (see comments) (Does not meet criteria) (07/07/25 1041)                                                        A minimum of two characteristics is recommended for diagnosis of either severe or non-severe malnutrition.    Chart Review    Reason Seen: follow-up    Malnutrition Screening Tool Results   Have you recently lost weight without trying?: No  Have you been eating poorly because of a decreased appetite?: No   MST Score: 0   Diagnosis:  Acute combined hypoxic and hypercapnic respiratory failure  History of cerebrovascular disease with right hemiparesis  Acute kidney injury superimposed on stage II chronic kidney disease, obstructive uropathy component.  He remains nonoliguric, daily improvement in BUN/creatinine   Type 2 diabetes mellitus with polyneuropathy, hyperglycemia improved.  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, glaucoma, hypertension, and hyperlipidemia     Scheduled Medications:  amLODIPine, 5 mg, Daily  cefTRIAXone (Rocephin) IV (PEDS and ADULTS), 1 g, Q24H  enoxparin, 40 mg, Q24H (prophylaxis, 1700)  famotidine, 20 mg, BID  insulin glargine U-100, 30  Units, BID  lisinopriL, 20 mg, Daily  polyethylene glycol, 17 g, BID  senna-docusate, 2 tablet, Daily  sodium chloride 3%, 4 mL, Q6H    Continuous Infusions:  dexmedeTOMIDine (Precedex) infusion (titrating), Last Rate: Stopped (07/16/25 1332)  NORepinephrine bitartrate-D5W, Last Rate: Stopped (07/19/25 2206)  propofoL, Last Rate: 10 mcg/kg/min (07/22/25 0517)    PRN Medications:  acetaminophen, 650 mg, Q6H PRN  dextrose 50%, 12.5 g, PRN  dextrose 50%, 12.5 g, PRN  fentaNYL, 50 mcg, Q3H PRN  glucagon (human recombinant), 1 mg, PRN  glucagon (human recombinant), 1 mg, PRN  hydrALAZINE, 10 mg, Q6H PRN  insulin aspart U-100, 0-10 Units, Q6H PRN  labetalol, 20 mg, Q6H PRN  oxyCODONE, 5 mg, Q6H PRN  sodium chloride 0.9%, 10 mL, Q12H PRN    Calorie Containing IV Medications: no significant kcals from medications at this time    Recent Labs   Lab 07/16/25  0249 07/17/25  0238 07/18/25  0507 07/18/25  0631 07/19/25  0022 07/19/25  0319 07/20/25  0321 07/21/25  0302 07/22/25  0340    139  --  140 141 141 139 138 138   K 4.8 4.8  --  5.1 4.9 5.0 4.1 4.4 4.7   CALCIUM 8.8 8.8  --  8.7* 8.5* 8.4* 8.3* 8.4* 8.7*   PHOS 3.1 3.1  --  2.6  --  2.5 2.3 3.1 3.2   MG 1.80 1.80  --  2.10  --  2.20 2.40 2.30 2.00    106  --  106 107 107 108* 107 106   CO2 22* 22*  --  21* 23 23 23 22* 22*   BUN 44.5* 32.9*  --  32.6* 43.3* 47.3* 57.8* 56.8* 43.1*   CREATININE 1.00 1.00  --  1.12 1.77* 1.59* 1.30* 1.10 0.97   EGFRNORACEVR >60 >60  --  >60 42 47 60 >60 >60    82  --  199* 203* 188* 188* 145* 143*   BILITOT 0.8 0.8  --  0.8  --  0.5 0.3 0.2 0.3   ALKPHOS 57 64  --  79  --  57 53 53 57   ALT 37 38  --  33  --  24 22 19 25   AST 19 24  --  21  --  13 14 13 17   ALBUMIN 2.3* 2.4*  --  2.5*  --  2.3* 2.2* 2.2* 2.4*   WBC 9.92 10.62 15.12*  --   --  11.72* 7.33 6.41 7.14   HGB 12.8* 12.5* 12.8*  --   --  11.5* 11.1* 10.8* 12.1*   HCT 40.8* 41.0* 40.7*  --   --  37.0* 35.3* 34.9* 38.5*     Nutrition Orders:  No diet orders on  "file  Tube Feedings/Formulas 55; 1,100; Peptamen Intense VHP; OG; 30; Every hour,Tube Feedings/Formulas Other (see comments); OG; ProSource TF20; 2 times daily    Appetite/Oral Intake: NPO/NPO  Factors Affecting Nutritional Intake: NPO and on mechanical ventilation  Social Needs Impacting Access to Food: none identified; NH resident   Food/Rastafari/Cultural Preferences: unable to obtain  Food Allergies: no known food allergies  Last Bowel Movement: 07/21/25  Wound(s):  no partial or full thickness pressure injuries documented at this time.     Comments    7/7/25: Pt intubated on mechanical ventilation, receiving kcals from diprivan. Not currently receiving enteral nutrition. Tube feeding orders updated.     7/11/25 Patient remains on ventilator with propofol, tube feeding at previous goal rate; recommend decreasing tube feeding rate due to overfeeding with propofol currently, will need to increase protein modular.    7/15/25: Patient remains intubated, no longer receiving kcals from propofol. TF put on hold this morning d/t worsening abdominal distention this morning. TF regimen updated for once able to resume feeds.     7/18/25 Tube feeding held currently secondary to suspected aspiration, possibly resume today.    7/22/25 Patient remains intubated, not currently receiving kcals from propofol. Tolerating tube feedings at goal rate.    Anthropometrics    Height: 5' 8" (172.7 cm), Height Method: Estimated  Last Weight: 115.5 kg (254 lb 10.1 oz) (07/18/25 0600), Weight Method: Bed Scale  BMI (Calculated): 38.7  BMI Classification: obese grade II (BMI 35-39.9)        Ideal Body Weight (IBW), Male: 154 lb     % Ideal Body Weight, Male (lb): 175.08 %                          Usual Weight Provided By: EMR weight history    Wt Readings from Last 5 Encounters:   07/18/25 115.5 kg (254 lb 10.1 oz)     Weight Change(s) Since Admission:   Wt Readings from Last 1 Encounters:   07/18/25 0600 115.5 kg (254 lb 10.1 oz)   07/13/25 " 0800 122.3 kg (269 lb 10 oz)   07/05/25 0605 113.4 kg (250 lb)   Admit Weight: 113.4 kg (250 lb) (07/05/25 0605), Weight Method: Stated    Estimated Needs    Weight Used For Calorie Calculations: 113.4 kg (250 lb)  Energy Calorie Requirements (kcal): 8609-1280 kcals (11-14 kcal/kg)  Energy Need Method: Kcal/kg  Weight Used For Protein Calculations: 70 kg (154 lb 5.2 oz) (IBW)  Protein Requirements: 140 g (2 g/kg IBW)  Fluid Requirements (mL): 2268 mL (20 mL/kg)  CHO Requirement: 140-178 g/day (45% of total EEN)     Enteral Nutrition   Formula: Peptamen Intense VHP  Rate/Volume: 50ml/hr  Water Flushes: 30 ml/hr  Additives/Modulars: Prosource TF20 BID  Route: orogastric tube  Method: continuous  Total Nutrition Provided by Tube Feeding, Additives, and Flushes:  Calories Provided  1260 kcal/d, 100% needs   Protein Provided  142 g/d, 101% needs   Fluid Provided  1524 ml/d, 67% needs   Continuous feeding calculations based on estimated 20 hr/d run time unless otherwise stated.     Parenteral Nutrition Patient not receiving parenteral nutrition support at this time.    Evaluation of Received Nutrient Intake    Calories: meeting estimated needs  Protein: meeting estimated needs    Patient Education Not applicable.    Nutrition Diagnosis     PES: Inadequate energy intake related to inability to consume sufficient nutrients as evidenced by on mechanical ventilation, need for NPO status. (resolved)   PES: Inadequate oral intake related to acute illness as evidenced by on mechanical ventilation since admit. (new)   PES: N/A            Nutrition Interventions     Intervention(s): Enteral nutrition management  Intervention(s):      Goal: Meet greater than 80% of nutritional needs by follow-up. (goal met)  Goal: Tolerate enteral feeding at goal rate by follow-up. (goal met)    Nutrition Goals & Monitoring     Dietitian will monitor: energy intake, enteral nutrition intake, weight, electrolyte/renal panel, glucose/endocrine  profile, and gastrointestinal profile  Discharge planning: too early to determine; pending clinical course  Nutrition Risk/Follow-Up: patient at increased nutrition risk; dietitian will follow-up twice weekly   Please consult if re-assessment needed sooner.

## 2025-07-23 LAB
ALBUMIN SERPL-MCNC: 2.4 G/DL (ref 3.4–4.8)
ALBUMIN/GLOB SERPL: 0.5 RATIO (ref 1.1–2)
ALP SERPL-CCNC: 58 UNIT/L (ref 40–150)
ALT SERPL-CCNC: 26 UNIT/L (ref 0–55)
ANION GAP SERPL CALC-SCNC: 8 MEQ/L
AST SERPL-CCNC: 20 UNIT/L (ref 11–45)
BASOPHILS # BLD AUTO: 0.04 X10(3)/MCL
BASOPHILS NFR BLD AUTO: 0.5 %
BILIRUB SERPL-MCNC: 0.4 MG/DL
BUN SERPL-MCNC: 34.2 MG/DL (ref 8.4–25.7)
CALCIUM SERPL-MCNC: 9.1 MG/DL (ref 8.8–10)
CHLORIDE SERPL-SCNC: 104 MMOL/L (ref 98–107)
CO2 SERPL-SCNC: 24 MMOL/L (ref 23–31)
CREAT SERPL-MCNC: 0.88 MG/DL (ref 0.72–1.25)
CREAT/UREA NIT SERPL: 39
EOSINOPHIL # BLD AUTO: 0.27 X10(3)/MCL (ref 0–0.9)
EOSINOPHIL NFR BLD AUTO: 3.3 %
ERYTHROCYTE [DISTWIDTH] IN BLOOD BY AUTOMATED COUNT: 14.3 % (ref 11.5–17)
GFR SERPLBLD CREATININE-BSD FMLA CKD-EPI: >60 ML/MIN/1.73/M2
GLOBULIN SER-MCNC: 5.1 GM/DL (ref 2.4–3.5)
GLUCOSE SERPL-MCNC: 166 MG/DL (ref 82–115)
HCT VFR BLD AUTO: 37.4 % (ref 42–52)
HGB BLD-MCNC: 11.9 G/DL (ref 14–18)
IMM GRANULOCYTES # BLD AUTO: 0.02 X10(3)/MCL (ref 0–0.04)
IMM GRANULOCYTES NFR BLD AUTO: 0.2 %
LYMPHOCYTES # BLD AUTO: 1.63 X10(3)/MCL (ref 0.6–4.6)
LYMPHOCYTES NFR BLD AUTO: 20.1 %
MAGNESIUM SERPL-MCNC: 1.6 MG/DL (ref 1.6–2.6)
MCH RBC QN AUTO: 28.4 PG (ref 27–31)
MCHC RBC AUTO-ENTMCNC: 31.8 G/DL (ref 33–36)
MCV RBC AUTO: 89.3 FL (ref 80–94)
MONOCYTES # BLD AUTO: 0.69 X10(3)/MCL (ref 0.1–1.3)
MONOCYTES NFR BLD AUTO: 8.5 %
NEUTROPHILS # BLD AUTO: 5.47 X10(3)/MCL (ref 2.1–9.2)
NEUTROPHILS NFR BLD AUTO: 67.4 %
NRBC BLD AUTO-RTO: 0 %
PHOSPHATE SERPL-MCNC: 2.8 MG/DL (ref 2.3–4.7)
PLATELET # BLD AUTO: 220 X10(3)/MCL (ref 130–400)
PMV BLD AUTO: 10.5 FL (ref 7.4–10.4)
POCT GLUCOSE: 154 MG/DL (ref 70–110)
POCT GLUCOSE: 163 MG/DL (ref 70–110)
POCT GLUCOSE: 166 MG/DL (ref 70–110)
POCT GLUCOSE: 173 MG/DL (ref 70–110)
POCT GLUCOSE: 224 MG/DL (ref 70–110)
POCT GLUCOSE: 234 MG/DL (ref 70–110)
POTASSIUM SERPL-SCNC: 4.8 MMOL/L (ref 3.5–5.1)
PROT SERPL-MCNC: 7.5 GM/DL (ref 5.8–7.6)
RBC # BLD AUTO: 4.19 X10(6)/MCL (ref 4.7–6.1)
SODIUM SERPL-SCNC: 136 MMOL/L (ref 136–145)
WBC # BLD AUTO: 8.12 X10(3)/MCL (ref 4.5–11.5)

## 2025-07-23 PROCEDURE — 99232 SBSQ HOSP IP/OBS MODERATE 35: CPT | Mod: ,,,

## 2025-07-23 PROCEDURE — 94760 N-INVAS EAR/PLS OXIMETRY 1: CPT

## 2025-07-23 PROCEDURE — 63600175 PHARM REV CODE 636 W HCPCS: Performed by: INTERNAL MEDICINE

## 2025-07-23 PROCEDURE — 85025 COMPLETE CBC W/AUTO DIFF WBC: CPT

## 2025-07-23 PROCEDURE — 20000000 HC ICU ROOM

## 2025-07-23 PROCEDURE — 80053 COMPREHEN METABOLIC PANEL: CPT

## 2025-07-23 PROCEDURE — 27100171 HC OXYGEN HIGH FLOW UP TO 24 HOURS

## 2025-07-23 PROCEDURE — 94761 N-INVAS EAR/PLS OXIMETRY MLT: CPT

## 2025-07-23 PROCEDURE — 94003 VENT MGMT INPAT SUBQ DAY: CPT

## 2025-07-23 PROCEDURE — 36415 COLL VENOUS BLD VENIPUNCTURE: CPT

## 2025-07-23 PROCEDURE — 94640 AIRWAY INHALATION TREATMENT: CPT

## 2025-07-23 PROCEDURE — 84100 ASSAY OF PHOSPHORUS: CPT

## 2025-07-23 PROCEDURE — 99900026 HC AIRWAY MAINTENANCE (STAT)

## 2025-07-23 PROCEDURE — 25000242 PHARM REV CODE 250 ALT 637 W/ HCPCS: Performed by: INTERNAL MEDICINE

## 2025-07-23 PROCEDURE — 25000003 PHARM REV CODE 250: Performed by: INTERNAL MEDICINE

## 2025-07-23 PROCEDURE — 25000003 PHARM REV CODE 250

## 2025-07-23 PROCEDURE — 99900035 HC TECH TIME PER 15 MIN (STAT)

## 2025-07-23 PROCEDURE — 83735 ASSAY OF MAGNESIUM: CPT

## 2025-07-23 PROCEDURE — 63600175 PHARM REV CODE 636 W HCPCS: Performed by: HOSPITALIST

## 2025-07-23 PROCEDURE — 99900031 HC PATIENT EDUCATION (STAT)

## 2025-07-23 RX ADMIN — SODIUM CHLORIDE SOLN NEBU 3% 4 ML: 3 NEBU SOLN at 09:07

## 2025-07-23 RX ADMIN — GLYCOPYRROLATE 1 MG: 1 TABLET ORAL at 08:07

## 2025-07-23 RX ADMIN — INSULIN ASPART 4 UNITS: 100 INJECTION, SOLUTION INTRAVENOUS; SUBCUTANEOUS at 06:07

## 2025-07-23 RX ADMIN — INSULIN GLARGINE 30 UNITS: 100 INJECTION, SOLUTION SUBCUTANEOUS at 08:07

## 2025-07-23 RX ADMIN — ENOXAPARIN SODIUM 40 MG: 40 INJECTION SUBCUTANEOUS at 05:07

## 2025-07-23 RX ADMIN — DEXMEDETOMIDINE HYDROCHLORIDE 1.2 MCG/KG/HR: 4 INJECTION, SOLUTION INTRAVENOUS at 07:07

## 2025-07-23 RX ADMIN — FAMOTIDINE 20 MG: 20 TABLET, FILM COATED ORAL at 08:07

## 2025-07-23 RX ADMIN — INSULIN ASPART 2 UNITS: 100 INJECTION, SOLUTION INTRAVENOUS; SUBCUTANEOUS at 06:07

## 2025-07-23 RX ADMIN — DEXMEDETOMIDINE HYDROCHLORIDE 1 MCG/KG/HR: 4 INJECTION, SOLUTION INTRAVENOUS at 08:07

## 2025-07-23 RX ADMIN — DEXMEDETOMIDINE HYDROCHLORIDE 0.8 MCG/KG/HR: 4 INJECTION, SOLUTION INTRAVENOUS at 11:07

## 2025-07-23 RX ADMIN — LISINOPRIL 20 MG: 20 TABLET ORAL at 08:07

## 2025-07-23 RX ADMIN — AMLODIPINE BESYLATE 5 MG: 5 TABLET ORAL at 08:07

## 2025-07-23 RX ADMIN — FENTANYL CITRATE 50 MCG: 50 INJECTION INTRAMUSCULAR; INTRAVENOUS at 01:07

## 2025-07-23 RX ADMIN — INSULIN ASPART 4 UNITS: 100 INJECTION, SOLUTION INTRAVENOUS; SUBCUTANEOUS at 03:07

## 2025-07-23 RX ADMIN — CEFTRIAXONE SODIUM 1 G: 1 INJECTION, POWDER, FOR SOLUTION INTRAMUSCULAR; INTRAVENOUS at 11:07

## 2025-07-23 RX ADMIN — GLYCOPYRROLATE 1 MG: 1 TABLET ORAL at 02:07

## 2025-07-23 RX ADMIN — SODIUM CHLORIDE SOLN NEBU 3% 4 ML: 3 NEBU SOLN at 02:07

## 2025-07-23 NOTE — PLAN OF CARE
Problem: Infection  Goal: Absence of Infection Signs and Symptoms  Outcome: Progressing     Problem: Adult Inpatient Plan of Care  Goal: Plan of Care Review  Outcome: Progressing  Goal: Patient-Specific Goal (Individualized)  Outcome: Progressing  Goal: Absence of Hospital-Acquired Illness or Injury  Outcome: Progressing  Goal: Optimal Comfort and Wellbeing  Outcome: Progressing  Goal: Readiness for Transition of Care  Outcome: Progressing     Problem: Mechanical Ventilation Invasive  Goal: Effective Communication  Outcome: Not Progressing  Goal: Optimal Device Function  Outcome: Progressing  Goal: Mechanical Ventilation Liberation  Outcome: Progressing  Goal: Optimal Nutrition Delivery  Outcome: Progressing  Goal: Absence of Device-Related Skin and Tissue Injury  Outcome: Progressing  Goal: Absence of Ventilator-Induced Lung Injury  Outcome: Progressing     Problem: Noninvasive Ventilation Acute  Goal: Effective Unassisted Ventilation and Oxygenation  Outcome: Progressing     Problem: Hospitalized Older Adult  Goal: Optimal Cognitive Function  Outcome: Progressing  Goal: Effective Bowel Elimination  Outcome: Progressing  Goal: Optimal Coping  Outcome: Progressing  Goal: Fluid and Electrolyte Balance  Outcome: Progressing  Goal: Optimal Functional Ability  Outcome: Progressing  Goal: Improved Oral Intake  Outcome: Progressing  Goal: Effective Urinary Elimination  Outcome: Progressing

## 2025-07-23 NOTE — PLAN OF CARE
Resident at [facility], currently vented at this time. Care team will attempt to wean today. Plan to continue Rocephin for 7 days. Palliative care following patient, discussing goals of care with family.

## 2025-07-23 NOTE — PROGRESS NOTES
[facility] - 7th Floor ICU  Pulmonary/Critical Care  Progress Note  7/23/2025    Patient Name: Charly Padilla  MRN: [id number]  Admission Date: 7/5/2025  Code Status: No Order      Subjective:     HPI:  The patient is a 69-year-old male nursing home resident with a history of type 2 diabetes mellitus with polyneuropathy, stage II chronic kidney disease, cerebrovascular disease with previous stroke and right hemiparesis, glaucoma, hypertension, and hyperlipidemia.  He presents to ED per EMS 07/05/2025 after multiple falls at nursing home with reported generalized weakness.  He is reported as having a Benítez catheter placed about 1 week ago for urinary retention.  Patient was reported as having pulled this out himself while at nursing home, on a.m. of presentation.  Benítez catheter was placed in ED, returning 700 cc of urine.  While in ER, he was reported as becoming more somnolent with poor overall respiratory effort and decreasing level of consciousness.  ABG revealed acute respiratory acidosis.  He was intubated at 8:55 a.m., placed on mechanical ventilatory support.  CT brain obtained in ED reveals chronic microvascular ischemic changes with no acute intracranial abnormalities.  SARS-COVID-2 PCR negative, influenza A/B PCR negative.  He is now admitted to ICU for ongoing medical care.     Hospital Course:  07/05/2025: Intubation/mechanical ventilation   TTE (07/05/2025: Normal LV size with decreased LV SF, EF 45-50%; trace MR, mild TR and mild PI.  PASP estimated 30 mm Hg; no pericardial effusion  07/16.  Extubated  07/18.  Intubated      24hr Interval History:  More awake today.  Moving left side spontaneously.  Oxygenation improved.  Remains on ventilator assist control 24 peep 10 FiO2 50%.  Intake 1822 output 2000.  Rocephin day 5-plan to continue for 7 days due to aspiration pneumonia      Review of systems unobtainable due to intubation, sedation.      Scheduled Medications:   amLODIPine  5 mg Per NG  tube Daily    cefTRIAXone (Rocephin) IV (PEDS and ADULTS)  1 g Intravenous Q24H    enoxparin  40 mg Subcutaneous Q24H (prophylaxis, 1700)    famotidine  20 mg Per NG tube BID    glycopyrrolate  1 mg Per OG tube TID    insulin glargine U-100  30 Units Subcutaneous BID    lisinopriL  20 mg Per NG tube Daily    polyethylene glycol  17 g Per NG tube BID    senna-docusate  2 tablet Per NG tube Daily    sodium chloride 3%  4 mL Nebulization Q6H     PRN Medications:    Current Facility-Administered Medications:     acetaminophen, 650 mg, Per NG tube, Q6H PRN    dextrose 50%, 12.5 g, Intravenous, PRN    dextrose 50%, 12.5 g, Intravenous, PRN    fentaNYL, 50 mcg, Intravenous, Q3H PRN    glucagon (human recombinant), 1 mg, Intramuscular, PRN    glucagon (human recombinant), 1 mg, Intramuscular, PRN    hydrALAZINE, 10 mg, Intravenous, Q6H PRN    insulin aspart U-100, 0-10 Units, Subcutaneous, Q6H PRN    labetalol, 20 mg, Intravenous, Q6H PRN    oxyCODONE, 5 mg, Per NG tube, Q6H PRN    Flushing PICC/Midline Protocol, , , Until Discontinued **AND** sodium chloride 0.9%, 10 mL, Intravenous, Q12H PRN  Continuous Infusions:   dexmedeTOMIDine (Precedex) infusion (titrating)  0-1.4 mcg/kg/hr Intravenous Continuous   Stopped at 07/16/25 1332    NORepinephrine bitartrate-D5W  0-3 mcg/kg/min (Dosing Weight) Intravenous Continuous   Stopped at 07/19/25 2206    propofoL  0-50 mcg/kg/min (Dosing Weight) Intravenous Continuous   Stopped at 07/22/25 0556       No past medical history on file.    No past surgical history on file.    Objective:     Input/output:    Intake/Output Summary (Last 24 hours) at 7/23/2025 0745  Last data filed at 7/23/2025 0600  Gross per 24 hour   Intake 1822.52 ml   Output 2000 ml   Net -177.48 ml       Vital Signs (Most Recent):  Temp: 99.4 °F (37.4 °C) (07/23/25 0400)  Pulse: 96 (07/23/25 0600)  Resp: (!) 23 (07/23/25 0600)  BP: (!) 141/85 (07/23/25 0600)  SpO2: 96 % (07/23/25 0600)  Body mass index is 38.72  kg/m².  Weight: 115.5 kg (254 lb 10.1 oz) Vital Signs (24h Range):  Temp:  [98.5 °F (36.9 °C)-99.4 °F (37.4 °C)] 99.4 °F (37.4 °C)  Pulse:  [] 96  Resp:  [14-36] 23  SpO2:  [85 %-100 %] 96 %  BP: (110-162)/(70-94) 141/85       Physical exam:  Gen- intubated and mechanically ventilated.   In no distress.  HENT- ATNC, MMM  CV- RRR  Resp- bilateral rhonchi  Abd- distended, soft with no rebound or guarding, hypoactive bowel sounds   MSK- WWP, trace edema  Neuro- awake this morning moving left side spontaneously.  He does focus on me when I speak to him but does not follow commands.  Psych- calm but otherwise unable to assess 2/2 neurologic status      Lines/Drains/Airways       Drain  Duration                  NG/OG Tube 07/05/25 0848 17 days         Urethral Catheter 07/05/25 0800 Non-latex 16 Fr. 17 days              Airway  Duration                  Airway - Non-Surgical 07/18/25 1721 Endotracheal Tube 4 days              Peripheral Intravenous Line  Duration             Peripheral IV 07/15/25 0040 20 G 2 1/4 in Anterior;Left Upper Arm 8 days    Peripheral IV Single Lumen 07/19/25 1000 20 G Anterior;Right Forearm 3 days                  Vent:  Vent Mode: A/C (07/23/25 0559)  Ventilator Initiated: Yes (07/18/25 1730)  Set Rate: 24 BPM (07/23/25 0559)  Vt Set: 480 mL (07/23/25 0559)  Pressure Support: (S) 5 cmH20 (decreased from 8; total PS 10) (07/16/25 1215)  PEEP/CPAP: 10 cmH20 (07/23/25 0559)  Oxygen Concentration (%): 50 (07/23/25 0559)  Peak Airway Pressure: 20 cmH20 (07/23/25 0559)  Total Ve: 9.5 L/m (07/23/25 0559)  F/VT Ratio<105 (RSBI): (!) 69.7 (07/23/25 0559)    ABGs:  Lab Results   Component Value Date    PH 7.380 07/18/2025    PO2 85.0 07/18/2025    PCO2 44.0 07/18/2025       Significant Labs:  Lab Results   Component Value Date    WBC 8.12 07/23/2025    HGB 11.9 (L) 07/23/2025    HCT 37.4 (L) 07/23/2025    MCV 89.3 07/23/2025     07/23/2025       Recent Labs   Lab 07/23/25  0338      K  4.8      CO2 24   BUN 34.2*   CREATININE 0.88   CALCIUM 9.1   MG 1.60   PHOS 2.8   AST 20   ALT 26   ALKPHOS 58   ALBUMIN 2.4*         Assessment:     Acute combined hypoxic and hypercapnic respiratory failure-possibly obesity/hypoventilation syndrome coupled with previous stroke and pneumonia.    History of cerebrovascular disease with right hemiparesis-more awake today.  Acute kidney injury superimposed on stage II chronic kidney disease, obstructive uropathy component.  He remains nonoliguric, daily improvement in BUN/creatinine   Type 2 diabetes mellitus with polyneuropathy,   Previous CVA with evidence of multiple areas of ischemia  Hypertension    Plan:     Continue on Rocephin for full 7 days for aspiration of tube feeds  Begin weaning vent support as tolerated  Continue tube feedings.  Per palliative care patient's daughter does not think he would want a trach and PEG.         DVT ppx: LMWH   GI ppx: famotidine       The patient remains at high risk of decompensation and death and will remain in ICU level care    42 min of critical care time was spent reviewing the patient's chart including medications, radiographs, labs, pertinent cultures and pathology data, other consultant notes/recomendations as well as titration of vasopressors, adjustment of mechanical ventilatory or NIPPV support, as well as discussion of goals of care with nursing staff, respiratory therapy at the bedside and with family at the bedside/via phone.     JESSIKA Whittaker MD  Pulmonary/Critical Care

## 2025-07-23 NOTE — PROGRESS NOTES
Patient Name: Charly Padilla   MRN: [id number]   Admission Date: 7/5/2025   Hospital Length of Stay: 18   Attending Provider: Franklyn Peres MD   Consulting Provider: Lindsay FREIRE  Reason for Consult: Goals of Care  Primary Care Physician:  Rob Liu MD     Principal Problem: <principal problem not specified>       Final diagnoses:  [W19.XXXA] Fall  [R53.1] Generalized weakness (Primary)  [N17.9] VAN (acute kidney injury)  [R33.8] Acute urinary retention  [J96.02] Acute respiratory failure with hypercapnia      Assessment/Plan:     I reviewed the patient and family's understanding of the seriousness of the illness and its expected prognosis. We discussed the patient's goals of care and treatment preferences.        Called and spoke with daughter/HCPSuzanne NAVARRETE. Updated her on his current status. She states she has not made a decision regarding code status. She is hoping we will be able to wean from ventilatory support. I explained that we can see how he does over the next couple days. I also informed her that if he is eventually extubated and then would fail again, then we would have to proceed with a tracheostomy if she wanted to continue all aggressive measures. She verbalized understanding. She states she will be by to visit later this afternoon. I explained that I would be available for any other questions or if she wanted to set up a meeting in person.     Support provided.  Reflective listening, validation concerns, and normalization of fears provided.    Discussed with nursing.     Recommendations:     Previous LaPOST to be uploaded into chart      Interval History:     Patient remains intubated and sedated. Will begin weaning from vent support today if able to tolerate.       Active Ambulatory Problems     Diagnosis Date Noted    No Active Ambulatory Problems     Resolved Ambulatory Problems     Diagnosis Date Noted    No Resolved Ambulatory Problems     No Additional Past Medical History     "    No past surgical history on file.     Review of patient's allergies indicates:  No Known Allergies     Current Medications[1]       Current Facility-Administered Medications:     acetaminophen, 650 mg, Per NG tube, Q6H PRN    dextrose 50%, 12.5 g, Intravenous, PRN    dextrose 50%, 12.5 g, Intravenous, PRN    fentaNYL, 50 mcg, Intravenous, Q3H PRN    glucagon (human recombinant), 1 mg, Intramuscular, PRN    glucagon (human recombinant), 1 mg, Intramuscular, PRN    hydrALAZINE, 10 mg, Intravenous, Q6H PRN    insulin aspart U-100, 0-10 Units, Subcutaneous, Q6H PRN    labetalol, 20 mg, Intravenous, Q6H PRN    oxyCODONE, 5 mg, Per NG tube, Q6H PRN    Flushing PICC/Midline Protocol, , , Until Discontinued **AND** sodium chloride 0.9%, 10 mL, Intravenous, Q12H PRN     No family history on file.       Review of Systems   Unable to perform ROS: Intubated            Objective:   BP (!) 142/87   Pulse 96   Temp 99.4 °F (37.4 °C) (Oral)   Resp (!) 23   Ht 5' 8" (1.727 m)   Wt 115.5 kg (254 lb 10.1 oz)   SpO2 96%   BMI 38.72 kg/m²      Physical Exam   Constitutional: He is sedated and intubated. He appears ill.   HENT:   Head: Normocephalic and atraumatic.   Cardiovascular: Regular rhythm. Pulmonary:      Effort: Pulmonary effort is normal. He is intubated.     Abdominal: Soft.            Review of Symptoms      Symptom Assessment (ESAS 0-10 Scale)  Pain:  0  Dyspnea:  0  Anxiety:  0  Nausea:  0  Depression:  0  Anorexia:  0  Fatigue:  0  Insomnia:  0  Restlessness:  0  Agitation:  0         Psychosocial/Cultural:   See Palliative Psychosocial Note: Yes  **Primary  to Follow**  Palliative Care  Consult: No      Advance Care Planning   Advance Directives:   Medical Power of : Yes      Decision Making:  Family answered questions  Goals of Care: What is most important right now is to focus on curative/life-prolongation (regardless of treatment burdens). Accordingly, we have decided that " the best plan to meet the patient's goals includes continuing with treatment.          PAINAD: NA    Caregiver burden formerly assessed: Yes      No results displayed because visit has over 200 results.               > 50% of 35 min of encounter was spent in chart review, face to face discussion of goals of care, symptom assessment, coordination of care and emotional support.    Lindsay Gonsales, BERTINP  Palliative Medicine  [facility]           [1]   Current Facility-Administered Medications:     acetaminophen tablet 650 mg, 650 mg, Per NG tube, Q6H PRN, Jose Antonio Arora MD, 650 mg at 07/22/25 1533    amLODIPine tablet 5 mg, 5 mg, Per NG tube, Daily, Julieta Cabrera MD, 5 mg at 07/23/25 0800    cefTRIAXone injection 1 g, 1 g, Intravenous, Q24H, Franklyn Peres MD, 1 g at 07/22/25 1150    dexmedetomidine (PRECEDEX) 400mcg/100mL 0.9% NaCL infusion, 0-1.4 mcg/kg/hr, Intravenous, Continuous, Bhavik Alanis Jr., MD, FCCP, Stopped at 07/16/25 1332    dextrose 50% injection 12.5 g, 12.5 g, Intravenous, PRN, Eun Woodall MD    dextrose 50% injection 12.5 g, 12.5 g, Intravenous, PRN, [facility] Jr., [facility]    enoxaparin injection 40 mg, 40 mg, Subcutaneous, Q24H (prophylaxis, 1700), Bhavik Alanis Jr., MD, FCCP, 40 mg at 07/22/25 1536    famotidine tablet 20 mg, 20 mg, Per NG tube, BID, Bhavik Alanis Jr., MD, FCCP, 20 mg at 07/23/25 0800    fentaNYL injection 50 mcg, 50 mcg, Intravenous, Q3H PRN, Franklyn Peres MD, 50 mcg at 07/23/25 0130    glucagon (human recombinant) injection 1 mg, 1 mg, Intramuscular, PRN, Eun Woodall MD    glucagon (human recombinant) injection 1 mg, 1 mg, Intramuscular, PRN, Bhavik Alanis Jr., MD, [facility]    glycopyrrolate tablet 1 mg, 1 mg, Per OG tube, TID, DANELLE Whittaker MD, 1 mg at 07/23/25 0800    hydrALAZINE injection 10 mg, 10 mg, Intravenous, Q6H PRN, Julieta Cabrera MD    insulin aspart U-100 injection 0-10 Units, 0-10 Units, Subcutaneous,  Q6H PRN, Bhavik Alanis Jr., MD, FCCP, 2 Units at 07/23/25 0609    insulin glargine U-100 (Lantus) injection 30 Units, 30 Units, Subcutaneous, BID, Jose Antonio Arora MD, 30 Units at 07/22/25 2110    labetaloL injection 20 mg, 20 mg, Intravenous, Q6H PRN, Julieta Cabrera MD    lisinopriL tablet 20 mg, 20 mg, Per NG tube, Daily, Julieta Cabrera MD, 20 mg at 07/23/25 0800    NORepinephrine 8 mg in dextrose 5% 250 mL infusion, 0-3 mcg/kg/min (Dosing Weight), Intravenous, Continuous, Julieta Cabrera MD, Stopped at 07/19/25 2206    oxyCODONE immediate release tablet 5 mg, 5 mg, Per NG tube, Q6H PRN, Franklyn Peres MD, 5 mg at 07/22/25 1801    polyethylene glycol packet 17 g, 17 g, Per NG tube, BID, Julieta Cabrera MD, 17 g at 07/21/25 2038    propofol (DIPRIVAN) 10 mg/mL infusion, 0-50 mcg/kg/min (Dosing Weight), Intravenous, Continuous, Kari Macario MD, Stopped at 07/22/25 0556    senna-docusate 8.6-50 mg per tablet 2 tablet, 2 tablet, Per NG tube, Daily, Julieta Cabrera MD, 2 tablet at 07/22/25 0813    Flushing PICC/Midline Protocol, , , Until Discontinued **AND** sodium chloride 0.9% flush 10 mL, 10 mL, Intravenous, Q12H PRN, Franklyn Peres MD    sodium chloride 3% nebulizer solution 4 mL, 4 mL, Nebulization, Q6H, Jose Antonio Arora MD, 4 mL at 07/23/25 0226

## 2025-07-23 NOTE — PLAN OF CARE
Problem: Infection  Goal: Absence of Infection Signs and Symptoms  Outcome: Progressing     Problem: Adult Inpatient Plan of Care  Goal: Plan of Care Review  Outcome: Progressing  Goal: Patient-Specific Goal (Individualized)  Outcome: Progressing  Goal: Absence of Hospital-Acquired Illness or Injury  Outcome: Progressing  Goal: Optimal Comfort and Wellbeing  Outcome: Progressing  Goal: Readiness for Transition of Care  Outcome: Progressing     Problem: Mechanical Ventilation Invasive  Goal: Effective Communication  Outcome: Progressing  Goal: Optimal Device Function  Outcome: Progressing  Goal: Mechanical Ventilation Liberation  Outcome: Progressing  Goal: Optimal Nutrition Delivery  Outcome: Progressing  Goal: Absence of Device-Related Skin and Tissue Injury  Outcome: Progressing  Goal: Absence of Ventilator-Induced Lung Injury  Outcome: Progressing     Problem: Artificial Airway  Goal: Effective Communication  Outcome: Progressing  Goal: Optimal Device Function  Outcome: Progressing  Goal: Absence of Device-Related Skin or Tissue Injury  Outcome: Progressing     Problem: Noninvasive Ventilation Acute  Goal: Effective Unassisted Ventilation and Oxygenation  Outcome: Progressing     Problem: Skin Injury Risk Increased  Goal: Skin Health and Integrity  Outcome: Progressing     Problem: Fall Injury Risk  Goal: Absence of Fall and Fall-Related Injury  Outcome: Progressing     Problem: Delirium  Goal: Optimal Coping  Outcome: Progressing  Goal: Improved Behavioral Control  Outcome: Progressing  Goal: Improved Attention and Thought Clarity  Outcome: Progressing  Goal: Improved Sleep  Outcome: Progressing     Problem: Bariatric Environmental Safety  Goal: Safety Maintained with Care  Outcome: Progressing     Problem: Coping Ineffective  Goal: Effective Coping  Outcome: Progressing     Problem: Hospitalized Older Adult  Goal: Optimal Cognitive Function  Outcome: Progressing  Goal: Effective Bowel Elimination  Outcome:  Progressing  Goal: Optimal Coping  Outcome: Progressing  Goal: Fluid and Electrolyte Balance  Outcome: Progressing  Goal: Optimal Functional Ability  Outcome: Progressing  Goal: Improved Oral Intake  Outcome: Progressing  Goal: Adequate Sleep/Rest  Outcome: Progressing  Goal: Effective Urinary Elimination  Outcome: Progressing

## 2025-07-24 LAB
ALBUMIN SERPL-MCNC: 2.5 G/DL (ref 3.4–4.8)
ALBUMIN/GLOB SERPL: 0.5 RATIO (ref 1.1–2)
ALP SERPL-CCNC: 56 UNIT/L (ref 40–150)
ALT SERPL-CCNC: 25 UNIT/L (ref 0–55)
ANION GAP SERPL CALC-SCNC: 11 MEQ/L
AST SERPL-CCNC: 16 UNIT/L (ref 11–45)
BACTERIA BLD CULT: NORMAL
BACTERIA BLD CULT: NORMAL
BASOPHILS # BLD AUTO: 0.04 X10(3)/MCL
BASOPHILS NFR BLD AUTO: 0.4 %
BILIRUB SERPL-MCNC: 0.3 MG/DL
BUN SERPL-MCNC: 57.5 MG/DL (ref 8.4–25.7)
CALCIUM SERPL-MCNC: 9.1 MG/DL (ref 8.8–10)
CHLORIDE SERPL-SCNC: 102 MMOL/L (ref 98–107)
CO2 SERPL-SCNC: 22 MMOL/L (ref 23–31)
CREAT SERPL-MCNC: 1.34 MG/DL (ref 0.72–1.25)
CREAT/UREA NIT SERPL: 43
EOSINOPHIL # BLD AUTO: 0.21 X10(3)/MCL (ref 0–0.9)
EOSINOPHIL NFR BLD AUTO: 2.2 %
ERYTHROCYTE [DISTWIDTH] IN BLOOD BY AUTOMATED COUNT: 14.1 % (ref 11.5–17)
GFR SERPLBLD CREATININE-BSD FMLA CKD-EPI: 58 ML/MIN/1.73/M2
GLOBULIN SER-MCNC: 5 GM/DL (ref 2.4–3.5)
GLUCOSE SERPL-MCNC: 207 MG/DL (ref 82–115)
HCT VFR BLD AUTO: 38.3 % (ref 42–52)
HGB BLD-MCNC: 11.7 G/DL (ref 14–18)
IMM GRANULOCYTES # BLD AUTO: 0.05 X10(3)/MCL (ref 0–0.04)
IMM GRANULOCYTES NFR BLD AUTO: 0.5 %
LYMPHOCYTES # BLD AUTO: 2.18 X10(3)/MCL (ref 0.6–4.6)
LYMPHOCYTES NFR BLD AUTO: 22.8 %
MAGNESIUM SERPL-MCNC: 1.8 MG/DL (ref 1.6–2.6)
MCH RBC QN AUTO: 28.1 PG (ref 27–31)
MCHC RBC AUTO-ENTMCNC: 30.5 G/DL (ref 33–36)
MCV RBC AUTO: 91.8 FL (ref 80–94)
MONOCYTES # BLD AUTO: 0.79 X10(3)/MCL (ref 0.1–1.3)
MONOCYTES NFR BLD AUTO: 8.3 %
NEUTROPHILS # BLD AUTO: 6.29 X10(3)/MCL (ref 2.1–9.2)
NEUTROPHILS NFR BLD AUTO: 65.8 %
NRBC BLD AUTO-RTO: 0 %
PHOSPHATE SERPL-MCNC: 3.3 MG/DL (ref 2.3–4.7)
PLATELET # BLD AUTO: 212 X10(3)/MCL (ref 130–400)
PMV BLD AUTO: 10.7 FL (ref 7.4–10.4)
POCT GLUCOSE: 133 MG/DL (ref 70–110)
POCT GLUCOSE: 148 MG/DL (ref 70–110)
POCT GLUCOSE: 157 MG/DL (ref 70–110)
POCT GLUCOSE: 176 MG/DL (ref 70–110)
POCT GLUCOSE: 221 MG/DL (ref 70–110)
POTASSIUM SERPL-SCNC: 5.3 MMOL/L (ref 3.5–5.1)
PROT SERPL-MCNC: 7.5 GM/DL (ref 5.8–7.6)
RBC # BLD AUTO: 4.17 X10(6)/MCL (ref 4.7–6.1)
SODIUM SERPL-SCNC: 135 MMOL/L (ref 136–145)
WBC # BLD AUTO: 9.56 X10(3)/MCL (ref 4.5–11.5)

## 2025-07-24 PROCEDURE — 20000000 HC ICU ROOM

## 2025-07-24 PROCEDURE — 83735 ASSAY OF MAGNESIUM: CPT

## 2025-07-24 PROCEDURE — 25000242 PHARM REV CODE 250 ALT 637 W/ HCPCS: Performed by: INTERNAL MEDICINE

## 2025-07-24 PROCEDURE — 94003 VENT MGMT INPAT SUBQ DAY: CPT

## 2025-07-24 PROCEDURE — 63600175 PHARM REV CODE 636 W HCPCS: Performed by: INTERNAL MEDICINE

## 2025-07-24 PROCEDURE — 99900026 HC AIRWAY MAINTENANCE (STAT)

## 2025-07-24 PROCEDURE — 80053 COMPREHEN METABOLIC PANEL: CPT

## 2025-07-24 PROCEDURE — 36415 COLL VENOUS BLD VENIPUNCTURE: CPT

## 2025-07-24 PROCEDURE — 99900035 HC TECH TIME PER 15 MIN (STAT)

## 2025-07-24 PROCEDURE — 25000003 PHARM REV CODE 250

## 2025-07-24 PROCEDURE — 94640 AIRWAY INHALATION TREATMENT: CPT

## 2025-07-24 PROCEDURE — 99900031 HC PATIENT EDUCATION (STAT)

## 2025-07-24 PROCEDURE — 84100 ASSAY OF PHOSPHORUS: CPT

## 2025-07-24 PROCEDURE — 63600175 PHARM REV CODE 636 W HCPCS: Performed by: HOSPITALIST

## 2025-07-24 PROCEDURE — 94761 N-INVAS EAR/PLS OXIMETRY MLT: CPT

## 2025-07-24 PROCEDURE — 25000003 PHARM REV CODE 250: Performed by: INTERNAL MEDICINE

## 2025-07-24 PROCEDURE — 85025 COMPLETE CBC W/AUTO DIFF WBC: CPT

## 2025-07-24 PROCEDURE — 27100171 HC OXYGEN HIGH FLOW UP TO 24 HOURS

## 2025-07-24 PROCEDURE — 94760 N-INVAS EAR/PLS OXIMETRY 1: CPT

## 2025-07-24 PROCEDURE — 27200966 HC CLOSED SUCTION SYSTEM

## 2025-07-24 RX ADMIN — SODIUM CHLORIDE SOLN NEBU 3% 4 ML: 3 NEBU SOLN at 02:07

## 2025-07-24 RX ADMIN — DEXMEDETOMIDINE HYDROCHLORIDE 1.4 MCG/KG/HR: 4 INJECTION, SOLUTION INTRAVENOUS at 05:07

## 2025-07-24 RX ADMIN — SODIUM CHLORIDE SOLN NEBU 3% 4 ML: 3 NEBU SOLN at 08:07

## 2025-07-24 RX ADMIN — AMLODIPINE BESYLATE 5 MG: 5 TABLET ORAL at 08:07

## 2025-07-24 RX ADMIN — LISINOPRIL 20 MG: 20 TABLET ORAL at 08:07

## 2025-07-24 RX ADMIN — INSULIN GLARGINE 30 UNITS: 100 INJECTION, SOLUTION SUBCUTANEOUS at 08:07

## 2025-07-24 RX ADMIN — POLYETHYLENE GLYCOL 3350 17 G: 17 POWDER, FOR SOLUTION ORAL at 08:07

## 2025-07-24 RX ADMIN — DEXMEDETOMIDINE HYDROCHLORIDE 1 MCG/KG/HR: 4 INJECTION, SOLUTION INTRAVENOUS at 10:07

## 2025-07-24 RX ADMIN — DEXMEDETOMIDINE HYDROCHLORIDE 1.4 MCG/KG/HR: 4 INJECTION, SOLUTION INTRAVENOUS at 01:07

## 2025-07-24 RX ADMIN — FENTANYL CITRATE 50 MCG: 50 INJECTION INTRAMUSCULAR; INTRAVENOUS at 11:07

## 2025-07-24 RX ADMIN — GLYCOPYRROLATE 1 MG: 1 TABLET ORAL at 08:07

## 2025-07-24 RX ADMIN — GLYCOPYRROLATE 1 MG: 1 TABLET ORAL at 09:07

## 2025-07-24 RX ADMIN — INSULIN ASPART 4 UNITS: 100 INJECTION, SOLUTION INTRAVENOUS; SUBCUTANEOUS at 11:07

## 2025-07-24 RX ADMIN — INSULIN GLARGINE 15 UNITS: 100 INJECTION, SOLUTION SUBCUTANEOUS at 09:07

## 2025-07-24 RX ADMIN — FAMOTIDINE 20 MG: 20 TABLET, FILM COATED ORAL at 08:07

## 2025-07-24 RX ADMIN — INSULIN ASPART 1 UNITS: 100 INJECTION, SOLUTION INTRAVENOUS; SUBCUTANEOUS at 01:07

## 2025-07-24 RX ADMIN — DEXMEDETOMIDINE HYDROCHLORIDE 0.6 MCG/KG/HR: 4 INJECTION, SOLUTION INTRAVENOUS at 01:07

## 2025-07-24 RX ADMIN — SODIUM CHLORIDE SOLN NEBU 3% 4 ML: 3 NEBU SOLN at 01:07

## 2025-07-24 RX ADMIN — GLYCOPYRROLATE 1 MG: 1 TABLET ORAL at 02:07

## 2025-07-24 RX ADMIN — FENTANYL CITRATE 50 MCG: 50 INJECTION INTRAMUSCULAR; INTRAVENOUS at 01:07

## 2025-07-24 RX ADMIN — DEXMEDETOMIDINE HYDROCHLORIDE 0.4 MCG/KG/HR: 4 INJECTION, SOLUTION INTRAVENOUS at 10:07

## 2025-07-24 RX ADMIN — FENTANYL CITRATE 50 MCG: 50 INJECTION INTRAMUSCULAR; INTRAVENOUS at 04:07

## 2025-07-24 RX ADMIN — DEXMEDETOMIDINE HYDROCHLORIDE 1 MCG/KG/HR: 4 INJECTION, SOLUTION INTRAVENOUS at 07:07

## 2025-07-24 RX ADMIN — CEFTRIAXONE SODIUM 1 G: 1 INJECTION, POWDER, FOR SOLUTION INTRAMUSCULAR; INTRAVENOUS at 10:07

## 2025-07-24 RX ADMIN — POLYETHYLENE GLYCOL 3350 17 G: 17 POWDER, FOR SOLUTION ORAL at 09:07

## 2025-07-24 RX ADMIN — DEXMEDETOMIDINE HYDROCHLORIDE 1.4 MCG/KG/HR: 4 INJECTION, SOLUTION INTRAVENOUS at 04:07

## 2025-07-24 RX ADMIN — ENOXAPARIN SODIUM 40 MG: 40 INJECTION SUBCUTANEOUS at 04:07

## 2025-07-24 RX ADMIN — SENNOSIDES, DOCUSATE SODIUM 2 TABLET: 8.6; 5 TABLET ORAL at 08:07

## 2025-07-24 RX ADMIN — FAMOTIDINE 20 MG: 20 TABLET, FILM COATED ORAL at 09:07

## 2025-07-24 NOTE — PLAN OF CARE
Pt informed and verbalized understanding/kmp   [facility]

## 2025-07-24 NOTE — PROGRESS NOTES
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  He presents to ED per EMS 07/05/2025 after multiple falls at nursing home with reported generalized weakness.  He is reported as having a Benítez catheter placed about 1 week ago for urinary retention.  Patient was reported as having pulled this out himself while at nursing home, on a.m. of presentation.  Benítez catheter was placed in ED, returning 700 cc of urine.  While in ER, he was reported as becoming more somnolent with poor overall respiratory effort and decreasing level of consciousness.  ABG revealed acute respiratory acidosis.  He was intubated at 8:55 a.m., placed on mechanical ventilatory support.  CT brain obtained in ED reveals chronic microvascular ischemic changes with no acute intracranial abnormalities.  SARS-COVID-2 PCR negative, influenza A/B PCR negative.  He is now admitted to ICU for ongoing medical care.     Hospital Course:  07/05/2025: Intubation/mechanical ventilation   TTE (07/05/2025: Normal LV size with decreased LV SF, EF 45-50%; trace MR, mild TR and mild PI.  PASP estimated 30 mm Hg; no pericardial effusion  07/16.  Extubated  07/18.  Intubated      24hr Interval History:  More awake today.  Moving left side spontaneously.  Oxygenation improved.  Remains on ventilator assist control 24 peep 10 FiO2 40%.  Intake 3532 output 950.  Rocephin day 6-plan to continue for 7 days due to aspiration pneumonia      Review of systems unobtainable due to intubation, sedation.      Scheduled Medications:   amLODIPine  5 mg Per NG  tube Daily    cefTRIAXone (Rocephin) IV (PEDS and ADULTS)  1 g Intravenous Q24H    enoxparin  40 mg Subcutaneous Q24H (prophylaxis, 1700)    famotidine  20 mg Per NG tube BID    glycopyrrolate  1 mg Per OG tube TID    insulin glargine U-100  30 Units Subcutaneous BID    lisinopriL  20 mg Per NG tube Daily    polyethylene glycol  17 g Per NG tube BID    senna-docusate  2 tablet Per NG tube Daily    sodium chloride 3%  4 mL Nebulization Q6H     PRN Medications:    Current Facility-Administered Medications:     acetaminophen, 650 mg, Per NG tube, Q6H PRN    dextrose 50%, 12.5 g, Intravenous, PRN    dextrose 50%, 12.5 g, Intravenous, PRN    fentaNYL, 50 mcg, Intravenous, Q3H PRN    glucagon (human recombinant), 1 mg, Intramuscular, PRN    glucagon (human recombinant), 1 mg, Intramuscular, PRN    hydrALAZINE, 10 mg, Intravenous, Q6H PRN    insulin aspart U-100, 0-10 Units, Subcutaneous, Q6H PRN    labetalol, 20 mg, Intravenous, Q6H PRN    oxyCODONE, 5 mg, Per NG tube, Q6H PRN    Flushing PICC/Midline Protocol, , , Until Discontinued **AND** sodium chloride 0.9%, 10 mL, Intravenous, Q12H PRN  Continuous Infusions:   dexmedeTOMIDine (Precedex) infusion (titrating)  0-1.4 mcg/kg/hr Intravenous Continuous 39.69 mL/hr at 07/24/25 0655 1.4 mcg/kg/hr at 07/24/25 0655    NORepinephrine bitartrate-D5W  0-3 mcg/kg/min (Dosing Weight) Intravenous Continuous   Stopped at 07/19/25 2206    propofoL  0-50 mcg/kg/min (Dosing Weight) Intravenous Continuous   Stopped at 07/22/25 0556       No past medical history on file.    No past surgical history on file.    Objective:     Input/output:    Intake/Output Summary (Last 24 hours) at 7/24/2025 1015  Last data filed at 7/24/2025 0655  Gross per 24 hour   Intake 3532.5 ml   Output 700 ml   Net 2832.5 ml       Vital Signs (Most Recent):  Temp: 98.7 °F (37.1 °C) (07/24/25 0800)  Pulse: 61 (07/24/25 1000)  Resp: (!) 24 (07/24/25 1000)  BP: (!) 97/59 (07/24/25 1000)  SpO2: 96 % (07/24/25  1000)  Body mass index is 38.72 kg/m².  Weight: 115.5 kg (254 lb 10.1 oz) Vital Signs (24h Range):  Temp:  [98.2 °F (36.8 °C)-100 °F (37.8 °C)] 98.7 °F (37.1 °C)  Pulse:  [52-75] 61  Resp:  [19-28] 24  SpO2:  [94 %-100 %] 96 %  BP: ()/(58-84) 97/59       Physical exam:  Gen- intubated and mechanically ventilated.   In no distress.  HENT- ATNC, MMM  CV- RRR  Resp- bilateral rhonchi  Abd- distended, soft with no rebound or guarding, hypoactive bowel sounds   MSK- WWP, trace edema  Neuro- awake this morning moving left side spontaneously.  He does focus on me when I speak to him but does not follow commands.  Psych- calm but otherwise unable to assess 2/2 neurologic status      Lines/Drains/Airways       Drain  Duration                  NG/OG Tube 07/05/25 0848 19 days         Urethral Catheter 07/05/25 0800 Non-latex 16 Fr. 19 days              Airway  Duration                  Airway - Non-Surgical 07/18/25 1721 Endotracheal Tube 5 days              Peripheral Intravenous Line  Duration             Peripheral IV Single Lumen 07/19/25 1000 20 G Anterior;Right Forearm 5 days    Peripheral IV Single Lumen 07/23/25 1200 20 G 1 1/4 in Anterior;Left Forearm <1 day                  Vent:  Vent Mode: A/C (07/24/25 0817)  Ventilator Initiated: Yes (07/18/25 1730)  Set Rate: 24 BPM (07/24/25 0817)  Vt Set: 480 mL (07/24/25 0817)  Pressure Support: (S) 5 cmH20 (decreased from 8; total PS 10) (07/16/25 1215)  PEEP/CPAP: 8 cmH20 (07/24/25 0817)  Oxygen Concentration (%): 40 (07/24/25 0817)  Peak Airway Pressure: 24 cmH20 (07/24/25 0817)  Total Ve: 8.8 L/m (07/24/25 0817)  F/VT Ratio<105 (RSBI): (!) 68.49 (07/24/25 0817)    ABGs:  Lab Results   Component Value Date    PH 7.380 07/18/2025    PO2 85.0 07/18/2025    PCO2 44.0 07/18/2025       Significant Labs:  Lab Results   Component Value Date    WBC 9.56 07/24/2025    HGB 11.7 (L) 07/24/2025    HCT 38.3 (L) 07/24/2025    MCV 91.8 07/24/2025     07/24/2025       Recent  Labs   Lab 07/24/25  0419   *   K 5.3*      CO2 22*   BUN 57.5*   CREATININE 1.34*   CALCIUM 9.1   MG 1.80   PHOS 3.3   AST 16   ALT 25   ALKPHOS 56   ALBUMIN 2.5*         Assessment:     Acute combined hypoxic and hypercapnic respiratory failure-possibly obesity/hypoventilation syndrome coupled with previous stroke and pneumonia.    History of cerebrovascular disease with right hemiparesis-more awake today.  Acute kidney injury superimposed on stage II chronic kidney disease, obstructive uropathy component.  He remains nonoliguric, daily improvement in BUN/creatinine   Type 2 diabetes mellitus with polyneuropathy,   Previous CVA with evidence of multiple areas of ischemia  Hypertension    Plan:     Continue on Rocephin for full 7 days for aspiration of tube feeds  Begin weaning vent support as tolerated no ventilator weaning performed yesterday.  Continue tube feedings.  Per palliative care patient's daughter does not think he would want a trach and PEG.  Need to verify this with family.  I do not think this patient would tolerate extubation.  If they do not desire trach or PEG then will need to consider palliative withdrawal.       DVT ppx: LMWH   GI ppx: famotidine       The patient remains at high risk of decompensation and death and will remain in ICU level care    40 min of critical care time was spent reviewing the patient's chart including medications, radiographs, labs, pertinent cultures and pathology data, other consultant notes/recomendations as well as titration of vasopressors, adjustment of mechanical ventilatory or NIPPV support, as well as discussion of goals of care with nursing staff, respiratory therapy at the bedside and with family at the bedside/via phone.     JESSIKA Whittaker MD  Pulmonary/Critical Care

## 2025-07-25 LAB
ALBUMIN SERPL-MCNC: 2.4 G/DL (ref 3.4–4.8)
ALBUMIN/GLOB SERPL: 0.5 RATIO (ref 1.1–2)
ALLENS TEST BLOOD GAS (OHS): YES
ALP SERPL-CCNC: 55 UNIT/L (ref 40–150)
ALT SERPL-CCNC: 19 UNIT/L (ref 0–55)
ANION GAP SERPL CALC-SCNC: 8 MEQ/L
AST SERPL-CCNC: 12 UNIT/L (ref 11–45)
BASE EXCESS BLD CALC-SCNC: 1.7 MMOL/L
BASOPHILS # BLD AUTO: 0.06 X10(3)/MCL
BASOPHILS NFR BLD AUTO: 0.8 %
BILIRUB SERPL-MCNC: 0.3 MG/DL
BLOOD GAS SAMPLE TYPE (OHS): ABNORMAL
BUN SERPL-MCNC: 48.6 MG/DL (ref 8.4–25.7)
CA-I BLD-SCNC: 1.26 MMOL/L (ref 1.12–1.23)
CALCIUM SERPL-MCNC: 9.1 MG/DL (ref 8.8–10)
CHLORIDE SERPL-SCNC: 104 MMOL/L (ref 98–107)
CO2 BLDA-SCNC: 27.9 MMOL/L
CO2 SERPL-SCNC: 21 MMOL/L (ref 23–31)
CREAT SERPL-MCNC: 0.95 MG/DL (ref 0.72–1.25)
CREAT/UREA NIT SERPL: 51
DRAWN BY BLOOD GAS (OHS): ABNORMAL
EOSINOPHIL # BLD AUTO: 0.17 X10(3)/MCL (ref 0–0.9)
EOSINOPHIL NFR BLD AUTO: 2.2 %
ERYTHROCYTE [DISTWIDTH] IN BLOOD BY AUTOMATED COUNT: 14.1 % (ref 11.5–17)
GFR SERPLBLD CREATININE-BSD FMLA CKD-EPI: >60 ML/MIN/1.73/M2
GLOBULIN SER-MCNC: 5.1 GM/DL (ref 2.4–3.5)
GLUCOSE SERPL-MCNC: 193 MG/DL (ref 82–115)
HCO3 BLDA-SCNC: 26.6 MMOL/L (ref 22–26)
HCT VFR BLD AUTO: 39.9 % (ref 42–52)
HGB BLD-MCNC: 12.1 G/DL (ref 14–18)
IMM GRANULOCYTES # BLD AUTO: 0.03 X10(3)/MCL (ref 0–0.04)
IMM GRANULOCYTES NFR BLD AUTO: 0.4 %
INHALED O2 CONCENTRATION: 40 %
LYMPHOCYTES # BLD AUTO: 1.95 X10(3)/MCL (ref 0.6–4.6)
LYMPHOCYTES NFR BLD AUTO: 25.3 %
MAGNESIUM SERPL-MCNC: 1.7 MG/DL (ref 1.6–2.6)
MCH RBC QN AUTO: 28.5 PG (ref 27–31)
MCHC RBC AUTO-ENTMCNC: 30.3 G/DL (ref 33–36)
MCV RBC AUTO: 94.1 FL (ref 80–94)
MODE (OHS): ABNORMAL
MONOCYTES # BLD AUTO: 0.54 X10(3)/MCL (ref 0.1–1.3)
MONOCYTES NFR BLD AUTO: 7 %
NEUTROPHILS # BLD AUTO: 4.95 X10(3)/MCL (ref 2.1–9.2)
NEUTROPHILS NFR BLD AUTO: 64.3 %
NRBC BLD AUTO-RTO: 0 %
PCO2 BLDA: 42 MMHG (ref 35–45)
PEEP RESPIRATORY: 8 CMH2O
PH BLDA: 7.41 [PH] (ref 7.35–7.45)
PHOSPHATE SERPL-MCNC: 3 MG/DL (ref 2.3–4.7)
PLATELET # BLD AUTO: 204 X10(3)/MCL (ref 130–400)
PMV BLD AUTO: 10.6 FL (ref 7.4–10.4)
PO2 BLDA: 120 MMHG (ref 80–100)
POCT GLUCOSE: 106 MG/DL (ref 70–110)
POCT GLUCOSE: 118 MG/DL (ref 70–110)
POCT GLUCOSE: 206 MG/DL (ref 70–110)
POCT GLUCOSE: 69 MG/DL (ref 70–110)
POCT GLUCOSE: 83 MG/DL (ref 70–110)
POTASSIUM BLOOD GAS (OHS): 5.3 MMOL/L (ref 3.5–5)
POTASSIUM SERPL-SCNC: 5.3 MMOL/L (ref 3.5–5.1)
PROT SERPL-MCNC: 7.5 GM/DL (ref 5.8–7.6)
PS (OHS): 10 CMH2O
RBC # BLD AUTO: 4.24 X10(6)/MCL (ref 4.7–6.1)
SAMPLE SITE BLOOD GAS (OHS): ABNORMAL
SAO2 % BLDA: 99 %
SODIUM BLOOD GAS (OHS): 132 MMOL/L (ref 137–145)
SODIUM SERPL-SCNC: 133 MMOL/L (ref 136–145)
WBC # BLD AUTO: 7.7 X10(3)/MCL (ref 4.5–11.5)

## 2025-07-25 PROCEDURE — 25000003 PHARM REV CODE 250: Performed by: INTERNAL MEDICINE

## 2025-07-25 PROCEDURE — 63600175 PHARM REV CODE 636 W HCPCS: Performed by: INTERNAL MEDICINE

## 2025-07-25 PROCEDURE — 27000646 HC AEROBIKA DEVICE

## 2025-07-25 PROCEDURE — 99900035 HC TECH TIME PER 15 MIN (STAT)

## 2025-07-25 PROCEDURE — 36600 WITHDRAWAL OF ARTERIAL BLOOD: CPT

## 2025-07-25 PROCEDURE — 80053 COMPREHEN METABOLIC PANEL: CPT

## 2025-07-25 PROCEDURE — 27000190 HC CPAP FULL FACE MASK W/VALVE

## 2025-07-25 PROCEDURE — 25000003 PHARM REV CODE 250

## 2025-07-25 PROCEDURE — 25000242 PHARM REV CODE 250 ALT 637 W/ HCPCS: Performed by: INTERNAL MEDICINE

## 2025-07-25 PROCEDURE — 94640 AIRWAY INHALATION TREATMENT: CPT

## 2025-07-25 PROCEDURE — 99900031 HC PATIENT EDUCATION (STAT)

## 2025-07-25 PROCEDURE — 85025 COMPLETE CBC W/AUTO DIFF WBC: CPT

## 2025-07-25 PROCEDURE — 84100 ASSAY OF PHOSPHORUS: CPT

## 2025-07-25 PROCEDURE — 82803 BLOOD GASES ANY COMBINATION: CPT

## 2025-07-25 PROCEDURE — 36415 COLL VENOUS BLD VENIPUNCTURE: CPT

## 2025-07-25 PROCEDURE — 83735 ASSAY OF MAGNESIUM: CPT

## 2025-07-25 PROCEDURE — 94664 DEMO&/EVAL PT USE INHALER: CPT

## 2025-07-25 PROCEDURE — 27100171 HC OXYGEN HIGH FLOW UP TO 24 HOURS

## 2025-07-25 PROCEDURE — 20000000 HC ICU ROOM

## 2025-07-25 PROCEDURE — 99900026 HC AIRWAY MAINTENANCE (STAT)

## 2025-07-25 PROCEDURE — 94761 N-INVAS EAR/PLS OXIMETRY MLT: CPT | Mod: XB

## 2025-07-25 PROCEDURE — 94003 VENT MGMT INPAT SUBQ DAY: CPT

## 2025-07-25 PROCEDURE — 94760 N-INVAS EAR/PLS OXIMETRY 1: CPT | Mod: XB

## 2025-07-25 PROCEDURE — 94660 CPAP INITIATION&MGMT: CPT | Mod: XB

## 2025-07-25 RX ADMIN — FAMOTIDINE 20 MG: 20 TABLET, FILM COATED ORAL at 08:07

## 2025-07-25 RX ADMIN — SODIUM CHLORIDE SOLN NEBU 3% 4 ML: 3 NEBU SOLN at 08:07

## 2025-07-25 RX ADMIN — DEXMEDETOMIDINE HYDROCHLORIDE 1 MCG/KG/HR: 4 INJECTION, SOLUTION INTRAVENOUS at 12:07

## 2025-07-25 RX ADMIN — ENOXAPARIN SODIUM 40 MG: 40 INJECTION SUBCUTANEOUS at 05:07

## 2025-07-25 RX ADMIN — LISINOPRIL 20 MG: 20 TABLET ORAL at 08:07

## 2025-07-25 RX ADMIN — FAMOTIDINE 20 MG: 20 TABLET, FILM COATED ORAL at 07:07

## 2025-07-25 RX ADMIN — SENNOSIDES, DOCUSATE SODIUM 2 TABLET: 8.6; 5 TABLET ORAL at 08:07

## 2025-07-25 RX ADMIN — GLYCOPYRROLATE 1 MG: 1 TABLET ORAL at 07:07

## 2025-07-25 RX ADMIN — POLYETHYLENE GLYCOL 3350 17 G: 17 POWDER, FOR SOLUTION ORAL at 08:07

## 2025-07-25 RX ADMIN — INSULIN GLARGINE 8 UNITS: 100 INJECTION, SOLUTION SUBCUTANEOUS at 08:07

## 2025-07-25 RX ADMIN — INSULIN GLARGINE 30 UNITS: 100 INJECTION, SOLUTION SUBCUTANEOUS at 08:07

## 2025-07-25 RX ADMIN — DEXMEDETOMIDINE HYDROCHLORIDE 1 MCG/KG/HR: 4 INJECTION, SOLUTION INTRAVENOUS at 09:07

## 2025-07-25 RX ADMIN — GLYCOPYRROLATE 1 MG: 1 TABLET ORAL at 08:07

## 2025-07-25 RX ADMIN — POLYETHYLENE GLYCOL 3350 17 G: 17 POWDER, FOR SOLUTION ORAL at 07:07

## 2025-07-25 RX ADMIN — AMLODIPINE BESYLATE 5 MG: 5 TABLET ORAL at 08:07

## 2025-07-25 RX ADMIN — DEXMEDETOMIDINE HYDROCHLORIDE 0.4 MCG/KG/HR: 4 INJECTION, SOLUTION INTRAVENOUS at 04:07

## 2025-07-25 RX ADMIN — INSULIN ASPART 4 UNITS: 100 INJECTION, SOLUTION INTRAVENOUS; SUBCUTANEOUS at 12:07

## 2025-07-25 RX ADMIN — DEXMEDETOMIDINE HYDROCHLORIDE 0.4 MCG/KG/HR: 4 INJECTION, SOLUTION INTRAVENOUS at 11:07

## 2025-07-25 NOTE — PROGRESS NOTES
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  He presents to ED per EMS 07/05/2025 after multiple falls at nursing home with reported generalized weakness.  He is reported as having a Benítez catheter placed about 1 week ago for urinary retention.  Patient was reported as having pulled this out himself while at nursing home, on a.m. of presentation.  Benítez catheter was placed in ED, returning 700 cc of urine.  While in ER, he was reported as becoming more somnolent with poor overall respiratory effort and decreasing level of consciousness.  ABG revealed acute respiratory acidosis.  He was intubated at 8:55 a.m., placed on mechanical ventilatory support.  CT brain obtained in ED reveals chronic microvascular ischemic changes with no acute intracranial abnormalities.  SARS-COVID-2 PCR negative, influenza A/B PCR negative.  He is now admitted to ICU for ongoing medical care.     Hospital Course:  07/05/2025: Intubation/mechanical ventilation   TTE (07/05/2025: Normal LV size with decreased LV SF, EF 45-50%; trace MR, mild TR and mild PI.  PASP estimated 30 mm Hg; no pericardial effusion  07/16.  Extubated  07/18.  Intubated      24hr Interval History:  More awake today.  Moving left side spontaneously.  Oxygenation improved.  Remains on ventilator assist control 24 peep 10 FiO2 40%.  Intake 2397 output 2000.  Has completed 7 days of Rocephin      Review of systems unobtainable due to intubation, sedation.      Scheduled Medications:   amLODIPine  5 mg Per NG tube Daily    enoxparin  40 mg  Subcutaneous Q24H (prophylaxis, 1700)    famotidine  20 mg Per NG tube BID    glycopyrrolate  1 mg Per OG tube TID    insulin glargine U-100  30 Units Subcutaneous BID    lisinopriL  20 mg Per NG tube Daily    polyethylene glycol  17 g Per NG tube BID    senna-docusate  2 tablet Per NG tube Daily    sodium chloride 3%  4 mL Nebulization Q6H     PRN Medications:    Current Facility-Administered Medications:     acetaminophen, 650 mg, Per NG tube, Q6H PRN    dextrose 50%, 12.5 g, Intravenous, PRN    dextrose 50%, 12.5 g, Intravenous, PRN    fentaNYL, 50 mcg, Intravenous, Q3H PRN    glucagon (human recombinant), 1 mg, Intramuscular, PRN    glucagon (human recombinant), 1 mg, Intramuscular, PRN    hydrALAZINE, 10 mg, Intravenous, Q6H PRN    insulin aspart U-100, 0-10 Units, Subcutaneous, Q6H PRN    labetalol, 20 mg, Intravenous, Q6H PRN    oxyCODONE, 5 mg, Per NG tube, Q6H PRN    Flushing PICC/Midline Protocol, , , Until Discontinued **AND** sodium chloride 0.9%, 10 mL, Intravenous, Q12H PRN  Continuous Infusions:   dexmedeTOMIDine (Precedex) infusion (titrating)  0-1.4 mcg/kg/hr Intravenous Continuous 28.35 mL/hr at 07/25/25 0420 1 mcg/kg/hr at 07/25/25 0420    NORepinephrine bitartrate-D5W  0-3 mcg/kg/min (Dosing Weight) Intravenous Continuous   Stopped at 07/19/25 2206    propofoL  0-50 mcg/kg/min (Dosing Weight) Intravenous Continuous   Stopped at 07/22/25 0556       No past medical history on file.    No past surgical history on file.    Objective:     Input/output:    Intake/Output Summary (Last 24 hours) at 7/25/2025 0913  Last data filed at 7/25/2025 0420  Gross per 24 hour   Intake 2396.67 ml   Output 2000 ml   Net 396.67 ml       Vital Signs (Most Recent):  Temp: 98.9 °F (37.2 °C) (07/25/25 0800)  Pulse: 60 (07/25/25 0813)  Resp: (!) 22 (07/25/25 0813)  BP: 133/77 (07/25/25 0803)  SpO2: 99 % (07/25/25 0800)  Body mass index is 38.72 kg/m².  Weight: 115.5 kg (254 lb 10.1 oz) Vital Signs (24h Range):  Temp:   [98.3 °F (36.8 °C)-98.9 °F (37.2 °C)] 98.9 °F (37.2 °C)  Pulse:  [49-76] 60  Resp:  [18-30] 22  SpO2:  [93 %-100 %] 99 %  BP: ()/(59-93) 133/77       Physical exam:  Gen- intubated and mechanically ventilated.   In no distress.  HENT- ATNC, MMM  CV- RRR  Resp- bilateral rhonchi  Abd- distended, soft with no rebound or guarding, hypoactive bowel sounds   MSK- WWP, trace edema  Neuro- awake this morning moving left side spontaneously.  He does focus on me when I speak to him but does not follow commands.  Psych- calm but otherwise unable to assess 2/2 neurologic status      Lines/Drains/Airways       Drain  Duration                  NG/OG Tube 07/05/25 0848 20 days         Urethral Catheter 07/05/25 0800 Non-latex 16 Fr. 20 days              Airway  Duration                  Airway - Non-Surgical 07/18/25 1721 Endotracheal Tube 6 days              Peripheral Intravenous Line  Duration             Peripheral IV Single Lumen 07/19/25 1000 20 G Anterior;Right Forearm 5 days                  Vent:  Vent Mode: CPAP / PSV (07/25/25 0813)  Ventilator Initiated: Yes (07/18/25 1730)  Set Rate: 24 BPM (07/25/25 0514)  Vt Set: 480 mL (07/25/25 0514)  Pressure Support: 10 cmH20 (07/25/25 0813)  PEEP/CPAP: 8 cmH20 (07/25/25 0813)  Oxygen Concentration (%): 40 (07/25/25 0813)  Peak Airway Pressure: 20 cmH20 (07/25/25 0813)  Total Ve: 9.6 L/m (07/25/25 0813)  F/VT Ratio<105 (RSBI): (!) 45.36 (07/25/25 0813)    ABGs:  Lab Results   Component Value Date    PH 7.380 07/18/2025    PO2 85.0 07/18/2025    PCO2 44.0 07/18/2025       Significant Labs:  Lab Results   Component Value Date    WBC 7.70 07/25/2025    HGB 12.1 (L) 07/25/2025    HCT 39.9 (L) 07/25/2025    MCV 94.1 (H) 07/25/2025     07/25/2025       Recent Labs   Lab 07/25/25  0325   *   K 5.3*      CO2 21*   BUN 48.6*   CREATININE 0.95   CALCIUM 9.1   MG 1.70   PHOS 3.0   AST 12   ALT 19   ALKPHOS 55   ALBUMIN 2.4*         Assessment:     Acute combined  hypoxic and hypercapnic respiratory failure-possibly obesity/hypoventilation syndrome coupled with previous stroke and pneumonia.    History of cerebrovascular disease with right hemiparesis-more awake today.  Acute kidney injury superimposed on stage II chronic kidney disease, obstructive uropathy component.  He remains nonoliguric, daily improvement in BUN/creatinine   Type 2 diabetes mellitus with polyneuropathy,   Previous CVA with evidence of multiple areas of ischemia  Hypertension    Plan:     Has completed 7 days of Rocephin.  Continue tube feedings.  Per palliative care patient's daughter does not think he would want a trach and PEG.  Need to verify this with family.  I do not think this patient would tolerate extubation.  If they do not desire trach or PEG then will need to consider palliative withdrawal.       DVT ppx: LMWH   GI ppx: famotidine       The patient remains at high risk of decompensation and death and will remain in ICU level care    36 min of critical care time was spent reviewing the patient's chart including medications, radiographs, labs, pertinent cultures and pathology data, other consultant notes/recomendations as well as titration of vasopressors, adjustment of mechanical ventilatory or NIPPV support, as well as discussion of goals of care with nursing staff, respiratory therapy at the bedside and with family at the bedside/via phone.     JESSIKA Whittaker MD  Pulmonary/Critical Care    Addendum:  CPAP ABGs PO2 120 pCO2 42 pH 7.41 on pressure support 10 FiO2 40%.  Patient is awake on Precedex.  Will place tube feedings on hold and extubate patient in 1 hour.

## 2025-07-25 NOTE — PROGRESS NOTES
Inpatient Nutrition Assessment    Admit Date: 7/5/2025   Total duration of encounter: 20 days   Patient Age: 69 y.o.    Nutrition Recommendation/Prescription     Resume tube feeding when feasible:  Peptamen Intense VHP goal rate 55 ml/hr   [id number] two times daily  to provide  1260 kcal 100% needs  142 g protein 101% needs  83 g CHO 59% needs  924 ml water 41% needs, 67% with current 30 ml/hr water flushes  calculations based on estimated 20 hour run time     Medical management of hyperglycemia.     Communication of Recommendations: reviewed with nurse    Nutrition Assessment     Malnutrition Assessment/Nutrition-Focused Physical Exam       Malnutrition Level: other (see comments) (Does not meet criteria) (07/07/25 1041)                                                        A minimum of two characteristics is recommended for diagnosis of either severe or non-severe malnutrition.    Chart Review    Reason Seen: follow-up    Malnutrition Screening Tool Results   Have you recently lost weight without trying?: No  Have you been eating poorly because of a decreased appetite?: No   MST Score: 0   Diagnosis:  Acute combined hypoxic and hypercapnic respiratory failure  History of cerebrovascular disease with right hemiparesis  Acute kidney injury superimposed on stage II chronic kidney disease, obstructive uropathy component.  He remains nonoliguric, daily improvement in BUN/creatinine   Type 2 diabetes mellitus with polyneuropathy, hyperglycemia improved.  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, glaucoma, hypertension, and hyperlipidemia     Scheduled Medications:  amLODIPine, 5 mg, Daily  enoxparin, 40 mg, Q24H (prophylaxis, 1700)  famotidine, 20 mg, BID  glycopyrrolate, 1 mg, TID  insulin glargine U-100, 30 Units, BID  lisinopriL, 20 mg,  Daily  polyethylene glycol, 17 g, BID  senna-docusate, 2 tablet, Daily  sodium chloride 3%, 4 mL, Q6H    Continuous Infusions:  dexmedeTOMIDine (Precedex) infusion (titrating), Last Rate: 1 mcg/kg/hr (07/25/25 0929)  NORepinephrine bitartrate-D5W, Last Rate: Stopped (07/19/25 2206)  propofoL, Last Rate: Stopped (07/22/25 0556)    PRN Medications:  acetaminophen, 650 mg, Q6H PRN  dextrose 50%, 12.5 g, PRN  dextrose 50%, 12.5 g, PRN  fentaNYL, 50 mcg, Q3H PRN  glucagon (human recombinant), 1 mg, PRN  glucagon (human recombinant), 1 mg, PRN  hydrALAZINE, 10 mg, Q6H PRN  insulin aspart U-100, 0-10 Units, Q6H PRN  labetalol, 20 mg, Q6H PRN  oxyCODONE, 5 mg, Q6H PRN  sodium chloride 0.9%, 10 mL, Q12H PRN    Calorie Containing IV Medications: no significant kcals from medications at this time    Recent Labs   Lab 07/19/25  0319 07/20/25  0321 07/21/25  0302 07/22/25  0340 07/23/25  0338 07/24/25  0419 07/25/25  0325    139 138 138 136 135* 133*   K 5.0 4.1 4.4 4.7 4.8 5.3* 5.3*   CALCIUM 8.4* 8.3* 8.4* 8.7* 9.1 9.1 9.1   PHOS 2.5 2.3 3.1 3.2 2.8 3.3 3.0   MG 2.20 2.40 2.30 2.00 1.60 1.80 1.70    108* 107 106 104 102 104   CO2 23 23 22* 22* 24 22* 21*   BUN 47.3* 57.8* 56.8* 43.1* 34.2* 57.5* 48.6*   CREATININE 1.59* 1.30* 1.10 0.97 0.88 1.34* 0.95   EGFRNORACEVR 47 60 >60 >60 >60 58 >60   * 188* 145* 143* 166* 207* 193*   BILITOT 0.5 0.3 0.2 0.3 0.4 0.3 0.3   ALKPHOS 57 53 53 57 58 56 55   ALT 24 22 19 25 26 25 19   AST 13 14 13 17 20 16 12   ALBUMIN 2.3* 2.2* 2.2* 2.4* 2.4* 2.5* 2.4*   WBC 11.72* 7.33 6.41 7.14 8.12 9.56 7.70   HGB 11.5* 11.1* 10.8* 12.1* 11.9* 11.7* 12.1*   HCT 37.0* 35.3* 34.9* 38.5* 37.4* 38.3* 39.9*     Nutrition Orders:  No diet orders on file  Tube Feedings/Formulas 55; 1,100; Peptamen Intense VHP; OG; 30; Every hour,Tube Feedings/Formulas Other (see comments); OG; ProSource TF20; 2 times daily    Appetite/Oral Intake: NPO/NPO  Factors Affecting Nutritional Intake: NPO and on  "mechanical ventilation  Social Needs Impacting Access to Food: none identified; NH resident   Food/Nondenominational/Cultural Preferences: unable to obtain  Food Allergies: no known food allergies  Last Bowel Movement: 07/23/25  Wound(s):  no partial or full thickness pressure injuries documented at this time.     Comments    7/7/25: Pt intubated on mechanical ventilation, receiving kcals from diprivan. Not currently receiving enteral nutrition. Tube feeding orders updated.     7/11/25 Patient remains on ventilator with propofol, tube feeding at previous goal rate; recommend decreasing tube feeding rate due to overfeeding with propofol currently, will need to increase protein modular.    7/15/25: Patient remains intubated, no longer receiving kcals from propofol. TF put on hold this morning d/t worsening abdominal distention this morning. TF regimen updated for once able to resume feeds.     7/18/25 Tube feeding held currently secondary to suspected aspiration, possibly resume today.    7/22/25 Patient remains intubated, not currently receiving kcals from propofol. Tolerating tube feedings at goal rate.    7/25/25 Patient remains intubated. Tolerating tube feedings at goal rate. Receiving 30 units lantus BID and SSI prn for hyperglycemia.     Anthropometrics    Height: 5' 8" (172.7 cm), Height Method: Estimated  Last Weight: 115.5 kg (254 lb 10.1 oz) (07/18/25 0600), Weight Method: Bed Scale  BMI (Calculated): 38.7  BMI Classification: obese grade II (BMI 35-39.9)        Ideal Body Weight (IBW), Male: 154 lb     % Ideal Body Weight, Male (lb): 175.08 %                          Usual Weight Provided By: EMR weight history    Wt Readings from Last 5 Encounters:   07/18/25 115.5 kg (254 lb 10.1 oz)     Weight Change(s) Since Admission:   7/25/25 no updated weights   Wt Readings from Last 1 Encounters:   07/18/25 0600 115.5 kg (254 lb 10.1 oz)   07/13/25 0800 122.3 kg (269 lb 10 oz)   07/05/25 0605 113.4 kg (250 lb)   Admit " Weight: 113.4 kg (250 lb) (07/05/25 0605), Weight Method: Stated    Estimated Needs    Weight Used For Calorie Calculations: 113.4 kg (250 lb)  Energy Calorie Requirements (kcal): 6670-7307 kcals (11-14 kcal/kg)  Energy Need Method: Kcal/kg  Weight Used For Protein Calculations: 70 kg (154 lb 5.2 oz) (IBW)  Protein Requirements: 140 g (2 g/kg IBW)  Fluid Requirements (mL): 2268 mL (20 mL/kg)  CHO Requirement: 140-178 g/day (45% of total EEN)     Enteral Nutrition   Formula: Peptamen Intense VHP  Rate/Volume: 55ml/hr  Water Flushes: 30 ml/hr  Additives/Modulars: Prosource TF20 BID  Route: orogastric tube  Method: continuous  Total Nutrition Provided by Tube Feeding, Additives, and Flushes:  Calories Provided  1260 kcal/d, 100% needs   Protein Provided  142 g/d, 101% needs   Fluid Provided  1524 ml/d, 67% needs   Continuous feeding calculations based on estimated 20 hr/d run time unless otherwise stated.     Parenteral Nutrition Patient not receiving parenteral nutrition support at this time.    Evaluation of Received Nutrient Intake    Calories: meeting estimated needs  Protein: meeting estimated needs    Patient Education Not applicable.    Nutrition Diagnosis     PES: Inadequate energy intake related to inability to consume sufficient nutrients as evidenced by on mechanical ventilation, need for NPO status. (resolved)   PES: Inadequate oral intake related to acute illness as evidenced by on mechanical ventilation since admit. (active)   PES: N/A            Nutrition Interventions     Intervention(s): Enteral nutrition management  Intervention(s):      Goal: Meet greater than 80% of nutritional needs by follow-up. (goal met)  Goal: Tolerate enteral feeding at goal rate by follow-up. (goal met)    Nutrition Goals & Monitoring     Dietitian will monitor: energy intake, enteral nutrition intake, weight, electrolyte/renal panel, glucose/endocrine profile, and gastrointestinal profile  Discharge planning: too early to  determine; pending clinical course  Nutrition Risk/Follow-Up: patient at increased nutrition risk; dietitian will follow-up twice weekly   Please consult if re-assessment needed sooner.

## 2025-07-26 LAB
ALBUMIN SERPL-MCNC: 2.4 G/DL (ref 3.4–4.8)
ALBUMIN/GLOB SERPL: 0.5 RATIO (ref 1.1–2)
ALP SERPL-CCNC: 51 UNIT/L (ref 40–150)
ALT SERPL-CCNC: 17 UNIT/L (ref 0–55)
ANION GAP SERPL CALC-SCNC: 7 MEQ/L
AST SERPL-CCNC: 12 UNIT/L (ref 11–45)
BASOPHILS # BLD AUTO: 0.04 X10(3)/MCL
BASOPHILS NFR BLD AUTO: 0.5 %
BILIRUB SERPL-MCNC: 0.3 MG/DL
BUN SERPL-MCNC: 34.1 MG/DL (ref 8.4–25.7)
CALCIUM SERPL-MCNC: 9.1 MG/DL (ref 8.8–10)
CHLORIDE SERPL-SCNC: 102 MMOL/L (ref 98–107)
CO2 SERPL-SCNC: 22 MMOL/L (ref 23–31)
CREAT SERPL-MCNC: 0.83 MG/DL (ref 0.72–1.25)
CREAT/UREA NIT SERPL: 41
EOSINOPHIL # BLD AUTO: 0.2 X10(3)/MCL (ref 0–0.9)
EOSINOPHIL NFR BLD AUTO: 2.6 %
ERYTHROCYTE [DISTWIDTH] IN BLOOD BY AUTOMATED COUNT: 13.8 % (ref 11.5–17)
GFR SERPLBLD CREATININE-BSD FMLA CKD-EPI: >60 ML/MIN/1.73/M2
GLOBULIN SER-MCNC: 5 GM/DL (ref 2.4–3.5)
GLUCOSE SERPL-MCNC: 157 MG/DL (ref 82–115)
HCT VFR BLD AUTO: 37.7 % (ref 42–52)
HGB BLD-MCNC: 11.8 G/DL (ref 14–18)
IMM GRANULOCYTES # BLD AUTO: 0.03 X10(3)/MCL (ref 0–0.04)
IMM GRANULOCYTES NFR BLD AUTO: 0.4 %
LYMPHOCYTES # BLD AUTO: 2.08 X10(3)/MCL (ref 0.6–4.6)
LYMPHOCYTES NFR BLD AUTO: 27.4 %
MAGNESIUM SERPL-MCNC: 1.5 MG/DL (ref 1.6–2.6)
MCH RBC QN AUTO: 28.4 PG (ref 27–31)
MCHC RBC AUTO-ENTMCNC: 31.3 G/DL (ref 33–36)
MCV RBC AUTO: 90.6 FL (ref 80–94)
MONOCYTES # BLD AUTO: 0.74 X10(3)/MCL (ref 0.1–1.3)
MONOCYTES NFR BLD AUTO: 9.7 %
NEUTROPHILS # BLD AUTO: 4.5 X10(3)/MCL (ref 2.1–9.2)
NEUTROPHILS NFR BLD AUTO: 59.4 %
NRBC BLD AUTO-RTO: 0 %
PHOSPHATE SERPL-MCNC: 3.1 MG/DL (ref 2.3–4.7)
PLATELET # BLD AUTO: 209 X10(3)/MCL (ref 130–400)
PMV BLD AUTO: 10.8 FL (ref 7.4–10.4)
POCT GLUCOSE: 119 MG/DL (ref 70–110)
POCT GLUCOSE: 151 MG/DL (ref 70–110)
POCT GLUCOSE: 177 MG/DL (ref 70–110)
POCT GLUCOSE: 191 MG/DL (ref 70–110)
POCT GLUCOSE: 210 MG/DL (ref 70–110)
POCT GLUCOSE: 232 MG/DL (ref 70–110)
POTASSIUM SERPL-SCNC: 4.7 MMOL/L (ref 3.5–5.1)
PROT SERPL-MCNC: 7.4 GM/DL (ref 5.8–7.6)
RBC # BLD AUTO: 4.16 X10(6)/MCL (ref 4.7–6.1)
SODIUM SERPL-SCNC: 131 MMOL/L (ref 136–145)
WBC # BLD AUTO: 7.59 X10(3)/MCL (ref 4.5–11.5)

## 2025-07-26 PROCEDURE — 84100 ASSAY OF PHOSPHORUS: CPT

## 2025-07-26 PROCEDURE — 94667 MNPJ CHEST WALL 1ST: CPT

## 2025-07-26 PROCEDURE — 25000003 PHARM REV CODE 250

## 2025-07-26 PROCEDURE — 27100171 HC OXYGEN HIGH FLOW UP TO 24 HOURS

## 2025-07-26 PROCEDURE — 20000000 HC ICU ROOM

## 2025-07-26 PROCEDURE — 99900031 HC PATIENT EDUCATION (STAT)

## 2025-07-26 PROCEDURE — 94660 CPAP INITIATION&MGMT: CPT

## 2025-07-26 PROCEDURE — 94640 AIRWAY INHALATION TREATMENT: CPT

## 2025-07-26 PROCEDURE — 25000242 PHARM REV CODE 250 ALT 637 W/ HCPCS: Performed by: INTERNAL MEDICINE

## 2025-07-26 PROCEDURE — 80053 COMPREHEN METABOLIC PANEL: CPT

## 2025-07-26 PROCEDURE — 36415 COLL VENOUS BLD VENIPUNCTURE: CPT

## 2025-07-26 PROCEDURE — 25000003 PHARM REV CODE 250: Performed by: INTERNAL MEDICINE

## 2025-07-26 PROCEDURE — 85025 COMPLETE CBC W/AUTO DIFF WBC: CPT

## 2025-07-26 PROCEDURE — 99900035 HC TECH TIME PER 15 MIN (STAT)

## 2025-07-26 PROCEDURE — 83735 ASSAY OF MAGNESIUM: CPT

## 2025-07-26 PROCEDURE — 63600175 PHARM REV CODE 636 W HCPCS

## 2025-07-26 PROCEDURE — 94761 N-INVAS EAR/PLS OXIMETRY MLT: CPT

## 2025-07-26 PROCEDURE — 94760 N-INVAS EAR/PLS OXIMETRY 1: CPT

## 2025-07-26 PROCEDURE — 63600175 PHARM REV CODE 636 W HCPCS: Performed by: INTERNAL MEDICINE

## 2025-07-26 RX ORDER — QUETIAPINE FUMARATE 25 MG/1
25 TABLET, FILM COATED ORAL 2 TIMES DAILY
Status: DISCONTINUED | OUTPATIENT
Start: 2025-07-26 | End: 2025-07-27

## 2025-07-26 RX ORDER — MAGNESIUM SULFATE HEPTAHYDRATE 40 MG/ML
2 INJECTION, SOLUTION INTRAVENOUS ONCE
Status: COMPLETED | OUTPATIENT
Start: 2025-07-26 | End: 2025-07-26

## 2025-07-26 RX ORDER — QUETIAPINE FUMARATE 25 MG/1
25 TABLET, FILM COATED ORAL 2 TIMES DAILY PRN
Status: DISCONTINUED | OUTPATIENT
Start: 2025-07-26 | End: 2025-07-26

## 2025-07-26 RX ADMIN — SODIUM CHLORIDE SOLN NEBU 3% 4 ML: 3 NEBU SOLN at 08:07

## 2025-07-26 RX ADMIN — INSULIN ASPART 4 UNITS: 100 INJECTION, SOLUTION INTRAVENOUS; SUBCUTANEOUS at 11:07

## 2025-07-26 RX ADMIN — SODIUM CHLORIDE SOLN NEBU 3% 4 ML: 3 NEBU SOLN at 03:07

## 2025-07-26 RX ADMIN — QUETIAPINE FUMARATE 25 MG: 25 TABLET ORAL at 12:07

## 2025-07-26 RX ADMIN — GLYCOPYRROLATE 1 MG: 1 TABLET ORAL at 08:07

## 2025-07-26 RX ADMIN — QUETIAPINE FUMARATE 25 MG: 25 TABLET ORAL at 04:07

## 2025-07-26 RX ADMIN — AMLODIPINE BESYLATE 5 MG: 5 TABLET ORAL at 08:07

## 2025-07-26 RX ADMIN — QUETIAPINE FUMARATE 25 MG: 25 TABLET ORAL at 08:07

## 2025-07-26 RX ADMIN — DEXMEDETOMIDINE HYDROCHLORIDE 0.6 MCG/KG/HR: 4 INJECTION, SOLUTION INTRAVENOUS at 05:07

## 2025-07-26 RX ADMIN — SODIUM CHLORIDE SOLN NEBU 3% 4 ML: 3 NEBU SOLN at 01:07

## 2025-07-26 RX ADMIN — MAGNESIUM SULFATE HEPTAHYDRATE 2 G: 40 INJECTION, SOLUTION INTRAVENOUS at 05:07

## 2025-07-26 RX ADMIN — DEXMEDETOMIDINE HYDROCHLORIDE 0.4 MCG/KG/HR: 4 INJECTION, SOLUTION INTRAVENOUS at 08:07

## 2025-07-26 RX ADMIN — GLYCOPYRROLATE 1 MG: 1 TABLET ORAL at 03:07

## 2025-07-26 RX ADMIN — POLYETHYLENE GLYCOL 3350 17 G: 17 POWDER, FOR SOLUTION ORAL at 08:07

## 2025-07-26 RX ADMIN — INSULIN ASPART 2 UNITS: 100 INJECTION, SOLUTION INTRAVENOUS; SUBCUTANEOUS at 06:07

## 2025-07-26 RX ADMIN — LISINOPRIL 20 MG: 20 TABLET ORAL at 08:07

## 2025-07-26 RX ADMIN — INSULIN GLARGINE 30 UNITS: 100 INJECTION, SOLUTION SUBCUTANEOUS at 08:07

## 2025-07-26 RX ADMIN — ENOXAPARIN SODIUM 40 MG: 40 INJECTION SUBCUTANEOUS at 03:07

## 2025-07-26 RX ADMIN — SENNOSIDES, DOCUSATE SODIUM 2 TABLET: 8.6; 5 TABLET ORAL at 08:07

## 2025-07-26 RX ADMIN — FAMOTIDINE 20 MG: 20 TABLET, FILM COATED ORAL at 08:07

## 2025-07-26 NOTE — PROGRESS NOTES
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  He presents to ED per EMS 07/05/2025 after multiple falls at nursing home with reported generalized weakness.  He is reported as having a Benítez catheter placed about 1 week ago for urinary retention.  Patient was reported as having pulled this out himself while at nursing home, on a.m. of presentation.  Benítez catheter was placed in ED, returning 700 cc of urine.  While in ER, he was reported as becoming more somnolent with poor overall respiratory effort and decreasing level of consciousness.  ABG revealed acute respiratory acidosis.  He was intubated at 8:55 a.m., placed on mechanical ventilatory support.  CT brain obtained in ED reveals chronic microvascular ischemic changes with no acute intracranial abnormalities.  SARS-COVID-2 PCR negative, influenza A/B PCR negative.  He was admitted to ICU for ongoing medical care.     Hospital Course:  07/05/2025: Intubation/mechanical ventilation   TTE (07/05/2025: Normal LV size with decreased LV SF, EF 45-50%; trace MR, mild TR and mild PI.  PASP estimated 30 mm Hg; no pericardial effusion  07/16.  Extubated  07/18.  Intubated  07/25.  Extubated      24hr Interval History:  Extubated yesterday and is doing well.  Awake this morning breathing easily on nasal cannula O2.  Tolerating tube feedings well.      Review of systems unobtainable due to intubation, sedation.      Scheduled Medications:   amLODIPine  5 mg Per NG tube Daily    enoxparin  40 mg Subcutaneous Q24H (prophylaxis, 1700)    famotidine  20  mg Per NG tube BID    glycopyrrolate  1 mg Per OG tube TID    insulin glargine U-100  30 Units Subcutaneous BID    lisinopriL  20 mg Per NG tube Daily    polyethylene glycol  17 g Per NG tube BID    senna-docusate  2 tablet Per NG tube Daily    sodium chloride 3%  4 mL Nebulization Q6H     PRN Medications:    Current Facility-Administered Medications:     acetaminophen, 650 mg, Per NG tube, Q6H PRN    dextrose 50%, 12.5 g, Intravenous, PRN    dextrose 50%, 12.5 g, Intravenous, PRN    fentaNYL, 50 mcg, Intravenous, Q3H PRN    glucagon (human recombinant), 1 mg, Intramuscular, PRN    glucagon (human recombinant), 1 mg, Intramuscular, PRN    hydrALAZINE, 10 mg, Intravenous, Q6H PRN    insulin aspart U-100, 0-10 Units, Subcutaneous, Q6H PRN    labetalol, 20 mg, Intravenous, Q6H PRN    oxyCODONE, 5 mg, Per NG tube, Q6H PRN    QUEtiapine, 25 mg, Per NG tube, BID PRN    Flushing PICC/Midline Protocol, , , Until Discontinued **AND** sodium chloride 0.9%, 10 mL, Intravenous, Q12H PRN  Continuous Infusions:   dexmedeTOMIDine (Precedex) infusion (titrating)  0-1.4 mcg/kg/hr Intravenous Continuous 17.01 mL/hr at 07/26/25 0702 0.6 mcg/kg/hr at 07/26/25 0702    NORepinephrine bitartrate-D5W  0-3 mcg/kg/min (Dosing Weight) Intravenous Continuous   Stopped at 07/19/25 2206    propofoL  0-50 mcg/kg/min (Dosing Weight) Intravenous Continuous   Stopped at 07/26/25 0552       No past medical history on file.    No past surgical history on file.    Objective:     Input/output:    Intake/Output Summary (Last 24 hours) at 7/26/2025 0758  Last data filed at 7/26/2025 0702  Gross per 24 hour   Intake 1922.85 ml   Output 3050 ml   Net -1127.15 ml       Vital Signs (Most Recent):  Temp: 98.7 °F (37.1 °C) (07/26/25 0400)  Pulse: (!) 58 (07/26/25 0600)  Resp: (!) 21 (07/26/25 0600)  BP: (!) 146/84 (07/26/25 0600)  SpO2: 100 % (07/26/25 0600)  Body mass index is 38.72 kg/m².  Weight: 115.5 kg (254 lb 10.1 oz) Vital Signs (24h Range):  Temp:   [97.8 °F (36.6 °C)-98.9 °F (37.2 °C)] 98.7 °F (37.1 °C)  Pulse:  [50-78] 58  Resp:  [15-30] 21  SpO2:  [96 %-100 %] 100 %  BP: (108-153)/() 146/84       Physical exam:  Gen-  In no distress.  HENT- ATNC, MMM  CV- RRR  Resp- bilateral rhonchi  Abd- distended, soft with no rebound or guarding, hypoactive bowel sounds   MSK- WWP, trace edema  Neuro- awake this morning moving left side spontaneously.  He does focus on me when I speak to him but does not follow commands.  Psych- calm but otherwise unable to assess 2/2 neurologic status      Lines/Drains/Airways       Drain  Duration                  NG/OG Tube 07/05/25 0848 20 days         Urethral Catheter 07/05/25 0800 Non-latex 16 Fr. 20 days              Peripheral Intravenous Line  Duration             Peripheral IV Single Lumen 07/19/25 1000 20 G Anterior;Right Forearm 6 days    Peripheral IV Single Lumen 07/25/25 0600 20 G Distal;Left;Posterior Forearm 1 day                  Vent:  Vent Mode: CPAP / PSV (07/25/25 1125)  Ventilator Initiated: Yes (07/18/25 1730)  Set Rate: 24 BPM (07/25/25 0514)  Vt Set: 480 mL (07/25/25 0514)  Pressure Support: 10 cmH20 (07/25/25 1125)  PEEP/CPAP: 8 cmH20 (07/25/25 1125)  Oxygen Concentration (%): 40 (07/26/25 0334)  Peak Airway Pressure: 20 cmH20 (07/25/25 1125)  Total Ve: 9.4 L/m (07/25/25 1125)  F/VT Ratio<105 (RSBI): (!) 45.36 (07/25/25 0813)    ABGs:  Lab Results   Component Value Date    PH 7.410 07/25/2025    PO2 120.0 (H) 07/25/2025    PCO2 42.0 07/25/2025       Significant Labs:  Lab Results   Component Value Date    WBC 7.59 07/26/2025    HGB 11.8 (L) 07/26/2025    HCT 37.7 (L) 07/26/2025    MCV 90.6 07/26/2025     07/26/2025       Recent Labs   Lab 07/26/25  0320   *   K 4.7      CO2 22*   BUN 34.1*   CREATININE 0.83   CALCIUM 9.1   MG 1.50*   PHOS 3.1   AST 12   ALT 17   ALKPHOS 51   ALBUMIN 2.4*         Assessment:     Acute combined hypoxic and hypercapnic respiratory failure-possibly  obesity/hypoventilation syndrome coupled with previous stroke and pneumonia.    History of cerebrovascular disease with right hemiparesis-more awake today.  Acute kidney injury superimposed on stage II chronic kidney disease, obstructive uropathy component.  He remains nonoliguric, daily improvement in BUN/creatinine   Type 2 diabetes mellitus with polyneuropathy,   Previous CVA with evidence of multiple areas of ischemia  Hypertension    Plan:     Has completed 7 days of Rocephin.  Continue tube feedings.  We will continue ICU for another 24 hours and then can downgrade to the floor if remains stable.       DVT ppx: LMWH   GI ppx: famotidine       The patient remains at high risk of decompensation and death and will remain in ICU level care    36 min of critical care time was spent reviewing the patient's chart including medications, radiographs, labs, pertinent cultures and pathology data, other consultant notes/recomendations as well as titration of vasopressors, adjustment of mechanical ventilatory or NIPPV support, as well as discussion of goals of care with nursing staff, respiratory therapy at the bedside and with family at the bedside/via phone.     JESSIKA Whittaker MD  Pulmonary/Critical Care

## 2025-07-26 NOTE — PLAN OF CARE
Problem: Infection  Goal: Absence of Infection Signs and Symptoms  7/26/2025 0933 by James Gauthier RN  Outcome: Progressing  7/26/2025 0933 by James Gauthier RN  Outcome: Progressing     Problem: Adult Inpatient Plan of Care  Goal: Plan of Care Review  7/26/2025 0933 by James Gauthier RN  Outcome: Progressing  7/26/2025 0933 by James Gauthier RN  Outcome: Progressing  Goal: Patient-Specific Goal (Individualized)  7/26/2025 0933 by James Gauthier RN  Outcome: Progressing  7/26/2025 0933 by James Gauthier RN  Outcome: Progressing  Goal: Absence of Hospital-Acquired Illness or Injury  7/26/2025 0933 by James Gauthier RN  Outcome: Progressing  7/26/2025 0933 by James Gauthier RN  Outcome: Progressing  Goal: Optimal Comfort and Wellbeing  7/26/2025 0933 by James Gauthier RN  Outcome: Progressing  7/26/2025 0933 by James Gauthier RN  Outcome: Progressing  Goal: Readiness for Transition of Care  7/26/2025 0933 by James Gauthier RN  Outcome: Progressing  7/26/2025 0933 by James Gauthier RN  Outcome: Progressing     Problem: Mechanical Ventilation Invasive  Goal: Effective Communication  7/26/2025 0933 by James Gauthier RN  Outcome: Progressing  7/26/2025 0933 by James Gauthier RN  Outcome: Progressing  Goal: Optimal Device Function  7/26/2025 0933 by James Gauthier RN  Outcome: Progressing  7/26/2025 0933 by James Gauthier RN  Outcome: Progressing  Goal: Mechanical Ventilation Liberation  7/26/2025 0933 by James Gauthier RN  Outcome: Progressing  7/26/2025 0933 by James Gauthier RN  Outcome: Progressing  Goal: Optimal Nutrition Delivery  7/26/2025 0933 by James Gauthier RN  Outcome: Progressing  7/26/2025 0933 by James Gauthier RN  Outcome: Progressing  Goal: Absence of Device-Related Skin and Tissue Injury  7/26/2025 0933 by James Gauthier RN  Outcome: Progressing  7/26/2025 0933 by Disha, James, RN  Outcome: Progressing  Goal: Absence of Ventilator-Induced Lung Injury  7/26/2025 0933 by  [place], James, RN  Outcome: Progressing  7/26/2025 0933 by James Gauthier RN  Outcome: Progressing     Problem: Artificial Airway  Goal: Effective Communication  7/26/2025 0933 by James Gauthier RN  Outcome: Progressing  7/26/2025 0933 by James Gauthier RN  Outcome: Progressing  Goal: Optimal Device Function  7/26/2025 0933 by James Gauthier RN  Outcome: Progressing  7/26/2025 0933 by James Gauthier RN  Outcome: Progressing  Goal: Absence of Device-Related Skin or Tissue Injury  7/26/2025 0933 by James Gauthier RN  Outcome: Progressing  7/26/2025 0933 by James Gauthier RN  Outcome: Progressing     Problem: Noninvasive Ventilation Acute  Goal: Effective Unassisted Ventilation and Oxygenation  7/26/2025 0933 by James Gauthier RN  Outcome: Progressing  7/26/2025 0933 by James Gauthier RN  Outcome: Progressing     Problem: Skin Injury Risk Increased  Goal: Skin Health and Integrity  7/26/2025 0933 by James Gauthier RN  Outcome: Progressing  7/26/2025 0933 by James Gauthier RN  Outcome: Progressing     Problem: Fall Injury Risk  Goal: Absence of Fall and Fall-Related Injury  7/26/2025 0933 by James Gauthier RN  Outcome: Progressing  7/26/2025 0933 by James Gauthier RN  Outcome: Progressing     Problem: Delirium  Goal: Optimal Coping  7/26/2025 0933 by James Gauthier RN  Outcome: Progressing  7/26/2025 0933 by James Gauthier RN  Outcome: Progressing  Goal: Improved Behavioral Control  7/26/2025 0933 by James Gauthier RN  Outcome: Progressing  7/26/2025 0933 by James Gauthier RN  Outcome: Progressing  Goal: Improved Attention and Thought Clarity  7/26/2025 0933 by James Gauthier RN  Outcome: Progressing  7/26/2025 0933 by James Gauthier RN  Outcome: Progressing  Goal: Improved Sleep  Outcome: Progressing     Problem: Bariatric Environmental Safety  Goal: Safety Maintained with Care  Outcome: Progressing     Problem: Coping Ineffective  Goal: Effective Coping  Outcome: Progressing     Problem:  Hospitalized Older Adult  Goal: Optimal Cognitive Function  Outcome: Progressing  Goal: Effective Bowel Elimination  Outcome: Progressing  Goal: Optimal Coping  Outcome: Progressing  Goal: Fluid and Electrolyte Balance  Outcome: Progressing  Goal: Optimal Functional Ability  Outcome: Progressing  Goal: Improved Oral Intake  Outcome: Progressing  Goal: Adequate Sleep/Rest  Outcome: Progressing  Goal: Effective Urinary Elimination  Outcome: Progressing

## 2025-07-27 PROBLEM — J96.02 ACUTE RESPIRATORY FAILURE WITH HYPERCAPNIA: Status: ACTIVE | Noted: 2025-07-27

## 2025-07-27 LAB
ALBUMIN SERPL-MCNC: 2.7 G/DL (ref 3.4–4.8)
ALBUMIN/GLOB SERPL: 0.5 RATIO (ref 1.1–2)
ALP SERPL-CCNC: 62 UNIT/L (ref 40–150)
ALT SERPL-CCNC: 19 UNIT/L (ref 0–55)
ANION GAP SERPL CALC-SCNC: 9 MEQ/L
AST SERPL-CCNC: 14 UNIT/L (ref 11–45)
BASOPHILS # BLD AUTO: 0.05 X10(3)/MCL
BASOPHILS NFR BLD AUTO: 0.6 %
BILIRUB SERPL-MCNC: 0.4 MG/DL
BUN SERPL-MCNC: 43.9 MG/DL (ref 8.4–25.7)
CALCIUM SERPL-MCNC: 9.7 MG/DL (ref 8.8–10)
CHLORIDE SERPL-SCNC: 99 MMOL/L (ref 98–107)
CO2 SERPL-SCNC: 25 MMOL/L (ref 23–31)
CREAT SERPL-MCNC: 1.22 MG/DL (ref 0.72–1.25)
CREAT/UREA NIT SERPL: 36
EOSINOPHIL # BLD AUTO: 0.15 X10(3)/MCL (ref 0–0.9)
EOSINOPHIL NFR BLD AUTO: 1.8 %
ERYTHROCYTE [DISTWIDTH] IN BLOOD BY AUTOMATED COUNT: 13.7 % (ref 11.5–17)
GFR SERPLBLD CREATININE-BSD FMLA CKD-EPI: >60 ML/MIN/1.73/M2
GLOBULIN SER-MCNC: 5.4 GM/DL (ref 2.4–3.5)
GLUCOSE SERPL-MCNC: 189 MG/DL (ref 82–115)
HCT VFR BLD AUTO: 40 % (ref 42–52)
HGB BLD-MCNC: 12.8 G/DL (ref 14–18)
IMM GRANULOCYTES # BLD AUTO: 0.02 X10(3)/MCL (ref 0–0.04)
IMM GRANULOCYTES NFR BLD AUTO: 0.2 %
LYMPHOCYTES # BLD AUTO: 2.18 X10(3)/MCL (ref 0.6–4.6)
LYMPHOCYTES NFR BLD AUTO: 26.7 %
MAGNESIUM SERPL-MCNC: 1.9 MG/DL (ref 1.6–2.6)
MCH RBC QN AUTO: 28.2 PG (ref 27–31)
MCHC RBC AUTO-ENTMCNC: 32 G/DL (ref 33–36)
MCV RBC AUTO: 88.1 FL (ref 80–94)
MONOCYTES # BLD AUTO: 0.88 X10(3)/MCL (ref 0.1–1.3)
MONOCYTES NFR BLD AUTO: 10.8 %
NEUTROPHILS # BLD AUTO: 4.89 X10(3)/MCL (ref 2.1–9.2)
NEUTROPHILS NFR BLD AUTO: 59.9 %
NRBC BLD AUTO-RTO: 0 %
PHOSPHATE SERPL-MCNC: 3.9 MG/DL (ref 2.3–4.7)
PLATELET # BLD AUTO: 214 X10(3)/MCL (ref 130–400)
PMV BLD AUTO: 10.9 FL (ref 7.4–10.4)
POCT GLUCOSE: 160 MG/DL (ref 70–110)
POCT GLUCOSE: 192 MG/DL (ref 70–110)
POCT GLUCOSE: 195 MG/DL (ref 70–110)
POTASSIUM SERPL-SCNC: 5.5 MMOL/L (ref 3.5–5.1)
PROT SERPL-MCNC: 8.1 GM/DL (ref 5.8–7.6)
RBC # BLD AUTO: 4.54 X10(6)/MCL (ref 4.7–6.1)
SODIUM SERPL-SCNC: 133 MMOL/L (ref 136–145)
WBC # BLD AUTO: 8.17 X10(3)/MCL (ref 4.5–11.5)

## 2025-07-27 PROCEDURE — 21400001 HC TELEMETRY ROOM

## 2025-07-27 PROCEDURE — 25000242 PHARM REV CODE 250 ALT 637 W/ HCPCS: Performed by: INTERNAL MEDICINE

## 2025-07-27 PROCEDURE — 63600175 PHARM REV CODE 636 W HCPCS: Performed by: INTERNAL MEDICINE

## 2025-07-27 PROCEDURE — 25000003 PHARM REV CODE 250

## 2025-07-27 PROCEDURE — 27100171 HC OXYGEN HIGH FLOW UP TO 24 HOURS

## 2025-07-27 PROCEDURE — 99900035 HC TECH TIME PER 15 MIN (STAT)

## 2025-07-27 PROCEDURE — 36415 COLL VENOUS BLD VENIPUNCTURE: CPT

## 2025-07-27 PROCEDURE — 80053 COMPREHEN METABOLIC PANEL: CPT

## 2025-07-27 PROCEDURE — 63600175 PHARM REV CODE 636 W HCPCS: Performed by: HOSPITALIST

## 2025-07-27 PROCEDURE — 99900031 HC PATIENT EDUCATION (STAT)

## 2025-07-27 PROCEDURE — 11000001 HC ACUTE MED/SURG PRIVATE ROOM

## 2025-07-27 PROCEDURE — 85025 COMPLETE CBC W/AUTO DIFF WBC: CPT

## 2025-07-27 PROCEDURE — 25000003 PHARM REV CODE 250: Performed by: INTERNAL MEDICINE

## 2025-07-27 PROCEDURE — 94640 AIRWAY INHALATION TREATMENT: CPT

## 2025-07-27 PROCEDURE — 94760 N-INVAS EAR/PLS OXIMETRY 1: CPT

## 2025-07-27 PROCEDURE — 94660 CPAP INITIATION&MGMT: CPT

## 2025-07-27 PROCEDURE — 83735 ASSAY OF MAGNESIUM: CPT

## 2025-07-27 PROCEDURE — 84100 ASSAY OF PHOSPHORUS: CPT

## 2025-07-27 RX ORDER — SCOPOLAMINE 1 MG/3D
1 PATCH, EXTENDED RELEASE TRANSDERMAL
Status: DISCONTINUED | OUTPATIENT
Start: 2025-07-28 | End: 2025-08-07 | Stop reason: HOSPADM

## 2025-07-27 RX ADMIN — AMLODIPINE BESYLATE 5 MG: 5 TABLET ORAL at 08:07

## 2025-07-27 RX ADMIN — FAMOTIDINE 20 MG: 20 TABLET, FILM COATED ORAL at 09:07

## 2025-07-27 RX ADMIN — SODIUM CHLORIDE SOLN NEBU 3% 4 ML: 3 NEBU SOLN at 08:07

## 2025-07-27 RX ADMIN — FENTANYL CITRATE 50 MCG: 50 INJECTION INTRAMUSCULAR; INTRAVENOUS at 08:07

## 2025-07-27 RX ADMIN — GLYCOPYRROLATE 1 MG: 1 TABLET ORAL at 09:07

## 2025-07-27 RX ADMIN — GLYCOPYRROLATE 1 MG: 1 TABLET ORAL at 04:07

## 2025-07-27 RX ADMIN — POLYETHYLENE GLYCOL 3350 17 G: 17 POWDER, FOR SOLUTION ORAL at 09:07

## 2025-07-27 RX ADMIN — LISINOPRIL 20 MG: 20 TABLET ORAL at 08:07

## 2025-07-27 RX ADMIN — INSULIN ASPART 2 UNITS: 100 INJECTION, SOLUTION INTRAVENOUS; SUBCUTANEOUS at 11:07

## 2025-07-27 RX ADMIN — FAMOTIDINE 20 MG: 20 TABLET, FILM COATED ORAL at 08:07

## 2025-07-27 RX ADMIN — ENOXAPARIN SODIUM 40 MG: 40 INJECTION SUBCUTANEOUS at 04:07

## 2025-07-27 RX ADMIN — INSULIN GLARGINE 30 UNITS: 100 INJECTION, SOLUTION SUBCUTANEOUS at 08:07

## 2025-07-27 RX ADMIN — SODIUM CHLORIDE SOLN NEBU 3% 4 ML: 3 NEBU SOLN at 01:07

## 2025-07-27 RX ADMIN — INSULIN GLARGINE 30 UNITS: 100 INJECTION, SOLUTION SUBCUTANEOUS at 09:07

## 2025-07-27 RX ADMIN — SENNOSIDES, DOCUSATE SODIUM 2 TABLET: 8.6; 5 TABLET ORAL at 08:07

## 2025-07-27 RX ADMIN — GLYCOPYRROLATE 1 MG: 1 TABLET ORAL at 08:07

## 2025-07-27 RX ADMIN — SODIUM ZIRCONIUM CYCLOSILICATE 10 G: 10 POWDER, FOR SUSPENSION ORAL at 04:07

## 2025-07-27 RX ADMIN — POLYETHYLENE GLYCOL 3350 17 G: 17 POWDER, FOR SOLUTION ORAL at 08:07

## 2025-07-27 NOTE — H&P
[facility] Hospital Medicine History & Physical Examination       Patient Name: Charly Padilla  MRN: [id number]  Patient Class: IP- Inpatient   Admission Date: 7/5/2025   Admitting Physician: AVIS Service   Length of Stay: 22  Attending Physician: VALENTE Martínez   Primary Care Provider: Rob Liu MD  Face-to-Face encounter date: 07/27/2025  Code Status: Full  Chief Complaint: Fall (Pt arrives via [facility] from [facility] for fall x 3 today trying to get out of bed. PMH of CVA with L sided deficits. Pt reported pulled his own mcgill out yesterday. Denies blood thinners, head strike, LOC. Denies any pain at this time. GCS 15)        Patient information was obtained from patient, patient's family, past medical records and ER records.     HISTORY OF PRESENT ILLNESS:   Charly Padilla is a 69 y.o. male with CVA with right-sided hemiparesis, DM2, neuropathy, CKD 2, hypertension, HLD presented from nursing facility with increased generalized weakness.  Was found to be in acute hypoxic respiratory failure.  Admitted to ICU.  Patient is now completed antibiotics.  Just review of hospital course patient was intubated and mechanical ventilation on 07/05/2025, had echocardiogram which showed EF 45 50% with mild valvular heart disease, extubated on 07/16 reintubated on 07/18 and then lastly extubated on 07/25/2025.  At the time of evaluation he is downgraded from ICU level of care to Medicine Services he is in room 782 in the bed no acute distress he is nonverbal is with right hemiplegia he is admitted on the left hand he is only moving that hand he does track with his eyes but he is nonverbal.  He does not follow simple commands appropriately he has an NG tube with tube feeding free water flush.    PAST MEDICAL HISTORY:   As per HPI     PAST SURGICAL HISTORY:   No past surgical history on file.    ALLERGIES:   Patient has no known allergies.    FAMILY HISTORY:   Reviewed and  negative    SOCIAL HISTORY:     Social History     Tobacco Use    Smoking status: Not on file    Smokeless tobacco: Not on file   Substance Use Topics    Alcohol use: Not on file        HOME MEDICATIONS:     Prior to Admission medications    Medication Sig Start Date End Date Taking? Authorizing Provider   amLODIPine (NORVASC) 5 MG tablet Take 5 mg by mouth once daily. 6/24/25  Yes Provider, Historical   atorvastatin (LIPITOR) 20 MG tablet Take 20 mg by mouth once daily. 6/24/25  Yes Provider, Historical   ATROVENT HFA 17 mcg/actuation inhaler Inhale 1 puff into the lungs every 4 (four) hours as needed for Wheezing. 7/1/25  Yes Provider, Historical   diclofenac (VOLTAREN) 75 MG EC tablet Take 75 mg by mouth 2 (two) times daily. 5/19/25  Yes Provider, Historical   gabapentin (NEURONTIN) 600 MG tablet Take 600 mg by mouth once daily. 6/24/25  Yes Provider, Historical   glipiZIDE (GLUCOTROL) 5 MG tablet Take 5 mg by mouth 2 (two) times daily with meals. 7/20/24  Yes Provider, Historical   guaiFENesin 100 mg/5 ml (ROBITUSSIN) 100 mg/5 mL syrup Take 100 mg by mouth 4 (four) times daily as needed. 7/1/25  Yes Provider, Historical   insulin glargine U-100, Lantus, 100 unit/mL injection Inject 20 Units into the skin every evening. 5/15/25  Yes Provider, Historical   lisinopriL (PRINIVIL,ZESTRIL) 20 MG tablet Take 20 mg by mouth once daily. 7/20/24  Yes Provider, Historical   LOKELMA 10 gram packet Take 10 g by mouth once daily. 7/2/25  Yes Provider, Historical   metFORMIN (GLUCOPHAGE) 1000 MG tablet Take 1,000 mg by mouth 2 (two) times daily. 6/25/25  Yes Provider, Historical   methocarbamoL (ROBAXIN) 500 MG Tab Take 500 mg by mouth 2 (two) times daily. 6/19/25  Yes Provider, Historical   moxifloxacin (VIGAMOX) 0.5 % ophthalmic solution Place 1 drop into both eyes 3 (three) times daily. 3/24/25  Yes Provider, Historical   naphazoline-pheniramine 0.025-0.3% (NAPHCON-A) 0.025-0.3 % ophthalmic solution Place 2 drops into both  eyes once daily. 4/2/25  Yes Provider, Historical   oxybutynin (DITROPAN-XL) 10 MG 24 hr tablet Take 10 mg by mouth 2 (two) times a day. 7/2/25  Yes Provider, Historical   polyethylene glycol (GLYCOLAX) 17 gram PwPk Take 17 g by mouth once daily. 8/21/24  Yes Provider, Historical   QUEtiapine (SEROQUEL) 25 MG Tab Take 25 mg by mouth every evening. 7/1/25  Yes Provider, Historical   tamsulosin (FLOMAX) 0.4 mg Cap Take 0.8 mg by mouth once daily. 7/2/25  Yes Provider, Historical   TRADJENTA 5 mg Tab tablet Take 5 mg by mouth once daily. 12/4/24  Yes Provider, Historical       REVIEW OF SYSTEMS:   Except as documented, all other systems reviewed and negative     PHYSICAL EXAM:     VITAL SIGNS: 24 HRS MIN & MAX LAST   Temp  Min: 98.1 °F (36.7 °C)  Max: 99.8 °F (37.7 °C) 99.8 °F (37.7 °C)   BP  Min: 99/56  Max: 183/82 126/74   Pulse  Min: 66  Max: 113  107   Resp  Min: 17  Max: 35 20   SpO2  Min: 89 %  Max: 100 % 97 %       General appearance: Well-developed, well-nourished male in no apparent distress.  HENT: Atraumatic head. Moist mucous membranes of oral cavity.  Eyes: Normal extraocular movements.   Neck: Supple.   Lungs: Clear to auscultation bilaterally. No wheezing present.   Heart: Regular rate and rhythm. S1 and S2 present with no murmurs/gallop/rub. No pedal edema. No JVD present.   Abdomen: Soft, non-distended, non-tender. No rebound tenderness/guarding. Bowel sounds are normal.   Extremities: No cyanosis, clubbing, or edema.  Skin: No Rash.   Neuro: Motor and sensory exams grossly intact. Good tone. Muscle strength 5/5 in all 4 extremities  Psych/mental status: Appropriate mood and affect. Responds appropriately to questions.     LABS AND IMAGING:     Recent Labs   Lab 07/25/25  0325 07/26/25  0320 07/27/25  0204   WBC 7.70 7.59 8.17   RBC 4.24* 4.16* 4.54*   HGB 12.1* 11.8* 12.8*   HCT 39.9* 37.7* 40.0*   MCV 94.1* 90.6 88.1   MCH 28.5 28.4 28.2   MCHC 30.3* 31.3* 32.0*   RDW 14.1 13.8 13.7    209 214    MPV 10.6* 10.8* 10.9*       Recent Labs   Lab 07/25/25  0325 07/25/25  1125 07/26/25  0320 07/27/25  0204   *  --  131* 133*   K 5.3*  --  4.7 5.5*     --  102 99   CO2 21*  --  22* 25   BUN 48.6*  --  34.1* 43.9*   CREATININE 0.95  --  0.83 1.22   *  --  157* 189*   CALCIUM 9.1  --  9.1 9.7   PH  --  7.410  --   --    MG 1.70  --  1.50* 1.90   ALBUMIN 2.4*  --  2.4* 2.7*   PROT 7.5  --  7.4 8.1*   ALKPHOS 55  --  51 62   ALT 19  --  17 19   AST 12  --  12 14   BILITOT 0.3  --  0.3 0.4       Microbiology Results (last 7 days)       Procedure Component Value Units Date/Time    Blood Culture [[id number]]  (Normal) Collected: 07/19/25 0022    Order Status: Completed Specimen: Blood from Arm, Right Updated: 07/24/25 0102     Blood Culture No Growth at 5 days    Blood Culture [[id number]]  (Normal) Collected: 07/19/25 0029    Order Status: Completed Specimen: Blood from Arm, Left Updated: 07/24/25 0102     Blood Culture No Growth at 5 days    Respiratory Culture [[id number]] Collected: 07/19/25 0047    Order Status: Completed Specimen: Sputum Updated: 07/21/25 0924     Respiratory Culture Normal respiratory richard     GRAM STAIN Quality 3+      No bacteria seen             X-Ray Chest 1 View  Narrative: EXAMINATION:  XR CHEST 1 VIEW    CPT 39778    CLINICAL HISTORY:  respiratory failure;    COMPARISON:  July 21, 2025    FINDINGS:  Examination reveals mediastinal silhouette to be within normal limits cardiac silhouette is not enlarged lung fields are clear and free of gross infiltrates or effusions with linear densities in both bases representing subsegmental atelectatic changes.    No focal consolidative changes are otherwise identified  Impression: Atelectatic changes in both bases.    No other significant change as compared with the previous exam    Electronically signed by: Cj Richmond  Date:    07/22/2025  Time:    07:51        ASSESSMENT & PLAN:   Acute combined hypoxic hypercapnic  respiratory failure-resolved   Aspiration pneumonia-resolved   History of CVA with right hemiparesis  VAN on CKD   DM2   Hypertension    Plan:   Maintain sats above 90%, stable on 4 L nasal cannula  Tube feeding free water flush via NG tube   We will get palliative care consult for goals of care   Pending palliative care discussion may need GI consult for PEG tube placement to return back to nursing facility as prior to this admission   Continue antihypertensives via NG tube  Sliding scale insulin   Lantus 30 units b.i.d.   Bowel regimen   Frequent reposition   Labs in a.m.   Code status: Full  Case management consult for discharge planning/needs        VTE Prophylaxis: on Lovenox for DVT prophylaxis and will be advised to be as mobile as possible and sit in a chair as tolerated    Patient condition:  Stable     __________________________________________________________________________  INPATIENT LIST OF MEDICATIONS     Scheduled Meds:   amLODIPine  5 mg Per NG tube Daily    enoxparin  40 mg Subcutaneous Q24H (prophylaxis, 1700)    famotidine  20 mg Per NG tube BID    glycopyrrolate  1 mg Per OG tube TID    insulin glargine U-100  30 Units Subcutaneous BID    lisinopriL  20 mg Per NG tube Daily    polyethylene glycol  17 g Per NG tube BID    [START ON 7/28/2025] scopolamine  1 patch Transdermal Q3 Days    senna-docusate  2 tablet Per NG tube Daily    sodium chloride 3%  4 mL Nebulization Q6H     Continuous Infusions:      PRN Meds:.  Current Facility-Administered Medications:     acetaminophen, 650 mg, Per NG tube, Q6H PRN    dextrose 50%, 12.5 g, Intravenous, PRN    dextrose 50%, 12.5 g, Intravenous, PRN    fentaNYL, 50 mcg, Intravenous, Q3H PRN    glucagon (human recombinant), 1 mg, Intramuscular, PRN    glucagon (human recombinant), 1 mg, Intramuscular, PRN    hydrALAZINE, 10 mg, Intravenous, Q6H PRN    insulin aspart U-100, 0-10 Units, Subcutaneous, Q6H PRN    labetalol, 20 mg, Intravenous, Q6H PRN    oxyCODONE,  5 mg, Per NG tube, Q6H PRN    Flushing PICC/Midline Protocol, , , Until Discontinued **AND** sodium chloride 0.9%, 10 mL, Intravenous, Q12H PRN      I, Yovany Zaragoza,  NP have reviewed and discussed the case with VALENTE Martínez    Please see the following addendum for further assessment and plan from there attending MD.    07/27/2025    ________________________________________________________________________________    MD Addendum:  I, Dr. NADYA Johnson assumed care of this patient today at 4 pm  For the patient encounter, I performed the substantive portion of the visit, I reviewed the NP/PA documentation, treatment plan, and medical decision making.  I had face to face time with this patient     A. History:    Charly Padilla is a 69 y.o. male with CVA with right-sided hemiparesis, DM2, neuropathy, CKD 2, hypertension, HLD presented on 7/5/2025 from nursing facility with increased generalized weakness.  Was found to be in acute hypoxic respiratory failure.  Admitted to ICU.  Patient has now completed antibiotics.      Review of hospital course patient was intubated and mechanical ventilation on 07/05/2025, had echocardiogram which showed EF 45 50% with mild valvular heart disease, extubated on 07/16 reintubated on 07/18 and then lastly extubated on 07/25/2025.      Downgraded from ICU to Medicine Services on 7/27/25. No acute distress, nonverbal, with right hemiplegia, he does track with his eyes. He does not follow simple commands appropriately, he has an NG tube with tube feeding free water flush.    B. Physical exam:  General appearance: Well-developed, well-nourished male in no apparent distress.  HENT: Atraumatic head. Moist mucous membranes of oral cavity.NG tube   Eyes: Normal extraocular movements.   Neck: Supple.   Lungs: Clear to auscultation bilaterally. No wheezing present.   Heart: Regular rate and rhythm. S1 and S2 present with no murmurs/gallop/rub. No pedal edema. No JVD present.   Abdomen: Soft,  non-distended, non-tender. No rebound tenderness/guarding. Bowel sounds are normal.   Extremities: No cyanosis, clubbing, or edema.  Skin: No Rash.   Neuro: No acute distress, nonverbal, with right hemiplegia, he does track with his eyes. He does not follow simple commands  Psych/mental status: Appropriate mood and affect. Responds appropriately to questions.     C. Medical decision making:    Acute combined hypoxic hypercapnic respiratory failure  - possibly obesity/hypoventilation syndrome coupled with previous stroke and pneumonia.   Aspiration pneumonia, improved  History of CVA with right hemiparesis  VAN on CKD 2  DM2   Neuropathy  Hypertension  HLD    Plan:   completed 7 days of Rocephin.   Maintain sats above 90%, stable on 4 L nasal cannula    Tube feeding free water flush via NG tube   We will get palliative care consult for goals of care   May need GI consult for PEG tube placement to return back to nursing facility    Continue antihypertensives via NG tube    Replace electrolytes as need  A1c 7.3, Lantus 30 units b.i.d. ; Monitor gluc - prn insulin if needed, hypoglycemia precautions    VAN on CKD 2  - remains nonoliguric, daily improvement in BUN/creatinine   K 5.5, hold lisinopril for now, lokelma x 1    Resume home meds as appropriate  Bowel regimen; Frequent reposition   Labs in a.m.     Case management consult for discharge planning/needs      VTE Prophylaxis: on Lovenox for DVT prophylaxis and will be advised to be as mobile as possible and sit in a chair as tolerated     Patient condition:  Stable        Discharge Planning and Disposition:   All diagnosis and differential diagnosis have been reviewed; assessment and plan has been documented; I have personally reviewed the labs and test results that are presently available; I have reviewed the patients medication list; I have reviewed the consulting providers response and recommendations. I have reviewed or attempted to review medical records based  upon their availability.    All of the patient and family questions have been addressed and answered. Patient's is agreeable to the above stated plan. I will continue to monitor closely and make adjustments to medical management as needed.      Kenn Johnson MD   07/27/2025

## 2025-07-27 NOTE — PLAN OF CARE
Problem: Skin Injury Risk Increased  Goal: Skin Health and Integrity  Outcome: Progressing     Problem: Adult Inpatient Plan of Care  Goal: Plan of Care Review  Outcome: Progressing     Problem: Adult Inpatient Plan of Care  Goal: Patient-Specific Goal (Individualized)  Outcome: Progressing     Problem: Adult Inpatient Plan of Care  Goal: Absence of Hospital-Acquired Illness or Injury  Outcome: Progressing     Problem: Adult Inpatient Plan of Care  Goal: Optimal Comfort and Wellbeing  Outcome: Progressing     Problem: Adult Inpatient Plan of Care  Goal: Readiness for Transition of Care  Outcome: Progressing     Problem: Infection  Goal: Absence of Infection Signs and Symptoms  Outcome: Progressing

## 2025-07-27 NOTE — PROGRESS NOTES
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  He presents to ED per EMS 07/05/2025 after multiple falls at nursing home with reported generalized weakness.  He is reported as having a Benítez catheter placed about 1 week ago for urinary retention.  Patient was reported as having pulled this out himself while at nursing home, on a.m. of presentation.  Benítez catheter was placed in ED, returning 700 cc of urine.  While in ER, he was reported as becoming more somnolent with poor overall respiratory effort and decreasing level of consciousness.  ABG revealed acute respiratory acidosis.  He was intubated at 8:55 a.m., placed on mechanical ventilatory support.  CT brain obtained in ED reveals chronic microvascular ischemic changes with no acute intracranial abnormalities.  SARS-COVID-2 PCR negative, influenza A/B PCR negative.  He was admitted to ICU for ongoing medical care.     Hospital Course:  07/05/2025: Intubation/mechanical ventilation   TTE (07/05/2025: Normal LV size with decreased LV SF, EF 45-50%; trace MR, mild TR and mild PI.  PASP estimated 30 mm Hg; no pericardial effusion  07/16.  Extubated  07/18.  Intubated  07/25.  Extubated      24hr Interval History:  No new problems reported overnight.  Breathing easily on nasal cannula this morning.  Follows some commands with the left side.  Right side is plegic.      Review of systems unobtainable due to intubation, sedation.      Scheduled Medications:   amLODIPine  5 mg Per NG tube Daily    enoxparin  40 mg Subcutaneous Q24H (prophylaxis,  1700)    famotidine  20 mg Per NG tube BID    glycopyrrolate  1 mg Per OG tube TID    insulin glargine U-100  30 Units Subcutaneous BID    lisinopriL  20 mg Per NG tube Daily    polyethylene glycol  17 g Per NG tube BID    senna-docusate  2 tablet Per NG tube Daily    sodium chloride 3%  4 mL Nebulization Q6H     PRN Medications:    Current Facility-Administered Medications:     acetaminophen, 650 mg, Per NG tube, Q6H PRN    dextrose 50%, 12.5 g, Intravenous, PRN    dextrose 50%, 12.5 g, Intravenous, PRN    fentaNYL, 50 mcg, Intravenous, Q3H PRN    glucagon (human recombinant), 1 mg, Intramuscular, PRN    glucagon (human recombinant), 1 mg, Intramuscular, PRN    hydrALAZINE, 10 mg, Intravenous, Q6H PRN    insulin aspart U-100, 0-10 Units, Subcutaneous, Q6H PRN    labetalol, 20 mg, Intravenous, Q6H PRN    oxyCODONE, 5 mg, Per NG tube, Q6H PRN    Flushing PICC/Midline Protocol, , , Until Discontinued **AND** sodium chloride 0.9%, 10 mL, Intravenous, Q12H PRN  Continuous Infusions:   dexmedeTOMIDine (Precedex) infusion (titrating)  0-1.4 mcg/kg/hr Intravenous Continuous   Stopped at 07/26/25 2135    NORepinephrine bitartrate-D5W  0-3 mcg/kg/min (Dosing Weight) Intravenous Continuous   Stopped at 07/19/25 2206    propofoL  0-50 mcg/kg/min (Dosing Weight) Intravenous Continuous   Stopped at 07/26/25 0552       No past medical history on file.    No past surgical history on file.    Objective:     Input/output:    Intake/Output Summary (Last 24 hours) at 7/27/2025 0952  Last data filed at 7/27/2025 0509  Gross per 24 hour   Intake 1843.28 ml   Output 2201 ml   Net -357.72 ml       Vital Signs (Most Recent):  Temp: 98.1 °F (36.7 °C) (07/27/25 0800)  Pulse: 106 (07/27/25 0824)  Resp: (!) 26 (07/27/25 0824)  BP: (!) 183/82 (07/27/25 0800)  SpO2: 95 % (07/27/25 0824)  Body mass index is 38.72 kg/m².  Weight: 115.5 kg (254 lb 10.1 oz) Vital Signs (24h Range):  Temp:  [97.9 °F (36.6 °C)-98.6 °F (37 °C)] 98.1 °F (36.7  °C)  Pulse:  [] 106  Resp:  [17-35] 26  SpO2:  [89 %-100 %] 95 %  BP: ()/() 183/82       Physical exam:  Gen-  In no distress.  HENT- ATNC, MMM  CV- RRR  Resp- bilateral rhonchi  Abd- distended, soft with no rebound or guarding, hypoactive bowel sounds   MSK- WWP, trace edema  Neuro- awake this morning moving left side spontaneously.  He does focus on me when I speak to him but does not follow commands.  Psych- calm but otherwise unable to assess 2/2 neurologic status      Lines/Drains/Airways       Drain  Duration                  NG/OG Tube 07/05/25 0848 22 days         Urethral Catheter 07/05/25 0800 Non-latex 16 Fr. 22 days              Peripheral Intravenous Line  Duration             Peripheral IV Single Lumen 07/19/25 1000 20 G Anterior;Right Forearm 7 days    Peripheral IV Single Lumen 07/25/25 0600 20 G Distal;Left;Posterior Forearm 2 days                  Vent:  Vent Mode: CPAP / PSV (07/25/25 1125)  Ventilator Initiated: Yes (07/18/25 1730)  Set Rate: 24 BPM (07/25/25 0514)  Vt Set: 480 mL (07/25/25 0514)  Pressure Support: 10 cmH20 (07/25/25 1125)  PEEP/CPAP: 8 cmH20 (07/25/25 1125)  Oxygen Concentration (%): 40 (07/27/25 0138)  Peak Airway Pressure: 20 cmH20 (07/25/25 1125)  Total Ve: 9.4 L/m (07/25/25 1125)  F/VT Ratio<105 (RSBI): (!) 45.36 (07/25/25 0813)    ABGs:  Lab Results   Component Value Date    PH 7.410 07/25/2025    PO2 120.0 (H) 07/25/2025    PCO2 42.0 07/25/2025       Significant Labs:  Lab Results   Component Value Date    WBC 8.17 07/27/2025    HGB 12.8 (L) 07/27/2025    HCT 40.0 (L) 07/27/2025    MCV 88.1 07/27/2025     07/27/2025       Recent Labs   Lab 07/27/25  0204   *   K 5.5*   CL 99   CO2 25   BUN 43.9*   CREATININE 1.22   CALCIUM 9.7   MG 1.90   PHOS 3.9   AST 14   ALT 19   ALKPHOS 62   ALBUMIN 2.7*         Assessment:     Acute combined hypoxic and hypercapnic respiratory failure-possibly obesity/hypoventilation syndrome coupled with previous  stroke and pneumonia.    History of cerebrovascular disease with right hemiparesis-more awake today.  Acute kidney injury superimposed on stage II chronic kidney disease, obstructive uropathy component.  He remains nonoliguric, daily improvement in BUN/creatinine   Type 2 diabetes mellitus with polyneuropathy,   Previous CVA with evidence of multiple areas of ischemia  Hypertension    Plan:     Has completed 7 days of Rocephin.  Continue tube feedings.  Can downgrade to the floor today.       DVT ppx: LMWH   GI ppx: famotidine         JESSIKA Whittaker MD  Pulmonary/Critical Care

## 2025-07-28 LAB
ANION GAP SERPL CALC-SCNC: 6 MEQ/L
BASOPHILS # BLD AUTO: 0.08 X10(3)/MCL
BASOPHILS NFR BLD AUTO: 1.2 %
BUN SERPL-MCNC: 39.7 MG/DL (ref 8.4–25.7)
CALCIUM SERPL-MCNC: 9.4 MG/DL (ref 8.8–10)
CHLORIDE SERPL-SCNC: 101 MMOL/L (ref 98–107)
CO2 SERPL-SCNC: 26 MMOL/L (ref 23–31)
CREAT SERPL-MCNC: 1.05 MG/DL (ref 0.72–1.25)
CREAT/UREA NIT SERPL: 38
EOSINOPHIL # BLD AUTO: 0.15 X10(3)/MCL (ref 0–0.9)
EOSINOPHIL NFR BLD AUTO: 2.2 %
ERYTHROCYTE [DISTWIDTH] IN BLOOD BY AUTOMATED COUNT: 14.3 % (ref 11.5–17)
GFR SERPLBLD CREATININE-BSD FMLA CKD-EPI: >60 ML/MIN/1.73/M2
GLUCOSE SERPL-MCNC: 149 MG/DL (ref 82–115)
HCT VFR BLD AUTO: 44.7 % (ref 42–52)
HGB BLD-MCNC: 13.6 G/DL (ref 14–18)
IMM GRANULOCYTES # BLD AUTO: 0.02 X10(3)/MCL (ref 0–0.04)
IMM GRANULOCYTES NFR BLD AUTO: 0.3 %
LYMPHOCYTES # BLD AUTO: 1.91 X10(3)/MCL (ref 0.6–4.6)
LYMPHOCYTES NFR BLD AUTO: 28.6 %
MCH RBC QN AUTO: 28.7 PG (ref 27–31)
MCHC RBC AUTO-ENTMCNC: 30.4 G/DL (ref 33–36)
MCV RBC AUTO: 94.3 FL (ref 80–94)
MONOCYTES # BLD AUTO: 0.71 X10(3)/MCL (ref 0.1–1.3)
MONOCYTES NFR BLD AUTO: 10.6 %
NEUTROPHILS # BLD AUTO: 3.8 X10(3)/MCL (ref 2.1–9.2)
NEUTROPHILS NFR BLD AUTO: 57.1 %
NRBC BLD AUTO-RTO: 0 %
PLATELET # BLD AUTO: 206 X10(3)/MCL (ref 130–400)
PMV BLD AUTO: 11.9 FL (ref 7.4–10.4)
POCT GLUCOSE: 182 MG/DL (ref 70–110)
POCT GLUCOSE: 203 MG/DL (ref 70–110)
POTASSIUM SERPL-SCNC: 5 MMOL/L (ref 3.5–5.1)
RBC # BLD AUTO: 4.74 X10(6)/MCL (ref 4.7–6.1)
SODIUM SERPL-SCNC: 133 MMOL/L (ref 136–145)
WBC # BLD AUTO: 6.67 X10(3)/MCL (ref 4.5–11.5)

## 2025-07-28 PROCEDURE — 94761 N-INVAS EAR/PLS OXIMETRY MLT: CPT

## 2025-07-28 PROCEDURE — 87040 BLOOD CULTURE FOR BACTERIA: CPT | Performed by: INTERNAL MEDICINE

## 2025-07-28 PROCEDURE — 36415 COLL VENOUS BLD VENIPUNCTURE: CPT | Performed by: INTERNAL MEDICINE

## 2025-07-28 PROCEDURE — 63600175 PHARM REV CODE 636 W HCPCS: Performed by: INTERNAL MEDICINE

## 2025-07-28 PROCEDURE — 27000221 HC OXYGEN, UP TO 24 HOURS

## 2025-07-28 PROCEDURE — 25000242 PHARM REV CODE 250 ALT 637 W/ HCPCS: Performed by: INTERNAL MEDICINE

## 2025-07-28 PROCEDURE — 80048 BASIC METABOLIC PNL TOTAL CA: CPT | Performed by: INTERNAL MEDICINE

## 2025-07-28 PROCEDURE — 11000001 HC ACUTE MED/SURG PRIVATE ROOM

## 2025-07-28 PROCEDURE — 25000003 PHARM REV CODE 250: Performed by: INTERNAL MEDICINE

## 2025-07-28 PROCEDURE — 99231 SBSQ HOSP IP/OBS SF/LOW 25: CPT | Mod: ,,,

## 2025-07-28 PROCEDURE — 99900031 HC PATIENT EDUCATION (STAT)

## 2025-07-28 PROCEDURE — 99900035 HC TECH TIME PER 15 MIN (STAT)

## 2025-07-28 PROCEDURE — 25000003 PHARM REV CODE 250

## 2025-07-28 PROCEDURE — 94760 N-INVAS EAR/PLS OXIMETRY 1: CPT

## 2025-07-28 PROCEDURE — 21400001 HC TELEMETRY ROOM

## 2025-07-28 PROCEDURE — 85025 COMPLETE CBC W/AUTO DIFF WBC: CPT | Performed by: INTERNAL MEDICINE

## 2025-07-28 PROCEDURE — 97163 PT EVAL HIGH COMPLEX 45 MIN: CPT

## 2025-07-28 PROCEDURE — 97166 OT EVAL MOD COMPLEX 45 MIN: CPT

## 2025-07-28 PROCEDURE — 94640 AIRWAY INHALATION TREATMENT: CPT

## 2025-07-28 RX ORDER — SODIUM CHLORIDE FOR INHALATION 3 %
4 VIAL, NEBULIZER (ML) INHALATION EVERY 8 HOURS
Status: DISCONTINUED | OUTPATIENT
Start: 2025-07-28 | End: 2025-07-28

## 2025-07-28 RX ORDER — SODIUM CHLORIDE FOR INHALATION 3 %
4 VIAL, NEBULIZER (ML) INHALATION EVERY 8 HOURS
Status: DISCONTINUED | OUTPATIENT
Start: 2025-07-29 | End: 2025-07-28

## 2025-07-28 RX ORDER — SODIUM CHLORIDE FOR INHALATION 3 %
4 VIAL, NEBULIZER (ML) INHALATION EVERY 8 HOURS
Status: DISCONTINUED | OUTPATIENT
Start: 2025-07-28 | End: 2025-08-06

## 2025-07-28 RX ADMIN — INSULIN GLARGINE 30 UNITS: 100 INJECTION, SOLUTION SUBCUTANEOUS at 10:07

## 2025-07-28 RX ADMIN — FAMOTIDINE 20 MG: 20 TABLET, FILM COATED ORAL at 10:07

## 2025-07-28 RX ADMIN — GLYCOPYRROLATE 1 MG: 1 TABLET ORAL at 10:07

## 2025-07-28 RX ADMIN — SODIUM CHLORIDE SOLN NEBU 3% 4 ML: 3 NEBU SOLN at 08:07

## 2025-07-28 RX ADMIN — SODIUM CHLORIDE SOLN NEBU 3% 4 ML: 3 NEBU SOLN at 01:07

## 2025-07-28 RX ADMIN — POLYETHYLENE GLYCOL 3350 17 G: 17 POWDER, FOR SOLUTION ORAL at 10:07

## 2025-07-28 RX ADMIN — SODIUM CHLORIDE SOLN NEBU 3% 4 ML: 3 NEBU SOLN at 04:07

## 2025-07-28 RX ADMIN — AMLODIPINE BESYLATE 5 MG: 5 TABLET ORAL at 10:07

## 2025-07-28 RX ADMIN — INSULIN ASPART 4 UNITS: 100 INJECTION, SOLUTION INTRAVENOUS; SUBCUTANEOUS at 10:07

## 2025-07-28 RX ADMIN — SENNOSIDES, DOCUSATE SODIUM 2 TABLET: 8.6; 5 TABLET ORAL at 10:07

## 2025-07-28 RX ADMIN — GLYCOPYRROLATE 1 MG: 1 TABLET ORAL at 03:07

## 2025-07-28 RX ADMIN — ENOXAPARIN SODIUM 40 MG: 40 INJECTION SUBCUTANEOUS at 05:07

## 2025-07-28 NOTE — PLAN OF CARE
Problem: Physical Therapy  Goal: Physical Therapy Goal  Description: Goals to be met by: 25     Patient will increase functional independence with mobility by performin. Supine to sit with Moderate Assistance  2. Sit to stand transfer with Moderate Assistance  3. Bed to chair transfer with Moderate Assistance using Least Restrictive Assistive Device  4. Sitting at edge of bed x10 minutes with Minimal Assistance  5. Pt will follow 5/5 motor commands for LLE.  6. Gait TBD as pt progresses    Outcome: Progressing

## 2025-07-28 NOTE — PT/OT/SLP EVAL
Occupational Therapy  Evaluation    Name: Charly Padilla  MRN: [id number]  Recent Surgery: * No surgery found *      Recommendations:     Discharge therapy intensity: Moderate Intensity Therapy   Discharge Equipment Recommendations:  to be determined by next level of care  Barriers to discharge:       Assessment:     Charly Padilla is a 69 y.o. male with a medical diagnosis of Acute hypoxic hypercapnic respiratory failure requiring intubation 7/5-7/16 and reintubation 7/18-7/25, aspiration pneumonia, hx of CVA with R-hemiparesis.  He presents with the following performance deficits affecting function: impaired endurance, weakness, impaired functional mobility, impaired self care skills, impaired balance, impaired cognition, decreased upper extremity function, decreased lower extremity function, decreased safety awareness, pain, abnormal tone, decreased ROM, impaired coordination, impaired fine motor, edema. Patient with fair-poor tolerance for today's session. Patient presents with R hemiparesis with flexor synergy, limited command following with L UE only, decreased R attention, core instability, limited sitting balance/ tolerance, decreased safety awareness, and requires total assist for bed mobility/ sitting balance impeding independence in meaningful activities of daily living. Patient to benefit from continued skilled OT intervention to address aforementioned deficits to reduce risk of falls and improve occupational performance prior to next level of care.      DME Justification: No DME recommended requiring DME justifications    Rehab Prognosis: Fair; patient would benefit from acute skilled OT services to address these deficits and reach maximum level of function.       Plan:     Patient to be seen 3 x/week to address the above listed problems via self-care/home management, therapeutic activities, therapeutic exercises, wheelchair management/training  Plan of Care Expires: 08/25/25  Plan of Care Reviewed with:  patient    Subjective     Chief Complaint: None state; patient with no verbalizations throughout today's session   Patient/Family Comments/goals: None stated    Occupational Profile:  Living Environment: Per telecommunication with [facility], patient resides at NH  Previous level of function: Per RN, prior to admit patient with ambulatory with rollator and demo'd ability to complete toilet transfers/ dressing/ and feeding independently. Yet, nurse also endorses patient with limited safety at baseline and declining staff assist.  Roles and Routines: Unable to determine   Equipment Used at Home: wheelchair, rollator  Assistance upon Discharge: per NH staff    Pain/Comfort:  Pain Rating 1: other (see comments) (patient grimacing with R UE PROM, pain not formally rated)    Patients cultural, spiritual, Orthodoxy conflicts given the current situation: no    Objective:     OT communicated with RN prior to session.      Patient was found supine with blood pressure cuff, mcgill catheter, NG tube, peripheral IV, pulse ox (continuous), telemetry upon OT entry to room.    General Precautions: Standard, fall, NPO  Orthopedic Precautions: N/A  Braces: N/A    Vital Signs: Blood Pressure: 112/63 supine in bed, 132/ 67 supine in bed following session       Functional Mobility/Transfers:  Bed mobility:    Rolling Left:  total assistance  Supine to Sit: total assistance and of 2 persons  Sit to Supine: total assistance and of 2 persons  Functional Mobility: Deferred at this time as patient with significant global weakness, core instability, impaired sitting balance/tolerance with decreased righting reactions, and requires total assist for bed mobility/ short sitting EOB    Activities of Daily Living:  Lower Body Dressing: dependence Don B socks short sitting EOB    AMPAC 6 Click ADL:  AMPAC Total Score: 7    Functional Cognition:  Affect: Flat  and Withdrawn  Attention: Easily distracted  Command Following: Unable to follow commands  "despite cues  Safety Awareness: Impaired.    Insight into Deficits: Impaired.    Patient sadi's ability to complete L UE gross grasp to therapist command to "squeeze fingers" 1x, and sadi's ability to state possible verbalization of "yes." OT to continue to assess command following.    Visual Perceptual Skills:  Unable to determine as patient with eyes closed majority of session; to continue to assess    Upper Extremity Function:  Right Upper Extremity:   1x spontaneous movement of R UE elbow flexion' yet appears reflexive  Patient presents with R UE hemiparesis with flexor synergy.  Shoulder flexion PROM 0-~90* with noted tightness, 3 on modified ramesh scale   Elbow flexion extension PROM ~30*-~110* with increased rigidity,  3 on modified ramesh scale  Wrist flexion/extension PROM 0-~30* with increased rigidity,  3 on modified ramesh scale    Left Upper Extremity:  Patient sadi's ability to complete L gross grasp 1x; 2-/5 MMT  strength   No proximal AROM at elbow /shoulders noted at this time; PROM WFL     Balance:   Impaired. Patient requires total assist to maintain short sitting EOB ~7 minutes with noted posterior/ L lateral leaning and limited core engagement     Therapeutic Positioning  Risk for acquired pressure injuries is significantly increased due to inability to communicate toileting needs, obesity, and severity of deficits resulting in prolonged immobility .    OT interventions performed during the course of today's session:   Education was provided on benefits of and recommendations for therapeutic positioning    OT recommendations for therapeutic positioning throughout hospitalization:   Specialty Mattress    Co-Treatment: Yes, due to High complexity requires 2 sets of skilled hands    Patient Education:  Patient provided with verbal education education regarding OT role/goals/POC, fall prevention, safety awareness, and Discharge/DME recommendations.  Additional teaching is warranted. "     Patient left supine with all lines intact, call button in reach, wedge under L side, and RN notified.    GOALS:   Multidisciplinary Problems       Occupational Therapy Goals          Problem: Occupational Therapy    Goal Priority Disciplines Outcome Interventions   Occupational Therapy Goal     OT, PT/OT Progressing    Description: LTG: Pt will perform basic ADLs and ADL t/fs with min A using LRAD by d/c.    STG: to be met by 8/11  1. Pt will follow >80% of simple one step commands  2. Pt will perform grooming EOB with min A.   3. Pt will perform functional grasp/reach/release of items with L UE >80% of trials in prep for increased participation in ADL tasks.   4. Pt will perform sit<>stand with mod A x 2 in prep for ADL t/fs.                        History:     No past medical history on file.    No past surgical history on file.    Time Tracking:     OT Date of Treatment: 07/28/25  OT Start Time: 0935  OT Stop Time: 0958  OT Total Time (min): 23 min    Billable Minutes:Evaluation 23 7/28/2025

## 2025-07-28 NOTE — PT/OT/SLP EVAL
Physical Therapy Evaluation    Patient Name:  Charly Padilla   MRN:  [id number]    Recommendations:     Discharge therapy intensity: Moderate Intensity Therapy   Discharge Equipment Recommendations: to be determined by next level of care   Barriers to discharge: Impaired mobility and Ongoing medical needs    Assessment:     Charly Padilla is a 69 y.o. male admitted with a medical diagnosis of Acute hypoxic hypercapnic respiratory failure requiring intubation 7/5-7/16 and reintubation 7/18-7/25, aspiration pneumonia, hx of CVA with R-hemiparesis.  He presents with the following impairments/functional limitations: weakness, impaired endurance, impaired self care skills, impaired functional mobility, impaired balance, impaired cognition, decreased upper extremity function, decreased lower extremity function, decreased safety awareness, edema . Pt is a nursing home resident; presents awake but nonverbal during evaluation. Required totalA for bed mobility and sitting balance due to weakness and global rigidity. Very limited command following with LUE, otherwise no command following with RUE and BLE but did observe spontaneous/reflexive movements in RUE/RLE. Per staff member at [facility], prior to admission pt was ambulatory with rollator though unsafe, able to dress himself and feed himself at prior baseline. Will follow this admission.    DME Justification:  No DME recommended requiring DME justifications    Rehab Prognosis: Fair; patient would benefit from acute skilled PT services to address these deficits and reach maximum level of function.    Recent Surgery: * No surgery found *      Plan:     During this hospitalization, patient would benefit from acute PT services 3 x/week to address the identified rehab impairments via gait training, therapeutic activities, therapeutic exercises, neuromuscular re-education, wheelchair management/training and progress toward the following goals:    Plan of Care Expires:   "08/28/25    Subjective     Chief Complaint: unable to state, not in acute distress  Patient/Family Comments/goals: unable to state  Pain/Comfort:  Pain Rating 1:  (grimacing with PROM of RUE)    Patients cultural, spiritual, Pentecostal conflicts given the current situation: no    Living Environment:  Pt lives at [facility].   Prior to admission, patients level of function was ambulatory with assistive device, able to t/f self to toilet and dress self, feed self. Nurse reports unsafe at baseline and resistant to staff assist, encouraged to use WC to mobilize.  Equipment used at home: wheelchair, rollator.  DME owned (not currently used): none.  Upon discharge, patient will have assistance from NH staff.    Objective:     Communicated with RN prior to session.  Patient found HOB elevated with telemetry, pulse ox (continuous), blood pressure cuff, NG tube  upon PT entry to room.    General Precautions: Standard, fall, NPO  Orthopedic Precautions:N/A   Braces: N/A  Respiratory Status: Nasal cannula, flow 4 L/min  Blood Pressure: 132/67      Exams:  Cognition: followed 1 command to squeeze PT hand with LUE. When cued to bring tissue to mouth, active movement of R hand observed but unable to bring to face. Possibly 1 verbalization of "yes". Appeared to mimic PT with head nods and smiling.   RLE ROM: (+) flexor tone with limited knee extension; no spontaneous movement observed   LLE ROM: (+) flexor tone, full PROM; no command following for AROM against gravity; 1 spontaneous movement of hip flexion observed in supine when donning socks  Skin integrity: Visible skin intact      Functional Mobility:  Bed Mobility:     Rolling Right: total assistance  Supine to Sit: total assistance and of 2 persons  Sit to Supine: total assistance and of 2 persons  Balance: sitting balance = totalA, leaning posteriorly and to the L. No righting reactions or protective balance reactions.      AM-PAC 6 CLICK MOBILITY  Total " Score:8     Co-Treatment: Yes, due to High complexity requires 2 sets of skilled hands    Treatment & Education:  Bed malfunctioning, unable to place bed alarm. RN aware and ordering a new bed.    Patient provided with verbal education education regarding PT role/goals/POC, safety awareness, discharge/DME recommendations, and pressure ulcer prevention.  Additional teaching is warranted.     Patient left HOB elevated with all lines intact, call button in reach, wedge under L side, pressure relief boots, and RN notified.      GOALS:   Multidisciplinary Problems       Physical Therapy Goals          Problem: Physical Therapy    Goal Priority Disciplines Outcome Interventions   Physical Therapy Goal     PT, PT/OT Progressing    Description: Goals to be met by: 25     Patient will increase functional independence with mobility by performin. Supine to sit with Moderate Assistance  2. Sit to stand transfer with Moderate Assistance  3. Bed to chair transfer with Moderate Assistance using Least Restrictive Assistive Device  4. Sitting at edge of bed x10 minutes with Minimal Assistance  5. Pt will follow 5/5 motor commands for LLE.  6. Gait TBD as pt progresses                         History:     No past medical history on file.    No past surgical history on file.    Time Tracking:     PT Received On: 25  PT Start Time: 936     PT Stop Time: 955  PT Total Time (min): 19 min     Billable Minutes: Evaluation high complexity      2025

## 2025-07-28 NOTE — PROGRESS NOTES
Patient Name: Charly Padilla   MRN: [id number]   Admission Date: 7/5/2025   Hospital Length of Stay: 23   Attending Provider: Kenn Johnson MD   Consulting Provider: Lindsay FREIRE  Reason for Consult: Goals of Care  Primary Care Physician:  Rob Liu MD     Principal Problem: Acute respiratory failure with hypercapnia       Final diagnoses:  [W19.XXXA] Fall  [R53.1] Generalized weakness (Primary)  [N17.9] VAN (acute kidney injury)  [R33.8] Acute urinary retention  [J96.02] Acute respiratory failure with hypercapnia      Assessment/Plan:     I reviewed the patient and family's understanding of the seriousness of the illness and its expected prognosis. We discussed the patient's goals of care and treatment preferences.          Evaluated patient. He is awake, but nonverbal. He did not follow any verbal commands.    Called daughter, Suzanne, and left voice message. Will await her return call to discuss ongoing plans of care.     Support provided.  Reflective listening, validation concerns, and normalization of fears provided.    Recommendations:       Palliative care will continue to follow.      Interval History:     Patient was extubated on 07/25/2025. Right sided hemiparesis.      Active Ambulatory Problems     Diagnosis Date Noted    No Active Ambulatory Problems     Resolved Ambulatory Problems     Diagnosis Date Noted    No Resolved Ambulatory Problems     No Additional Past Medical History        No past surgical history on file.     Review of patient's allergies indicates:  No Known Allergies     Current Medications[1]       Current Facility-Administered Medications:     acetaminophen, 650 mg, Per NG tube, Q6H PRN    dextrose 50%, 12.5 g, Intravenous, PRN    dextrose 50%, 12.5 g, Intravenous, PRN    fentaNYL, 50 mcg, Intravenous, Q3H PRN    glucagon (human recombinant), 1 mg, Intramuscular, PRN    glucagon (human recombinant), 1 mg, Intramuscular, PRN    hydrALAZINE, 10 mg, Intravenous, Q6H PRN     "insulin aspart U-100, 0-10 Units, Subcutaneous, Q6H PRN    labetalol, 20 mg, Intravenous, Q6H PRN    oxyCODONE, 5 mg, Per NG tube, Q6H PRN    Flushing PICC/Midline Protocol, , , Until Discontinued **AND** sodium chloride 0.9%, 10 mL, Intravenous, Q12H PRN     No family history on file.       Review of Systems   Unable to perform ROS: Patient nonverbal            Objective:   BP (!) 151/101   Pulse 81   Temp 99 °F (37.2 °C) (Oral)   Resp 20   Ht 5' 8" (1.727 m)   Wt 115.5 kg (254 lb 10.1 oz)   SpO2 (!) 91%   BMI 38.72 kg/m²      Physical Exam   Constitutional: He appears obese. He has a sickly appearance.   HENT:   Head: Normocephalic and atraumatic.   NGT present   Cardiovascular: Normal rate and regular rhythm. Pulmonary:      Effort: Pulmonary effort is normal.     Neurological: He is alert.   Nonverbal; does not follow commands.             Review of Symptoms      Symptom Assessment (ESAS 0-10 Scale)  Pain:  0  Dyspnea:  0  Anxiety:  0  Nausea:  0  Depression:  0  Anorexia:  0  Fatigue:  0  Insomnia:  0  Restlessness:  0  Agitation:  0         Performance Status:  20    Living Arrangements:  Lives in nursing home    Psychosocial/Cultural:   See Palliative Psychosocial Note: Yes  **Primary  to Follow**  Palliative Care  Consult: No      Advance Care Planning   Advance Directives:   Medical Power of : Yes      Decision Making:  Family answered questions  Goals of Care: What is most important right now is to focus on curative/life-prolongation (regardless of treatment burdens). Accordingly, we have decided that the best plan to meet the patient's goals includes continuing with treatment.          PAINAD: NA    Caregiver burden formerly assessed: Yes      No results displayed because visit has over 200 results.               > 50% of 25 min of encounter was spent in chart review, face to face discussion of goals of care, symptom assessment, coordination of care and emotional " support.    Lindsay Gonsales, P  Palliative Medicine  [facility]           [1]   Current Facility-Administered Medications:     acetaminophen tablet 650 mg, 650 mg, Per NG tube, Q6H PRN, Jose Antonio Arora MD, 650 mg at 07/22/25 1533    amLODIPine tablet 5 mg, 5 mg, Per NG tube, Daily, Julieta Cabrera MD, 5 mg at 07/27/25 0820    dextrose 50% injection 12.5 g, 12.5 g, Intravenous, PRN, Eun Woodall MD    dextrose 50% injection 12.5 g, 12.5 g, Intravenous, PRN, Bhavik Alanis Jr., MD, [facility]    enoxaparin injection 40 mg, 40 mg, Subcutaneous, Q24H (prophylaxis, 1700), Bhavik Alanis Jr., MD, FCCP, 40 mg at 07/27/25 1654    famotidine tablet 20 mg, 20 mg, Per NG tube, BID, Bhavik Alanis Jr., MD, FCCP, 20 mg at 07/27/25 2153    fentaNYL injection 50 mcg, 50 mcg, Intravenous, Q3H PRN, Franklyn Peres MD, 50 mcg at 07/27/25 0816    glucagon (human recombinant) injection 1 mg, 1 mg, Intramuscular, PRN, Eun Woodall MD    glucagon (human recombinant) injection 1 mg, 1 mg, Intramuscular, PRN, Bhavik Alanis Jr., MD, [facility]    glycopyrrolate tablet 1 mg, 1 mg, Per OG tube, TID, DANELLE Whittaker MD, 1 mg at 07/27/25 2153    hydrALAZINE injection 10 mg, 10 mg, Intravenous, Q6H PRN, Julieta Cabrera MD    insulin aspart U-100 injection 0-10 Units, 0-10 Units, Subcutaneous, Q6H PRN, Bhavik Alanis Jr., MD, FCCP, 2 Units at 07/27/25 1120    insulin glargine U-100 (Lantus) injection 30 Units, 30 Units, Subcutaneous, BID, Jose Antonio Arora MD, 30 Units at 07/27/25 2154    labetaloL injection 20 mg, 20 mg, Intravenous, Q6H PRN, Julieta Cabrera MD    oxyCODONE immediate release tablet 5 mg, 5 mg, Per NG tube, Q6H PRN, Franklyn Peres MD, 5 mg at 07/22/25 1801    polyethylene glycol packet 17 g, 17 g, Per NG tube, BID, Julieta Cabrera MD, 17 g at 07/27/25 2153    scopolamine 1.3-1.5 mg (1 mg over 3 days) 1 patch, 1 patch, Transdermal, Q3 Days, Yovany Zaragoza, NP    senna-docusate 8.6-50 mg  per tablet 2 tablet, 2 tablet, Per NG tube, Daily, Julieta Cabrera MD, 2 tablet at 07/27/25 0820    Flushing PICC/Midline Protocol, , , Until Discontinued **AND** sodium chloride 0.9% flush 10 mL, 10 mL, Intravenous, Q12H PRN, Franklyn Peres MD    sodium chloride 3% nebulizer solution 4 mL, 4 mL, Nebulization, Q6H, Jose Antonio Arora MD, 4 mL at 07/28/25 0136

## 2025-07-28 NOTE — PROGRESS NOTES
[facility] Hospital Medicine Progress Note        Chief Complaint: Inpatient Follow-up     HPI:   Charly Padilla is a 69 y.o. male with CVA with right-sided hemiparesis, DM2, neuropathy, CKD 2, hypertension, HLD,  presented on 7/5/2025 from nursing facility with increased generalized weakness.   He is reported as having a Benítez catheter placed about 1 week ago prior to presentation for urinary retention. Was found to be in acute hypoxic respiratory failure.  Admitted to ICU.  Patient has now completed antibiotics.       Review of hospital course patient was intubated and mechanical ventilation on 07/05/2025, had echocardiogram which showed EF 45 50% with mild valvular heart disease, extubated on 07/16 reintubated on 07/18 and then lastly extubated on 07/25/2025.       Downgraded from ICU to Medicine Services on 7/27/25. No acute distress, nonverbal, with right hemiplegia, he does track with his eyes. He does not follow simple commands appropriately, he has an NG tube with tube feeding free water flush.    Interval Hx:     Tmax 100.3F overnight, monitor closely off abx for now. Send Blood cx. Cxr, UA.   Awake, non verbal. However based on prior charts from admission patient was verbal, will repeat CT head.  F/u palliative eval    Case was discussed with patient's nurse and  on the floor.    Objective/physical exam:  General: In no acute distress, afebrile  Chest: Clear to auscultation bilaterally  Heart: RRR, +S1, S2, no appreciable murmur  Abdomen: Soft, nontender, BS +  MSK: Warm, no lower extremity edema, no clubbing or cyanosis  Neurologic: Alert    VITAL SIGNS: 24 HRS MIN & MAX LAST   Temp  Min: 97.8 °F (36.6 °C)  Max: 100.3 °F (37.9 °C) 97.8 °F (36.6 °C)   BP  Min: 126/74  Max: 160/86 132/75   Pulse  Min: 81  Max: 107  92   Resp  Min: 14  Max: 20 18   SpO2  Min: 91 %  Max: 97 % (!) 94 %     I have reviewed the following labs:  Recent Labs   Lab 07/26/25  8983  07/27/25  0204 07/28/25  0303   WBC 7.59 8.17 6.67   RBC 4.16* 4.54* 4.74   HGB 11.8* 12.8* 13.6*   HCT 37.7* 40.0* 44.7   MCV 90.6 88.1 94.3*   MCH 28.4 28.2 28.7   MCHC 31.3* 32.0* 30.4*   RDW 13.8 13.7 14.3    214 206   MPV 10.8* 10.9* 11.9*     Recent Labs   Lab 07/25/25  0325 07/25/25  1125 07/26/25  0320 07/27/25  0204 07/28/25  0537   *  --  131* 133* 133*   K 5.3*  --  4.7 5.5* 5.0     --  102 99 101   CO2 21*  --  22* 25 26   BUN 48.6*  --  34.1* 43.9* 39.7*   CREATININE 0.95  --  0.83 1.22 1.05   *  --  157* 189* 149*   CALCIUM 9.1  --  9.1 9.7 9.4   PH  --  7.410  --   --   --    MG 1.70  --  1.50* 1.90  --    ALBUMIN 2.4*  --  2.4* 2.7*  --    PROT 7.5  --  7.4 8.1*  --    ALKPHOS 55  --  51 62  --    ALT 19  --  17 19  --    AST 12  --  12 14  --    BILITOT 0.3  --  0.3 0.4  --      Microbiology Results (last 7 days)       Procedure Component Value Units Date/Time    Blood Culture [[id number]]  (Normal) Collected: 07/19/25 0022    Order Status: Completed Specimen: Blood from Arm, Right Updated: 07/24/25 0102     Blood Culture No Growth at 5 days    Blood Culture [[id number]]  (Normal) Collected: 07/19/25 0029    Order Status: Completed Specimen: Blood from Arm, Left Updated: 07/24/25 0102     Blood Culture No Growth at 5 days             See below for Radiology    Assessment/Plan:    Acute combined hypoxic hypercapnic respiratory failure  - possibly obesity/hypoventilation syndrome coupled with previous stroke and pneumonia.   Aspiration pneumonia, improved  History of CVA with right hemiparesis  VAN on CKD 2  DM2   Neuropathy  Hypertension  HLD  Urinary retention on mcgill at NH since 6/2025     CXR 7/22 mediastinal silhouette to be within normal limits cardiac silhouette is not enlarged lung fields are clear and free of gross infiltrates or effusions with linear densities in both bases representing subsegmental atelectatic changes. No focal consolidative changes are otherwise  identified   7/19 respiratory cx normal richard    Plan:   completed 7 days of Rocephin on 7/24/25  Maintain sats above 90%, stable on 4 L nasal cannula  7/28 Tmax 100.3F overnight, monitor closely off abx for now. Send Blood cx, UA.   CXR Suspect some mild atelectasis.   Mcgill in place, reported as having a Mcgill catheter placed about 1 week ago prior to presentation for urinary retention  7/28 Awake, non verbal. However based on prior charts from admission patient was verbal, will repeat CT head.    Tube feeding free water flush via NG tube   Palliative care consult for goals of care   May need GI consult for PEG tube placement to return back to nursing facility    On scopolamine patch and glycopyrrolate per ICU team prior to downgrade  Continue antihypertensives via NG tube     Replace electrolytes as need  A1c 7.3, Lantus 30 units b.i.d. ; Monitor gluc - prn insulin if needed, hypoglycemia precautions     VAN on CKD 2  - remains nonoliguric, daily improvement in BUN/creatinine   K 5.5 >> 5, hold lisinopril for now, lokelma x 1 on 7/27     Urinary retention on mcgill at NH since 6/2025  Will need urology follow up outpatient    Resume home meds as appropriate  Bowel regimen; Frequent reposition   Labs in a.m.      Case management consult for discharge planning/needs      VTE Prophylaxis: on Lovenox for DVT prophylaxis and will be advised to be as mobile as possible and sit in a chair as tolerated     Patient condition:  Stable     Anticipated discharge and Disposition:         All diagnosis and differential diagnosis have been reviewed; assessment and plan has been documented; I have personally reviewed the labs and test results that are presently available; I have reviewed the patients medication list; I have reviewed the consulting providers response and recommendations. I have reviewed or attempted to review medical records based upon their availability    All of the patient's questions have been  addressed and  answered. Patient's is agreeable to the above stated plan. I will continue to monitor closely and make adjustments to medical management as needed.    Portions of this note dictated using EMR integrated voice recognition software, and may be subject to voice recognition errors not corrected at proofreading. Please contact writer for clarification if needed.   _____________________________________________________________________    Malnutrition Status:  Nutrition consulted. Most recent weight and BMI monitored-     Measurements:  Wt Readings from Last 1 Encounters:   07/18/25 115.5 kg (254 lb 10.1 oz)   Body mass index is 38.72 kg/m².    Patient has been screened and assessed by RD.    Malnutrition Type:  Context:    Level: other (see comments) (Does not meet criteria)    Malnutrition Characteristic Summary:       Interventions/Recommendations (treatment strategy):        Scheduled Med:   amLODIPine  5 mg Per NG tube Daily    enoxparin  40 mg Subcutaneous Q24H (prophylaxis, 1700)    famotidine  20 mg Per NG tube BID    glycopyrrolate  1 mg Per OG tube TID    insulin glargine U-100  30 Units Subcutaneous BID    polyethylene glycol  17 g Per NG tube BID    scopolamine  1 patch Transdermal Q3 Days    senna-docusate  2 tablet Per NG tube Daily    sodium chloride 3%  4 mL Nebulization Q8H      Continuous Infusions:     PRN Meds:    Current Facility-Administered Medications:     acetaminophen, 650 mg, Per NG tube, Q6H PRN    dextrose 50%, 12.5 g, Intravenous, PRN    dextrose 50%, 12.5 g, Intravenous, PRN    fentaNYL, 50 mcg, Intravenous, Q3H PRN    glucagon (human recombinant), 1 mg, Intramuscular, PRN    glucagon (human recombinant), 1 mg, Intramuscular, PRN    hydrALAZINE, 10 mg, Intravenous, Q6H PRN    insulin aspart U-100, 0-10 Units, Subcutaneous, Q6H PRN    labetalol, 20 mg, Intravenous, Q6H PRN    oxyCODONE, 5 mg, Per NG tube, Q6H PRN    Flushing PICC/Midline Protocol, , , Until Discontinued **AND** sodium chloride  0.9%, 10 mL, Intravenous, Q12H PRN     Radiology:  I have personally reviewed the following imaging and agree with the radiologist.     X-Ray Chest 1 View  Narrative: EXAMINATION:  XR CHEST 1 VIEW    CPT 04083    CLINICAL HISTORY:  respiratory failure;    COMPARISON:  July 21, 2025    FINDINGS:  Examination reveals mediastinal silhouette to be within normal limits cardiac silhouette is not enlarged lung fields are clear and free of gross infiltrates or effusions with linear densities in both bases representing subsegmental atelectatic changes.    No focal consolidative changes are otherwise identified  Impression: Atelectatic changes in both bases.    No other significant change as compared with the previous exam    Electronically signed by: Cj Richmond  Date:    07/22/2025  Time:    07:51      Kenn Johnson MD  Department of Hospital Medicine   [facility]   07/28/2025

## 2025-07-28 NOTE — PLAN OF CARE
Problem: Occupational Therapy  Goal: Occupational Therapy Goal  Description: LTG: Pt will perform basic ADLs and ADL t/fs with min A using LRAD by d/c.    STG: to be met by 8/11  1. Pt will follow >80% of simple one step commands  2. Pt will perform grooming EOB with min A.   3. Pt will perform functional grasp/reach/release of items with L UE >80% of trials in prep for increased participation in ADL tasks.   4. Pt will perform sit<>stand with mod A x 2 in prep for ADL t/fs.   Outcome: Progressing

## 2025-07-29 LAB
ALBUMIN SERPL-MCNC: 2.7 G/DL (ref 3.4–4.8)
ALBUMIN/GLOB SERPL: 0.5 RATIO (ref 1.1–2)
ALP SERPL-CCNC: 57 UNIT/L (ref 40–150)
ALT SERPL-CCNC: 14 UNIT/L (ref 0–55)
ANION GAP SERPL CALC-SCNC: 7 MEQ/L
AST SERPL-CCNC: 11 UNIT/L (ref 11–45)
BACTERIA #/AREA URNS AUTO: ABNORMAL /HPF
BILIRUB SERPL-MCNC: 0.3 MG/DL
BILIRUB UR QL STRIP.AUTO: NEGATIVE
BUN SERPL-MCNC: 35.2 MG/DL (ref 8.4–25.7)
CALCIUM SERPL-MCNC: 9.4 MG/DL (ref 8.8–10)
CHLORIDE SERPL-SCNC: 100 MMOL/L (ref 98–107)
CLARITY UR: CLEAR
CO2 SERPL-SCNC: 24 MMOL/L (ref 23–31)
COLOR UR AUTO: ABNORMAL
CREAT SERPL-MCNC: 0.99 MG/DL (ref 0.72–1.25)
CREAT/UREA NIT SERPL: 36
ERYTHROCYTE [DISTWIDTH] IN BLOOD BY AUTOMATED COUNT: 13.6 % (ref 11.5–17)
GFR SERPLBLD CREATININE-BSD FMLA CKD-EPI: >60 ML/MIN/1.73/M2
GLOBULIN SER-MCNC: 5.2 GM/DL (ref 2.4–3.5)
GLUCOSE SERPL-MCNC: 217 MG/DL (ref 82–115)
GLUCOSE UR QL STRIP: ABNORMAL
HCT VFR BLD AUTO: 41.1 % (ref 42–52)
HGB BLD-MCNC: 13.2 G/DL (ref 14–18)
HGB UR QL STRIP: ABNORMAL
KETONES UR QL STRIP: NEGATIVE
LEUKOCYTE ESTERASE UR QL STRIP: 250
MCH RBC QN AUTO: 28.8 PG (ref 27–31)
MCHC RBC AUTO-ENTMCNC: 32.1 G/DL (ref 33–36)
MCV RBC AUTO: 89.7 FL (ref 80–94)
NITRITE UR QL STRIP: NEGATIVE
NRBC BLD AUTO-RTO: 0 %
PH UR STRIP: 6 [PH]
PLATELET # BLD AUTO: 223 X10(3)/MCL (ref 130–400)
PMV BLD AUTO: 10.8 FL (ref 7.4–10.4)
POCT GLUCOSE: 159 MG/DL (ref 70–110)
POCT GLUCOSE: 230 MG/DL (ref 70–110)
POCT GLUCOSE: 239 MG/DL (ref 70–110)
POTASSIUM SERPL-SCNC: 5.3 MMOL/L (ref 3.5–5.1)
PROT SERPL-MCNC: 7.9 GM/DL (ref 5.8–7.6)
PROT UR QL STRIP: ABNORMAL
RBC # BLD AUTO: 4.58 X10(6)/MCL (ref 4.7–6.1)
RBC #/AREA URNS AUTO: ABNORMAL /HPF
SODIUM SERPL-SCNC: 131 MMOL/L (ref 136–145)
SP GR UR STRIP.AUTO: 1.02 (ref 1–1.03)
SQUAMOUS #/AREA URNS LPF: ABNORMAL /HPF
UROBILINOGEN UR STRIP-ACNC: NORMAL
WBC # BLD AUTO: 7.75 X10(3)/MCL (ref 4.5–11.5)
WBC #/AREA URNS AUTO: ABNORMAL /HPF

## 2025-07-29 PROCEDURE — 25000003 PHARM REV CODE 250: Performed by: INTERNAL MEDICINE

## 2025-07-29 PROCEDURE — 80053 COMPREHEN METABOLIC PANEL: CPT | Performed by: INTERNAL MEDICINE

## 2025-07-29 PROCEDURE — 27100171 HC OXYGEN HIGH FLOW UP TO 24 HOURS

## 2025-07-29 PROCEDURE — 94760 N-INVAS EAR/PLS OXIMETRY 1: CPT

## 2025-07-29 PROCEDURE — 94799 UNLISTED PULMONARY SVC/PX: CPT

## 2025-07-29 PROCEDURE — 27000646 HC AEROBIKA DEVICE

## 2025-07-29 PROCEDURE — 94660 CPAP INITIATION&MGMT: CPT

## 2025-07-29 PROCEDURE — 85027 COMPLETE CBC AUTOMATED: CPT | Performed by: INTERNAL MEDICINE

## 2025-07-29 PROCEDURE — 25000003 PHARM REV CODE 250

## 2025-07-29 PROCEDURE — 94640 AIRWAY INHALATION TREATMENT: CPT

## 2025-07-29 PROCEDURE — 11000001 HC ACUTE MED/SURG PRIVATE ROOM

## 2025-07-29 PROCEDURE — 25000242 PHARM REV CODE 250 ALT 637 W/ HCPCS: Performed by: INTERNAL MEDICINE

## 2025-07-29 PROCEDURE — 94664 DEMO&/EVAL PT USE INHALER: CPT

## 2025-07-29 PROCEDURE — 94761 N-INVAS EAR/PLS OXIMETRY MLT: CPT

## 2025-07-29 PROCEDURE — 99900031 HC PATIENT EDUCATION (STAT)

## 2025-07-29 PROCEDURE — 97530 THERAPEUTIC ACTIVITIES: CPT

## 2025-07-29 PROCEDURE — 99900035 HC TECH TIME PER 15 MIN (STAT)

## 2025-07-29 PROCEDURE — 21400001 HC TELEMETRY ROOM

## 2025-07-29 PROCEDURE — 63600175 PHARM REV CODE 636 W HCPCS: Performed by: INTERNAL MEDICINE

## 2025-07-29 PROCEDURE — 81001 URINALYSIS AUTO W/SCOPE: CPT | Performed by: INTERNAL MEDICINE

## 2025-07-29 PROCEDURE — 36415 COLL VENOUS BLD VENIPUNCTURE: CPT | Performed by: INTERNAL MEDICINE

## 2025-07-29 RX ADMIN — INSULIN ASPART 4 UNITS: 100 INJECTION, SOLUTION INTRAVENOUS; SUBCUTANEOUS at 11:07

## 2025-07-29 RX ADMIN — AMLODIPINE BESYLATE 5 MG: 5 TABLET ORAL at 09:07

## 2025-07-29 RX ADMIN — SODIUM CHLORIDE SOLN NEBU 3% 4 ML: 3 NEBU SOLN at 08:07

## 2025-07-29 RX ADMIN — SODIUM ZIRCONIUM CYCLOSILICATE 10 G: 10 POWDER, FOR SUSPENSION ORAL at 03:07

## 2025-07-29 RX ADMIN — SODIUM CHLORIDE SOLN NEBU 3% 4 ML: 3 NEBU SOLN at 01:07

## 2025-07-29 RX ADMIN — SENNOSIDES, DOCUSATE SODIUM 2 TABLET: 8.6; 5 TABLET ORAL at 09:07

## 2025-07-29 RX ADMIN — GLYCOPYRROLATE 1 MG: 1 TABLET ORAL at 03:07

## 2025-07-29 RX ADMIN — GLYCOPYRROLATE 1 MG: 1 TABLET ORAL at 09:07

## 2025-07-29 RX ADMIN — INSULIN GLARGINE 30 UNITS: 100 INJECTION, SOLUTION SUBCUTANEOUS at 10:07

## 2025-07-29 RX ADMIN — POLYETHYLENE GLYCOL 3350 17 G: 17 POWDER, FOR SOLUTION ORAL at 09:07

## 2025-07-29 RX ADMIN — SODIUM CHLORIDE SOLN NEBU 3% 4 ML: 3 NEBU SOLN at 03:07

## 2025-07-29 RX ADMIN — INSULIN GLARGINE 30 UNITS: 100 INJECTION, SOLUTION SUBCUTANEOUS at 09:07

## 2025-07-29 RX ADMIN — SODIUM ZIRCONIUM CYCLOSILICATE 10 G: 10 POWDER, FOR SUSPENSION ORAL at 11:07

## 2025-07-29 RX ADMIN — FAMOTIDINE 20 MG: 20 TABLET, FILM COATED ORAL at 09:07

## 2025-07-29 RX ADMIN — INSULIN ASPART 4 UNITS: 100 INJECTION, SOLUTION INTRAVENOUS; SUBCUTANEOUS at 05:07

## 2025-07-29 RX ADMIN — ENOXAPARIN SODIUM 40 MG: 40 INJECTION SUBCUTANEOUS at 03:07

## 2025-07-29 RX ADMIN — GLYCOPYRROLATE 1 MG: 1 TABLET ORAL at 10:07

## 2025-07-29 RX ADMIN — FAMOTIDINE 20 MG: 20 TABLET, FILM COATED ORAL at 10:07

## 2025-07-29 NOTE — PROGRESS NOTES
Inpatient Nutrition Assessment    Admit Date: 7/5/2025   Total duration of encounter: 24 days   Patient Age: 69 y.o.    Nutrition Recommendation/Prescription     Tube feeding recommendation:  Peptamen Intense VHP goal rate 55 ml/hr   [id number] two times daily  to provide  1260 kcal 100% needs  142 g protein 101% needs  83 g CHO 59% needs  924 ml water 41% needs, 67% with current 30 ml/hr water flushes  calculations based on estimated 20 hour run time     Medical management of hyperglycemia.     Communication of Recommendations: reviewed with nurse    Nutrition Assessment     Malnutrition Assessment/Nutrition-Focused Physical Exam       Malnutrition Level: other (see comments) (Does not meet criteria) (07/07/25 1041)                                                        A minimum of two characteristics is recommended for diagnosis of either severe or non-severe malnutrition.    Chart Review    Reason Seen: follow-up    Malnutrition Screening Tool Results   Have you recently lost weight without trying?: No  Have you been eating poorly because of a decreased appetite?: No   MST Score: 0   Diagnosis:  Acute combined hypoxic and hypercapnic respiratory failure  History of cerebrovascular disease with right hemiparesis  Acute kidney injury superimposed on stage II chronic kidney disease, obstructive uropathy component.  He remains nonoliguric, daily improvement in BUN/creatinine   Type 2 diabetes mellitus with polyneuropathy, hyperglycemia improved.  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, glaucoma, hypertension, and hyperlipidemia     Scheduled Medications:  amLODIPine, 5 mg, Daily  enoxparin, 40 mg, Q24H (prophylaxis, 1700)  famotidine, 20 mg, BID  glycopyrrolate, 1 mg, TID  insulin glargine U-100, 30 Units, BID  polyethylene glycol, 17 g,  BID  scopolamine, 1 patch, Q3 Days  senna-docusate, 2 tablet, Daily  sodium chloride 3%, 4 mL, Q8H  sodium zirconium cyclosilicate, 10 g, TID    Continuous Infusions:     PRN Medications:  acetaminophen, 650 mg, Q6H PRN  dextrose 50%, 12.5 g, PRN  dextrose 50%, 12.5 g, PRN  fentaNYL, 50 mcg, Q3H PRN  glucagon (human recombinant), 1 mg, PRN  glucagon (human recombinant), 1 mg, PRN  hydrALAZINE, 10 mg, Q6H PRN  insulin aspart U-100, 0-10 Units, Q6H PRN  labetalol, 20 mg, Q6H PRN  oxyCODONE, 5 mg, Q6H PRN  sodium chloride 0.9%, 10 mL, Q12H PRN    Calorie Containing IV Medications: no significant kcals from medications at this time    Recent Labs   Lab 07/23/25  0338 07/24/25  0419 07/25/25  0325 07/26/25  0320 07/27/25  0204 07/28/25  0303 07/28/25  0537 07/29/25  0227    135* 133* 131* 133*  --  133* 131*   K 4.8 5.3* 5.3* 4.7 5.5*  --  5.0 5.3*   CALCIUM 9.1 9.1 9.1 9.1 9.7  --  9.4 9.4   PHOS 2.8 3.3 3.0 3.1 3.9  --   --   --    MG 1.60 1.80 1.70 1.50* 1.90  --   --   --     102 104 102 99  --  101 100   CO2 24 22* 21* 22* 25  --  26 24   BUN 34.2* 57.5* 48.6* 34.1* 43.9*  --  39.7* 35.2*   CREATININE 0.88 1.34* 0.95 0.83 1.22  --  1.05 0.99   EGFRNORACEVR >60 58 >60 >60 >60  --  >60 >60   * 207* 193* 157* 189*  --  149* 217*   BILITOT 0.4 0.3 0.3 0.3 0.4  --   --  0.3   ALKPHOS 58 56 55 51 62  --   --  57   ALT 26 25 19 17 19  --   --  14   AST 20 16 12 12 14  --   --  11   ALBUMIN 2.4* 2.5* 2.4* 2.4* 2.7*  --   --  2.7*   WBC 8.12 9.56 7.70 7.59 8.17 6.67  --  7.75   HGB 11.9* 11.7* 12.1* 11.8* 12.8* 13.6*  --  13.2*   HCT 37.4* 38.3* 39.9* 37.7* 40.0* 44.7  --  41.1*     Nutrition Orders:  No diet orders on file  Tube Feedings/Formulas 55; 1,100; Peptamen Intense VHP; OG; 30; Every hour,Tube Feedings/Formulas Other (see comments); OG; ProSource TF20; 2 times daily    Appetite/Oral Intake: NPO/NPO  Factors Affecting Nutritional Intake: NPO  Social Needs Impacting Access to Food: none identified;  "NH resident   Food/Rastafari/Cultural Preferences: unable to obtain  Food Allergies: no known food allergies  Last Bowel Movement: 07/28/25  Wound(s):  no partial or full thickness pressure injuries documented at this time.     Comments    7/7/25: Pt intubated on mechanical ventilation, receiving kcals from diprivan. Not currently receiving enteral nutrition. Tube feeding orders updated.     7/11/25 Patient remains on ventilator with propofol, tube feeding at previous goal rate; recommend decreasing tube feeding rate due to overfeeding with propofol currently, will need to increase protein modular.    7/15/25: Patient remains intubated, no longer receiving kcals from propofol. TF put on hold this morning d/t worsening abdominal distention this morning. TF regimen updated for once able to resume feeds.     7/18/25 Tube feeding held currently secondary to suspected aspiration, possibly resume today.    7/22/25 Patient remains intubated, not currently receiving kcals from propofol. Tolerating tube feedings at goal rate.    7/25/25 Patient remains intubated. Tolerating tube feedings at goal rate. Receiving 30 units lantus BID and SSI prn for hyperglycemia.     7/29/25: Patient extubated, now on BiPAP at night. RN reports patient tolerating tube feeding at goal rate.     Anthropometrics    Height: 5' 8" (172.7 cm), Height Method: Estimated  Last Weight: 115.5 kg (254 lb 10.1 oz) (07/18/25 0600), Weight Method: Bed Scale  BMI (Calculated): 38.7  BMI Classification: obese grade II (BMI 35-39.9)        Ideal Body Weight (IBW), Male: 154 lb     % Ideal Body Weight, Male (lb): 175.08 %                          Usual Weight Provided By: EMR weight history    Wt Readings from Last 5 Encounters:   07/18/25 115.5 kg (254 lb 10.1 oz)     Weight Change(s) Since Admission:   7/25/25 no updated weights   Wt Readings from Last 1 Encounters:   07/18/25 0600 115.5 kg (254 lb 10.1 oz)   07/13/25 0800 122.3 kg (269 lb 10 oz)   07/05/25 " 0605 113.4 kg (250 lb)   Admit Weight: 113.4 kg (250 lb) (07/05/25 0605), Weight Method: Stated    Estimated Needs    Weight Used For Calorie Calculations: 113.4 kg (250 lb)  Energy Calorie Requirements (kcal): 6332-2831 kcals (11-14 kcal/kg)  Energy Need Method: Kcal/kg  Weight Used For Protein Calculations: 70 kg (154 lb 5.2 oz) (IBW)  Protein Requirements: 140 g (2 g/kg IBW)  Fluid Requirements (mL): 2268 mL (20 mL/kg)  CHO Requirement: 140-178 g/day (45% of total EEN)     Enteral Nutrition   Formula: Peptamen Intense VHP  Rate/Volume: 55ml/hr  Water Flushes: 30 ml/hr  Additives/Modulars: Prosource TF20 BID  Route: nasogastric tube  Method: continuous  Total Nutrition Provided by Tube Feeding, Additives, and Flushes:  Calories Provided  1260 kcal/d, 100% needs   Protein Provided  142 g/d, 101% needs   Fluid Provided  1524 ml/d, 67% needs   Continuous feeding calculations based on estimated 20 hr/d run time unless otherwise stated.     Parenteral Nutrition Patient not receiving parenteral nutrition support at this time.    Evaluation of Received Nutrient Intake    Calories: meeting estimated needs  Protein: meeting estimated needs    Patient Education Not applicable.    Nutrition Diagnosis     PES: Inadequate energy intake related to inability to consume sufficient nutrients as evidenced by on mechanical ventilation, need for NPO status. (resolved)   PES: Inadequate oral intake related to acute illness as evidenced by on mechanical ventilation since admit. (active)   PES: N/A            Nutrition Interventions     Intervention(s): Enteral nutrition management  Intervention(s):      Goal: Meet greater than 80% of nutritional needs by follow-up. (goal met)  Goal: Tolerate enteral feeding at goal rate by follow-up. (goal met)    Nutrition Goals & Monitoring     Dietitian will monitor: energy intake, enteral nutrition intake, weight, electrolyte/renal panel, glucose/endocrine profile, and gastrointestinal  profile  Discharge planning: too early to determine; pending clinical course  Nutrition Risk/Follow-Up: patient at increased nutrition risk; dietitian will follow-up twice weekly   Please consult if re-assessment needed sooner.

## 2025-07-29 NOTE — PT/OT/SLP PROGRESS
Physical Therapy Treatment    Patient Name:  Charly Padilla   MRN:  [id number]    Recommendations:     Discharge therapy intensity: Moderate Intensity Therapy   Discharge Equipment Recommendations: to be determined by next level of care  Barriers to discharge: Impaired mobility and Ongoing medical needs    Assessment:     Charly Padilla is a 69 y.o. male admitted with a medical diagnosis of Acute hypoxic hypercapnic respiratory failure requiring intubation 7/5-7/16 and reintubation 7/18-7/25, aspiration pneumonia, hx of CVA with R-hemiparesis .  He presents with the following impairments/functional limitations: weakness, impaired endurance, impaired balance, impaired self care skills, impaired functional mobility, impaired cognition, decreased safety awareness, decreased lower extremity function, decreased upper extremity function, impaired coordination, decreased coordination . Pt is still total a x 2 for fxn'l mobility and not really following commands    DME Justification:  No DME recommended requiring DME justifications    Rehab Prognosis: Fair; patient would benefit from acute skilled PT services to address these deficits and reach maximum level of function.    Recent Surgery: * No surgery found *      Plan:     During this hospitalization, patient would benefit from acute PT services 3 x/week to address the identified rehab impairments via therapeutic activities, therapeutic exercises, neuromuscular re-education and progress toward the following goals:    Plan of Care Expires:  08/28/25    Subjective     Chief Complaint:   Patient/Family Comments/goals:   Pain/Comfort:         Objective:     Communicated with nurse prior to session.  Patient found supine with mcgill catheter, NG tube, telemetry, pulse ox (continuous), pressure relief boots upon PT entry to room.     General Precautions: Standard, fall, NPO  Orthopedic Precautions: N/A  Braces: N/A  Respiratory Status: Room air  Blood Pressure:   Skin Integrity: Visible  skin intact      Functional Mobility:  Bed Mobility:     Scooting: of totala x 2  persons  Supine to Sit: of total a x 2  persons  Sit to Supine: of totala x 2  persons  Stand was attempted but pt is not currently able to assist at all. Therefore , we aborted the attempt after initiating the stand attempt    Therapeutic Activities/Exercises:  Pt was unable to follow commands to move legs in supported sitting , but was seen moving left leg randomly after we placed him back in bed    Co-Treatment: No    Education:  Patient provided with verbal education education regarding PT role/goals/POC.  Understanding was verbalized, however additional teaching warranted.     Patient left supine with all lines intact      GOALS:   Multidisciplinary Problems       Physical Therapy Goals          Problem: Physical Therapy    Goal Priority Disciplines Outcome Interventions   Physical Therapy Goal     PT, PT/OT Progressing    Description: Goals to be met by: 25     Patient will increase functional independence with mobility by performin. Supine to sit with Moderate Assistance  2. Sit to stand transfer with Moderate Assistance  3. Bed to chair transfer with Moderate Assistance using Least Restrictive Assistive Device  4. Sitting at edge of bed x10 minutes with Minimal Assistance  5. Pt will follow 5/5 motor commands for LLE.  6. Gait TBD as pt progresses                         Time Tracking:     PT Received On:    PT Start Time: 917     PT Stop Time: 942  PT Total Time (min): 25 min     Billable Minutes: Therapeutic Activity 25    Treatment Type: Treatment  PT/PTA: PT     Number of PTA visits since last PT visit: 2025

## 2025-07-29 NOTE — PROGRESS NOTES
[facility] Hospital Medicine Progress Note        Chief Complaint: Inpatient Follow-up     HPI:   Charly Padilla is a 69 y.o. male with CVA with right-sided hemiparesis, DM2, neuropathy, CKD 2, hypertension, HLD,  presented on 7/5/2025 from nursing facility with increased generalized weakness.   He is reported as having a Benítez catheter placed about 1 week ago prior to presentation for urinary retention. Was found to be in acute hypoxic respiratory failure.  Admitted to ICU.  Patient has now completed antibiotics.       Review of hospital course patient was intubated and mechanical ventilation on 07/05/2025, had echocardiogram which showed EF 45 50% with mild valvular heart disease, extubated on 07/16 reintubated on 07/18 and then lastly extubated on 07/25/2025.       Downgraded from ICU to Medicine Services on 7/27/25. No acute distress, nonverbal, with right hemiplegia, he does track with his eyes. He does not follow simple commands appropriately, he has an NG tube with tube feeding free water flush.    7/28 Tmax 100.3F overnight, monitor closely off abx for now. Send Blood cx.   Awake, non verbal. However based on prior charts from admission patient was ?verbal  Cxr - Suspect some mild atelectasis  CT head No acute intracranial findings or significant interval change compared to earlier this month.     Interval Hx:     Tmax 99.8F overnight, monitor closely off abx for now. F/u Blood cx. UA  Benítez exchanged today  K 5.3, lokelma ordered  F/u palliative eval    Case was discussed with patient's nurse and  on the floor.    Objective/physical exam:  General: In no acute distress, afebrile  Chest: Clear to auscultation bilaterally  Heart: RRR, +S1, S2, no appreciable murmur  Abdomen: Soft, nontender, BS +  MSK: Warm, no lower extremity edema, no clubbing or cyanosis  Neurologic: Alert    VITAL SIGNS: 24 HRS MIN & MAX LAST   Temp  Min: 97.8 °F (36.6 °C)  Max: 99.8 °F (37.7 °C)  98.5 °F (36.9 °C)   BP  Min: 131/77  Max: 152/87 136/80   Pulse  Min: 82  Max: 100  82   Resp  Min: 16  Max: 18 16   SpO2  Min: 91 %  Max: 100 % 100 %     I have reviewed the following labs:  Recent Labs   Lab 07/27/25  0204 07/28/25  0303 07/29/25 0227   WBC 8.17 6.67 7.75   RBC 4.54* 4.74 4.58*   HGB 12.8* 13.6* 13.2*   HCT 40.0* 44.7 41.1*   MCV 88.1 94.3* 89.7   MCH 28.2 28.7 28.8   MCHC 32.0* 30.4* 32.1*   RDW 13.7 14.3 13.6    206 223   MPV 10.9* 11.9* 10.8*     Recent Labs   Lab 07/25/25  0325 07/25/25  1125 07/26/25  0320 07/27/25  0204 07/28/25  0537 07/29/25 0227   *  --  131* 133* 133* 131*   K 5.3*  --  4.7 5.5* 5.0 5.3*     --  102 99 101 100   CO2 21*  --  22* 25 26 24   BUN 48.6*  --  34.1* 43.9* 39.7* 35.2*   CREATININE 0.95  --  0.83 1.22 1.05 0.99   *  --  157* 189* 149* 217*   CALCIUM 9.1  --  9.1 9.7 9.4 9.4   PH  --  7.410  --   --   --   --    MG 1.70  --  1.50* 1.90  --   --    ALBUMIN 2.4*  --  2.4* 2.7*  --  2.7*   PROT 7.5  --  7.4 8.1*  --  7.9*   ALKPHOS 55  --  51 62  --  57   ALT 19  --  17 19  --  14   AST 12  --  12 14  --  11   BILITOT 0.3  --  0.3 0.4  --  0.3     Microbiology Results (last 7 days)       Procedure Component Value Units Date/Time    Blood Culture [[id number]] Collected: 07/28/25 1229    Order Status: Resulted Specimen: Blood Updated: 07/28/25 1233    Blood Culture [[id number]] Collected: 07/28/25 1229    Order Status: Resulted Specimen: Blood Updated: 07/28/25 1233    Blood Culture [[id number]]  (Normal) Collected: 07/19/25 0022    Order Status: Completed Specimen: Blood from Arm, Right Updated: 07/24/25 0102     Blood Culture No Growth at 5 days    Blood Culture [[id number]]  (Normal) Collected: 07/19/25 0029    Order Status: Completed Specimen: Blood from Arm, Left Updated: 07/24/25 0102     Blood Culture No Growth at 5 days             See below for Radiology    Assessment/Plan:    Acute combined hypoxic hypercapnic respiratory  failure  - possibly obesity/hypoventilation syndrome coupled with previous stroke and pneumonia.   Aspiration pneumonia, improved  History of CVA with right hemiparesis  VAN on CKD 2  DM2   Neuropathy  Hypertension  HLD  Urinary retention on mcgill at NH since 6/2025     CXR 7/22 mediastinal silhouette to be within normal limits cardiac silhouette is not enlarged lung fields are clear and free of gross infiltrates or effusions with linear densities in both bases representing subsegmental atelectatic changes. No focal consolidative changes are otherwise identified   7/19 respiratory cx normal richard  Cxr - Suspect some mild atelectasis  CT head No acute intracranial findings or significant interval change compared to earlier this month.     Plan:   completed 7 days of Rocephin on 7/24/25  Maintain sats above 90%, stable on 4 L nasal cannula  7/28 Tmax 100.3F overnight, monitor closely off abx for now. F/u Blood cx, UA.   Mcgill exchanged 7/29/25    Tube feeding free water flush via NG tube   Palliative care consult for goals of care   May need GI consult for PEG tube placement to return back to nursing facility    On scopolamine patch and glycopyrrolate per ICU team prior to downgrade  Continue antihypertensives via NG tube     Replace electrolytes as need  A1c 7.3, Lantus 30 units b.i.d. ; Monitor gluc - prn insulin if needed, hypoglycemia precautions     VAN on CKD 2  - remains nonoliguric, daily improvement in BUN/creatinine   K 5.3, hold lisinopril for now, lokelma x 1 on 7/27 and 7/29     Urinary retention on mcgill at NH since 6/2025  Will need urology follow up outpatient    Resume home meds as appropriate  Bowel regimen; Frequent reposition   Labs in a.m.      Case management consult for discharge planning/needs      VTE Prophylaxis: on Lovenox for DVT prophylaxis and will be advised to be as mobile as possible and sit in a chair as tolerated     Patient condition:  Stable     Anticipated discharge and  Disposition:         All diagnosis and differential diagnosis have been reviewed; assessment and plan has been documented; I have personally reviewed the labs and test results that are presently available; I have reviewed the patients medication list; I have reviewed the consulting providers response and recommendations. I have reviewed or attempted to review medical records based upon their availability    All of the patient's questions have been  addressed and answered. Patient's is agreeable to the above stated plan. I will continue to monitor closely and make adjustments to medical management as needed.    Portions of this note dictated using EMR integrated voice recognition software, and may be subject to voice recognition errors not corrected at proofreading. Please contact writer for clarification if needed.   _____________________________________________________________________    Malnutrition Status:  Nutrition consulted. Most recent weight and BMI monitored-     Measurements:  Wt Readings from Last 1 Encounters:   07/18/25 115.5 kg (254 lb 10.1 oz)   Body mass index is 38.72 kg/m².    Patient has been screened and assessed by RD.    Malnutrition Type:  Context:    Level: other (see comments) (Does not meet criteria)    Malnutrition Characteristic Summary:       Interventions/Recommendations (treatment strategy):        Scheduled Med:   amLODIPine  5 mg Per NG tube Daily    enoxparin  40 mg Subcutaneous Q24H (prophylaxis, 1700)    famotidine  20 mg Per NG tube BID    glycopyrrolate  1 mg Per OG tube TID    insulin glargine U-100  30 Units Subcutaneous BID    polyethylene glycol  17 g Per NG tube BID    scopolamine  1 patch Transdermal Q3 Days    senna-docusate  2 tablet Per NG tube Daily    sodium chloride 3%  4 mL Nebulization Q8H      Continuous Infusions:     PRN Meds:    Current Facility-Administered Medications:     acetaminophen, 650 mg, Per NG tube, Q6H PRN    dextrose 50%, 12.5 g, Intravenous, PRN     dextrose 50%, 12.5 g, Intravenous, PRN    fentaNYL, 50 mcg, Intravenous, Q3H PRN    glucagon (human recombinant), 1 mg, Intramuscular, PRN    glucagon (human recombinant), 1 mg, Intramuscular, PRN    hydrALAZINE, 10 mg, Intravenous, Q6H PRN    insulin aspart U-100, 0-10 Units, Subcutaneous, Q6H PRN    labetalol, 20 mg, Intravenous, Q6H PRN    oxyCODONE, 5 mg, Per NG tube, Q6H PRN    Flushing PICC/Midline Protocol, , , Until Discontinued **AND** sodium chloride 0.9%, 10 mL, Intravenous, Q12H PRN     Radiology:  I have personally reviewed the following imaging and agree with the radiologist.     CT Head Without Contrast  Narrative: EXAMINATION:  CT HEAD WITHOUT CONTRAST    CLINICAL HISTORY:  Mental status change, unknown cause;    TECHNIQUE:  CT imaging of the head performed from the skull base to the vertex without intravenous contrast.  DLP 1558 mGycm. Automatic exposure control, adjustment of mA/kV or iterative reconstruction technique was used to reduce radiation.    COMPARISON:  5 July 2025    FINDINGS:  There is no acute cortical infarct, hemorrhage or mass lesion.  No new parenchymal attenuation abnormality.  Ventricular size is stable.  There are vascular calcifications.    Paranasal sinuses are clear.  There is bilateral mastoid air cell fluid.  Impression: No acute intracranial findings or significant interval change compared to earlier this month.    Electronically signed by: Minh Sy  Date:    07/28/2025  Time:    18:04  X-Ray Chest 1 View  Narrative: EXAMINATION:  XR CHEST 1 VIEW    CLINICAL HISTORY:  Fever.    COMPARISON:  22 July 2025    FINDINGS:  Frontal view of the chest was obtained. Endotracheal tube and right PICC have been removed.  Enteric tube remains in place.  Heart is mildly enlarged.  Suspect some mild atelectasis.  No pneumothorax seen.  Impression: Suspect some mild atelectasis.    Electronically signed by: Minh Sy  Date:    07/28/2025  Time:    12:58      Kenn Johnson  MD  Department of Hospital Medicine   [facility]   07/29/2025

## 2025-07-30 PROBLEM — E11.9 TYPE 2 DIABETES MELLITUS, WITHOUT LONG-TERM CURRENT USE OF INSULIN: Status: ACTIVE | Noted: 2025-07-30

## 2025-07-30 PROBLEM — J69.0 ASPIRATION PNEUMONIA: Status: ACTIVE | Noted: 2025-07-30

## 2025-07-30 LAB
ANION GAP SERPL CALC-SCNC: 7 MEQ/L
BUN SERPL-MCNC: 32.3 MG/DL (ref 8.4–25.7)
CALCIUM SERPL-MCNC: 9.6 MG/DL (ref 8.8–10)
CHLORIDE SERPL-SCNC: 98 MMOL/L (ref 98–107)
CO2 SERPL-SCNC: 26 MMOL/L (ref 23–31)
CREAT SERPL-MCNC: 1.04 MG/DL (ref 0.72–1.25)
CREAT/UREA NIT SERPL: 31
ERYTHROCYTE [DISTWIDTH] IN BLOOD BY AUTOMATED COUNT: 13.8 % (ref 11.5–17)
GFR SERPLBLD CREATININE-BSD FMLA CKD-EPI: >60 ML/MIN/1.73/M2
GLUCOSE SERPL-MCNC: 197 MG/DL (ref 82–115)
HCT VFR BLD AUTO: 42.7 % (ref 42–52)
HGB BLD-MCNC: 13.2 G/DL (ref 14–18)
MAGNESIUM SERPL-MCNC: 1.6 MG/DL (ref 1.6–2.6)
MCH RBC QN AUTO: 28.2 PG (ref 27–31)
MCHC RBC AUTO-ENTMCNC: 30.9 G/DL (ref 33–36)
MCV RBC AUTO: 91.2 FL (ref 80–94)
NRBC BLD AUTO-RTO: 0 %
PLATELET # BLD AUTO: 196 X10(3)/MCL (ref 130–400)
PMV BLD AUTO: 10.9 FL (ref 7.4–10.4)
POCT GLUCOSE: 198 MG/DL (ref 70–110)
POCT GLUCOSE: 202 MG/DL (ref 70–110)
POTASSIUM SERPL-SCNC: 5.3 MMOL/L (ref 3.5–5.1)
RBC # BLD AUTO: 4.68 X10(6)/MCL (ref 4.7–6.1)
SODIUM SERPL-SCNC: 131 MMOL/L (ref 136–145)
WBC # BLD AUTO: 8.96 X10(3)/MCL (ref 4.5–11.5)

## 2025-07-30 PROCEDURE — 99900035 HC TECH TIME PER 15 MIN (STAT)

## 2025-07-30 PROCEDURE — 36415 COLL VENOUS BLD VENIPUNCTURE: CPT | Performed by: INTERNAL MEDICINE

## 2025-07-30 PROCEDURE — 94660 CPAP INITIATION&MGMT: CPT

## 2025-07-30 PROCEDURE — 94761 N-INVAS EAR/PLS OXIMETRY MLT: CPT

## 2025-07-30 PROCEDURE — 11000001 HC ACUTE MED/SURG PRIVATE ROOM

## 2025-07-30 PROCEDURE — 63600175 PHARM REV CODE 636 W HCPCS: Performed by: INTERNAL MEDICINE

## 2025-07-30 PROCEDURE — 99900031 HC PATIENT EDUCATION (STAT)

## 2025-07-30 PROCEDURE — 21400001 HC TELEMETRY ROOM

## 2025-07-30 PROCEDURE — 25000003 PHARM REV CODE 250

## 2025-07-30 PROCEDURE — 25000003 PHARM REV CODE 250: Performed by: INTERNAL MEDICINE

## 2025-07-30 PROCEDURE — 83735 ASSAY OF MAGNESIUM: CPT | Performed by: INTERNAL MEDICINE

## 2025-07-30 PROCEDURE — 94640 AIRWAY INHALATION TREATMENT: CPT

## 2025-07-30 PROCEDURE — 27100171 HC OXYGEN HIGH FLOW UP TO 24 HOURS

## 2025-07-30 PROCEDURE — 85027 COMPLETE CBC AUTOMATED: CPT | Performed by: INTERNAL MEDICINE

## 2025-07-30 PROCEDURE — 97530 THERAPEUTIC ACTIVITIES: CPT | Mod: CO

## 2025-07-30 PROCEDURE — 25000242 PHARM REV CODE 250 ALT 637 W/ HCPCS: Performed by: INTERNAL MEDICINE

## 2025-07-30 PROCEDURE — 63600175 PHARM REV CODE 636 W HCPCS

## 2025-07-30 PROCEDURE — 80048 BASIC METABOLIC PNL TOTAL CA: CPT | Performed by: INTERNAL MEDICINE

## 2025-07-30 RX ORDER — HALOPERIDOL LACTATE 5 MG/ML
2 INJECTION, SOLUTION INTRAMUSCULAR ONCE
Status: COMPLETED | OUTPATIENT
Start: 2025-07-30 | End: 2025-07-30

## 2025-07-30 RX ADMIN — FAMOTIDINE 20 MG: 20 TABLET, FILM COATED ORAL at 08:07

## 2025-07-30 RX ADMIN — AMLODIPINE BESYLATE 5 MG: 5 TABLET ORAL at 08:07

## 2025-07-30 RX ADMIN — GLYCOPYRROLATE 1 MG: 1 TABLET ORAL at 08:07

## 2025-07-30 RX ADMIN — GLYCOPYRROLATE 1 MG: 1 TABLET ORAL at 03:07

## 2025-07-30 RX ADMIN — HALOPERIDOL LACTATE 2 MG: 5 INJECTION, SOLUTION INTRAMUSCULAR at 04:07

## 2025-07-30 RX ADMIN — INSULIN ASPART 4 UNITS: 100 INJECTION, SOLUTION INTRAVENOUS; SUBCUTANEOUS at 12:07

## 2025-07-30 RX ADMIN — ENOXAPARIN SODIUM 40 MG: 40 INJECTION SUBCUTANEOUS at 04:07

## 2025-07-30 RX ADMIN — POLYETHYLENE GLYCOL 3350 17 G: 17 POWDER, FOR SOLUTION ORAL at 08:07

## 2025-07-30 RX ADMIN — SODIUM CHLORIDE SOLN NEBU 3% 4 ML: 3 NEBU SOLN at 08:07

## 2025-07-30 RX ADMIN — SENNOSIDES, DOCUSATE SODIUM 2 TABLET: 8.6; 5 TABLET ORAL at 08:07

## 2025-07-30 RX ADMIN — INSULIN GLARGINE 30 UNITS: 100 INJECTION, SOLUTION SUBCUTANEOUS at 08:07

## 2025-07-30 RX ADMIN — SODIUM CHLORIDE SOLN NEBU 3% 4 ML: 3 NEBU SOLN at 12:07

## 2025-07-30 RX ADMIN — INSULIN ASPART 2 UNITS: 100 INJECTION, SOLUTION INTRAVENOUS; SUBCUTANEOUS at 06:07

## 2025-07-30 NOTE — PLAN OF CARE
Clinical updates sent to [facility]. Made facility aware there is no expected discharge date at this time.

## 2025-07-30 NOTE — CONSULTS
"Gastroenterology Consult    CC:  Dysphagia.  Need for PEG tube.    HPI:  This is a 69 y.o. male with a history of type 2 diabetes, CKD, CVA with right-sided hemiparesis, admitted from a nursing facility with increased weakness, urinary retention, and acute hypoxemic respiratory failure requiring mechanical intubation and ICU admission on 07/05.  After prolonged intubation, he was able to be extubated and transferred to the floor.  He is nonverbal and unable to provide any significant medical history.  This is his 1st admission in the hospital and I could not reach his POA.  There is no known liver disease history outside of hepatic steatosis on ultrasound from 7/11, no cirrhotic features, or abdominal surgeries.  Review of systems are otherwise limited secondary to clinical status.    No past medical history on file.    No past surgical history on file.    Social History[1]    No family history on file.    PCP: Rob Liu MD    Review of patient's allergies indicates:  No Known Allergies     Current Medications[2]    Prescriptions Prior to Admission[3]      Review of Systems  Unable to obtain secondary to clinical status.    Physical Examination  /83   Pulse 88   Temp 97.9 °F (36.6 °C) (Oral)   Resp 18   Ht 5' 8" (1.727 m)   Wt 115.5 kg (254 lb 10.1 oz)   SpO2 98%   BMI 38.72 kg/m²   General appearance: alert, cooperative, no distress  HENT: Normocephalic, atraumatic, neck symmetrical, no nasal discharge   Eyes: conjunctivae/corneas clear, PERRL, EOM's intact  Lungs: no respiratory distress. Comfortable on RA.  Heart: regular rate. No edema.  Abdomen:  Soft.  Nontender.  Extremities: extremities symmetric; no clubbing, cyanosis, or edema  Integument: Skin color, texture, turgor normal; no jaundice  Neurologic: Alert and oriented X 3, normal strength, normal coordination and gait  Psychiatric: no pressured speech; normal affect; no evidence of impaired cognition       Recent Results (from the " past 48 hours)   POCT glucose    Collection Time: 07/28/25  7:48 PM   Result Value Ref Range    POCT Glucose 182 (H) 70 - 110 mg/dL   Comprehensive Metabolic Panel    Collection Time: 07/29/25  2:27 AM   Result Value Ref Range    Sodium 131 (L) 136 - 145 mmol/L    Potassium 5.3 (H) 3.5 - 5.1 mmol/L    Chloride 100 98 - 107 mmol/L    CO2 24 23 - 31 mmol/L    Glucose 217 (H) 82 - 115 mg/dL    Blood Urea Nitrogen 35.2 (H) 8.4 - 25.7 mg/dL    Creatinine 0.99 0.72 - 1.25 mg/dL    Calcium 9.4 8.8 - 10.0 mg/dL    Protein Total 7.9 (H) 5.8 - 7.6 gm/dL    Albumin 2.7 (L) 3.4 - 4.8 g/dL    Globulin 5.2 (H) 2.4 - 3.5 gm/dL    Albumin/Globulin Ratio 0.5 (L) 1.1 - 2.0 ratio    Bilirubin Total 0.3 <=1.5 mg/dL    ALP 57 40 - 150 unit/L    ALT 14 0 - 55 unit/L    AST 11 11 - 45 unit/L    eGFR >60 mL/min/1.73/m2    Anion Gap 7.0 mEq/L    BUN/Creatinine Ratio 36    CBC Without Differential    Collection Time: 07/29/25  2:27 AM   Result Value Ref Range    WBC 7.75 4.50 - 11.50 x10(3)/mcL    RBC 4.58 (L) 4.70 - 6.10 x10(6)/mcL    Hgb 13.2 (L) 14.0 - 18.0 g/dL    Hct 41.1 (L) 42.0 - 52.0 %    MCV 89.7 80.0 - 94.0 fL    MCH 28.8 27.0 - 31.0 pg    MCHC 32.1 (L) 33.0 - 36.0 g/dL    RDW 13.6 11.5 - 17.0 %    Platelet 223 130 - 400 x10(3)/mcL    MPV 10.8 (H) 7.4 - 10.4 fL    NRBC% 0.0 %   POCT glucose    Collection Time: 07/29/25 10:53 AM   Result Value Ref Range    POCT Glucose 230 (H) 70 - 110 mg/dL   Urinalysis, Reflex to Urine Culture Urine, Clean Catch    Collection Time: 07/29/25 11:45 AM    Specimen: Urine   Result Value Ref Range    Color, UA Light-Yellow Yellow, Light-Yellow, Colorless, Straw, Dark-Yellow    Appearance, UA Clear Clear    Specific Gravity, UA 1.016 1.005 - 1.030    pH, UA 6.0 5.0 - 8.5    Protein, UA 1+ (A) Negative    Glucose, UA 3+ (A) Negative, Normal    Ketones, UA Negative Negative    Blood, UA 3+ (A) Negative    Bilirubin, UA Negative Negative    Urobilinogen, UA Normal 0.2, 1.0, Normal    Nitrites, UA Negative  Negative    Leukocyte Esterase,  (A) Negative    RBC, UA 21-50 (A) None Seen, 0-2, 3-5, 0-5 /HPF    WBC, UA 6-10 (A) None Seen, 0-2, 3-5, 0-5 /HPF    Bacteria, UA Trace None Seen, Trace /HPF    Squamous Epithelial Cells, UA Trace None Seen, Trace /HPF   POCT glucose    Collection Time: 07/29/25  5:16 PM   Result Value Ref Range    POCT Glucose 239 (H) 70 - 110 mg/dL   POCT glucose    Collection Time: 07/29/25 10:19 PM   Result Value Ref Range    POCT Glucose 159 (H) 70 - 110 mg/dL   CBC Without Differential    Collection Time: 07/30/25  2:46 AM   Result Value Ref Range    WBC 8.96 4.50 - 11.50 x10(3)/mcL    RBC 4.68 (L) 4.70 - 6.10 x10(6)/mcL    Hgb 13.2 (L) 14.0 - 18.0 g/dL    Hct 42.7 42.0 - 52.0 %    MCV 91.2 80.0 - 94.0 fL    MCH 28.2 27.0 - 31.0 pg    MCHC 30.9 (L) 33.0 - 36.0 g/dL    RDW 13.8 11.5 - 17.0 %    Platelet 196 130 - 400 x10(3)/mcL    MPV 10.9 (H) 7.4 - 10.4 fL    NRBC% 0.0 %   Basic Metabolic Panel    Collection Time: 07/30/25  2:46 AM   Result Value Ref Range    Sodium 131 (L) 136 - 145 mmol/L    Potassium 5.3 (H) 3.5 - 5.1 mmol/L    Chloride 98 98 - 107 mmol/L    CO2 26 23 - 31 mmol/L    Glucose 197 (H) 82 - 115 mg/dL    Blood Urea Nitrogen 32.3 (H) 8.4 - 25.7 mg/dL    Creatinine 1.04 0.72 - 1.25 mg/dL    BUN/Creatinine Ratio 31     Calcium 9.6 8.8 - 10.0 mg/dL    Anion Gap 7.0 mEq/L    eGFR >60 mL/min/1.73/m2   Magnesium    Collection Time: 07/30/25  2:46 AM   Result Value Ref Range    Magnesium Level 1.60 1.60 - 2.60 mg/dL   POCT glucose    Collection Time: 07/30/25 11:37 AM   Result Value Ref Range    POCT Glucose 202 (H) 70 - 110 mg/dL       CT Head Without Contrast  Result Date: 7/28/2025  EXAMINATION: CT HEAD WITHOUT CONTRAST CLINICAL HISTORY: Mental status change, unknown cause; TECHNIQUE: CT imaging of the head performed from the skull base to the vertex without intravenous contrast.  DLP 1558 mGycm. Automatic exposure control, adjustment of mA/kV or iterative reconstruction  technique was used to reduce radiation. COMPARISON: 5 July 2025 FINDINGS: There is no acute cortical infarct, hemorrhage or mass lesion.  No new parenchymal attenuation abnormality.  Ventricular size is stable.  There are vascular calcifications. Paranasal sinuses are clear.  There is bilateral mastoid air cell fluid.     No acute intracranial findings or significant interval change compared to earlier this month. Electronically signed by: Minh Sy Date:    07/28/2025 Time:    18:04    X-Ray Chest 1 View  Result Date: 7/28/2025  EXAMINATION: XR CHEST 1 VIEW CLINICAL HISTORY: Fever. COMPARISON: 22 July 2025 FINDINGS: Frontal view of the chest was obtained. Endotracheal tube and right PICC have been removed.  Enteric tube remains in place.  Heart is mildly enlarged.  Suspect some mild atelectasis.  No pneumothorax seen.     Suspect some mild atelectasis. Electronically signed by: Minh Sy Date:    07/28/2025 Time:    12:58    X-Ray Chest 1 View  Result Date: 7/22/2025  EXAMINATION: XR CHEST 1 VIEW CPT 69466 CLINICAL HISTORY: respiratory failure; COMPARISON: July 21, 2025 FINDINGS: Examination reveals mediastinal silhouette to be within normal limits cardiac silhouette is not enlarged lung fields are clear and free of gross infiltrates or effusions with linear densities in both bases representing subsegmental atelectatic changes. No focal consolidative changes are otherwise identified     Atelectatic changes in both bases. No other significant change as compared with the previous exam Electronically signed by: Cj Richmond Date:    07/22/2025 Time:    07:51    X-Ray Chest 1 View  Result Date: 7/21/2025  EXAMINATION: XR CHEST 1 VIEW CPT 43139 CLINICAL HISTORY: desatting; COMPARISON: July 19, 2025 FINDINGS: Examination reveals cardiomediastinal silhouette and pleuroparenchymal changes to be essentially unchanged as compared with the previous exam. Support catheters remain in place     No significant change  as compared with the previous exam Electronically signed by: Cj Richmond Date:    07/21/2025 Time:    11:21    X-Ray Chest 1 View for Line/Tube Placement  Result Date: 7/19/2025  EXAMINATION: XR CHEST 1 VIEW FOR LINE/TUBE PLACEMENT CLINICAL HISTORY: picc placement; TECHNIQUE: One COMPARISON: January 19, 2025.. FINDINGS: Right PICC line terminates within the distal superior vena cava.  Otherwise, no significant interval change.     Optimal placement right PICC line. Electronically signed by: Guillermo Peraza Date:    07/19/2025 Time:    14:25    X-Ray Chest 1 View  Result Date: 7/19/2025  EXAMINATION: XR CHEST 1 VIEW CLINICAL HISTORY: intubated; COMPARISON: Yesterday FINDINGS: Frontal view of the chest was obtained. Endotracheal tube tip midthoracic trachea.  Enteric tube extends into the stomach.  The heart and mediastinum are unchanged.  No new focal consolidation or pneumothorax.     No significant interval change. Electronically signed by: Minh Sy Date:    07/19/2025 Time:    08:05    X-Ray Chest 1 View for Line/Tube Placement  Result Date: 7/18/2025  EXAMINATION: XR CHEST 1 VIEW FOR LINE/TUBE PLACEMENT CLINICAL HISTORY: ETT Placement; TECHNIQUE: One view COMPARISON: July 17, 2025. FINDINGS: Cardiopericardial silhouette is within normal limits.  Endotracheal tube tip is 3 cm from the bonilla.  Nasogastric tube traverses into the stomach.  There are left lower lung lobe new patchy opacities which may be related to atelectasis with exclusion developing infiltrates.  No pulmonary edema.  No pneumothorax     1.  Optimal intubation. 2.  Left lower lung lobe new patchy opacities which may be related to atelectasis without exclusion of associated developing infiltrates. Electronically signed by: Guillermo Peraza Date:    07/18/2025 Time:    18:28    X-Ray Chest 1 View  Result Date: 7/17/2025  EXAMINATION: XR CHEST 1 VIEW CPT 46952 CLINICAL HISTORY: hypoxia; COMPARISON: July 15, 2025 FINDINGS: Examination reveals  cardiomediastinal silhouette improved parenchymal changes to be essentially unchanged as compared with the previous exam. Endotracheal tube has been removed. Nasogastric tube remains in place     Interval removal of endotracheal tube. No other significant change Electronically signed by: Cj Richmond Date:    07/17/2025 Time:    10:17    X-Ray Abdomen AP 1 View  Result Date: 7/15/2025  EXAMINATION: XR ABDOMEN AP 1 VIEW CLINICAL HISTORY: worsened abdominal distention; TECHNIQUE: AP View(s) of the abdomen. COMPARISON: Radiography 07/14/2025 FINDINGS: Enteric tube side port lies 2 cm below the GE junction.  Generalized colonic distension with proximal colon measuring up to 10 cm in diameter.  Suspect moderate to large volume stool within the proximal colon.  No definitive small bowel dilatation.     Mild generalized colonic distension. Electronically signed by: Rd Darling Date:    07/15/2025 Time:    13:03    X-Ray Chest 1 View  Result Date: 7/15/2025  EXAMINATION: XR CHEST 1 VIEW CLINICAL HISTORY: worsening hypoxia; TECHNIQUE: Frontal view(s) of the chest. COMPARISON: Radiography 07/13/2025 at 14:35 hours FINDINGS: Stable positioning of the endotracheal tube.  Enteric tube extends below the diaphragm.  Mild worsening of left lower lung aeration during the interval with possible small left pleural effusion.  No pneumothorax identified.     Mild worsening of left lower lung aeration since 07/13/2025. Electronically signed by: Rd Darling Date:    07/15/2025 Time:    13:01    X-Ray Abdomen AP 1 View  Result Date: 7/15/2025  EXAMINATION: XR ABDOMEN AP 1 VIEW CLINICAL HISTORY: constipation; TECHNIQUE: AP X-RAY OF THE ABDOMEN: CPT 97107 FINDINGS: Examination reveals some residual feces throughout the colon the gas pattern is nonspecific with no clear evidence of ileus or obstruction no abnormal masses or calcifications identified     Residual feces. Nonspecific gas pattern Electronically signed by: Cj  Yi Date:    07/15/2025 Time:    07:08    X-Ray Chest 1 View for Line/Tube Placement  Result Date: 7/13/2025  EXAMINATION: XR CHEST 1 VIEW FOR LINE/TUBE PLACEMENT CLINICAL HISTORY: Advanced ET tube; TECHNIQUE: Single frontal portable view of the chest was performed. COMPARISON: Frontal chest radiograph, 07/13/2025. FINDINGS: Advancement of endotracheal tube with tip now 4.5 cm above the bonilla.  Esophagogastric tube is unchanged. Minor perihilar interstitial prominence with slight improvement in aeration of lungs compared to prior study.  Probable persistent trace pleural effusions.  No pneumothorax. Stable cardiomediastinal silhouette and osseous structures.     1. Endotracheal tube advancement with tip in good position 4.5 cm above the bonilla. 2. Slight improvement in aeration of the lungs. Electronically signed by: Kushal Brantley MD Date:    07/13/2025 Time:    14:40    X-Ray Chest 1 View  Result Date: 7/13/2025  EXAMINATION: XR CHEST 1 VIEW CLINICAL HISTORY: ET tube. TECHNIQUE: Single frontal view of the chest was performed. COMPARISON: Frontal chest radiograph, 07/12/2025, 07/11/2025. FINDINGS: Endotracheal and esophagogastric tubes are unchanged.  Persistent bilateral lung opacities, similar to prior study.  Unchanged bilateral pleural effusions.  No pneumothorax. Stable cardiomediastinal silhouette and osseous structures.     No significant interval change. Electronically signed by: Kushal Brantley MD Date:    07/13/2025 Time:    10:49    X-Ray Chest 1 View  Result Date: 7/12/2025  EXAMINATION: Single view chest radiograph. CLINICAL HISTORY: ET Tube Placement; TECHNIQUE: Single view of the chest. COMPARISON: Chest radiograph 07/11/2025. FINDINGS: The endotracheal tube and nasogastric tube are unchanged.  The bilateral pleural effusions and lower lobe airspace opacities are unchanged.  There is no pneumothorax.  The cardiac silhouette is enlarged.  There is no acute osseous abnormality.     No  significant change from prior exam. Electronically signed by: Kushal Hernandez MD Date:    07/12/2025 Time:    11:11    US Abdomen Complete  Result Date: 7/12/2025  EXAMINATION: US ABDOMEN COMPLETE CLINICAL HISTORY: Persistent fevers; TECHNIQUE: Grayscale and color Doppler images of the abdomen are obtained. COMPARISON: None FINDINGS: The aorta is nonaneurysmal.  The IVC is patent. The liver is diffusely hyperechogenic.  There is no intrahepatic mass.  There is no biliary dilatation.  The main portal vein is patent with hepatopetal flow.  The liver is enlarged measuring 23.8 cm.  There is no intrahepatic mass.  The gallbladder is contracted.  There is no wall thickening.  There is no cholelithiasis.  There is no sonographic Sauer sign. The right kidney measures 9.7 x 5.0 x 6.9 cm.  The left kidney measures 11.3 x 5.8 x 5.2 cm.  There is no hydronephrosis or nephrolithiasis.  The spleen is not visualized.  The pancreas is not visualized.     Hepatomegaly and diffuse hepatic steatosis with out acute abnormality of the abdomen. Electronically signed by: Kushal Hernandez MD Date:    07/12/2025 Time:    11:06    CV Ultrasound doppler venous legs bilat  Result Date: 7/11/2025  The right lower extremity venous system is patent with no evidence of superficial or deep vein thrombosis. The left lower extremity venous system is patent with no evidence of superficial or deep vein thrombosis.     X-Ray Chest 1 View  Result Date: 7/11/2025  EXAMINATION: XR CHEST 1 VIEW CLINICAL HISTORY: ET Tube Placement; TECHNIQUE: Single frontal view of the chest was performed. COMPARISON: 07/10/2025 FINDINGS: LINES AND TUBES: Endotracheal tube projects over the trachea with tip above the bonilla.  Enteric tube courses below the diaphragm.  EKG/telemetry leads overlie the chest. MEDIASTINUM AND ADITYA: Cardiac silhouette is at the upper limits of normal. LUNGS: Mild improved aeration of the lungs. PLEURA:No pleural effusion. No pneumothorax. OTHER: No acute  osseous abnormality.     Mild improved aeration of the lungs. Electronically signed by: Edna Rivas Date:    07/11/2025 Time:    07:10    X-Ray Chest 1 View  Result Date: 7/10/2025  EXAMINATION: XR CHEST 1 VIEW CLINICAL HISTORY: respiratory failure; TECHNIQUE: AP chest COMPARISON: Chest x-ray dated 07/09/2025 FINDINGS: Endotracheal tube and enteric tube remain in place.  The heart is stable in size.  There are similar bilateral airspace opacities with small left pleural effusion.  There is no definite visible pneumothorax.     Stable exam without significant interval change. Electronically signed by: Paola Paniagua Date:    07/10/2025 Time:    08:14    X-Ray Chest 1 View  Result Date: 7/9/2025  EXAMINATION: Single view chest radiograph. CLINICAL HISTORY: respiratory failure; TECHNIQUE: Single view of the chest. COMPARISON: Chest radiograph 07/07/2025. FINDINGS: The endotracheal tube and nasogastric tube are unchanged.  The left greater than right pleural effusions and associated lower lobe airspace opacities have slightly worsened.  There is no pneumothorax.  The cardiac silhouette is enlarged.     Cardiomegaly with slight interval worsening of the left greater than right pleural effusions and lower lobe airspace opacities reflecting atelectasis versus pneumonia. Electronically signed by: Kushal Hernandez MD Date:    07/09/2025 Time:    05:49    XR Gastric tube check, non-radiologist performed  Result Date: 7/7/2025  EXAMINATION: XR GASTRIC TUBE CHECK, NON-RADIOLOGIST PERFORMED CLINICAL HISTORY: ngt; COMPARISON: No priors     Frontal image of the upper abdomen.  Enteric tube extends into the stomach with the proximal side hole probably just past the GE junction. Electronically signed by: Minh Sy Date:    07/07/2025 Time:    14:30    X-Ray Chest 1 View  Result Date: 7/7/2025  EXAMINATION: XR CHEST 1 VIEW CLINICAL HISTORY: respiratory failure; TECHNIQUE: AP chest COMPARISON: Chest x-ray dated 07/06/2025  FINDINGS: Endotracheal tube remains in place.  The enteric tube has its tip over the distal esophagus.  The heart is stable in size.  There are similar bibasilar airspace opacities with small left pleural effusion.  There is no visible pneumothorax.     Enteric tube tip over the distal esophagus.  Further advancement is recommended.  Otherwise stable exam. Electronically signed by: Paola Paniagua Date:    07/07/2025 Time:    06:04    Echo  Result Date: 7/6/2025    Left Ventricle: The left ventricle is normal in size. Increased wall thickness. There is mildly reduced systolic function. Ejection fraction is approximately 50%. There is normal diastolic function.   Right Ventricle: Systolic function is normal. TAPSE is 1.9 cm.   Aortic Valve: There is aortic valve sclerosis.   Mitral Valve: There is mitral annular calcification. There is trace regurgitation.   Tricuspid Valve: There is mild regurgitation.   Pulmonic Valve: There is trace regurgitation.   Pulmonary Artery: The estimated pulmonary artery systolic pressure is 30 mmHg.   IVC/SVC: Normal venous pressure at 3 mmHg.   Pericardium: There is no pericardial effusion.     X-Ray Chest 1 View  Result Date: 7/6/2025  EXAMINATION: XR CHEST 1 VIEW CLINICAL HISTORY: respiratory failure; TECHNIQUE: AP chest COMPARISON: Chest x-ray dated 07/05/2025 FINDINGS: Endotracheal tube and enteric tube remain in place.  The heart is stable in size.  There is bibasilar subsegmental atelectasis.  There is no enlarging pleural effusion or visible pneumothorax.     Bibasilar subsegmental atelectasis. Electronically signed by: Paola Paniagua Date:    07/06/2025 Time:    07:48    US Retroperitoneal Complete  Result Date: 7/5/2025  EXAMINATION: US RETROPERITONEAL COMPLETE CLINICAL HISTORY: acute kidney injury; TECHNIQUE: Ultrasound evaluation of the kidneys, region of the ureters, and urinary bladder COMPARISON: None FINDINGS: The right kidney measures 10.6 cm. The left kidney  measures 11.11 cm. No hydronephrosis. Renal cortical echogenicity and cortical thickness are normal. The urinary bladder is decompressed with a Benítez catheter in place. No significant findings within the visualized segments of the abdominal aorta and IVC.     No hydronephrosis. Electronically signed by: Paola Paniagua Date:    07/05/2025 Time:    14:32    X-Ray Chest AP Portable  Result Date: 7/5/2025  EXAMINATION: XR CHEST AP PORTABLE CLINICAL HISTORY: fall, shortness of breath; COMPARISON: None FINDINGS: The heart is normal in size.  There is no focal airspace consolidation.  There is no pleural effusion or visible pneumothorax.     No acute abnormality of the chest. Electronically signed by: Paola Paniagua Date:    07/05/2025 Time:    12:10    CT Head Without Contrast  Result Date: 7/5/2025  EXAMINATION: CT HEAD WITHOUT CONTRAST CLINICAL HISTORY: Neuro deficit, acute, stroke suspected; TECHNIQUE: Axial scans were obtained from skull base to the vertex. Coronal and sagittal reconstructions obtained from the axial data. Automatic exposure control was utilized to limit radiation dose. Contrast: None Radiation Dose: Total DLP: 1137 mGy*cm COMPARISON: None FINDINGS: There is no acute intracranial hemorrhage or edema.  There is encephalomalacia in the right frontal and parietal lobes, left occipital lobe and right cerebellum.  Patchy and confluent hypodensities in the subcortical and periventricular white matter, basal ganglia, left thalamus and cerebellum likely represent chronic microvascular ischemic changes. There is no mass effect or midline shift.  There is diffuse parenchymal volume loss.  The basal cisterns are patent. There is no abnormal extra-axial fluid collection.  Carotid artery calcifications are noted. The calvarium and skull base are intact. The visualized paranasal sinuses and the mastoid air cells are clear.     1. No acute intracranial abnormality. 2. Chronic microvascular ischemic changes. No  significant change from the [facility] interpretation. Electronically signed by: Paola Paniagua Date:    07/05/2025 Time:    09:28    X-Ray Chest 1 View  Result Date: 7/5/2025  EXAMINATION: XR CHEST 1 VIEW CLINICAL HISTORY: post intubation; TECHNIQUE: AP chest COMPARISON: Chest x-ray dated 07/05/2025 FINDINGS: The endotracheal tube has its tip 1.8 cm above the bonilla.  The nasogastric tube courses below the diaphragm.  The heart is normal in size.  There are small left basilar airspace opacities.  There is no visible pneumothorax.     Endotracheal tube with tip 1.8 cm above the bonilla Electronically signed by: Paola Paniagua Date:    07/05/2025 Time:    09:15      I have personally reviewed these labs and images    Assessment:   69-year-old male with a history of type 2 diabetes, CKD, CVA with right-sided hemiparesis, admitted from a nursing facility with increased weakness, urinary retention, and acute hypoxemic respiratory failure requiring mechanical intubation and ICU admission on 07/05.  Since transfer to the floor on 07/25, the patient has remained NG tube dependent and this failed attempts at improving his p.o. intake.  We are consulted for PEG tube placement.    Plan:  NPO at midnight (Stop tube feeds)  Hold lovenox  EGD with PEG placement tomorrow    Minh Carrillo MD  Gastroenterology        [1]    [2]   Current Facility-Administered Medications   Medication Dose Route Frequency Provider Last Rate Last Admin    acetaminophen tablet 650 mg  650 mg Per NG tube Q6H PRN Jose Antonio Arora MD   650 mg at 07/22/25 1533    amLODIPine tablet 5 mg  5 mg Per NG tube Daily Julieta Cabrera MD   5 mg at 07/30/25 0845    dextrose 50% injection 12.5 g  12.5 g Intravenous PRN Eun Woodall MD        dextrose 50% injection 12.5 g  12.5 g Intravenous PRN [facility] Alan[facility] Jr., [facility]        enoxaparin injection 40 mg  40 mg Subcutaneous Q24H (prophylaxis, 1700) Bhavik Alanis Jr., MD, [facility]   40 mg at  07/30/25 1628    famotidine tablet 20 mg  20 mg Per NG tube BID Bhavik Alanis Jr., MD, [facility]P   20 mg at 07/30/25 0846    fentaNYL injection 50 mcg  50 mcg Intravenous Q3H PRN Franklyn Peres MD   50 mcg at 07/27/25 0816    glucagon (human recombinant) injection 1 mg  1 mg Intramuscular PRN Eun Woodall MD        glucagon (human recombinant) injection 1 mg  1 mg Intramuscular PRN Bhavik Alanis Jr., MD, [facility]P        glycopyrrolate tablet 1 mg  1 mg Per OG tube TID DANELLE Whittaker MD   1 mg at 07/30/25 1526    hydrALAZINE injection 10 mg  10 mg Intravenous Q6H PRN Julieta Cabrera MD        insulin aspart U-100 injection 0-10 Units  0-10 Units Subcutaneous Q6H PRN Bhavik Alanis Jr., MD, FCCP   4 Units at 07/30/25 1217    insulin glargine U-100 (Lantus) injection 30 Units  30 Units Subcutaneous BID Jose Antonio Arora MD   30 Units at 07/30/25 0845    labetaloL injection 20 mg  20 mg Intravenous Q6H PRN Julieta Cabrera MD        oxyCODONE immediate release tablet 5 mg  5 mg Per NG tube Q6H PRN Franklyn Peres MD   5 mg at 07/22/25 1801    polyethylene glycol packet 17 g  17 g Per NG tube BID Julieta Cabrera MD   17 g at 07/30/25 0845    scopolamine 1.3-1.5 mg (1 mg over 3 days) 1 patch  1 patch Transdermal Q3 Days Yovany Zaragoza, MIRNA        senna-docusate 8.6-50 mg per tablet 2 tablet  2 tablet Per NG tube Daily Julieta Cabrera MD   2 tablet at 07/30/25 0846    sodium chloride 0.9% flush 10 mL  10 mL Intravenous Q12H PRN Franklyn Peres MD        sodium chloride 3% nebulizer solution 4 mL  4 mL Nebulization Q8H Kenn Johnson MD   4 mL at 07/30/25 0804   [3]   Medications Prior to Admission   Medication Sig Dispense Refill Last Dose/Taking    amLODIPine (NORVASC) 5 MG tablet Take 5 mg by mouth once daily.   Taking    atorvastatin (LIPITOR) 20 MG tablet Take 20 mg by mouth once daily.   Taking    ATROVENT HFA 17 mcg/actuation inhaler Inhale 1 puff into the lungs every 4 (four) hours as needed for  Wheezing.   Taking As Needed    diclofenac (VOLTAREN) 75 MG EC tablet Take 75 mg by mouth 2 (two) times daily.   Taking    gabapentin (NEURONTIN) 600 MG tablet Take 600 mg by mouth once daily.   Taking    glipiZIDE (GLUCOTROL) 5 MG tablet Take 5 mg by mouth 2 (two) times daily with meals.   Taking    guaiFENesin 100 mg/5 ml (ROBITUSSIN) 100 mg/5 mL syrup Take 100 mg by mouth 4 (four) times daily as needed.   Taking As Needed    insulin glargine U-100, Lantus, 100 unit/mL injection Inject 20 Units into the skin every evening.   Taking    lisinopriL (PRINIVIL,ZESTRIL) 20 MG tablet Take 20 mg by mouth once daily.   Taking    LOKELMA 10 gram packet Take 10 g by mouth once daily.   Taking    metFORMIN (GLUCOPHAGE) 1000 MG tablet Take 1,000 mg by mouth 2 (two) times daily.   Taking    methocarbamoL (ROBAXIN) 500 MG Tab Take 500 mg by mouth 2 (two) times daily.   Taking    moxifloxacin (VIGAMOX) 0.5 % ophthalmic solution Place 1 drop into both eyes 3 (three) times daily.   Taking    naphazoline-pheniramine 0.025-0.3% (NAPHCON-A) 0.025-0.3 % ophthalmic solution Place 2 drops into both eyes once daily.   Taking    oxybutynin (DITROPAN-XL) 10 MG 24 hr tablet Take 10 mg by mouth 2 (two) times a day.   Taking    polyethylene glycol (GLYCOLAX) 17 gram PwPk Take 17 g by mouth once daily.   Taking    QUEtiapine (SEROQUEL) 25 MG Tab Take 25 mg by mouth every evening.   Taking    tamsulosin (FLOMAX) 0.4 mg Cap Take 0.8 mg by mouth once daily.   Taking    TRADJENTA 5 mg Tab tablet Take 5 mg by mouth once daily.   Taking

## 2025-07-30 NOTE — ASSESSMENT & PLAN NOTE
Patient with Hypoxic Respiratory failure which is Acute on chronic. The patient's respiratory failure is likely due to Obesity Hypoventilation. The patient is not on home oxygen. On my initial assessment the signs/symptoms of respiratory failure were tachypnea and use of accessory muscles.    Oxygen Requirements in the last 24 hours  Rate of Resp  Min: 16  Max: 18  SpO2  Min: 97 %  Max: 98 %   Flow (L/min) (Oxygen Therapy)  Min: 3  Max: 4  Device (Oxygen Therapy): nasal cannula    Plan  - Chest x-ray was ordered  - ABG was done and showed .  - Will treat underlying cause and assess daily for weaning supplementary oxygen as able  - Patient's respiratory failure is improving  - .

## 2025-07-30 NOTE — HOSPITAL COURSE
7/30/25-Will consult GI for possible PEG.  Otherwise no other issues with patient.   Subjective   Patient ID: Thompson is a 9 month old male who is accompanied by:mother and father    Well Child Assessment:  History was provided by the mother and father. Thompson lives with his mother, father and sister.   Nutrition  Types of milk consumed include breast feeding. Additional intake includes cereal. Breast Feeding - Feedings occur every 4-5 hours. The breast milk is not pumped. Cereal - Types of cereal consumed include oat (He likes cereal and gets daily ). Solid Foods - Types of intake include fruits, meats and vegetables. The patient can consume stage II foods and table foods (doing finger foods. ). Feeding problems do not include vomiting.   Dental  The patient has teething symptoms. Tooth eruption is beginning (has 2 lower teeth).  Elimination  Urination occurs 4-6 times per 24 hours. Bowel movements occur 1-3 times per 24 hours. Stools have a loose consistency. Elimination problems do not include colic, constipation or diarrhea.   Sleep  The patient sleeps in his crib. Average sleep duration is 10 (napping twice daily and total is 2 hours . Awakens at night to feed. ) hours.   Safety  Home is child-proofed? yes. There is no smoking in the home. Home has working smoke alarms? yes. Home has working carbon monoxide alarms? yes. There is an appropriate car seat in use.   Screening  Immunizations are up-to-date. There are no risk factors for hearing loss. There are no risk factors for oral health. There are risk factors for lead toxicity.   Social  The caregiver enjoys the child. Childcare is provided at child's home. The childcare provider is a parent. The child spends 0 days per week at . The child spends 0 hours per day at .         S/p GERD and zantac     Retractile testes and buried penis    He is crawling on belly but not all 4's. He can stand well with support.     Additional concerns today: None     Review of Systems   Constitutional: Negative for activity change, appetite change,  crying, fever and irritability.   HENT: Negative for congestion, ear discharge, rhinorrhea and sneezing.    Eyes: Negative for discharge and redness.   Respiratory: Negative for apnea, cough, choking, wheezing and stridor.    Gastrointestinal: Negative for blood in stool, constipation, diarrhea and vomiting.   Genitourinary: Negative for decreased urine volume.   Skin: Negative for color change, pallor and rash.   Allergic/Immunologic: Negative for food allergies and immunocompromised state.       Patient's medications, allergies, past medical, surgical, social and family histories were reviewed and updated as appropriate.    Objective   Vitals: Pulse 120   Temp 97.6 °F (36.4 °C) (Temporal)   Ht 29\" (73.7 cm)   Wt 9.735 kg (21 lb 7.4 oz)   HC 44.5 cm (17.52\")   BMI 17.94 kg/m²   BSA 0.43 m²   Growth parameters are noted and are appropriate for age.  Thompson is up 3\" and 3.75# since last WCC done 4/4/19    Physical Exam   Constitutional: He appears well-developed. He is active. He has a strong cry.   HENT:   Head: Anterior fontanelle is flat. No cranial deformity or facial anomaly.   Right Ear: Tympanic membrane normal.   Left Ear: Tympanic membrane normal.   Nose: Nose normal.   Mouth/Throat: Mucous membranes are moist. Dentition is normal. Oropharynx is clear. Pharynx is normal.   Eyes: Red reflex is present bilaterally. Pupils are equal, round, and reactive to light. Conjunctivae and EOM are normal.   Neck: Normal range of motion. Neck supple.   Cardiovascular: Normal rate, regular rhythm, S1 normal and S2 normal. Pulses are palpable.   No murmur heard.  Pulmonary/Chest: Effort normal and breath sounds normal. No nasal flaring or stridor. No respiratory distress. He has no wheezes. He has no rhonchi. He has no rales. He exhibits no retraction.   Abdominal: Soft. Bowel sounds are normal. He exhibits no distension. There is no hepatosplenomegaly. There is no tenderness. There is no rebound and no guarding.    Genitourinary: Penis normal. Circumcised.   Genitourinary Comments: Buried penis; has retractile testes.    Musculoskeletal: Normal range of motion. He exhibits no deformity.   Lymphadenopathy:     He has no cervical adenopathy.   Neurological: He is alert. He has normal strength. He displays normal reflexes. He exhibits normal muscle tone. Suck normal.   Skin: Skin is warm and moist. Capillary refill takes less than 2 seconds. Turgor is normal. No rash noted.   Nursing note and vitals reviewed.      Assessment   Problem List Items Addressed This Visit        Other    Encounter for routine child health examination without abnormal findings - Primary    Relevant Orders    HEP B VACC PEDIATRIC/ADOLESCENT, IM          Thompson needs HGB and lead at the 3 year [facility].  Thompson does not need to feed at night so Mom will discontinue feeding him at night.   Thompson has palpable testes that are retractile and will follow for that and buried penis.     Screening tests  ASQ Score: 55 for fine motor and problem solving and 50 for personal social and 45 for communication and 35 for gross motor.   Primary water source has adequate fluoride: Yes  Developmental milestones were reviewed and were: normal based on age    Immunizations today: per orders.  History of previous adverse reactions to immunizations? no  Immunizations given today: Yes - Immunizations given today and vaccine counseling including benefits, risks, and adverse reactions were provided by myself during the visit.    Follow-up for the 12 month well child visit, or sooner as needed.

## 2025-07-30 NOTE — HPI
Currently on palliative treatment.  He has nephrostomy tubes which were DC accidentally.  These has been replaced by IR urinalysis obtained and started on empiric antibiotic therapy with daptomycin given his history of VRE    69 y.o. male with CVA with right-sided hemiparesis, DM2, neuropathy, CKD 2, hypertension, HLD,  presented on 7/5/2025 from nursing facility with increased generalized weakness.   He is reported as having a Benítez catheter placed about 1 week ago prior to presentation for urinary retention. Was found to be in acute hypoxic respiratory failure.  Admitted to ICU.  Patient has now completed antibiotics.       Review of hospital course patient was intubated and mechanical ventilation on 07/05/2025, had echocardiogram which showed EF 45 50% with mild valvular heart disease, extubated on 07/16 reintubated on 07/18 and then lastly extubated on 07/25/2025.       Downgraded from ICU to Medicine Services on 7/27/25. No acute distress, nonverbal, with right hemiplegia, he does track with his eyes. He does not follow simple commands appropriately, he has an NG tube with tube feeding free water flush.     7/28 Tmax 100.3F overnight, monitor closely off abx for now. Send Blood cx.   Awake, non verbal. However based on prior charts from admission patient was ?verbal  Cxr - Suspect some mild atelectasis  CT head No acute intracranial findings or significant interval change compared to earlier this month.

## 2025-07-30 NOTE — PROGRESS NOTES
[facility] Hospital Medicine  Progress Note    Patient Name: Charly Padilla  MRN: [id number]  Patient Class: IP- Inpatient   Admission Date: 7/5/2025  Length of Stay: 25 days  Attending Physician: Christiano Ramos MD  Primary Care Provider: Rob Liu MD        Subjective     Principal Problem:Acute respiratory failure with hypercapnia        HPI:   69 y.o. male with CVA with right-sided hemiparesis, DM2, neuropathy, CKD 2, hypertension, HLD,  presented on 7/5/2025 from nursing facility with increased generalized weakness.   He is reported as having a Benítez catheter placed about 1 week ago prior to presentation for urinary retention. Was found to be in acute hypoxic respiratory failure.  Admitted to ICU.  Patient has now completed antibiotics.       Review of hospital course patient was intubated and mechanical ventilation on 07/05/2025, had echocardiogram which showed EF 45 50% with mild valvular heart disease, extubated on 07/16 reintubated on 07/18 and then lastly extubated on 07/25/2025.       Downgraded from ICU to Medicine Services on 7/27/25. No acute distress, nonverbal, with right hemiplegia, he does track with his eyes. He does not follow simple commands appropriately, he has an NG tube with tube feeding free water flush.     7/28 Tmax 100.3F overnight, monitor closely off abx for now. Send Blood cx.   Awake, non verbal. However based on prior charts from admission patient was ?verbal  Cxr - Suspect some mild atelectasis  CT head No acute intracranial findings or significant interval change compared to earlier this month.     Overview/Hospital Course:  7/30/25-Will consult GI for possible PEG.  Otherwise no other issues with patient.    Interval History:     Review of Systems   Unable to perform ROS: Acuity of condition     Objective:     Vital Signs (Most Recent):  Temp: 98.1 °F (36.7 °C) (07/30/25 1123)  Pulse: 89 (07/30/25 1123)  Resp: 18 (07/30/25 0804)  BP: 133/83  (07/30/25 1123)  SpO2: 98 % (07/30/25 0804) Vital Signs (24h Range):  Temp:  [98.1 °F (36.7 °C)-98.7 °F (37.1 °C)] 98.1 °F (36.7 °C)  Pulse:  [88-95] 89  Resp:  [16-18] 18  SpO2:  [97 %-98 %] 98 %  BP: (124-178)/(74-93) 133/83     Weight: 115.5 kg (254 lb 10.1 oz)  Body mass index is 38.72 kg/m².    Intake/Output Summary (Last 24 hours) at 7/30/2025 1518  Last data filed at 7/30/2025 0800  Gross per 24 hour   Intake 30 ml   Output --   Net 30 ml         Physical Exam  Constitutional:       General: He is awake.      Appearance: He is obese.   HENT:      Head: Normocephalic and atraumatic.      Nose: Nose normal.      Mouth/Throat:      Pharynx: Oropharynx is clear.   Eyes:      Extraocular Movements: Extraocular movements intact.      Conjunctiva/sclera: Conjunctivae normal.      Pupils: Pupils are equal, round, and reactive to light.   Cardiovascular:      Rate and Rhythm: Normal rate and regular rhythm.      Pulses: Normal pulses.      Heart sounds: Normal heart sounds.   Pulmonary:      Effort: Pulmonary effort is normal.      Breath sounds: Normal breath sounds.   Abdominal:      General: Bowel sounds are normal.      Palpations: Abdomen is soft.   Musculoskeletal:         General: Normal range of motion.      Cervical back: Normal range of motion and neck supple.   Skin:     General: Skin is warm and dry.      Capillary Refill: Capillary refill takes 2 to 3 seconds.   Neurological:      Mental Status: He is disoriented.   Psychiatric:         Cognition and Memory: Cognition is impaired. Memory is impaired. He exhibits impaired recent memory and impaired remote memory.               Significant Labs: All pertinent labs within the past 24 hours have been reviewed.  BMP:   Recent Labs   Lab 07/30/25  0246   *   *   K 5.3*   CL 98   CO2 26   BUN 32.3*   CREATININE 1.04   CALCIUM 9.6   MG 1.60     CBC:   Recent Labs   Lab 07/29/25  0227 07/30/25  0246   WBC 7.75 8.96   HGB 13.2* 13.2*   HCT 41.1* 42.7     196     CMP:   Recent Labs   Lab 07/29/25  0227 07/30/25  0246   * 131*   K 5.3* 5.3*    98   CO2 24 26   * 197*   BUN 35.2* 32.3*   CREATININE 0.99 1.04   CALCIUM 9.4 9.6   PROT 7.9*  --    ALBUMIN 2.7*  --    BILITOT 0.3  --    ALKPHOS 57  --    AST 11  --    ALT 14  --      Magnesium:   Recent Labs   Lab 07/30/25  0246   MG 1.60       Significant Imaging: I have reviewed all pertinent imaging results/findings within the past 24 hours.      Assessment & Plan  Acute respiratory failure with hypercapnia  Patient with Hypoxic Respiratory failure which is Acute on chronic. The patient's respiratory failure is likely due to Obesity Hypoventilation. The patient is not on home oxygen. On my initial assessment the signs/symptoms of respiratory failure were tachypnea and use of accessory muscles.    Oxygen Requirements in the last 24 hours  Rate of Resp  Min: 16  Max: 18  SpO2  Min: 97 %  Max: 98 %   Flow (L/min) (Oxygen Therapy)  Min: 3  Max: 4  Device (Oxygen Therapy): nasal cannula    Plan  - Chest x-ray was ordered  - ABG was done and showed .  - Will treat underlying cause and assess daily for weaning supplementary oxygen as able  - Patient's respiratory failure is improving  - .  Aspiration pneumonia  Patient has pneumonia that is suspected to be bacterial in etiology but the organism is not known. On my initial assessment the patient had the following signs/symptoms of pneumonia: persistent hypoxia  and shortness of breath. The patient does not have a home oxygen requirement.    Antibiotics  , 3 times daily, Both Eyes  Microbiology  Respiratory Culture   Date Value Ref Range Status   07/19/2025 Normal respiratory richard  Final     Blood Culture   Date Value Ref Range Status   07/28/2025 No Growth At 48 Hours  Preliminary   07/28/2025 No Growth At 48 Hours  Preliminary   07/19/2025 No Growth at 5 days  Final   07/19/2025 No Growth at 5 days  Final   07/11/2025 No Growth at 5 days  Final    07/11/2025 No Growth at 5 days  Final        Plan  - Antibiotics as listed above, tailor based on micro data, if able  - Patient is on supplementary oxygen at 2 L/min  - Patient's pneumonia is improving  - GI consulted for PEG  Type 2 diabetes mellitus, without long-term current use of insulin  ISS    VTE Risk Mitigation (From admission, onward)           Ordered     enoxaparin injection 40 mg  Every 24 hours         07/09/25 0822                  GI consulted for PEG  Follow labs  Therapy  Resume current meds  DVT prophylaxis  ?placement  Discharge Planning   JANELL:      Code Status: Not on file   Medical Readiness for Discharge Date:   Discharge Plan A: Return to nursing home          SDOH Screening:  The patient was unable to be screened for utility difficulties, food insecurity, transport difficulties, housing insecurity, and interpersonal safety, so no concerns could be identified this admission.                Please place Justification for DME      Christiano Ramos MD  Department of Hospital Medicine   [facility] - Neurology

## 2025-07-30 NOTE — SUBJECTIVE & OBJECTIVE
Interval History:     Review of Systems   Unable to perform ROS: Acuity of condition     Objective:     Vital Signs (Most Recent):  Temp: 98.1 °F (36.7 °C) (07/30/25 1123)  Pulse: 89 (07/30/25 1123)  Resp: 18 (07/30/25 0804)  BP: 133/83 (07/30/25 1123)  SpO2: 98 % (07/30/25 0804) Vital Signs (24h Range):  Temp:  [98.1 °F (36.7 °C)-98.7 °F (37.1 °C)] 98.1 °F (36.7 °C)  Pulse:  [88-95] 89  Resp:  [16-18] 18  SpO2:  [97 %-98 %] 98 %  BP: (124-178)/(74-93) 133/83     Weight: 115.5 kg (254 lb 10.1 oz)  Body mass index is 38.72 kg/m².    Intake/Output Summary (Last 24 hours) at 7/30/2025 1518  Last data filed at 7/30/2025 0800  Gross per 24 hour   Intake 30 ml   Output --   Net 30 ml         Physical Exam  Constitutional:       General: He is awake.      Appearance: He is obese.   HENT:      Head: Normocephalic and atraumatic.      Nose: Nose normal.      Mouth/Throat:      Pharynx: Oropharynx is clear.   Eyes:      Extraocular Movements: Extraocular movements intact.      Conjunctiva/sclera: Conjunctivae normal.      Pupils: Pupils are equal, round, and reactive to light.   Cardiovascular:      Rate and Rhythm: Normal rate and regular rhythm.      Pulses: Normal pulses.      Heart sounds: Normal heart sounds.   Pulmonary:      Effort: Pulmonary effort is normal.      Breath sounds: Normal breath sounds.   Abdominal:      General: Bowel sounds are normal.      Palpations: Abdomen is soft.   Musculoskeletal:         General: Normal range of motion.      Cervical back: Normal range of motion and neck supple.   Skin:     General: Skin is warm and dry.      Capillary Refill: Capillary refill takes 2 to 3 seconds.   Neurological:      Mental Status: He is disoriented.   Psychiatric:         Cognition and Memory: Cognition is impaired. Memory is impaired. He exhibits impaired recent memory and impaired remote memory.               Significant Labs: All pertinent labs within the past 24 hours have been reviewed.  BMP:   Recent  Labs   Lab 07/30/25 0246   *   *   K 5.3*   CL 98   CO2 26   BUN 32.3*   CREATININE 1.04   CALCIUM 9.6   MG 1.60     CBC:   Recent Labs   Lab 07/29/25 0227 07/30/25 0246   WBC 7.75 8.96   HGB 13.2* 13.2*   HCT 41.1* 42.7    196     CMP:   Recent Labs   Lab 07/29/25 0227 07/30/25 0246   * 131*   K 5.3* 5.3*    98   CO2 24 26   * 197*   BUN 35.2* 32.3*   CREATININE 0.99 1.04   CALCIUM 9.4 9.6   PROT 7.9*  --    ALBUMIN 2.7*  --    BILITOT 0.3  --    ALKPHOS 57  --    AST 11  --    ALT 14  --      Magnesium:   Recent Labs   Lab 07/30/25 0246   MG 1.60       Significant Imaging: I have reviewed all pertinent imaging results/findings within the past 24 hours.

## 2025-07-30 NOTE — ASSESSMENT & PLAN NOTE
Patient has pneumonia that is suspected to be bacterial in etiology but the organism is not known. On my initial assessment the patient had the following signs/symptoms of pneumonia: persistent hypoxia  and shortness of breath. The patient does not have a home oxygen requirement.    Antibiotics  , 3 times daily, Both Eyes  Microbiology  Respiratory Culture   Date Value Ref Range Status   07/19/2025 Normal respiratory richard  Final     Blood Culture   Date Value Ref Range Status   07/28/2025 No Growth At 48 Hours  Preliminary   07/28/2025 No Growth At 48 Hours  Preliminary   07/19/2025 No Growth at 5 days  Final   07/19/2025 No Growth at 5 days  Final   07/11/2025 No Growth at 5 days  Final   07/11/2025 No Growth at 5 days  Final        Plan  - Antibiotics as listed above, tailor based on micro data, if able  - Patient is on supplementary oxygen at 2 L/min  - Patient's pneumonia is improving  - GI consulted for PEG

## 2025-07-30 NOTE — PT/OT/SLP PROGRESS
Occupational Therapy   Treatment    Name: Charly Padilla  MRN: [id number]    Recommendations:     Recommended therapy intensity at discharge: Moderate Intensity Therapy   Discharge Equipment Recommendations:  to be determined by next level of care  Barriers to discharge:       Assessment:     Charly Padilla is a 69 y.o. male with a medical diagnosis of Acute respiratory failure with hypercapnia.  He presents with difficulty following directions and agitated. Performance deficits affecting function are impaired endurance, weakness, impaired functional mobility, impaired self care skills, impaired balance, impaired cognition, decreased upper extremity function, decreased lower extremity function, decreased safety awareness, pain, abnormal tone, decreased ROM, impaired coordination, impaired fine motor, edema.     DME Justification: No DME recommended requiring DME justifications    Rehab Prognosis:  Good; patient would benefit from acute skilled OT services to address these deficits and reach maximum level of function.       Plan:     Patient to be seen 3 x/week to address the above listed problems via self-care/home management, therapeutic activities, therapeutic exercises, wheelchair management/training  Plan of Care Expires: 08/25/25  Plan of Care Reviewed with: patient    Subjective     Pain/Comfort:       Objective:     Communicated with: nurse prior to session.  Patient found HOB elevated with peripheral IV, pulse ox (continuous), telemetry, mcgill catheter upon OT entry to room.    General Precautions: Standard, fall, NPO    Orthopedic Precautions:N/A  Braces: N/A  Respiratory Status: Nasal cannula, flow 3 L/min     Occupational Performance:     Functional Mobility/Transfers:  Bed mobility:    Rolling Left:  total assistance and of 2 persons  Rolling Right: total assistance and of 2 persons  Scooting: total assistance and of 2 persons  Supine to Sit: total assistance and of 2 persons  Sit to Supine: total assistance and  of 2 persons    Balance:   Static Sitting Balance: Bilateral UE support: Max A x 2 tends to push laterally and posteriorly    Therapeutic Activities:  Tolerated sitting approximately 14 minutes with Max A x 2   Lateral lean on B sides both elbows to assist with positioning in sitting    Therapeutic Positioning    OT interventions performed during the course of today's session in an effort to prevent and/or reduce acquired pressure injuries:   Therapeutic positioning was provided at the conclusion of session to offload all bony prominences for the prevention and/or reduction of pressure injuries    [facility] 6 Click ADL: 7    Co-Treatment: No    Patient Education:  Patient provided with verbal education and demonstrations education regarding fall prevention and safety awareness.  Additional teaching is warranted.      Patient left HOB elevated with all lines intact, call button in reach, and restraints reapplied at end of session.    GOALS:   Multidisciplinary Problems       Occupational Therapy Goals          Problem: Occupational Therapy    Goal Priority Disciplines Outcome Interventions   Occupational Therapy Goal     OT, PT/OT Progressing    Description: LTG: Pt will perform basic ADLs and ADL t/fs with min A using LRAD by d/c.    STG: to be met by 8/11  1. Pt will follow >80% of simple one step commands  2. Pt will perform grooming EOB with min A.   3. Pt will perform functional grasp/reach/release of items with L UE >80% of trials in prep for increased participation in ADL tasks.   4. Pt will perform sit<>stand with mod A x 2 in prep for ADL t/fs.                        Time Tracking:     OT Date of Treatment: 07/30/25  OT Start Time: 0905  OT Stop Time: 0934  OT Total Time (min): 29 min    Billable Minutes:Therapeutic Activity 29    OTR/L readily available for conference at the time of the provision of services: JOSE ANGEL Atkinson  OT/EDILBERTO: EDILBERTO     Number of EDILBERTO visits since last OT visit: 1 7/30/2025

## 2025-07-30 NOTE — PLAN OF CARE
Problem: Infection  Goal: Absence of Infection Signs and Symptoms  Outcome: Progressing     Problem: Adult Inpatient Plan of Care  Goal: Plan of Care Review  Outcome: Progressing  Goal: Patient-Specific Goal (Individualized)  Outcome: Progressing  Goal: Absence of Hospital-Acquired Illness or Injury  Outcome: Progressing  Goal: Optimal Comfort and Wellbeing  Outcome: Progressing  Goal: Readiness for Transition of Care  Outcome: Progressing     Problem: Mechanical Ventilation Invasive  Goal: Effective Communication  Outcome: Progressing  Goal: Optimal Device Function  Outcome: Progressing  Goal: Mechanical Ventilation Liberation  Outcome: Progressing  Goal: Optimal Nutrition Delivery  Outcome: Progressing  Goal: Absence of Device-Related Skin and Tissue Injury  Outcome: Progressing  Goal: Absence of Ventilator-Induced Lung Injury  Outcome: Progressing     Problem: Artificial Airway  Goal: Effective Communication  Outcome: Progressing  Goal: Optimal Device Function  Outcome: Progressing  Goal: Absence of Device-Related Skin or Tissue Injury  Outcome: Progressing     Problem: Noninvasive Ventilation Acute  Goal: Effective Unassisted Ventilation and Oxygenation  Outcome: Progressing     Problem: Skin Injury Risk Increased  Goal: Skin Health and Integrity  Outcome: Progressing     Problem: Delirium  Goal: Optimal Coping  Outcome: Progressing  Goal: Improved Behavioral Control  Outcome: Progressing  Goal: Improved Attention and Thought Clarity  Outcome: Progressing  Goal: Improved Sleep  Outcome: Progressing

## 2025-07-31 ENCOUNTER — ANESTHESIA (OUTPATIENT)
Dept: ENDOSCOPY | Facility: HOSPITAL | Age: 70
End: 2025-07-31
Payer: MEDICARE

## 2025-07-31 ENCOUNTER — ANESTHESIA EVENT (OUTPATIENT)
Dept: ENDOSCOPY | Facility: HOSPITAL | Age: 70
End: 2025-07-31
Payer: MEDICARE

## 2025-07-31 LAB
ANION GAP SERPL CALC-SCNC: 9 MEQ/L
BUN SERPL-MCNC: 29.1 MG/DL (ref 8.4–25.7)
CALCIUM SERPL-MCNC: 9.9 MG/DL (ref 8.8–10)
CHLORIDE SERPL-SCNC: 98 MMOL/L (ref 98–107)
CO2 SERPL-SCNC: 26 MMOL/L (ref 23–31)
CREAT SERPL-MCNC: 0.93 MG/DL (ref 0.72–1.25)
CREAT/UREA NIT SERPL: 31
ERYTHROCYTE [DISTWIDTH] IN BLOOD BY AUTOMATED COUNT: 13.4 % (ref 11.5–17)
GFR SERPLBLD CREATININE-BSD FMLA CKD-EPI: >60 ML/MIN/1.73/M2
GLUCOSE SERPL-MCNC: 175 MG/DL (ref 82–115)
HCT VFR BLD AUTO: 43.4 % (ref 42–52)
HGB BLD-MCNC: 13.8 G/DL (ref 14–18)
MCH RBC QN AUTO: 28.6 PG (ref 27–31)
MCHC RBC AUTO-ENTMCNC: 31.8 G/DL (ref 33–36)
MCV RBC AUTO: 90 FL (ref 80–94)
NRBC BLD AUTO-RTO: 0 %
PLATELET # BLD AUTO: 242 X10(3)/MCL (ref 130–400)
PMV BLD AUTO: 10.2 FL (ref 7.4–10.4)
POCT GLUCOSE: 149 MG/DL (ref 70–110)
POCT GLUCOSE: 158 MG/DL (ref 70–110)
POCT GLUCOSE: 164 MG/DL (ref 70–110)
POCT GLUCOSE: 174 MG/DL (ref 70–110)
POTASSIUM SERPL-SCNC: 5.4 MMOL/L (ref 3.5–5.1)
RBC # BLD AUTO: 4.82 X10(6)/MCL (ref 4.7–6.1)
SODIUM SERPL-SCNC: 133 MMOL/L (ref 136–145)
WBC # BLD AUTO: 8.98 X10(3)/MCL (ref 4.5–11.5)

## 2025-07-31 PROCEDURE — 25000003 PHARM REV CODE 250: Performed by: INTERNAL MEDICINE

## 2025-07-31 PROCEDURE — 0DH63UZ INSERTION OF FEEDING DEVICE INTO STOMACH, PERCUTANEOUS APPROACH: ICD-10-PCS | Performed by: STUDENT IN AN ORGANIZED HEALTH CARE EDUCATION/TRAINING PROGRAM

## 2025-07-31 PROCEDURE — 85027 COMPLETE CBC AUTOMATED: CPT | Performed by: INTERNAL MEDICINE

## 2025-07-31 PROCEDURE — 80048 BASIC METABOLIC PNL TOTAL CA: CPT | Performed by: INTERNAL MEDICINE

## 2025-07-31 PROCEDURE — 94640 AIRWAY INHALATION TREATMENT: CPT

## 2025-07-31 PROCEDURE — 99900035 HC TECH TIME PER 15 MIN (STAT)

## 2025-07-31 PROCEDURE — 43248 EGD GUIDE WIRE INSERTION: CPT | Performed by: STUDENT IN AN ORGANIZED HEALTH CARE EDUCATION/TRAINING PROGRAM

## 2025-07-31 PROCEDURE — 94760 N-INVAS EAR/PLS OXIMETRY 1: CPT

## 2025-07-31 PROCEDURE — 94761 N-INVAS EAR/PLS OXIMETRY MLT: CPT

## 2025-07-31 PROCEDURE — 99900031 HC PATIENT EDUCATION (STAT)

## 2025-07-31 PROCEDURE — 63600175 PHARM REV CODE 636 W HCPCS: Performed by: NURSE ANESTHETIST, CERTIFIED REGISTERED

## 2025-07-31 PROCEDURE — 25000242 PHARM REV CODE 250 ALT 637 W/ HCPCS: Performed by: INTERNAL MEDICINE

## 2025-07-31 PROCEDURE — 25000003 PHARM REV CODE 250

## 2025-07-31 PROCEDURE — 25000003 PHARM REV CODE 250: Performed by: HOSPITALIST

## 2025-07-31 PROCEDURE — 25000003 PHARM REV CODE 250: Performed by: NURSE ANESTHETIST, CERTIFIED REGISTERED

## 2025-07-31 PROCEDURE — 63600175 PHARM REV CODE 636 W HCPCS: Performed by: INTERNAL MEDICINE

## 2025-07-31 PROCEDURE — 37000008 HC ANESTHESIA 1ST 15 MINUTES: Performed by: STUDENT IN AN ORGANIZED HEALTH CARE EDUCATION/TRAINING PROGRAM

## 2025-07-31 PROCEDURE — 27100171 HC OXYGEN HIGH FLOW UP TO 24 HOURS

## 2025-07-31 PROCEDURE — 11000001 HC ACUTE MED/SURG PRIVATE ROOM

## 2025-07-31 PROCEDURE — 37000009 HC ANESTHESIA EA ADD 15 MINS: Performed by: STUDENT IN AN ORGANIZED HEALTH CARE EDUCATION/TRAINING PROGRAM

## 2025-07-31 PROCEDURE — 27201423 OPTIME MED/SURG SUP & DEVICES STERILE SUPPLY: Performed by: STUDENT IN AN ORGANIZED HEALTH CARE EDUCATION/TRAINING PROGRAM

## 2025-07-31 PROCEDURE — 36415 COLL VENOUS BLD VENIPUNCTURE: CPT | Performed by: INTERNAL MEDICINE

## 2025-07-31 PROCEDURE — 21400001 HC TELEMETRY ROOM

## 2025-07-31 PROCEDURE — 94660 CPAP INITIATION&MGMT: CPT

## 2025-07-31 RX ORDER — KETAMINE HCL IN 0.9 % NACL 50 MG/5 ML
SYRINGE (ML) INTRAVENOUS
Status: COMPLETED
Start: 2025-07-31 | End: 2025-07-31

## 2025-07-31 RX ORDER — CEFAZOLIN SODIUM 1 G/3ML
INJECTION, POWDER, FOR SOLUTION INTRAMUSCULAR; INTRAVENOUS
Status: COMPLETED
Start: 2025-07-31 | End: 2025-07-31

## 2025-07-31 RX ORDER — LIDOCAINE HYDROCHLORIDE 10 MG/ML
INJECTION, SOLUTION EPIDURAL; INFILTRATION; INTRACAUDAL; PERINEURAL
Status: DISCONTINUED | OUTPATIENT
Start: 2025-07-31 | End: 2025-07-31

## 2025-07-31 RX ORDER — LIDOCAINE HYDROCHLORIDE 10 MG/ML
INJECTION, SOLUTION EPIDURAL; INFILTRATION; INTRACAUDAL; PERINEURAL
Status: COMPLETED
Start: 2025-07-31 | End: 2025-07-31

## 2025-07-31 RX ORDER — PROPOFOL 10 MG/ML
VIAL (ML) INTRAVENOUS
Status: DISCONTINUED | OUTPATIENT
Start: 2025-07-31 | End: 2025-07-31

## 2025-07-31 RX ORDER — CEFAZOLIN 2 G/1
INJECTION, POWDER, FOR SOLUTION INTRAMUSCULAR; INTRAVENOUS
Status: DISCONTINUED | OUTPATIENT
Start: 2025-07-31 | End: 2025-07-31

## 2025-07-31 RX ORDER — LIDOCAINE HYDROCHLORIDE 10 MG/ML
1 INJECTION, SOLUTION EPIDURAL; INFILTRATION; INTRACAUDAL; PERINEURAL ONCE
OUTPATIENT
Start: 2025-07-31 | End: 2025-07-31

## 2025-07-31 RX ORDER — PROPOFOL 10 MG/ML
VIAL (ML) INTRAVENOUS
Status: COMPLETED
Start: 2025-07-31 | End: 2025-07-31

## 2025-07-31 RX ORDER — METOCLOPRAMIDE HYDROCHLORIDE 5 MG/ML
10 INJECTION INTRAMUSCULAR; INTRAVENOUS EVERY 10 MIN PRN
OUTPATIENT
Start: 2025-07-31

## 2025-07-31 RX ORDER — SODIUM CHLORIDE 9 MG/ML
INJECTION, SOLUTION INTRAVENOUS CONTINUOUS
OUTPATIENT
Start: 2025-07-31

## 2025-07-31 RX ORDER — KETAMINE HCL IN 0.9 % NACL 50 MG/5 ML
SYRINGE (ML) INTRAVENOUS
Status: DISCONTINUED | OUTPATIENT
Start: 2025-07-31 | End: 2025-07-31

## 2025-07-31 RX ORDER — ONDANSETRON HYDROCHLORIDE 2 MG/ML
4 INJECTION, SOLUTION INTRAVENOUS ONCE
OUTPATIENT
Start: 2025-07-31 | End: 2025-07-31

## 2025-07-31 RX ADMIN — SODIUM CHLORIDE SOLN NEBU 3% 4 ML: 3 NEBU SOLN at 01:07

## 2025-07-31 RX ADMIN — PROPOFOL 20 MG: 10 INJECTION, EMULSION INTRAVENOUS at 01:07

## 2025-07-31 RX ADMIN — FAMOTIDINE 20 MG: 20 TABLET, FILM COATED ORAL at 09:07

## 2025-07-31 RX ADMIN — GLYCOPYRROLATE 1 MG: 1 TABLET ORAL at 09:07

## 2025-07-31 RX ADMIN — LIDOCAINE HYDROCHLORIDE 50 MG: 10 INJECTION, SOLUTION EPIDURAL; INFILTRATION; INTRACAUDAL; PERINEURAL at 01:07

## 2025-07-31 RX ADMIN — POLYETHYLENE GLYCOL 3350 17 G: 17 POWDER, FOR SOLUTION ORAL at 09:07

## 2025-07-31 RX ADMIN — SODIUM CHLORIDE SOLN NEBU 3% 4 ML: 3 NEBU SOLN at 08:07

## 2025-07-31 RX ADMIN — OXYCODONE HYDROCHLORIDE 5 MG: 5 TABLET ORAL at 10:07

## 2025-07-31 RX ADMIN — Medication 30 MG: at 01:07

## 2025-07-31 RX ADMIN — SODIUM CHLORIDE SOLN NEBU 3% 4 ML: 3 NEBU SOLN at 04:07

## 2025-07-31 RX ADMIN — SODIUM CHLORIDE, SODIUM GLUCONATE, SODIUM ACETATE, POTASSIUM CHLORIDE AND MAGNESIUM CHLORIDE: 526; 502; 368; 37; 30 INJECTION, SOLUTION INTRAVENOUS at 01:07

## 2025-07-31 RX ADMIN — GLYCOPYRROLATE 1 MG: 1 TABLET ORAL at 04:07

## 2025-07-31 RX ADMIN — INSULIN GLARGINE 30 UNITS: 100 INJECTION, SOLUTION SUBCUTANEOUS at 09:07

## 2025-07-31 RX ADMIN — CEFAZOLIN 2 G: 2 INJECTION, POWDER, FOR SOLUTION INTRAMUSCULAR; INTRAVENOUS at 01:07

## 2025-07-31 NOTE — PT/OT/SLP PROGRESS
Physical Therapy      Patient Name:  Charly Padilla   MRN:  [id number]    Patient not seen today secondary to Off the floor for procedure/surgery. Will follow-up as appropriate and as schedule allows.

## 2025-07-31 NOTE — PLAN OF CARE
Problem: Adult Inpatient Plan of Care  Goal: Optimal Comfort and Wellbeing  Outcome: Progressing     Problem: Skin Injury Risk Increased  Goal: Skin Health and Integrity  Outcome: Progressing     Problem: Fall Injury Risk  Goal: Absence of Fall and Fall-Related Injury  Outcome: Progressing     Problem: Bariatric Environmental Safety  Goal: Safety Maintained with Care  Outcome: Progressing     Problem: Coping Ineffective  Goal: Effective Coping  Outcome: Progressing     Problem: Diabetes Comorbidity  Goal: Blood Glucose Level Within Targeted Range  Outcome: Progressing

## 2025-07-31 NOTE — PLAN OF CARE
Problem: Enteral Nutrition  Goal: Absence of Aspiration Signs and Symptoms  Outcome: Progressing     Problem: Enteral Nutrition  Goal: Safe, Effective Therapy Delivery  Outcome: Progressing     Problem: Enteral Nutrition  Goal: Feeding Tolerance  Outcome: Progressing     Problem: Diabetes Comorbidity  Goal: Blood Glucose Level Within Targeted Range  7/31/2025 1832 by Rhea Feliciano RN  Outcome: Progressing  7/31/2025 1831 by Rhea Feliciano RN  Outcome: Progressing     Problem: Bariatric Environmental Safety  Goal: Safety Maintained with Care  7/31/2025 1832 by Rhea Feliciano RN  Outcome: Progressing  7/31/2025 1831 by Rhea Feliciano RN  Outcome: Progressing     Problem: Fall Injury Risk  Goal: Absence of Fall and Fall-Related Injury  7/31/2025 1832 by Rhea Feliciano RN  Outcome: Progressing  7/31/2025 1831 by Rhea Feliciano RN  Outcome: Progressing     Problem: Skin Injury Risk Increased  Goal: Skin Health and Integrity  7/31/2025 1832 by Rhea Feliciano RN  Outcome: Progressing  7/31/2025 1831 by Rhea Feliciano RN  Outcome: Progressing     Problem: Adult Inpatient Plan of Care  Goal: Plan of Care Review  7/31/2025 1832 by Rhea Feliciano RN  Outcome: Progressing  7/31/2025 1831 by Rhea Feliciano RN  Outcome: Progressing     Problem: Adult Inpatient Plan of Care  Goal: Patient-Specific Goal (Individualized)  7/31/2025 1832 by Rhea Feliciano RN  Outcome: Progressing  7/31/2025 1831 by Rhea Feliciano RN  Outcome: Progressing     Problem: Adult Inpatient Plan of Care  Goal: Absence of Hospital-Acquired Illness or Injury  7/31/2025 1832 by Rhea Feliciano RN  Outcome: Progressing  7/31/2025 1831 by Rhea Feliciano RN  Outcome: Progressing     Problem: Adult Inpatient Plan of Care  Goal: Optimal Comfort and Wellbeing  7/31/2025 1832 by Rhea Feliciano RN  Outcome: Progressing  7/31/2025 1831 by Rhea Feliciano RN  Outcome: Progressing     Problem: Adult  Inpatient Plan of Care  Goal: Readiness for Transition of Care  7/31/2025 1832 by Rhea Feliciano, RN  Outcome: Progressing  7/31/2025 1831 by Rhea Feliciano RN  Outcome: Progressing     Problem: Infection  Goal: Absence of Infection Signs and Symptoms  7/31/2025 1832 by Rhea Feliciano RN  Outcome: Progressing  7/31/2025 1831 by Rhea Feliciano RN  Outcome: Progressing

## 2025-07-31 NOTE — TRANSFER OF CARE
"Anesthesia Transfer of Care Note    Patient: Charly Padilla    Procedure(s) Performed: Procedure(s) (LRB):  PEG (N/A)    Patient location: GI    Anesthesia Type: MAC    Transport from OR: Transported from OR on room air with adequate spontaneous ventilation    Post pain: adequate analgesia    Post assessment: no apparent anesthetic complications    Post vital signs: stable    Level of consciousness: awake    Nausea/Vomiting: no nausea/vomiting    Complications: none    Transfer of care protocol was followed      Last vitals: Visit Vitals  /77 (BP Location: Left arm, Patient Position: Lying)   Pulse 89   Temp 36.7 °C (98.1 °F) (Tympanic)   Resp 17   Ht 5' 8" (1.727 m)   Wt 115.5 kg (254 lb 10.1 oz)   SpO2 98%   BMI 38.72 kg/m²     "

## 2025-07-31 NOTE — ANESTHESIA POSTPROCEDURE EVALUATION
Anesthesia Post Evaluation    Patient: Charly Padilla    Procedure(s) Performed: Procedure(s) (LRB):  PEG (N/A)    Final Anesthesia Type: MAC      Patient location during evaluation: PACU  Patient participation: Yes- Able to Participate  Level of consciousness: awake and alert  Post-procedure vital signs: reviewed and stable  Pain management: adequate  Airway patency: patent  JAMES mitigation strategies: Extubation and recovery carried out in lateral, semiupright, or other nonsupine position  PONV status at discharge: No PONV  Anesthetic complications: no      Cardiovascular status: blood pressure returned to baseline  Respiratory status: room air and unassisted  Hydration status: euvolemic  Follow-up not needed.  Comments: PT. Appears to have adequately recovered from Anesthetic. VSS, airway patient. No apparent complications noted.              Vitals Value Taken Time   /63 07/31/25 13:50   Temp  07/31/25 14:05   Pulse 92 07/31/25 13:50   Resp 20 07/31/25 13:50   SpO2 98 % 07/31/25 13:50         No case tracking events are documented in the log.      Pain/Castillo Score: Castillo Score: 9 (7/31/2025  1:43 PM)

## 2025-07-31 NOTE — ANESTHESIA PREPROCEDURE EVALUATION
07/31/2025  Charly Padilla is a 69 y.o., male with CVA with right-sided hemiparesis, DM2, neuropathy, CKD 2, hypertension, HLD,  presented on 7/5/2025 from nursing facility with increased generalized weakness.   He is reported as having a Benítez catheter placed about 1 week ago prior to presentation for urinary retention. Was found to be in acute hypoxic respiratory failure.  Admitted to ICU.  Patient has now completed antibiotics.       Review of hospital course patient was intubated and mechanical ventilation on 07/05/2025, had echocardiogram which showed EF 45 50% with mild valvular heart disease, extubated on 07/16 reintubated on 07/18 and then lastly extubated on 07/25/2025.       Downgraded from ICU to Medicine Services on 7/27/25. No acute distress, nonverbal, with right hemiplegia, he does track with his eyes. He does not follow simple commands appropriately, he has an NG tube with tube feeding free water flush.    He presents for PEG placement.    H/H: 13/43  PLT:242  K+5.4 (CKD, now on Lokelma)    EKG: NSR    ECHO:    Left Ventricle: The left ventricle is normal in size. Increased wall thickness. There is mildly reduced systolic function. Ejection fraction is approximately 50%. There is normal diastolic function.    Right Ventricle: Systolic function is normal. TAPSE is 1.9 cm.    Aortic Valve: There is aortic valve sclerosis.    Mitral Valve: There is mitral annular calcification. There is trace regurgitation.    Tricuspid Valve: There is mild regurgitation.    Pulmonic Valve: There is trace regurgitation.    Pulmonary Artery: The estimated pulmonary artery systolic pressure is 30 mmHg.    IVC/SVC: Normal venous pressure at 3 mmHg.    Pericardium: There is no pericardial effusion.       Pre-op Assessment    I have reviewed the Patient Summary Reports.     I have reviewed the Nursing Notes. I have  reviewed the NPO Status.   I have reviewed the Medications.     Review of Systems  Anesthesia Hx:  No problems with previous Anesthesia                Social:  Non-Smoker       Pulmonary:  Pneumonia                      Endocrine:  Diabetes, type 2         Obesity / BMI > 30      Physical Exam  General: Well nourished and Flat Affect  Awake, Restless, does not answer questions or respond to my commands  Airway:  Mallampati: unable to assess   TM Distance: Normal    Chest/Lungs:  Clear to auscultation, Normal Respiratory Rate    Heart:  Rate: Normal  Rhythm: Regular Rhythm  Sounds: Normal    Abdomen:  Normal, Soft, Nontender        Anesthesia Plan  Type of Anesthesia, risks & benefits discussed:    Anesthesia Type: MAC  Intra-op Monitoring Plan: Standard ASA Monitors  Post Op Pain Control Plan: multimodal analgesia  Induction:  IV  Airway Plan: Direct  Informed Consent: Informed consent signed with the Patient and all parties understand the risks and agree with anesthesia plan.  All questions answered.   ASA Score: 4  Day of Surgery Review of History & Physical: H&P Update referred to the surgeon/provider.    Ready For Surgery From Anesthesia Perspective.     .

## 2025-07-31 NOTE — [employer] PATIENT INSTRUCTIONS
Discharge Summary/Instructions after an Endoscopic Procedure  Patient Name: Charly Padilla  Patient MRN: [id number]  Patient YOB: 1955  Thursday, July 31, 2025  Minh Carrillo MD  Dear patient,  As a result of recent federal legislation (The Federal Cures Act), you may   receive lab or pathology results from your procedure in your [facility]   account before your physician is able to contact you. Your physician or   their representative will relay the results to you with their   recommendations at their soonest availability.  Thank you,  RESTRICTIONS:  During your procedure today, you received medications for sedation.  These   medications may affect your judgment, balance and coordination.  Therefore,   for 24 hours, you have the following restrictions:   - DO NOT drive a car, operate machinery, make legal/financial decisions,   sign important papers or drink alcohol.    ACTIVITY:  Today: no heavy lifting, straining or running due to procedural   sedation/anesthesia.  The following day: return to full activity including work.  DIET:  Eat and drink normally unless instructed otherwise.     TREATMENT FOR COMMON SIDE EFFECTS:  - Mild abdominal pain, nausea, belching, bloating or excessive gas:  rest,   eat lightly and use a heating pad.  - Sore Throat: treat with throat lozenges and/or gargle with warm salt   water.  - Because air was used during the procedure, expelling large amounts of air   from your rectum or belching is normal.  - If a bowel prep was taken, you may not have a bowel movement for 1-3 days.    This is normal.  SYMPTOMS TO WATCH FOR AND REPORT TO YOUR PHYSICIAN:  1. Abdominal pain or bloating, other than gas cramps.  2. Chest pain.  3. Back pain.  4. Signs of infection such as: chills or fever occurring within 24 hours   after the procedure.  5. Rectal bleeding, which would show as bright red, maroon, or black stools.   (A tablespoon of blood from the rectum is not serious, especially if    hemorrhoids are present.)  6. Vomiting.  7. Weakness or dizziness.  GO DIRECTLY TO THE NEAREST EMERGENCY ROOM IF YOU HAVE ANY OF THE FOLLOWING:      Difficulty breathing              Chills and/or fever over 101 F   Persistent vomiting and/or vomiting blood   Severe abdominal pain   Severe chest pain   Black, tarry stools   Bleeding- more than one tablespoon   Any other symptom or condition that you feel may need urgent attention  Your doctor recommends these additional instructions:  If any biopsies were taken, your doctors clinic will contact you in 1 to 2   weeks with any results.  Recommendations:  - [facility] patient to [facility] for [facility] care.   - Resume previous diet.   - Continue present medications.   - Resume Lovenox (enoxaparin) at prior dose tomorrow.   - Please follow the post-PEG recommendations including: Nutrition consult   for formula and volume, advance food and medications per primary care   provider, external bolster snug to abdominal wall, change dressing once per   day, change dressing on top of bumper daily, NPO x4 hrs then water today,   may use PEG today for meds and water, may use PEG tomorrow for feedings,   flush PEG daily with 60 ml water, antibiotic ointment to site and clean   site with soap and water daily and dry thoroughly.  Impressions:  - Normal esophagus.   - Normal stomach.   - Normal examined duodenum.   - An externally removable PEG placement was successfully completed.   - No specimens collected.  For questions, problems or results please call your physician - Minh Carrillo MD at Work:  [phone].  [facility] ED at [phone]  IF A COMPLICATION OR EMERGENCY SITUATION ARISES AND YOU ARE UNABLE TO REACH   YOUR PHYSICIAN - GO DIRECTLY TO THE EMERGENCY ROOM.  MD Minh Mosqueda MD  7/31/2025 2:14:16 PM  This report has been verified and signed electronically.  Dear patient,  As a result of recent federal legislation (The Federal  Cures Act), you may   receive lab or pathology results from your procedure in your [facility]   account before your physician is able to contact you. Your physician or   their representative will relay the results to you with their   recommendations at their soonest availability.  Thank you,  [employer]ATION

## 2025-08-01 LAB
POCT GLUCOSE: 145 MG/DL (ref 70–110)
POCT GLUCOSE: 186 MG/DL (ref 70–110)

## 2025-08-01 PROCEDURE — 25000242 PHARM REV CODE 250 ALT 637 W/ HCPCS: Performed by: INTERNAL MEDICINE

## 2025-08-01 PROCEDURE — 25000003 PHARM REV CODE 250: Performed by: HOSPITALIST

## 2025-08-01 PROCEDURE — 21400001 HC TELEMETRY ROOM

## 2025-08-01 PROCEDURE — 99900031 HC PATIENT EDUCATION (STAT)

## 2025-08-01 PROCEDURE — 27000221 HC OXYGEN, UP TO 24 HOURS

## 2025-08-01 PROCEDURE — 97535 SELF CARE MNGMENT TRAINING: CPT

## 2025-08-01 PROCEDURE — 94660 CPAP INITIATION&MGMT: CPT

## 2025-08-01 PROCEDURE — 25000003 PHARM REV CODE 250: Performed by: INTERNAL MEDICINE

## 2025-08-01 PROCEDURE — 63600175 PHARM REV CODE 636 W HCPCS: Performed by: INTERNAL MEDICINE

## 2025-08-01 PROCEDURE — 94760 N-INVAS EAR/PLS OXIMETRY 1: CPT

## 2025-08-01 PROCEDURE — 99900035 HC TECH TIME PER 15 MIN (STAT)

## 2025-08-01 PROCEDURE — 97530 THERAPEUTIC ACTIVITIES: CPT | Mod: CQ

## 2025-08-01 PROCEDURE — 94761 N-INVAS EAR/PLS OXIMETRY MLT: CPT

## 2025-08-01 PROCEDURE — 94640 AIRWAY INHALATION TREATMENT: CPT

## 2025-08-01 PROCEDURE — 25000003 PHARM REV CODE 250

## 2025-08-01 PROCEDURE — 11000001 HC ACUTE MED/SURG PRIVATE ROOM

## 2025-08-01 RX ADMIN — GLYCOPYRROLATE 1 MG: 1 TABLET ORAL at 03:08

## 2025-08-01 RX ADMIN — GLYCOPYRROLATE 1 MG: 1 TABLET ORAL at 09:08

## 2025-08-01 RX ADMIN — FAMOTIDINE 20 MG: 20 TABLET, FILM COATED ORAL at 09:08

## 2025-08-01 RX ADMIN — OXYCODONE HYDROCHLORIDE 5 MG: 5 TABLET ORAL at 10:08

## 2025-08-01 RX ADMIN — POLYETHYLENE GLYCOL 3350 17 G: 17 POWDER, FOR SOLUTION ORAL at 09:08

## 2025-08-01 RX ADMIN — SODIUM CHLORIDE SOLN NEBU 3% 4 ML: 3 NEBU SOLN at 04:08

## 2025-08-01 RX ADMIN — SODIUM CHLORIDE SOLN NEBU 3% 4 ML: 3 NEBU SOLN at 07:08

## 2025-08-01 RX ADMIN — AMLODIPINE BESYLATE 5 MG: 5 TABLET ORAL at 09:08

## 2025-08-01 RX ADMIN — ENOXAPARIN SODIUM 40 MG: 40 INJECTION SUBCUTANEOUS at 04:08

## 2025-08-01 RX ADMIN — SENNOSIDES, DOCUSATE SODIUM 2 TABLET: 8.6; 5 TABLET ORAL at 09:08

## 2025-08-01 RX ADMIN — INSULIN GLARGINE 30 UNITS: 100 INJECTION, SOLUTION SUBCUTANEOUS at 09:08

## 2025-08-01 RX ADMIN — INSULIN GLARGINE 30 UNITS: 100 INJECTION, SOLUTION SUBCUTANEOUS at 10:08

## 2025-08-01 NOTE — CONSULTS
Inpatient Nutrition Assessment    Admit Date: 7/5/2025   Total duration of encounter: 27 days   Patient Age: 69 y.o.    Nutrition Recommendation/Prescription     Start tube feedings at 20 ml/hr and advance 20ml q4hr as tolerate to goal rate. Tube feeding recommendation:  Diabetisource AC goal rate of 80 ml/hr will provide  1920 kcal 100% needs  96 g protein 100% needs  144 g CHO 65% needs  2560 g potassium daily  1312 ml water 66% needs, 82% with current 50 ml q3hr water flushes  calculations based on estimated 20 hour run time     -Medical management of hyperglycemia.   -May need potassium binder for hyperkalemia.      Communication of Recommendations: reviewed with nurse    Nutrition Assessment     Malnutrition Assessment/Nutrition-Focused Physical Exam       Malnutrition Level: other (see comments) (Does not meet criteria) (07/07/25 1041)                                                        A minimum of two characteristics is recommended for diagnosis of either severe or non-severe malnutrition.    Chart Review    Reason Seen: follow-up    Malnutrition Screening Tool Results   Have you recently lost weight without trying?: No  Have you been eating poorly because of a decreased appetite?: No   MST Score: 0   Diagnosis:  Acute combined hypoxic and hypercapnic respiratory failure  History of cerebrovascular disease with right hemiparesis  Acute kidney injury superimposed on stage II chronic kidney disease, obstructive uropathy component.  He remains nonoliguric, daily improvement in BUN/creatinine   Type 2 diabetes mellitus with polyneuropathy, hyperglycemia improved.  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, glaucoma, hypertension, and hyperlipidemia     Scheduled Medications:  amLODIPine, 5 mg, Daily  enoxparin, 40 mg, Q24H (prophylaxis,  1700)  famotidine, 20 mg, BID  glycopyrrolate, 1 mg, TID  insulin glargine U-100, 30 Units, BID  polyethylene glycol, 17 g, BID  scopolamine, 1 patch, Q3 Days  senna-docusate, 2 tablet, Daily  sodium chloride 3%, 4 mL, Q8H    Continuous Infusions:     PRN Medications:  acetaminophen, 650 mg, Q6H PRN  dextrose 50%, 12.5 g, PRN  dextrose 50%, 12.5 g, PRN  fentaNYL, 50 mcg, Q3H PRN  glucagon (human recombinant), 1 mg, PRN  glucagon (human recombinant), 1 mg, PRN  hydrALAZINE, 10 mg, Q6H PRN  insulin aspart U-100, 0-10 Units, Q6H PRN  labetalol, 20 mg, Q6H PRN  oxyCODONE, 5 mg, Q6H PRN  sodium chloride 0.9%, 10 mL, Q12H PRN    Calorie Containing IV Medications: no significant kcals from medications at this time    Recent Labs   Lab 07/26/25  0320 07/27/25  0204 07/28/25  0303 07/28/25  0537 07/29/25  0227 07/30/25  0246 07/31/25  0156   * 133*  --  133* 131* 131* 133*   K 4.7 5.5*  --  5.0 5.3* 5.3* 5.4*   CALCIUM 9.1 9.7  --  9.4 9.4 9.6 9.9   PHOS 3.1 3.9  --   --   --   --   --    MG 1.50* 1.90  --   --   --  1.60  --     99  --  101 100 98 98   CO2 22* 25  --  26 24 26 26   BUN 34.1* 43.9*  --  39.7* 35.2* 32.3* 29.1*   CREATININE 0.83 1.22  --  1.05 0.99 1.04 0.93   EGFRNORACEVR >60 >60  --  >60 >60 >60 >60   * 189*  --  149* 217* 197* 175*   BILITOT 0.3 0.4  --   --  0.3  --   --    ALKPHOS 51 62  --   --  57  --   --    ALT 17 19  --   --  14  --   --    AST 12 14  --   --  11  --   --    ALBUMIN 2.4* 2.7*  --   --  2.7*  --   --    WBC 7.59 8.17 6.67  --  7.75 8.96 8.98   HGB 11.8* 12.8* 13.6*  --  13.2* 13.2* 13.8*   HCT 37.7* 40.0* 44.7  --  41.1* 42.7 43.4     Nutrition Orders:  Diet NPO Except for: Medication  Tube Feedings/Formulas 80; 1,600; Diabetisource AC; Peg; 50; Every 3 hours    Appetite/Oral Intake: NPO/NPO  Factors Affecting Nutritional Intake: NPO  Social Needs Impacting Access to Food: none identified; NH resident   Food/Sabianist/Cultural Preferences: unable to obtain  Food  "Allergies: no known food allergies  Last Bowel Movement: 07/28/25  Wound(s):  no partial or full thickness pressure injuries documented at this time.     Comments    7/7/25: Pt intubated on mechanical ventilation, receiving kcals from diprivan. Not currently receiving enteral nutrition. Tube feeding orders updated.     7/11/25 Patient remains on ventilator with propofol, tube feeding at previous goal rate; recommend decreasing tube feeding rate due to overfeeding with propofol currently, will need to increase protein modular.    7/15/25: Patient remains intubated, no longer receiving kcals from propofol. TF put on hold this morning d/t worsening abdominal distention this morning. TF regimen updated for once able to resume feeds.     7/18/25 Tube feeding held currently secondary to suspected aspiration, possibly resume today.    7/22/25 Patient remains intubated, not currently receiving kcals from propofol. Tolerating tube feedings at goal rate.    7/25/25 Patient remains intubated. Tolerating tube feedings at goal rate. Receiving 30 units lantus BID and SSI prn for hyperglycemia.     7/29/25: Patient extubated, now on BiPAP at night. RN reports patient tolerating tube feeding at goal rate.     8/1/25: PEG placed yesterday. Consult received to restart tube feedings. Re estimated energy requirements since extubation and changed to diabetic formula s/t hyperglycemia. Also with hyperkalemia, not receiving daily potassium binder.      Anthropometrics    Height: 5' 8" (172.7 cm), Height Method: Estimated  Last Weight: 104.5 kg (230 lb 6.1 oz) (08/01/25 1350), Weight Method: Bed Scale  BMI (Calculated): 35  BMI Classification: obese grade II (BMI 35-39.9)        Ideal Body Weight (IBW), Male: 154 lb     % Ideal Body Weight, Male (lb): 175.08 %                          Usual Weight Provided By: EMR weight history    Wt Readings from Last 5 Encounters:   08/01/25 104.5 kg (230 lb 6.1 oz)     Weight Change(s) Since Admission: "   7/25/25 no updated weights   Wt Readings from Last 1 Encounters:   08/01/25 1350 104.5 kg (230 lb 6.1 oz)   07/18/25 0600 115.5 kg (254 lb 10.1 oz)   07/13/25 0800 122.3 kg (269 lb 10 oz)   07/05/25 0605 113.4 kg (250 lb)   Admit Weight: 113.4 kg (250 lb) (07/05/25 0605), Weight Method: Stated    Estimated Needs    Weight Used For Calorie Calculations: 104.5 kg (230 lb 6.1 oz)  Energy Calorie Requirements (kcal): 3968-9482 kcals/d (MSJ x 1.1-1.2 SF)  Energy Need Method: [place]-St Jeor  Weight Used For Protein Calculations: 104.5 kg (230 lb 6.1 oz)  Protein Requirements:  g/d (0.8-1 g/kg; CKD, DM)  Fluid Requirements (mL): 5841-5568 ml fl/d (1 ml/kcal)  CHO Requirement: 222-242 g carbohydrates daily (45% calories)     Enteral Nutrition   Formula: Diabetisource AC  Rate/Volume:   Water Flushes:   Additives/Modulars: none at this time  Route: PEG tube  Method: continuous  Total Nutrition Provided by Tube Feeding, Additives, and Flushes:  Calories Provided   kcal/d, % needs   Protein Provided   g/d, % needs   Fluid Provided   ml/d, % needs   Continuous feeding calculations based on estimated 20 hr/d run time unless otherwise stated.     Parenteral Nutrition Patient not receiving parenteral nutrition support at this time.    Evaluation of Received Nutrient Intake    Calories: not meeting estimated needs  Protein: not meeting estimated needs    Patient Education Not applicable.    Nutrition Diagnosis     PES: Inadequate energy intake related to inability to consume sufficient nutrients as evidenced by on mechanical ventilation, need for NPO status. (active)   PES: Inadequate oral intake related to acute illness as evidenced by on mechanical ventilation since admit (resolved), NPO s/p PEG. (active)   PES: N/A            Nutrition Interventions     Intervention(s): Enteral nutrition management  Intervention(s):      Goal: Meet greater than 80% of nutritional needs by follow-up. (goal progressing)  Goal: Tolerate  enteral feeding at goal rate by follow-up. (goal progressing)    Nutrition Goals & Monitoring     Dietitian will monitor: energy intake, enteral nutrition intake, weight, electrolyte/renal panel, glucose/endocrine profile, and gastrointestinal profile  Discharge planning: too early to determine; pending clinical course  Nutrition Risk/Follow-Up: patient at increased nutrition risk; dietitian will follow-up twice weekly   Please consult if re-assessment needed sooner.

## 2025-08-01 NOTE — PT/OT/SLP PROGRESS
Occupational Therapy   Treatment    Name: Charly Padilla  MRN: [id number]    Recommendations:     Recommended therapy intensity at discharge: Moderate Intensity Therapy   Discharge Equipment Recommendations:  to be determined by next level of care  Barriers to discharge:  Other (Comment) (placement)    Assessment:     Charly Padilla is a 69 y.o. male with a medical diagnosis of Acute hypoxic hypercapnic respiratory failure requiring intubation 7/5-7/16 and reintubation 7/18-7/25, aspiration pneumonia, hx of CVA with R-hemiparesis.  He presents with the following performance deficits affecting function: weakness, impaired cognition, impaired endurance, impaired self care skills, impaired functional mobility, impaired balance, decreased safety awareness, decreased lower extremity function, decreased upper extremity function, decreased ROM, abnormal tone.     Pt with fair-poor tolerance to OT session this date. Pt followed 1 command of wiggling toes, did not follow any commands with BUEs. Pt required total A of 2 people for bed mobility, sat EOB ~15 mins with Max A initially progressing to CGA-SBA intermittently. Pt spontaneously moving BUEs (LUE>RUE) and appears with tone/decreased ROM at RUE. Pt with protective reactions at BUEs when losing balance posteriorly. Will continue to progress as able.     Rehab Prognosis:  Fair; patient would benefit from acute skilled OT services to address these deficits and reach maximum level of function.       Plan:     Patient to be seen 3 x/week to address the above listed problems via self-care/home management, therapeutic activities, therapeutic exercises, neuromuscular re-education, wheelchair management/training  Plan of Care Expires: 08/25/25  Plan of Care Reviewed with: patient    Subjective     Pain/Comfort:  Pain Rating 1: 0/10    Objective:     Communicated with: RN prior to session.  Patient found HOB elevated with peripheral IV, pulse ox (continuous), mcgill catheter, Other  (comments), PEG Tube, oxygen (bilateral mittens) upon OT entry to room.    General Precautions: Standard, fall, NPO    Orthopedic Precautions:N/A  Braces: N/A  Respiratory Status: Nasal cannula, flow 3 L/min  Vital Signs: Sp02: 92%     Occupational Performance:     Functional Mobility/Transfers:  Bed mobility:    Rolling Left:  total assistance x2  Rolling Right: total assistance x2  Supine to Sit: total assistance x2  Sit to Supine: total assistance x2    Activities of Daily Living:  Grooming: maximal assistance washing face seated EOB with [place] A. Pt with no initiation, wiped mouth minimally when hand brought to face  Lower Body Dressing: total assistance for socks  Worked on sitting balance EOB with cues for anterior lean to promote increased trunk stability needed for ADL participation. Pt improved to CGA-SBA for sitting balance with increased time.  PROM at RUE to prevent contractures, limited throughout extremity by flexor tone, although moves RUE in protective reflex  Pt with spontaneous movements at LUE, however unable to complete on command.   Pt visually tracks speaker in left and right visual fields with verbal and visual cues.     Balance:   Static Sitting Balance: Bilateral UE support: ranged from max A initially to intermittent CGA    Therapeutic Positioning    OT interventions performed during the course of today's session in an effort to prevent and/or reduce acquired pressure injuries:   Education was provided on benefits of and recommendations for therapeutic positioning  Therapeutic positioning was provided at the conclusion of session to offload all bony prominences for the prevention and/or reduction of pressure injuries    [facility] 6 Click ADL: 6    Co-Treatment: Yes, due to Limited activity tolerance    Patient Education:  Patient provided with verbal education education regarding OT role/goals/POC, fall prevention, safety awareness, and pressure ulcer prevention.  Additional teaching is  warranted.      Patient left HOB elevated with all lines intact, call button in reach, pressure relief boots, and bed alarm on.    GOALS:   Multidisciplinary Problems       Occupational Therapy Goals          Problem: Occupational Therapy    Goal Priority Disciplines Outcome Interventions   Occupational Therapy Goal     OT, PT/OT Progressing    Description: LTG: Pt will perform basic ADLs and ADL t/fs with min A using LRAD by d/c.    STG: to be met by 8/11  1. Pt will follow >80% of simple one step commands  2. Pt will perform grooming EOB with min A.   3. Pt will perform functional grasp/reach/release of items with L UE >80% of trials in prep for increased participation in ADL tasks.   4. Pt will perform sit<>stand with mod A x 2 in prep for ADL t/fs.                        Time Tracking:     OT Date of Treatment: 08/01/25  OT Start Time: 1115  OT Stop Time: 1145  OT Total Time (min): 30 min    Billable Minutes:Self Care/Home Management 30 mins    OT/EDILBERTO: OT     Number of EDILBERTO visits since last OT visit: 2    8/1/2025

## 2025-08-01 NOTE — PLAN OF CARE
Clinical updates sent to [facility]. Made facility aware there is no expected discharge date at this time.

## 2025-08-01 NOTE — PROGRESS NOTES
"Hospital Medicine  Progress Note    Patient Name: Charly Padilla  MRN: [id number]  Status: IP- Inpatient   Admission Date: 7/5/2025  Length of Stay: 27  Date of Service: 08/01/2025       CC: hospital follow-up for respiratory failure       SUBJECTIVE    69 y.o. male with CVA with right-sided hemiparesis, DM2, neuropathy, CKD 2, hypertension, HLD,  presented on 7/5/2025 from nursing facility with increased generalized weakness.   He is reported as having a Benítez catheter placed about 1 week ago prior to presentation for urinary retention. Was found to be in acute hypoxic respiratory failure.  Admitted to ICU.  Patient has now completed antibiotics.       Review of hospital course patient was intubated and mechanical ventilation on 07/05/2025, had echocardiogram which showed EF 45 50% with mild valvular heart disease, extubated on 07/16 reintubated on 07/18 and then lastly extubated on 07/25/2025.       Downgraded from ICU to Medicine Services on 7/27/25. No acute distress, nonverbal, with right hemiplegia, he does track with his eyes. He does not follow simple commands appropriately, he has an NG tube with tube feeding free water flush.     7/28 Tmax 100.3F overnight, monitor closely off abx for now. Send Blood cx.   Awake, non verbal. However based on prior charts from admission patient was ?verbal  Cxr - Suspect some mild atelectasis  CT head No acute intracranial findings or significant interval change compared to earlier this month."    Underwent  PEG placement 7/31.      Today: Patient seen and examined at bedside, and chart reviewed.  Doing well s/p PEG.  Still confused.      MEDICATIONS   Scheduled   amLODIPine  5 mg Per NG tube Daily    enoxparin  40 mg Subcutaneous Q24H (prophylaxis, 1700)    famotidine  20 mg Per NG tube BID    glycopyrrolate  1 mg Per OG tube TID    insulin glargine U-100  30 Units Subcutaneous BID    polyethylene glycol  17 g Per NG tube BID    scopolamine  1 patch Transdermal Q3 Days    " senna-docusate  2 tablet Per NG tube Daily    sodium chloride 3%  4 mL Nebulization Q8H     Continuous Infusions  None      PHYSICAL EXAM   VITALS: T 98.2 °F (36.8 °C)   /64   P 90   RR 20   O2 95 %    GENERAL: Awake and in NAD  LUNGS: Decreased air movement B/L  CVS: Normal rate  GI/: Soft, NT, bowel sounds positive.  EXTREMITIES: No LE edema  NEURO: Awake and alert, not oriented  PSYCH: Cooperative      LABS   CBC  Recent Labs     07/30/25  0246 07/31/25  0156   WBC 8.96 8.98   RBC 4.68* 4.82   HGB 13.2* 13.8*   HCT 42.7 43.4   MCV 91.2 90.0   MCH 28.2 28.6   MCHC 30.9* 31.8*   RDW 13.8 13.4    242     CHEM  Recent Labs     07/30/25  0246 07/31/25  0156   * 133*   K 5.3* 5.4*   CO2 26 26   BUN 32.3* 29.1*   CREATININE 1.04 0.93   CALCIUM 9.6 9.9   MG 1.60  --          MICROBIOLOGY     Microbiology Results (last 7 days)       Procedure Component Value Units Date/Time    Blood Culture [[id number]]  (Normal) Collected: 07/28/25 1229    Order Status: Completed Specimen: Blood Updated: 08/01/25 1300     Blood Culture No Growth At 96 Hours    Blood Culture [[id number]]  (Normal) Collected: 07/28/25 1229    Order Status: Completed Specimen: Blood Updated: 08/01/25 1300     Blood Culture No Growth At 96 Hours            ASSESSMENT   Acute hypoxic respiratory failure  OP dysphagia, s/p PEG 7/31  Aspiration pneumonia, resolved  VAN on CKD II  Urinary retention with indwelling Benítez  h/o CVA with right hemiparesis  DM II   Peripheral Neuropathy  Essential Hypertension      PLAN   Start tube feeds, titrate up as tolerated  Wean oxygen as tolerated  Otherwise continue current management and monitoring in the interim        Prophylaxis: SC Lovenox        Johnnie Haq MD  [facility]

## 2025-08-01 NOTE — PROGRESS NOTES
"Gastroenterology Progress Note    Subjective/Interval History:    S/p EGD / PEG yesterday  No acute events overnight  Patient is afebrile and HDS  Tfs not yet initiated.     ROS:  Review of Systems   Unable to perform ROS: Acuity of condition       Vital Signs:  /79   Pulse 97 Comment: Simultaneous filing. User may not have seen previous data.  Temp 97.5 °F (36.4 °C) (Oral)   Resp 16   Ht 5' 8" (1.727 m)   Wt 115.5 kg (254 lb 10.1 oz)   SpO2 (!) 93%   BMI 38.72 kg/m²   Body mass index is 38.72 kg/m².    Physical Exam:  Physical Exam  Constitutional:       General: He is sleeping.      Appearance: He is obese. He is ill-appearing (chronically ill-appearing). He is not toxic-appearing or diaphoretic.   HENT:      Head: Normocephalic and atraumatic.      Mouth/Throat:      Mouth: Mucous membranes are dry.   Cardiovascular:      Rate and Rhythm: Normal rate and regular rhythm.      Pulses: Normal pulses.      Heart sounds: Normal heart sounds.   Pulmonary:      Effort: Pulmonary effort is normal.      Breath sounds: Normal breath sounds.   Abdominal:      General: Bowel sounds are normal. There is distension.      Palpations: Abdomen is soft.      Tenderness: There is no abdominal tenderness. There is no guarding.      Comments: PEG in place.  External bumper at 2.5 cm.  Site c/d/I.  Secured with abd binder   Neurological:      Mental Status: He is easily aroused.      Comments: Unable to assess   Psychiatric:      Comments: Unable to assess         Labs:  Recent Results (from the past 48 hours)   POCT glucose    Collection Time: 07/30/25 11:37 AM   Result Value Ref Range    POCT Glucose 202 (H) 70 - 110 mg/dL   POCT glucose    Collection Time: 07/30/25  8:31 PM   Result Value Ref Range    POCT Glucose 198 (H) 70 - 110 mg/dL   CBC Without Differential    Collection Time: 07/31/25  1:56 AM   Result Value Ref Range    WBC 8.98 4.50 - 11.50 x10(3)/mcL    RBC 4.82 4.70 - 6.10 x10(6)/mcL    Hgb 13.8 (L) 14.0 - " 18.0 g/dL    Hct 43.4 42.0 - 52.0 %    MCV 90.0 80.0 - 94.0 fL    MCH 28.6 27.0 - 31.0 pg    MCHC 31.8 (L) 33.0 - 36.0 g/dL    RDW 13.4 11.5 - 17.0 %    Platelet 242 130 - 400 x10(3)/mcL    MPV 10.2 7.4 - 10.4 fL    NRBC% 0.0 %   Basic Metabolic Panel    Collection Time: 07/31/25  1:56 AM   Result Value Ref Range    Sodium 133 (L) 136 - 145 mmol/L    Potassium 5.4 (H) 3.5 - 5.1 mmol/L    Chloride 98 98 - 107 mmol/L    CO2 26 23 - 31 mmol/L    Glucose 175 (H) 82 - 115 mg/dL    Blood Urea Nitrogen 29.1 (H) 8.4 - 25.7 mg/dL    Creatinine 0.93 0.72 - 1.25 mg/dL    BUN/Creatinine Ratio 31     Calcium 9.9 8.8 - 10.0 mg/dL    Anion Gap 9.0 mEq/L    eGFR >60 mL/min/1.73/m2   POCT glucose    Collection Time: 07/31/25  9:19 AM   Result Value Ref Range    POCT Glucose 164 (H) 70 - 110 mg/dL   POCT glucose    Collection Time: 07/31/25 11:38 AM   Result Value Ref Range    POCT Glucose 149 (H) 70 - 110 mg/dL   POCT glucose    Collection Time: 07/31/25  5:06 PM   Result Value Ref Range    POCT Glucose 174 (H) 70 - 110 mg/dL   POCT glucose    Collection Time: 07/31/25  8:34 PM   Result Value Ref Range    POCT Glucose 158 (H) 70 - 110 mg/dL   POCT glucose    Collection Time: 08/01/25  9:10 AM   Result Value Ref Range    POCT Glucose 145 (H) 70 - 110 mg/dL       Imaging:  CT Head Without Contrast  Result Date: 7/28/2025  EXAMINATION: CT HEAD WITHOUT CONTRAST CLINICAL HISTORY: Mental status change, unknown cause; TECHNIQUE: CT imaging of the head performed from the skull base to the vertex without intravenous contrast.  DLP 1558 mGycm. Automatic exposure control, adjustment of mA/kV or iterative reconstruction technique was used to reduce radiation. COMPARISON: 5 July 2025 FINDINGS: There is no acute cortical infarct, hemorrhage or mass lesion.  No new parenchymal attenuation abnormality.  Ventricular size is stable.  There are vascular calcifications. Paranasal sinuses are clear.  There is bilateral mastoid air cell fluid.     No  acute intracranial findings or significant interval change compared to earlier this month. Electronically signed by: Minh Sy Date:    07/28/2025 Time:    18:04    X-Ray Chest 1 View  Result Date: 7/28/2025  EXAMINATION: XR CHEST 1 VIEW CLINICAL HISTORY: Fever. COMPARISON: 22 July 2025 FINDINGS: Frontal view of the chest was obtained. Endotracheal tube and right PICC have been removed.  Enteric tube remains in place.  Heart is mildly enlarged.  Suspect some mild atelectasis.  No pneumothorax seen.     Suspect some mild atelectasis. Electronically signed by: Minh yS Date:    07/28/2025 Time:    12:58    X-Ray Chest 1 View  Result Date: 7/22/2025  EXAMINATION: XR CHEST 1 VIEW CPT 61478 CLINICAL HISTORY: respiratory failure; COMPARISON: July 21, 2025 FINDINGS: Examination reveals mediastinal silhouette to be within normal limits cardiac silhouette is not enlarged lung fields are clear and free of gross infiltrates or effusions with linear densities in both bases representing subsegmental atelectatic changes. No focal consolidative changes are otherwise identified     Atelectatic changes in both bases. No other significant change as compared with the previous exam Electronically signed by: Cj Richmond Date:    07/22/2025 Time:    07:51    X-Ray Chest 1 View  Result Date: 7/21/2025  EXAMINATION: XR CHEST 1 VIEW CPT 49675 CLINICAL HISTORY: desatting; COMPARISON: July 19, 2025 FINDINGS: Examination reveals cardiomediastinal silhouette and pleuroparenchymal changes to be essentially unchanged as compared with the previous exam. Support catheters remain in place     No significant change as compared with the previous exam Electronically signed by: Cj Richmond Date:    07/21/2025 Time:    11:21    X-Ray Chest 1 View for Line/Tube Placement  Result Date: 7/19/2025  EXAMINATION: XR CHEST 1 VIEW FOR LINE/TUBE PLACEMENT CLINICAL HISTORY: picc placement; TECHNIQUE: One COMPARISON: January 19, 2025.. FINDINGS:  Right PICC line terminates within the distal superior vena cava.  Otherwise, no significant interval change.     Optimal placement right PICC line. Electronically signed by: Guillermo Peraza Date:    07/19/2025 Time:    14:25    X-Ray Chest 1 View  Result Date: 7/19/2025  EXAMINATION: XR CHEST 1 VIEW CLINICAL HISTORY: intubated; COMPARISON: Yesterday FINDINGS: Frontal view of the chest was obtained. Endotracheal tube tip midthoracic trachea.  Enteric tube extends into the stomach.  The heart and mediastinum are unchanged.  No new focal consolidation or pneumothorax.     No significant interval change. Electronically signed by: Mnih Sy Date:    07/19/2025 Time:    08:05    X-Ray Chest 1 View for Line/Tube Placement  Result Date: 7/18/2025  EXAMINATION: XR CHEST 1 VIEW FOR LINE/TUBE PLACEMENT CLINICAL HISTORY: ETT Placement; TECHNIQUE: One view COMPARISON: July 17, 2025. FINDINGS: Cardiopericardial silhouette is within normal limits.  Endotracheal tube tip is 3 cm from the bonilla.  Nasogastric tube traverses into the stomach.  There are left lower lung lobe new patchy opacities which may be related to atelectasis with exclusion developing infiltrates.  No pulmonary edema.  No pneumothorax     1.  Optimal intubation. 2.  Left lower lung lobe new patchy opacities which may be related to atelectasis without exclusion of associated developing infiltrates. Electronically signed by: Guillermo Peraza Date:    07/18/2025 Time:    18:28    X-Ray Chest 1 View  Result Date: 7/17/2025  EXAMINATION: XR CHEST 1 VIEW CPT 39038 CLINICAL HISTORY: hypoxia; COMPARISON: July 15, 2025 FINDINGS: Examination reveals cardiomediastinal silhouette improved parenchymal changes to be essentially unchanged as compared with the previous exam. Endotracheal tube has been removed. Nasogastric tube remains in place     Interval removal of endotracheal tube. No other significant change Electronically signed by: Cj Richmond Date:    07/17/2025  Time:    10:17    X-Ray Abdomen AP 1 View  Result Date: 7/15/2025  EXAMINATION: XR ABDOMEN AP 1 VIEW CLINICAL HISTORY: worsened abdominal distention; TECHNIQUE: AP View(s) of the abdomen. COMPARISON: Radiography 07/14/2025 FINDINGS: Enteric tube side port lies 2 cm below the GE junction.  Generalized colonic distension with proximal colon measuring up to 10 cm in diameter.  Suspect moderate to large volume stool within the proximal colon.  No definitive small bowel dilatation.     Mild generalized colonic distension. Electronically signed by: Rd Darling Date:    07/15/2025 Time:    13:03    X-Ray Chest 1 View  Result Date: 7/15/2025  EXAMINATION: XR CHEST 1 VIEW CLINICAL HISTORY: worsening hypoxia; TECHNIQUE: Frontal view(s) of the chest. COMPARISON: Radiography 07/13/2025 at 14:35 hours FINDINGS: Stable positioning of the endotracheal tube.  Enteric tube extends below the diaphragm.  Mild worsening of left lower lung aeration during the interval with possible small left pleural effusion.  No pneumothorax identified.     Mild worsening of left lower lung aeration since 07/13/2025. Electronically signed by: Rd Darling Date:    07/15/2025 Time:    13:01    X-Ray Abdomen AP 1 View  Result Date: 7/15/2025  EXAMINATION: XR ABDOMEN AP 1 VIEW CLINICAL HISTORY: constipation; TECHNIQUE: AP X-RAY OF THE ABDOMEN: CPT 77060 FINDINGS: Examination reveals some residual feces throughout the colon the gas pattern is nonspecific with no clear evidence of ileus or obstruction no abnormal masses or calcifications identified     Residual feces. Nonspecific gas pattern Electronically signed by: jC Richmond Date:    07/15/2025 Time:    07:08    X-Ray Chest 1 View for Line/Tube Placement  Result Date: 7/13/2025  EXAMINATION: XR CHEST 1 VIEW FOR LINE/TUBE PLACEMENT CLINICAL HISTORY: Advanced ET tube; TECHNIQUE: Single frontal portable view of the chest was performed. COMPARISON: Frontal chest radiograph, 07/13/2025. FINDINGS:  Advancement of endotracheal tube with tip now 4.5 cm above the bonilla.  Esophagogastric tube is unchanged. Minor perihilar interstitial prominence with slight improvement in aeration of lungs compared to prior study.  Probable persistent trace pleural effusions.  No pneumothorax. Stable cardiomediastinal silhouette and osseous structures.     1. Endotracheal tube advancement with tip in good position 4.5 cm above the bonilla. 2. Slight improvement in aeration of the lungs. Electronically signed by: Kushal Brantley MD Date:    07/13/2025 Time:    14:40    X-Ray Chest 1 View  Result Date: 7/13/2025  EXAMINATION: XR CHEST 1 VIEW CLINICAL HISTORY: ET tube. TECHNIQUE: Single frontal view of the chest was performed. COMPARISON: Frontal chest radiograph, 07/12/2025, 07/11/2025. FINDINGS: Endotracheal and esophagogastric tubes are unchanged.  Persistent bilateral lung opacities, similar to prior study.  Unchanged bilateral pleural effusions.  No pneumothorax. Stable cardiomediastinal silhouette and osseous structures.     No significant interval change. Electronically signed by: Kushal Brantley MD Date:    07/13/2025 Time:    10:49    X-Ray Chest 1 View  Result Date: 7/12/2025  EXAMINATION: Single view chest radiograph. CLINICAL HISTORY: ET Tube Placement; TECHNIQUE: Single view of the chest. COMPARISON: Chest radiograph 07/11/2025. FINDINGS: The endotracheal tube and nasogastric tube are unchanged.  The bilateral pleural effusions and lower lobe airspace opacities are unchanged.  There is no pneumothorax.  The cardiac silhouette is enlarged.  There is no acute osseous abnormality.     No significant change from prior exam. Electronically signed by: Kushal Hernandez MD Date:    07/12/2025 Time:    11:11    US Abdomen Complete  Result Date: 7/12/2025  EXAMINATION: US ABDOMEN COMPLETE CLINICAL HISTORY: Persistent fevers; TECHNIQUE: Grayscale and color Doppler images of the abdomen are obtained. COMPARISON: None FINDINGS: The aorta  is nonaneurysmal.  The IVC is patent. The liver is diffusely hyperechogenic.  There is no intrahepatic mass.  There is no biliary dilatation.  The main portal vein is patent with hepatopetal flow.  The liver is enlarged measuring 23.8 cm.  There is no intrahepatic mass.  The gallbladder is contracted.  There is no wall thickening.  There is no cholelithiasis.  There is no sonographic Sauer sign. The right kidney measures 9.7 x 5.0 x 6.9 cm.  The left kidney measures 11.3 x 5.8 x 5.2 cm.  There is no hydronephrosis or nephrolithiasis.  The spleen is not visualized.  The pancreas is not visualized.     Hepatomegaly and diffuse hepatic steatosis with out acute abnormality of the abdomen. Electronically signed by: Kushal Hernandez MD Date:    07/12/2025 Time:    11:06    CV Ultrasound doppler venous legs bilat  Result Date: 7/11/2025  The right lower extremity venous system is patent with no evidence of superficial or deep vein thrombosis. The left lower extremity venous system is patent with no evidence of superficial or deep vein thrombosis.     X-Ray Chest 1 View  Result Date: 7/11/2025  EXAMINATION: XR CHEST 1 VIEW CLINICAL HISTORY: ET Tube Placement; TECHNIQUE: Single frontal view of the chest was performed. COMPARISON: 07/10/2025 FINDINGS: LINES AND TUBES: Endotracheal tube projects over the trachea with tip above the bonilla.  Enteric tube courses below the diaphragm.  EKG/telemetry leads overlie the chest. MEDIASTINUM AND ADITYA: Cardiac silhouette is at the upper limits of normal. LUNGS: Mild improved aeration of the lungs. PLEURA:No pleural effusion. No pneumothorax. OTHER: No acute osseous abnormality.     Mild improved aeration of the lungs. Electronically signed by: Edna Rivas Date:    07/11/2025 Time:    07:10    X-Ray Chest 1 View  Result Date: 7/10/2025  EXAMINATION: XR CHEST 1 VIEW CLINICAL HISTORY: respiratory failure; TECHNIQUE: AP chest COMPARISON: Chest x-ray dated 07/09/2025 FINDINGS: Endotracheal  tube and enteric tube remain in place.  The heart is stable in size.  There are similar bilateral airspace opacities with small left pleural effusion.  There is no definite visible pneumothorax.     Stable exam without significant interval change. Electronically signed by: Paola Paniagua Date:    07/10/2025 Time:    08:14    X-Ray Chest 1 View  Result Date: 7/9/2025  EXAMINATION: Single view chest radiograph. CLINICAL HISTORY: respiratory failure; TECHNIQUE: Single view of the chest. COMPARISON: Chest radiograph 07/07/2025. FINDINGS: The endotracheal tube and nasogastric tube are unchanged.  The left greater than right pleural effusions and associated lower lobe airspace opacities have slightly worsened.  There is no pneumothorax.  The cardiac silhouette is enlarged.     Cardiomegaly with slight interval worsening of the left greater than right pleural effusions and lower lobe airspace opacities reflecting atelectasis versus pneumonia. Electronically signed by: Kushal Hernandez MD Date:    07/09/2025 Time:    05:49    XR Gastric tube check, non-radiologist performed  Result Date: 7/7/2025  EXAMINATION: XR GASTRIC TUBE CHECK, NON-RADIOLOGIST PERFORMED CLINICAL HISTORY: ngt; COMPARISON: No priors     Frontal image of the upper abdomen.  Enteric tube extends into the stomach with the proximal side hole probably just past the GE junction. Electronically signed by: Minh Sy Date:    07/07/2025 Time:    14:30    X-Ray Chest 1 View  Result Date: 7/7/2025  EXAMINATION: XR CHEST 1 VIEW CLINICAL HISTORY: respiratory failure; TECHNIQUE: AP chest COMPARISON: Chest x-ray dated 07/06/2025 FINDINGS: Endotracheal tube remains in place.  The enteric tube has its tip over the distal esophagus.  The heart is stable in size.  There are similar bibasilar airspace opacities with small left pleural effusion.  There is no visible pneumothorax.     Enteric tube tip over the distal esophagus.  Further advancement is recommended.  Otherwise  stable exam. Electronically signed by: Paola Paniagua Date:    07/07/2025 Time:    06:04    Echo  Result Date: 7/6/2025    Left Ventricle: The left ventricle is normal in size. Increased wall thickness. There is mildly reduced systolic function. Ejection fraction is approximately 50%. There is normal diastolic function.   Right Ventricle: Systolic function is normal. TAPSE is 1.9 cm.   Aortic Valve: There is aortic valve sclerosis.   Mitral Valve: There is mitral annular calcification. There is trace regurgitation.   Tricuspid Valve: There is mild regurgitation.   Pulmonic Valve: There is trace regurgitation.   Pulmonary Artery: The estimated pulmonary artery systolic pressure is 30 mmHg.   IVC/SVC: Normal venous pressure at 3 mmHg.   Pericardium: There is no pericardial effusion.     X-Ray Chest 1 View  Result Date: 7/6/2025  EXAMINATION: XR CHEST 1 VIEW CLINICAL HISTORY: respiratory failure; TECHNIQUE: AP chest COMPARISON: Chest x-ray dated 07/05/2025 FINDINGS: Endotracheal tube and enteric tube remain in place.  The heart is stable in size.  There is bibasilar subsegmental atelectasis.  There is no enlarging pleural effusion or visible pneumothorax.     Bibasilar subsegmental atelectasis. Electronically signed by: Paola Paniagua Date:    07/06/2025 Time:    07:48    US Retroperitoneal Complete  Result Date: 7/5/2025  EXAMINATION: US RETROPERITONEAL COMPLETE CLINICAL HISTORY: acute kidney injury; TECHNIQUE: Ultrasound evaluation of the kidneys, region of the ureters, and urinary bladder COMPARISON: None FINDINGS: The right kidney measures 10.6 cm. The left kidney measures 11.11 cm. No hydronephrosis. Renal cortical echogenicity and cortical thickness are normal. The urinary bladder is decompressed with a Benítez catheter in place. No significant findings within the visualized segments of the abdominal aorta and IVC.     No hydronephrosis. Electronically signed by: Paola Paniagua Date:    07/05/2025  Time:    14:32    X-Ray Chest AP Portable  Result Date: 7/5/2025  EXAMINATION: XR CHEST AP PORTABLE CLINICAL HISTORY: fall, shortness of breath; COMPARISON: None FINDINGS: The heart is normal in size.  There is no focal airspace consolidation.  There is no pleural effusion or visible pneumothorax.     No acute abnormality of the chest. Electronically signed by: Paola Paniagua Date:    07/05/2025 Time:    12:10    CT Head Without Contrast  Result Date: 7/5/2025  EXAMINATION: CT HEAD WITHOUT CONTRAST CLINICAL HISTORY: Neuro deficit, acute, stroke suspected; TECHNIQUE: Axial scans were obtained from skull base to the vertex. Coronal and sagittal reconstructions obtained from the axial data. Automatic exposure control was utilized to limit radiation dose. Contrast: None Radiation Dose: Total DLP: 1137 mGy*cm COMPARISON: None FINDINGS: There is no acute intracranial hemorrhage or edema.  There is encephalomalacia in the right frontal and parietal lobes, left occipital lobe and right cerebellum.  Patchy and confluent hypodensities in the subcortical and periventricular white matter, basal ganglia, left thalamus and cerebellum likely represent chronic microvascular ischemic changes. There is no mass effect or midline shift.  There is diffuse parenchymal volume loss.  The basal cisterns are patent. There is no abnormal extra-axial fluid collection.  Carotid artery calcifications are noted. The calvarium and skull base are intact. The visualized paranasal sinuses and the mastoid air cells are clear.     1. No acute intracranial abnormality. 2. Chronic microvascular ischemic changes. No significant change from the [facility] interpretation. Electronically signed by: Paola Paniagua Date:    07/05/2025 Time:    09:28    X-Ray Chest 1 View  Result Date: 7/5/2025  EXAMINATION: XR CHEST 1 VIEW CLINICAL HISTORY: post intubation; TECHNIQUE: AP chest COMPARISON: Chest x-ray dated 07/05/2025 FINDINGS: The endotracheal tube has its  tip 1.8 cm above the bonilla.  The nasogastric tube courses below the diaphragm.  The heart is normal in size.  There are small left basilar airspace opacities.  There is no visible pneumothorax.     Endotracheal tube with tip 1.8 cm above the bonilla Electronically signed by: Paola Paniagua Date:    07/05/2025 Time:    09:15         Assessment/Plan:    69-year-old male with a history of type 2 diabetes, CKD, CVA with right-sided hemiparesis, admitted from a nursing facility with increased weakness, urinary retention, and acute hypoxemic respiratory failure requiring mechanical intubation and ICU admission on 07/05. Since transfer to the floor on 07/25, the patient has remained NG tube dependent and this failed attempts at improving his p.o. intake. We are consulted for PEG tube placement.     s/p EGD/PEG 7/31/25  - Initiate TFs and advance as tolerated to goal as per set orders.   - PEG site care daily.   - Keep abdominal binder in place at all times to prevent dislodgement.    PEG looks good and is without malfunction.  Please call GI if needed.       Candice Patton, BERTINP-C

## 2025-08-01 NOTE — PT/OT/SLP PROGRESS
Physical Therapy Treatment    Patient Name:  Charly Padilla   MRN:  [id number]    Recommendations:     Discharge therapy intensity: Moderate Intensity Therapy   Discharge Equipment Recommendations: to be determined by next level of care  Barriers to discharge: Impaired mobility    Assessment:     Charly Padilla is a 69 y.o. male admitted with a medical diagnosis of Acute hypoxic hypercapnic respiratory failure requiring intubation 7/5-7/16 and reintubation 7/18-7/25, aspiration pneumonia, hx of CVA with R-hemiparesis.  He presents with the following impairments/functional limitations: weakness, impaired endurance, impaired balance, impaired self care skills, impaired functional mobility, impaired cognition, decreased safety awareness, decreased lower extremity function, decreased upper extremity function, impaired coordination, decreased coordination     Pt with HOB elevated upon arrival today, sat on EOB at total Ax2 where his sitting balance was maxA progressing to CGA-SBA intermittently, noted some protective reflexes when decreasing support given. Pt only able to follow 1 command to wiggle toes and actively resisted all other movements attempted by PTA/OT. Pt localized to name and was able to track but would quickly close eyes when asked to perform any other task. Pt returned supine total Ax2 and was positioned for comfort and left with all needs in reach and in no distress.     DME Justification:  No DME recommended requiring DME justifications    Rehab Prognosis: Fair; patient would benefit from acute skilled PT services to address these deficits and reach maximum level of function.    Recent Surgery: Procedure(s) (LRB):  PEG (N/A) 1 Day Post-Op    Plan:     During this hospitalization, patient would benefit from acute PT services 3 x/week to address the identified rehab impairments via therapeutic activities, therapeutic exercises, neuromuscular re-education and progress toward the following goals:    Plan of Care  Expires:  25    Subjective     Chief Complaint: unable to verbalize  Patient/Family Comments/goals: unable to verablize  Pain/Comfort:  Pain Rating 1: other (see comments) (grimaces with movement of R extremities)  Pain Addressed 1: Cessation of Activity, Distraction, Reposition  Pain Rating Post-Intervention 1: other (see comments) (unable to verbalize)      Objective:     Communicated with nsg prior to session.  Patient found HOB elevated with pulse ox (continuous), telemetry, oxygen, mcgill catheter, PEG Tube (bilateral mittens) upon PT entry to room.     General Precautions: Standard, fall, NPO  Orthopedic Precautions: N/A  Braces: N/A  Respiratory Status: Nasal cannula, flow 3 L/min  Blood Pressure:   Skin Integrity: Visible skin intact      Functional Mobility:  Bed Mobility:     Rolling Left:  total assistance and of 2 persons  Rolling Right: total assistance and of 2 persons  Supine to Sit: total assistance and of 2 persons  Sit to Supine: total assistance and of 2 persons    Therapeutic Activities/Exercises:  Static sitting on EOB max A progressing to CGA-SBA intermittently  Attempted LB therex but pt actively resisting PROM and unable to follow commands to perform AROM.     Co-Treatment: Yes, due to High complexity requires 2 sets of skilled hands    Education:  Patient provided with verbal education education regarding PT role/goals/POC, fall prevention, safety awareness, and discharge/DME recommendations.  Additional teaching is warranted.     Patient left HOB elevated with all lines intact, call button in reach, nsg notified, and B mittens reapplied      GOALS:   Multidisciplinary Problems       Physical Therapy Goals          Problem: Physical Therapy    Goal Priority Disciplines Outcome Interventions   Physical Therapy Goal     PT, PT/OT Progressing    Description: Goals to be met by: 25     Patient will increase functional independence with mobility by performin. Supine to sit with  Moderate Assistance  2. Sit to stand transfer with Moderate Assistance  3. Bed to chair transfer with Moderate Assistance using Least Restrictive Assistive Device  4. Sitting at edge of bed x10 minutes with Minimal Assistance  5. Pt will follow 5/5 motor commands for LLE.  6. Gait TBD as pt progresses                         Time Tracking:     PT Received On: 08/01/25  PT Start Time: 1115     PT Stop Time: 1146  PT Total Time (min): 31 min     Billable Minutes: Therapeutic Activity 31    Treatment Type: Treatment  PT/PTA: PTA     Number of PTA visits since last PT visit: 2     08/01/2025

## 2025-08-01 NOTE — PLAN OF CARE
Problem: Infection  Goal: Absence of Infection Signs and Symptoms  Outcome: Progressing     Problem: Adult Inpatient Plan of Care  Goal: Plan of Care Review  Outcome: Progressing     Problem: Adult Inpatient Plan of Care  Goal: Patient-Specific Goal (Individualized)  Outcome: Progressing     Problem: Adult Inpatient Plan of Care  Goal: Absence of Hospital-Acquired Illness or Injury  Outcome: Progressing     Problem: Adult Inpatient Plan of Care  Goal: Optimal Comfort and Wellbeing  Outcome: Progressing     Problem: Skin Injury Risk Increased  Goal: Skin Health and Integrity  Outcome: Progressing     Problem: Fall Injury Risk  Goal: Absence of Fall and Fall-Related Injury  Outcome: Progressing     Problem: Delirium  Goal: Improved Behavioral Control  Outcome: Progressing     Problem: Enteral Nutrition  Goal: Absence of Aspiration Signs and Symptoms  Outcome: Progressing     Problem: Enteral Nutrition  Goal: Safe, Effective Therapy Delivery  Outcome: Progressing     Problem: Enteral Nutrition  Goal: Feeding Tolerance  Outcome: Progressing

## 2025-08-01 NOTE — PROGRESS NOTES
Hospital Medicine  Progress Note    Patient Name: Charly Padilla  MRN: [id number]  Status: IP- Inpatient   Admission Date: 7/5/2025  Length of Stay: 26  Date of Service: 07/31/2025       CC: hospital follow-up for respiratory failure       SUBJECTIVE   HPI and hospital course reviewed.  Today: Patient seen and examined at bedside, and chart reviewed.  Underwent  PEG placement today.  Stable on 3L of oxyegn      MEDICATIONS   Scheduled   amLODIPine  5 mg Per NG tube Daily    enoxparin  40 mg Subcutaneous Q24H (prophylaxis, 1700)    famotidine  20 mg Per NG tube BID    glycopyrrolate  1 mg Per OG tube TID    insulin glargine U-100  30 Units Subcutaneous BID    polyethylene glycol  17 g Per NG tube BID    scopolamine  1 patch Transdermal Q3 Days    senna-docusate  2 tablet Per NG tube Daily    sodium chloride 3%  4 mL Nebulization Q8H     Continuous Infusions  None      PHYSICAL EXAM   VITALS: T 98.1 °F (36.7 °C)   /71   P 96   RR 18   O2 (!) 92 %    GENERAL: Awake and in NAD  LUNGS: Decreased air movement B/L  CVS: Normal rate  GI/: Soft, NT, bowel sounds positive.  EXTREMITIES: No LE edema  NEURO: Awake and alert  PSYCH: Cooperative      LABS   CBC  Recent Labs     07/30/25  0246 07/31/25  0156   WBC 8.96 8.98   RBC 4.68* 4.82   HGB 13.2* 13.8*   HCT 42.7 43.4   MCV 91.2 90.0   MCH 28.2 28.6   MCHC 30.9* 31.8*   RDW 13.8 13.4    242     CHEM  Recent Labs     07/29/25  0227 07/30/25  0246 07/31/25  0156   * 131* 133*   K 5.3* 5.3* 5.4*   CO2 24 26 26   BUN 35.2* 32.3* 29.1*   CREATININE 0.99 1.04 0.93   CALCIUM 9.4 9.6 9.9   MG  --  1.60  --    ALBUMIN 2.7*  --   --    GLOBULIN 5.2*  --   --    ALKPHOS 57  --   --    ALT 14  --   --    AST 11  --   --    BILITOT 0.3  --   --          MICROBIOLOGY     Microbiology Results (last 7 days)       Procedure Component Value Units Date/Time    Blood Culture [[id number]]  (Normal) Collected: 07/28/25 1229    Order Status: Completed Specimen: Blood Updated:  07/31/25 1300     Blood Culture No Growth At 72 Hours    Blood Culture [[id number]]  (Normal) Collected: 07/28/25 1229    Order Status: Completed Specimen: Blood Updated: 07/31/25 1300     Blood Culture No Growth At 72 Hours            ASSESSMENT   Acute hypoxic respiratory failure  OP dysphagia, now s/p PEG  Aspiration pneumonia, resolved  VAN on CKD II  Urinary retention with indwelling Benítez  h/o CVA with right hemiparesis  DM II   Peripheral Neuropathy  Essential Hypertension      PLAN   Initiation of tube feeds per once ok with GI, Travis titrate to goal  OK for meds this evening  Continue oxygen as needed, wean as tolerated  Otherwise continue current management and monitoring in the interim        Prophylaxis: SC Lovenox        Johnnie Haq MD  [facility] Medicine

## 2025-08-01 NOTE — PLAN OF CARE
Problem: Infection  Goal: Absence of Infection Signs and Symptoms  Outcome: Progressing     Problem: Adult Inpatient Plan of Care  Goal: Plan of Care Review  Outcome: Progressing  Goal: Patient-Specific Goal (Individualized)  Outcome: Progressing  Goal: Absence of Hospital-Acquired Illness or Injury  Outcome: Progressing  Goal: Optimal Comfort and Wellbeing  Outcome: Progressing  Goal: Readiness for Transition of Care  Outcome: Progressing     Problem: Mechanical Ventilation Invasive  Goal: Effective Communication  Outcome: Progressing  Goal: Optimal Device Function  Outcome: Progressing  Goal: Mechanical Ventilation Liberation  Outcome: Progressing  Goal: Optimal Nutrition Delivery  Outcome: Progressing  Goal: Absence of Device-Related Skin and Tissue Injury  Outcome: Progressing  Goal: Absence of Ventilator-Induced Lung Injury  Outcome: Progressing

## 2025-08-02 LAB
BACTERIA BLD CULT: NORMAL
BACTERIA BLD CULT: NORMAL
POCT GLUCOSE: 223 MG/DL (ref 70–110)
POCT GLUCOSE: 254 MG/DL (ref 70–110)
POCT GLUCOSE: 261 MG/DL (ref 70–110)
POCT GLUCOSE: 266 MG/DL (ref 70–110)

## 2025-08-02 PROCEDURE — 25000242 PHARM REV CODE 250 ALT 637 W/ HCPCS: Performed by: INTERNAL MEDICINE

## 2025-08-02 PROCEDURE — 25000003 PHARM REV CODE 250

## 2025-08-02 PROCEDURE — 99900035 HC TECH TIME PER 15 MIN (STAT)

## 2025-08-02 PROCEDURE — 27100171 HC OXYGEN HIGH FLOW UP TO 24 HOURS

## 2025-08-02 PROCEDURE — 63600175 PHARM REV CODE 636 W HCPCS: Performed by: INTERNAL MEDICINE

## 2025-08-02 PROCEDURE — 5A09457 ASSISTANCE WITH RESPIRATORY VENTILATION, 24-96 CONSECUTIVE HOURS, CONTINUOUS POSITIVE AIRWAY PRESSURE: ICD-10-PCS | Performed by: INTERNAL MEDICINE

## 2025-08-02 PROCEDURE — 25000003 PHARM REV CODE 250: Performed by: HOSPITALIST

## 2025-08-02 PROCEDURE — 25000003 PHARM REV CODE 250: Performed by: INTERNAL MEDICINE

## 2025-08-02 PROCEDURE — 94660 CPAP INITIATION&MGMT: CPT

## 2025-08-02 PROCEDURE — 21400001 HC TELEMETRY ROOM

## 2025-08-02 PROCEDURE — 94640 AIRWAY INHALATION TREATMENT: CPT

## 2025-08-02 PROCEDURE — 11000001 HC ACUTE MED/SURG PRIVATE ROOM

## 2025-08-02 RX ADMIN — OXYCODONE HYDROCHLORIDE 5 MG: 5 TABLET ORAL at 03:08

## 2025-08-02 RX ADMIN — FAMOTIDINE 20 MG: 20 TABLET, FILM COATED ORAL at 08:08

## 2025-08-02 RX ADMIN — AMLODIPINE BESYLATE 5 MG: 5 TABLET ORAL at 08:08

## 2025-08-02 RX ADMIN — POLYETHYLENE GLYCOL 3350 17 G: 17 POWDER, FOR SOLUTION ORAL at 09:08

## 2025-08-02 RX ADMIN — GLYCOPYRROLATE 1 MG: 1 TABLET ORAL at 09:08

## 2025-08-02 RX ADMIN — SODIUM CHLORIDE SOLN NEBU 3% 4 ML: 3 NEBU SOLN at 08:08

## 2025-08-02 RX ADMIN — FAMOTIDINE 20 MG: 20 TABLET, FILM COATED ORAL at 09:08

## 2025-08-02 RX ADMIN — SENNOSIDES, DOCUSATE SODIUM 2 TABLET: 8.6; 5 TABLET ORAL at 08:08

## 2025-08-02 RX ADMIN — GLYCOPYRROLATE 1 MG: 1 TABLET ORAL at 08:08

## 2025-08-02 RX ADMIN — POLYETHYLENE GLYCOL 3350 17 G: 17 POWDER, FOR SOLUTION ORAL at 08:08

## 2025-08-02 RX ADMIN — SODIUM CHLORIDE SOLN NEBU 3% 4 ML: 3 NEBU SOLN at 12:08

## 2025-08-02 RX ADMIN — INSULIN GLARGINE 30 UNITS: 100 INJECTION, SOLUTION SUBCUTANEOUS at 09:08

## 2025-08-02 RX ADMIN — INSULIN GLARGINE 30 UNITS: 100 INJECTION, SOLUTION SUBCUTANEOUS at 08:08

## 2025-08-02 RX ADMIN — GLYCOPYRROLATE 1 MG: 1 TABLET ORAL at 03:08

## 2025-08-02 RX ADMIN — INSULIN ASPART 4 UNITS: 100 INJECTION, SOLUTION INTRAVENOUS; SUBCUTANEOUS at 07:08

## 2025-08-02 RX ADMIN — ENOXAPARIN SODIUM 40 MG: 40 INJECTION SUBCUTANEOUS at 04:08

## 2025-08-02 NOTE — PROGRESS NOTES
"Hospital Medicine  Progress Note    Patient Name: Charly Padilla  MRN: [id number]  Status: IP- Inpatient   Admission Date: 7/5/2025  Length of Stay: 28  Date of Service: 08/02/2025       CC: hospital follow-up for respiratory failure       SUBJECTIVE    69 y.o. male with CVA with right-sided hemiparesis, DM2, neuropathy, CKD 2, hypertension, HLD,  presented on 7/5/2025 from nursing facility with increased generalized weakness.   He is reported as having a Benítez catheter placed about 1 week ago prior to presentation for urinary retention. Was found to be in acute hypoxic respiratory failure.  Admitted to ICU.  Patient has now completed antibiotics.       Review of hospital course patient was intubated and mechanical ventilation on 07/05/2025, had echocardiogram which showed EF 45 50% with mild valvular heart disease, extubated on 07/16 reintubated on 07/18 and then lastly extubated on 07/25/2025.       Downgraded from ICU to Medicine Services on 7/27/25. No acute distress, nonverbal, with right hemiplegia, he does track with his eyes. He does not follow simple commands appropriately, he has an NG tube with tube feeding free water flush.     7/28 Tmax 100.3F overnight, monitor closely off abx for now. Send Blood cx.   Awake, non verbal. However based on prior charts from admission patient was ?verbal  Cxr - Suspect some mild atelectasis  CT head No acute intracranial findings or significant interval change compared to earlier this month."    Underwent  PEG placement 7/31.      Today: Patient seen and examined at bedside, and chart reviewed.  Tolerating tube feeds at goal.  Remains confused.      MEDICATIONS   Scheduled   amLODIPine  5 mg Per NG tube Daily    enoxparin  40 mg Subcutaneous Q24H (prophylaxis, 1700)    famotidine  20 mg Per NG tube BID    glycopyrrolate  1 mg Per OG tube TID    insulin glargine U-100  30 Units Subcutaneous BID    polyethylene glycol  17 g Per NG tube BID    scopolamine  1 patch Transdermal Q3 " Days    senna-docusate  2 tablet Per NG tube Daily    sodium chloride 3%  4 mL Nebulization Q8H     Continuous Infusions  None      PHYSICAL EXAM   VITALS: T 98.4 °F (36.9 °C)   /76   P 100   RR 20   O2 98 %    GENERAL: Awake and in NAD  LUNGS: Decreased air movement B/L  CVS: Normal rate  GI/: Soft, NT, bowel sounds positive.  EXTREMITIES: No LE edema  NEURO: Awake and alert, not well oriented  PSYCH: Cooperative      LABS   CBC  Recent Labs     07/31/25  0156   WBC 8.98   RBC 4.82   HGB 13.8*   HCT 43.4   MCV 90.0   MCH 28.6   MCHC 31.8*   RDW 13.4        CHEM  Recent Labs     07/31/25  0156   *   K 5.4*   CO2 26   BUN 29.1*   CREATININE 0.93   CALCIUM 9.9         MICROBIOLOGY     Microbiology Results (last 7 days)       Procedure Component Value Units Date/Time    Blood Culture [[id number]]  (Normal) Collected: 07/28/25 1229    Order Status: Completed Specimen: Blood Updated: 08/02/25 1300     Blood Culture No Growth at 5 days    Blood Culture [[id number]]  (Normal) Collected: 07/28/25 1229    Order Status: Completed Specimen: Blood Updated: 08/02/25 1300     Blood Culture No Growth at 5 days            ASSESSMENT   Acute hypoxic respiratory failure  OP dysphagia, s/p PEG 7/31  Aspiration pneumonia, resolved  VAN on CKD II  Urinary retention with indwelling Benítez  h/o CVA with right hemiparesis  DM II   Peripheral Neuropathy  Essential Hypertension      PLAN   Continue tube feeds, wean oxygen  CM working on placement  Otherwise continue current management and monitoring in the interim        Prophylaxis: SC Lovenox        Johnnie Haq MD  [facility] Medicine

## 2025-08-03 LAB
ALBUMIN SERPL-MCNC: 2.9 G/DL (ref 3.4–4.8)
ALBUMIN/GLOB SERPL: 0.6 RATIO (ref 1.1–2)
ALP SERPL-CCNC: 71 UNIT/L (ref 40–150)
ALT SERPL-CCNC: 12 UNIT/L (ref 0–55)
ANION GAP SERPL CALC-SCNC: 6 MEQ/L
AST SERPL-CCNC: 11 UNIT/L (ref 11–45)
BILIRUB SERPL-MCNC: 0.4 MG/DL
BUN SERPL-MCNC: 33.9 MG/DL (ref 8.4–25.7)
CALCIUM SERPL-MCNC: 9.5 MG/DL (ref 8.8–10)
CHLORIDE SERPL-SCNC: 97 MMOL/L (ref 98–107)
CO2 SERPL-SCNC: 29 MMOL/L (ref 23–31)
CREAT SERPL-MCNC: 1.01 MG/DL (ref 0.72–1.25)
CREAT/UREA NIT SERPL: 34
ERYTHROCYTE [DISTWIDTH] IN BLOOD BY AUTOMATED COUNT: 13.6 % (ref 11.5–17)
GFR SERPLBLD CREATININE-BSD FMLA CKD-EPI: >60 ML/MIN/1.73/M2
GLOBULIN SER-MCNC: 5.2 GM/DL (ref 2.4–3.5)
GLUCOSE SERPL-MCNC: 189 MG/DL (ref 82–115)
HCT VFR BLD AUTO: 41.2 % (ref 42–52)
HGB BLD-MCNC: 13.3 G/DL (ref 14–18)
MAGNESIUM SERPL-MCNC: 1.9 MG/DL (ref 1.6–2.6)
MCH RBC QN AUTO: 28.9 PG (ref 27–31)
MCHC RBC AUTO-ENTMCNC: 32.3 G/DL (ref 33–36)
MCV RBC AUTO: 89.4 FL (ref 80–94)
NRBC BLD AUTO-RTO: 0 %
PHOSPHATE SERPL-MCNC: 3.5 MG/DL (ref 2.3–4.7)
PLATELET # BLD AUTO: 222 X10(3)/MCL (ref 130–400)
PMV BLD AUTO: 9.9 FL (ref 7.4–10.4)
POCT GLUCOSE: 189 MG/DL (ref 70–110)
POCT GLUCOSE: 196 MG/DL (ref 70–110)
POCT GLUCOSE: 221 MG/DL (ref 70–110)
POTASSIUM SERPL-SCNC: 5.1 MMOL/L (ref 3.5–5.1)
PROT SERPL-MCNC: 8.1 GM/DL (ref 5.8–7.6)
RBC # BLD AUTO: 4.61 X10(6)/MCL (ref 4.7–6.1)
SODIUM SERPL-SCNC: 132 MMOL/L (ref 136–145)
WBC # BLD AUTO: 10.12 X10(3)/MCL (ref 4.5–11.5)

## 2025-08-03 PROCEDURE — 99900031 HC PATIENT EDUCATION (STAT)

## 2025-08-03 PROCEDURE — 25000003 PHARM REV CODE 250: Performed by: HOSPITALIST

## 2025-08-03 PROCEDURE — 27000221 HC OXYGEN, UP TO 24 HOURS

## 2025-08-03 PROCEDURE — 85027 COMPLETE CBC AUTOMATED: CPT | Performed by: INTERNAL MEDICINE

## 2025-08-03 PROCEDURE — 84100 ASSAY OF PHOSPHORUS: CPT | Performed by: INTERNAL MEDICINE

## 2025-08-03 PROCEDURE — 80053 COMPREHEN METABOLIC PANEL: CPT | Performed by: INTERNAL MEDICINE

## 2025-08-03 PROCEDURE — 25000003 PHARM REV CODE 250: Performed by: CLINICAL NURSE SPECIALIST

## 2025-08-03 PROCEDURE — 25000003 PHARM REV CODE 250: Performed by: INTERNAL MEDICINE

## 2025-08-03 PROCEDURE — 94640 AIRWAY INHALATION TREATMENT: CPT

## 2025-08-03 PROCEDURE — 25000003 PHARM REV CODE 250

## 2025-08-03 PROCEDURE — 11000001 HC ACUTE MED/SURG PRIVATE ROOM

## 2025-08-03 PROCEDURE — 99900035 HC TECH TIME PER 15 MIN (STAT)

## 2025-08-03 PROCEDURE — 94660 CPAP INITIATION&MGMT: CPT

## 2025-08-03 PROCEDURE — 36415 COLL VENOUS BLD VENIPUNCTURE: CPT | Performed by: INTERNAL MEDICINE

## 2025-08-03 PROCEDURE — 63600175 PHARM REV CODE 636 W HCPCS: Performed by: INTERNAL MEDICINE

## 2025-08-03 PROCEDURE — 83735 ASSAY OF MAGNESIUM: CPT | Performed by: INTERNAL MEDICINE

## 2025-08-03 PROCEDURE — 25000242 PHARM REV CODE 250 ALT 637 W/ HCPCS: Performed by: INTERNAL MEDICINE

## 2025-08-03 PROCEDURE — 94760 N-INVAS EAR/PLS OXIMETRY 1: CPT

## 2025-08-03 RX ADMIN — POLYETHYLENE GLYCOL 3350 17 G: 17 POWDER, FOR SOLUTION ORAL at 09:08

## 2025-08-03 RX ADMIN — OXYCODONE HYDROCHLORIDE 5 MG: 5 TABLET ORAL at 12:08

## 2025-08-03 RX ADMIN — OXYCODONE HYDROCHLORIDE 5 MG: 5 TABLET ORAL at 04:08

## 2025-08-03 RX ADMIN — GLYCOPYRROLATE 1 MG: 1 TABLET ORAL at 09:08

## 2025-08-03 RX ADMIN — OXYCODONE HYDROCHLORIDE 5 MG: 5 TABLET ORAL at 08:08

## 2025-08-03 RX ADMIN — SCOPOLAMINE 1 PATCH: 1 PATCH TRANSDERMAL at 09:08

## 2025-08-03 RX ADMIN — ACETAMINOPHEN 650 MG: 325 TABLET ORAL at 09:08

## 2025-08-03 RX ADMIN — INSULIN GLARGINE 30 UNITS: 100 INJECTION, SOLUTION SUBCUTANEOUS at 09:08

## 2025-08-03 RX ADMIN — INSULIN GLARGINE 30 UNITS: 100 INJECTION, SOLUTION SUBCUTANEOUS at 08:08

## 2025-08-03 RX ADMIN — GLYCOPYRROLATE 1 MG: 1 TABLET ORAL at 03:08

## 2025-08-03 RX ADMIN — ACETAMINOPHEN 650 MG: 325 TABLET ORAL at 03:08

## 2025-08-03 RX ADMIN — FAMOTIDINE 20 MG: 20 TABLET, FILM COATED ORAL at 09:08

## 2025-08-03 RX ADMIN — SENNOSIDES, DOCUSATE SODIUM 2 TABLET: 8.6; 5 TABLET ORAL at 09:08

## 2025-08-03 RX ADMIN — INSULIN ASPART 4 UNITS: 100 INJECTION, SOLUTION INTRAVENOUS; SUBCUTANEOUS at 12:08

## 2025-08-03 RX ADMIN — SODIUM CHLORIDE SOLN NEBU 3% 4 ML: 3 NEBU SOLN at 08:08

## 2025-08-03 RX ADMIN — AMLODIPINE BESYLATE 5 MG: 5 TABLET ORAL at 09:08

## 2025-08-03 RX ADMIN — SODIUM CHLORIDE SOLN NEBU 3% 4 ML: 3 NEBU SOLN at 04:08

## 2025-08-03 RX ADMIN — ENOXAPARIN SODIUM 40 MG: 40 INJECTION SUBCUTANEOUS at 04:08

## 2025-08-03 RX ADMIN — INSULIN ASPART 2 UNITS: 100 INJECTION, SOLUTION INTRAVENOUS; SUBCUTANEOUS at 05:08

## 2025-08-03 NOTE — PROGRESS NOTES
"Hospital Medicine  Progress Note    Patient Name: Charly Padilla  MRN: [id number]  Status: IP- Inpatient   Admission Date: 7/5/2025  Length of Stay: 29  Date of Service: 08/03/2025       CC: hospital follow-up for respiratory failure       SUBJECTIVE    69 y.o. male with CVA with right-sided hemiparesis, DM2, neuropathy, CKD 2, hypertension, HLD,  presented on 7/5/2025 from nursing facility with increased generalized weakness.   He is reported as having a Benítez catheter placed about 1 week ago prior to presentation for urinary retention. Was found to be in acute hypoxic respiratory failure.  Admitted to ICU.  Patient has now completed antibiotics.       Review of hospital course patient was intubated and mechanical ventilation on 07/05/2025, had echocardiogram which showed EF 45 50% with mild valvular heart disease, extubated on 07/16 reintubated on 07/18 and then lastly extubated on 07/25/2025.       Downgraded from ICU to Medicine Services on 7/27/25. No acute distress, nonverbal, with right hemiplegia, he does track with his eyes. He does not follow simple commands appropriately, he has an NG tube with tube feeding free water flush.     7/28 Tmax 100.3F overnight, monitor closely off abx for now. Send Blood cx.   Awake, non verbal. However based on prior charts from admission patient was ?verbal  Cxr - Suspect some mild atelectasis  CT head No acute intracranial findings or significant interval change compared to earlier this month."    Underwent  PEG placement 7/31.      Today: Patient seen and examined at bedside, and chart reviewed.  No new issues.      MEDICATIONS   Scheduled   amLODIPine  5 mg Per NG tube Daily    enoxparin  40 mg Subcutaneous Q24H (prophylaxis, 1700)    famotidine  20 mg Per NG tube BID    glycopyrrolate  1 mg Per OG tube TID    insulin glargine U-100  30 Units Subcutaneous BID    polyethylene glycol  17 g Per NG tube BID    scopolamine  1 patch Transdermal Q3 Days    senna-docusate  2 tablet " Per NG tube Daily    sodium chloride 3%  4 mL Nebulization Q8H     Continuous Infusions  None      PHYSICAL EXAM   VITALS: T 98.2 °F (36.8 °C)   /63   P 89   RR 18   O2 97 %    GENERAL: Awake and in NAD  LUNGS: Decreased air movement B/L  CVS: Normal rate  GI/: Soft, NT, bowel sounds positive.  EXTREMITIES: No LE edema  NEURO: Awake and alert, not well oriented  PSYCH: Cooperative      LABS   CBC  Recent Labs     08/03/25  0351   WBC 10.12   RBC 4.61*   HGB 13.3*   HCT 41.2*   MCV 89.4   MCH 28.9   MCHC 32.3*   RDW 13.6        CHEM  Recent Labs     08/03/25  0351   *   K 5.1   CO2 29   BUN 33.9*   CREATININE 1.01   CALCIUM 9.5   MG 1.90   PHOS 3.5   ALBUMIN 2.9*   GLOBULIN 5.2*   ALKPHOS 71   ALT 12   AST 11   BILITOT 0.4         MICROBIOLOGY     Microbiology Results (last 7 days)       Procedure Component Value Units Date/Time    Blood Culture [[id number]]  (Normal) Collected: 07/28/25 1229    Order Status: Completed Specimen: Blood Updated: 08/02/25 1300     Blood Culture No Growth at 5 days    Blood Culture [[id number]]  (Normal) Collected: 07/28/25 1229    Order Status: Completed Specimen: Blood Updated: 08/02/25 1300     Blood Culture No Growth at 5 days            ASSESSMENT   Acute hypoxic respiratory failure  OP dysphagia, s/p PEG 7/31  Aspiration pneumonia, resolved  VAN on CKD II  Urinary retention with indwelling Benítez  h/o CVA with right hemiparesis  DM II   Peripheral Neuropathy  Essential Hypertension      PLAN   Continue tube feeds, wean oxygen  CM working on placement  Otherwise continue current management and monitoring in the interim        Prophylaxis: SC Lovenox        Johnnie Haq MD  [facility] Medicine

## 2025-08-04 LAB
OHS QRS DURATION: 76 MS
OHS QTC CALCULATION: 430 MS
POCT GLUCOSE: 123 MG/DL (ref 70–110)
POCT GLUCOSE: 124 MG/DL (ref 70–110)
POCT GLUCOSE: 149 MG/DL (ref 70–110)
POCT GLUCOSE: 174 MG/DL (ref 70–110)
POCT GLUCOSE: 206 MG/DL (ref 70–110)

## 2025-08-04 PROCEDURE — 94761 N-INVAS EAR/PLS OXIMETRY MLT: CPT

## 2025-08-04 PROCEDURE — 27100171 HC OXYGEN HIGH FLOW UP TO 24 HOURS

## 2025-08-04 PROCEDURE — 94660 CPAP INITIATION&MGMT: CPT

## 2025-08-04 PROCEDURE — 11000001 HC ACUTE MED/SURG PRIVATE ROOM

## 2025-08-04 PROCEDURE — 99900035 HC TECH TIME PER 15 MIN (STAT)

## 2025-08-04 PROCEDURE — 63600175 PHARM REV CODE 636 W HCPCS

## 2025-08-04 PROCEDURE — 94640 AIRWAY INHALATION TREATMENT: CPT

## 2025-08-04 PROCEDURE — 63600175 PHARM REV CODE 636 W HCPCS: Performed by: INTERNAL MEDICINE

## 2025-08-04 PROCEDURE — 94760 N-INVAS EAR/PLS OXIMETRY 1: CPT

## 2025-08-04 PROCEDURE — 25000003 PHARM REV CODE 250: Performed by: INTERNAL MEDICINE

## 2025-08-04 PROCEDURE — 25000003 PHARM REV CODE 250: Performed by: HOSPITALIST

## 2025-08-04 PROCEDURE — 99900026 HC AIRWAY MAINTENANCE (STAT)

## 2025-08-04 PROCEDURE — 93005 ELECTROCARDIOGRAM TRACING: CPT

## 2025-08-04 PROCEDURE — 94799 UNLISTED PULMONARY SVC/PX: CPT

## 2025-08-04 PROCEDURE — 25000003 PHARM REV CODE 250

## 2025-08-04 PROCEDURE — 94668 MNPJ CHEST WALL SBSQ: CPT

## 2025-08-04 PROCEDURE — 25000242 PHARM REV CODE 250 ALT 637 W/ HCPCS: Performed by: INTERNAL MEDICINE

## 2025-08-04 PROCEDURE — 93010 ELECTROCARDIOGRAM REPORT: CPT | Mod: ,,, | Performed by: STUDENT IN AN ORGANIZED HEALTH CARE EDUCATION/TRAINING PROGRAM

## 2025-08-04 PROCEDURE — 99900031 HC PATIENT EDUCATION (STAT)

## 2025-08-04 RX ORDER — TRAZODONE HYDROCHLORIDE 50 MG/1
50 TABLET ORAL NIGHTLY PRN
Status: DISCONTINUED | OUTPATIENT
Start: 2025-08-04 | End: 2025-08-07 | Stop reason: HOSPADM

## 2025-08-04 RX ORDER — HALOPERIDOL LACTATE 5 MG/ML
2 INJECTION, SOLUTION INTRAMUSCULAR ONCE
Status: COMPLETED | OUTPATIENT
Start: 2025-08-04 | End: 2025-08-04

## 2025-08-04 RX ORDER — HALOPERIDOL LACTATE 5 MG/ML
5 INJECTION, SOLUTION INTRAMUSCULAR EVERY 6 HOURS PRN
Status: DISCONTINUED | OUTPATIENT
Start: 2025-08-04 | End: 2025-08-07 | Stop reason: HOSPADM

## 2025-08-04 RX ADMIN — SODIUM CHLORIDE SOLN NEBU 3% 4 ML: 3 NEBU SOLN at 07:08

## 2025-08-04 RX ADMIN — INSULIN GLARGINE 30 UNITS: 100 INJECTION, SOLUTION SUBCUTANEOUS at 08:08

## 2025-08-04 RX ADMIN — SODIUM CHLORIDE SOLN NEBU 3% 4 ML: 3 NEBU SOLN at 12:08

## 2025-08-04 RX ADMIN — AMLODIPINE BESYLATE 5 MG: 5 TABLET ORAL at 08:08

## 2025-08-04 RX ADMIN — SENNOSIDES, DOCUSATE SODIUM 2 TABLET: 8.6; 5 TABLET ORAL at 08:08

## 2025-08-04 RX ADMIN — GLYCOPYRROLATE 1 MG: 1 TABLET ORAL at 08:08

## 2025-08-04 RX ADMIN — FAMOTIDINE 20 MG: 20 TABLET, FILM COATED ORAL at 10:08

## 2025-08-04 RX ADMIN — GLYCOPYRROLATE 1 MG: 1 TABLET ORAL at 10:08

## 2025-08-04 RX ADMIN — ENOXAPARIN SODIUM 40 MG: 40 INJECTION SUBCUTANEOUS at 05:08

## 2025-08-04 RX ADMIN — SODIUM CHLORIDE SOLN NEBU 3% 4 ML: 3 NEBU SOLN at 03:08

## 2025-08-04 RX ADMIN — FAMOTIDINE 20 MG: 20 TABLET, FILM COATED ORAL at 08:08

## 2025-08-04 RX ADMIN — OXYCODONE HYDROCHLORIDE 5 MG: 5 TABLET ORAL at 12:08

## 2025-08-04 RX ADMIN — HALOPERIDOL LACTATE 2 MG: 5 INJECTION, SOLUTION INTRAMUSCULAR at 01:08

## 2025-08-04 RX ADMIN — POLYETHYLENE GLYCOL 3350 17 G: 17 POWDER, FOR SOLUTION ORAL at 08:08

## 2025-08-04 RX ADMIN — POLYETHYLENE GLYCOL 3350 17 G: 17 POWDER, FOR SOLUTION ORAL at 10:08

## 2025-08-04 RX ADMIN — SODIUM CHLORIDE SOLN NEBU 3% 4 ML: 3 NEBU SOLN at 08:08

## 2025-08-04 RX ADMIN — INSULIN GLARGINE 30 UNITS: 100 INJECTION, SOLUTION SUBCUTANEOUS at 10:08

## 2025-08-04 RX ADMIN — GLYCOPYRROLATE 1 MG: 1 TABLET ORAL at 03:08

## 2025-08-04 NOTE — CONSULTS
"8/4/2025  Charly Padilla   1955   [id number]            Psychiatry Initial Consult Note     Date of Admission: 7/5/2025  6:07 AM    Chief Complaint: Psychiatric consult for "combative behavior"    SUBJECTIVE:   History of Present Illness:   Charly Padilla is a 69 y.o. male with a past medical history that includes CVA with right-sided hemiparesis, T2DM, CKD2, HTN, and HLD who presented to [facility] ED on 07/05/25 from [facility] for fall when trying to get out of the bed. Also reported to have pulled out his own mcgill the day prior which was placed about a week prior for urinary retention. While in ER, he was reported as becoming more somnolent with poor overall respiratory effort and decreasing level of consciousness. ABG revealed acute respiratory acidosis. Was intubated and admitted to ICU. CT brain obtained in ED reveals chronic microvascular ischemic changes with no acute intracranial abnormalities. Was extubated on 07/16 and reintubated on 07/18. Last extubated on 07/25/25. Underwent PEG placement on 07/31.    Seen by psychiatry today due to fact that he has been combative with staff. Paperwork from nursing home shows history of depression and anxiety. Upon evaluation he is resting quietly in bed though with legs half-way out of bed. No psychomotor agitation present. He answers questions minimally and does not answer open-ended questions. States current location as [facility]. Answers most questions in the negative such as if he has ever been  or has children. He does have at least one child and is  per his face sheet. He displays poor eye-contact and impaired attention. Not observed to be responding to internal stimuli.         Past Psychiatric History:   Previous Psychiatric Hospitalizations: Denies   Previous Medication Trials: Denies  Previous Suicide Attempts: Denies   Outpatient psychiatrist: None    Current Medications:   Home Psychiatric Meds: None    Past Medical/Surgical History: "   History reviewed. No pertinent past medical history.  Past Surgical History:   Procedure Laterality Date    ESOPHAGOGASTRODUODENOSCOPY W/ PEG N/A 7/31/2025    Procedure: PEG;  Surgeon: Minh Carrillo MD;  Location: [facility] ENDOSCOPY;  Service: Gastroenterology;  Laterality: N/A;         Family Psychiatric History:   Denies     Allergies:   Review of patient's allergies indicates:  No Known Allergies    Substance Abuse History:   Tobacco: Denies  Alcohol: Denies  Illicit Substances: Denies  Treatment: Denies        Scheduled Meds:    amLODIPine  5 mg Per NG tube Daily    enoxparin  40 mg Subcutaneous Q24H (prophylaxis, 1700)    famotidine  20 mg Per NG tube BID    glycopyrrolate  1 mg Per OG tube TID    insulin glargine U-100  30 Units Subcutaneous BID    polyethylene glycol  17 g Per NG tube BID    scopolamine  1 patch Transdermal Q3 Days    senna-docusate  2 tablet Per NG tube Daily    sodium chloride 3%  4 mL Nebulization Q8H      PRN Meds:   Current Facility-Administered Medications:     acetaminophen, 650 mg, Per NG tube, Q6H PRN    dextrose 50%, 12.5 g, Intravenous, PRN    dextrose 50%, 12.5 g, Intravenous, PRN    fentaNYL, 50 mcg, Intravenous, Q3H PRN    glucagon (human recombinant), 1 mg, Intramuscular, PRN    glucagon (human recombinant), 1 mg, Intramuscular, PRN    hydrALAZINE, 10 mg, Intravenous, Q6H PRN    insulin aspart U-100, 0-10 Units, Subcutaneous, Q6H PRN    labetalol, 20 mg, Intravenous, Q6H PRN    oxyCODONE, 5 mg, Per NG tube, Q6H PRN    Flushing PICC/Midline Protocol, , , Until Discontinued **AND** sodium chloride 0.9%, 10 mL, Intravenous, Q12H PRN   Psychotherapeutics (From admission, onward)      None              Social History:  Housing Status: Nursing home  Relationship Status/Sexual Orientation:    Children: Yes  Education: Unable to assess  Employment Status/Info: Unable to assess    history: Denies  History of physical/sexual abuse: Denies   Access to gun: Denies        Legal History:   Past Charges/Incarcerations: Deneis   Pending charges: Denies      OBJECTIVE:     Vitals   Vitals:    08/04/25 1226   BP: 115/67   Pulse: 94   Resp:    Temp: 98.2 °F (36.8 °C)        Labs/Imaging/Studies:   Recent Results (from the past 48 hours)   POCT glucose    Collection Time: 08/02/25  5:34 PM   Result Value Ref Range    POCT Glucose 266 (H) 70 - 110 mg/dL   POCT glucose    Collection Time: 08/02/25  9:20 PM   Result Value Ref Range    POCT Glucose 261 (H) 70 - 110 mg/dL   Comprehensive Metabolic Panel    Collection Time: 08/03/25  3:51 AM   Result Value Ref Range    Sodium 132 (L) 136 - 145 mmol/L    Potassium 5.1 3.5 - 5.1 mmol/L    Chloride 97 (L) 98 - 107 mmol/L    CO2 29 23 - 31 mmol/L    Glucose 189 (H) 82 - 115 mg/dL    Blood Urea Nitrogen 33.9 (H) 8.4 - 25.7 mg/dL    Creatinine 1.01 0.72 - 1.25 mg/dL    Calcium 9.5 8.8 - 10.0 mg/dL    Protein Total 8.1 (H) 5.8 - 7.6 gm/dL    Albumin 2.9 (L) 3.4 - 4.8 g/dL    Globulin 5.2 (H) 2.4 - 3.5 gm/dL    Albumin/Globulin Ratio 0.6 (L) 1.1 - 2.0 ratio    Bilirubin Total 0.4 <=1.5 mg/dL    ALP 71 40 - 150 unit/L    ALT 12 0 - 55 unit/L    AST 11 11 - 45 unit/L    eGFR >60 mL/min/1.73/m2    Anion Gap 6.0 mEq/L    BUN/Creatinine Ratio 34    CBC Without Differential    Collection Time: 08/03/25  3:51 AM   Result Value Ref Range    WBC 10.12 4.50 - 11.50 x10(3)/mcL    RBC 4.61 (L) 4.70 - 6.10 x10(6)/mcL    Hgb 13.3 (L) 14.0 - 18.0 g/dL    Hct 41.2 (L) 42.0 - 52.0 %    MCV 89.4 80.0 - 94.0 fL    MCH 28.9 27.0 - 31.0 pg    MCHC 32.3 (L) 33.0 - 36.0 g/dL    RDW 13.6 11.5 - 17.0 %    Platelet 222 130 - 400 x10(3)/mcL    MPV 9.9 7.4 - 10.4 fL    NRBC% 0.0 %   Phosphorus    Collection Time: 08/03/25  3:51 AM   Result Value Ref Range    Phosphorus Level 3.5 2.3 - 4.7 mg/dL   Magnesium    Collection Time: 08/03/25  3:51 AM   Result Value Ref Range    Magnesium Level 1.90 1.60 - 2.60 mg/dL   POCT glucose    Collection Time: 08/03/25 10:41 AM   Result Value  "Ref Range    POCT Glucose 221 (H) 70 - 110 mg/dL   POCT glucose    Collection Time: 08/03/25  5:07 PM   Result Value Ref Range    POCT Glucose 189 (H) 70 - 110 mg/dL   POCT glucose    Collection Time: 08/03/25  8:28 PM   Result Value Ref Range    POCT Glucose 196 (H) 70 - 110 mg/dL   POCT glucose    Collection Time: 08/04/25  1:15 AM   Result Value Ref Range    POCT Glucose 123 (H) 70 - 110 mg/dL   EKG 12-lead    Collection Time: 08/04/25  3:28 AM   Result Value Ref Range    QRS Duration 76 ms    [facility] QTC Calculation 430 ms   POCT glucose    Collection Time: 08/04/25  8:54 AM   Result Value Ref Range    POCT Glucose 174 (H) 70 - 110 mg/dL   POCT glucose    Collection Time: 08/04/25 12:04 PM   Result Value Ref Range    POCT Glucose 206 (H) 70 - 110 mg/dL      No results found for: "PHENYTOIN", "PHENOBARB", "VALPROATE", "CBMZ"        Psychiatric Mental Status Exam:  General Appearance: dressed in hospital garb, lying in bed, in no acute distress  Arousal: drowsy  Behavior: minimal responses, poor eye contact  Movements and Motor Activity: no tics, no tremors, no akathisia, no dystonia, no evidence of tardive dyskinesia  Orientation: oriented to person only  Speech: coherent, increased latency of response  Mood: Guarded  Affect: constricted  Thought Process: difficult to assess due to poverty of thought  Associations: no loosening of associations  Thought Content and Perceptions: no suicidal or homicidal ideation, no auditory or visual hallucinations, no paranoid ideation, no ideas of reference, no evidence of delusions or psychosis  Recent and Remote Memory: impaired; per interview/observation with patient  Attention and Concentration: impaired; per interview/observation with patient  Fund of Knowledge: vocabulary appropriate; based on history, vocabulary, fund of knowledge, syntax, grammar, and content  Insight: impaired; based on understanding of severity of illness and HPI  Judgment: impaired; based on patient's " behavior and HPI          ASSESSMENT/PLAN:   Diagnoses:  Agitation  Unspecified cognitive disorder      Problem lists and Management Plans:  Medication Management  Haloperidol 5mg IV Q6hr PRN agitation  Trazodone 50mg PO QHS PRN insomnia  Legal  PEC not recommended. Low risk of imminent harm to self or others.  Recommend delirium precautions  Monitoring  EKG today with Qtc of 430ms  Psychiatry will continue to follow        Lorenzo Pastrana

## 2025-08-04 NOTE — PLAN OF CARE
Clinical updates sent to patient's facility [facility] residence[facility]. Made facility aware there is no expected discharge date at this time.

## 2025-08-04 NOTE — PLAN OF CARE
Problem: Infection  Goal: Absence of Infection Signs and Symptoms  Outcome: Progressing     Problem: Adult Inpatient Plan of Care  Goal: Plan of Care Review  Outcome: Progressing  Goal: Patient-Specific Goal (Individualized)  Outcome: Progressing  Goal: Absence of Hospital-Acquired Illness or Injury  Outcome: Progressing  Goal: Optimal Comfort and Wellbeing  Outcome: Progressing  Goal: Readiness for Transition of Care  Outcome: Progressing     Problem: Mechanical Ventilation Invasive  Goal: Effective Communication  Outcome: Progressing  Goal: Optimal Device Function  Outcome: Progressing  Goal: Mechanical Ventilation Liberation  Outcome: Progressing  Goal: Optimal Nutrition Delivery  Outcome: Progressing  Goal: Absence of Device-Related Skin and Tissue Injury  Outcome: Progressing  Goal: Absence of Ventilator-Induced Lung Injury  Outcome: Progressing     Problem: Artificial Airway  Goal: Effective Communication  Outcome: Progressing  Goal: Optimal Device Function  Outcome: Progressing  Goal: Absence of Device-Related Skin or Tissue Injury  Outcome: Progressing     Problem: Noninvasive Ventilation Acute  Goal: Effective Unassisted Ventilation and Oxygenation  Outcome: Progressing     Problem: Skin Injury Risk Increased  Goal: Skin Health and Integrity  Outcome: Progressing     Problem: Fall Injury Risk  Goal: Absence of Fall and Fall-Related Injury  Outcome: Progressing     Problem: Delirium  Goal: Optimal Coping  Outcome: Progressing  Goal: Improved Behavioral Control  Outcome: Progressing  Goal: Improved Attention and Thought Clarity  Outcome: Progressing  Goal: Improved Sleep  Outcome: Progressing     Problem: Bariatric Environmental Safety  Goal: Safety Maintained with Care  Outcome: Progressing     Problem: Coping Ineffective  Goal: Effective Coping  Outcome: Progressing     Problem: Hospitalized Older Adult  Goal: Optimal Cognitive Function  Outcome: Progressing  Goal: Effective Bowel Elimination  Outcome:  Progressing  Goal: Optimal Coping  Outcome: Progressing  Goal: Fluid and Electrolyte Balance  Outcome: Progressing  Goal: Optimal Functional Ability  Outcome: Progressing  Goal: Improved Oral Intake  Outcome: Progressing  Goal: Adequate Sleep/Rest  Outcome: Progressing  Goal: Effective Urinary Elimination  Outcome: Progressing     Problem: Diabetes Comorbidity  Goal: Blood Glucose Level Within Targeted Range  Outcome: Progressing     Problem: Enteral Nutrition  Goal: Absence of Aspiration Signs and Symptoms  Outcome: Progressing  Goal: Safe, Effective Therapy Delivery  Outcome: Progressing  Goal: Feeding Tolerance  Outcome: Progressing

## 2025-08-04 NOTE — PROGRESS NOTES
"Hospital Medicine  Progress Note    Patient Name: Charly Padilla  MRN: [id number]  Status: IP- Inpatient   Admission Date: 7/5/2025  Length of Stay: 30  Date of Service: 08/04/2025       CC: hospital follow-up for respiratory failure       SUBJECTIVE    69 y.o. male with CVA with right-sided hemiparesis, DM2, neuropathy, CKD 2, hypertension, HLD,  presented on 7/5/2025 from nursing facility with increased generalized weakness.   He is reported as having a Benítez catheter placed about 1 week ago prior to presentation for urinary retention. Was found to be in acute hypoxic respiratory failure.  Admitted to ICU.  Patient has now completed antibiotics.       Review of hospital course patient was intubated and mechanical ventilation on 07/05/2025, had echocardiogram which showed EF 45 50% with mild valvular heart disease, extubated on 07/16 reintubated on 07/18 and then lastly extubated on 07/25/2025.       Downgraded from ICU to Medicine Services on 7/27/25. No acute distress, nonverbal, with right hemiplegia, he does track with his eyes. He does not follow simple commands appropriately, he has an NG tube with tube feeding free water flush.     7/28 Tmax 100.3F overnight, monitor closely off abx for now. Send Blood cx.   Awake, non verbal. However based on prior charts from admission patient was ?verbal  Cxr - Suspect some mild atelectasis  CT head No acute intracranial findings or significant interval change compared to earlier this month."    Underwent  PEG placement 7/31; tube feeds initiated.      Today: Patient seen and examined at bedside, and chart reviewed.  Tolerating tube feeds, though remains confused.  He is now combative with staff.      MEDICATIONS   Scheduled   amLODIPine  5 mg Per NG tube Daily    enoxparin  40 mg Subcutaneous Q24H (prophylaxis, 1700)    famotidine  20 mg Per NG tube BID    glycopyrrolate  1 mg Per OG tube TID    insulin glargine U-100  30 Units Subcutaneous BID    polyethylene glycol  17 g " Per NG tube BID    scopolamine  1 patch Transdermal Q3 Days    senna-docusate  2 tablet Per NG tube Daily    sodium chloride 3%  4 mL Nebulization Q8H     Continuous Infusions  None      PHYSICAL EXAM   VITALS: T 98.2 °F (36.8 °C)   /67   P 94   RR 16   O2 (!) 92 %    GENERAL: Awake and in NAD  LUNGS: Decreased air movement B/L  CVS: Normal rate  GI/: Soft, NT, bowel sounds positive.  EXTREMITIES: No LE edema  NEURO: Awake and alert, not well oriented  PSYCH: Cooperative      LABS   CBC  Recent Labs     08/03/25  0351   WBC 10.12   RBC 4.61*   HGB 13.3*   HCT 41.2*   MCV 89.4   MCH 28.9   MCHC 32.3*   RDW 13.6        CHEM  Recent Labs     08/03/25  0351   *   K 5.1   CO2 29   BUN 33.9*   CREATININE 1.01   CALCIUM 9.5   MG 1.90   PHOS 3.5   ALBUMIN 2.9*   GLOBULIN 5.2*   ALKPHOS 71   ALT 12   AST 11   BILITOT 0.4         MICROBIOLOGY     Microbiology Results (last 7 days)       Procedure Component Value Units Date/Time    Blood Culture [[id number]]  (Normal) Collected: 07/28/25 1229    Order Status: Completed Specimen: Blood Updated: 08/02/25 1300     Blood Culture No Growth at 5 days    Blood Culture [[id number]]  (Normal) Collected: 07/28/25 1229    Order Status: Completed Specimen: Blood Updated: 08/02/25 1300     Blood Culture No Growth at 5 days            ASSESSMENT   Acute hypoxic respiratory failure  OP dysphagia, s/p PEG 7/31  Aspiration pneumonia, resolved  VAN on CKD II  Urinary retention with indwelling Benítez  h/o CVA with right hemiparesis  DM II   Peripheral Neuropathy  Essential Hypertension      PLAN   Continue tube feeds, wean oxygen  Will consult psych for medications assistance  Sedatives as needed for safety  CM working on placement  Otherwise continue current management and monitoring in the interim        Prophylaxis: SC Lovenoonel Haq MD  [facility]

## 2025-08-04 NOTE — PT/OT/SLP PROGRESS
Physical Therapy      Patient Name:  Charly Padilla   MRN:  [id number]    Attempted PT this AM, pt very lethargic and unable to keep eyes open to participate. Attempted to reposition pt in bed however pt not following commands and actively resisting. Will re-attempt at later time.

## 2025-08-04 NOTE — CONSULTS
Inpatient Nutrition Assessment    Admit Date: 7/5/2025   Total duration of encounter: 30 days   Patient Age: 69 y.o.    Nutrition Recommendation/Prescription     If not appropriate for bolus tube feedings, increase rate of continuous feeds to run during the day 6am-9pm to allow rest while on BiPAP at night.  Continuous tube feeding recommendations:  Diabetisource AC goal rate of 100 ml/hr will provide  1800 kcal/d 100% needs  90 g protein/d 100% needs  135 g CHO/d 65% needs  2400 g potassium daily  1230 ml water 66% needs, 85% with current 50 ml q3hr water flushes  calculations based on estimated 15 hour run time       If patient able to sit up for at least 30 minutes, trial bolus tube feedings.   -First bolus volume of 100 ml. Then can increase each proceeding bolus by 50 ml until volume of 250 ml is achieved.   Goal Bolus tube feeding recommendations:  250ml Diabetisource AC 6x daily will provide  1800 kcals/d 91% needs   90 g protein/d 100% needs   150 g CHO/d 68% needs  1224 ml fl/d 62% needs    -Medical management of hyperglycemia.       Communication of Recommendations: reviewed with nurse    Nutrition Assessment     Malnutrition Assessment/Nutrition-Focused Physical Exam       Malnutrition Level: other (see comments) (Does not meet criteria) (07/07/25 1041)                                                        A minimum of two characteristics is recommended for diagnosis of either severe or non-severe malnutrition.    Chart Review    Reason Seen: follow-up    Malnutrition Screening Tool Results   Have you recently lost weight without trying?: No  Have you been eating poorly because of a decreased appetite?: No   MST Score: 0   Diagnosis:  Acute combined hypoxic and hypercapnic respiratory failure  History of cerebrovascular disease with right hemiparesis  Acute kidney injury superimposed on stage II chronic kidney disease, obstructive uropathy component.  He remains nonoliguric, daily improvement in  BUN/creatinine   Type 2 diabetes mellitus with polyneuropathy, hyperglycemia improved.  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, glaucoma, hypertension, and hyperlipidemia     Scheduled Medications:  amLODIPine, 5 mg, Daily  enoxparin, 40 mg, Q24H (prophylaxis, 1700)  famotidine, 20 mg, BID  glycopyrrolate, 1 mg, TID  insulin glargine U-100, 30 Units, BID  polyethylene glycol, 17 g, BID  scopolamine, 1 patch, Q3 Days  senna-docusate, 2 tablet, Daily  sodium chloride 3%, 4 mL, Q8H    Continuous Infusions:     PRN Medications:  acetaminophen, 650 mg, Q6H PRN  dextrose 50%, 12.5 g, PRN  dextrose 50%, 12.5 g, PRN  fentaNYL, 50 mcg, Q3H PRN  glucagon (human recombinant), 1 mg, PRN  glucagon (human recombinant), 1 mg, PRN  hydrALAZINE, 10 mg, Q6H PRN  insulin aspart U-100, 0-10 Units, Q6H PRN  labetalol, 20 mg, Q6H PRN  oxyCODONE, 5 mg, Q6H PRN  sodium chloride 0.9%, 10 mL, Q12H PRN    Calorie Containing IV Medications: no significant kcals from medications at this time    Recent Labs   Lab 07/29/25  0227 07/30/25  0246 07/31/25  0156 08/03/25  0351   * 131* 133* 132*   K 5.3* 5.3* 5.4* 5.1   CALCIUM 9.4 9.6 9.9 9.5   PHOS  --   --   --  3.5   MG  --  1.60  --  1.90    98 98 97*   CO2 24 26 26 29   BUN 35.2* 32.3* 29.1* 33.9*   CREATININE 0.99 1.04 0.93 1.01   EGFRNORACEVR >60 >60 >60 >60   * 197* 175* 189*   BILITOT 0.3  --   --  0.4   ALKPHOS 57  --   --  71   ALT 14  --   --  12   AST 11  --   --  11   ALBUMIN 2.7*  --   --  2.9*   WBC 7.75 8.96 8.98 10.12   HGB 13.2* 13.2* 13.8* 13.3*   HCT 41.1* 42.7 43.4 41.2*     Nutrition Orders:  Diet NPO Except for: Medication  Tube Feedings/Formulas 80; 1,600; Diabetisource AC; Peg; 50; Every 3 hours    Appetite/Oral Intake: NPO/NPO  Factors Affecting Nutritional Intake: NPO  Social  Needs Impacting Access to Food: none identified; NH resident   Food/[place]/Cultural Preferences: unable to obtain  Food Allergies: no known food allergies  Last Bowel Movement: 07/28/25  Wound(s):  no partial or full thickness pressure injuries documented at this time.     Comments    7/7/25: Pt intubated on mechanical ventilation, receiving kcals from diprivan. Not currently receiving enteral nutrition. Tube feeding orders updated.     7/11/25 Patient remains on ventilator with propofol, tube feeding at previous goal rate; recommend decreasing tube feeding rate due to overfeeding with propofol currently, will need to increase protein modular.    7/15/25: Patient remains intubated, no longer receiving kcals from propofol. TF put on hold this morning d/t worsening abdominal distention this morning. TF regimen updated for once able to resume feeds.     7/18/25 Tube feeding held currently secondary to suspected aspiration, possibly resume today.    7/22/25 Patient remains intubated, not currently receiving kcals from propofol. Tolerating tube feedings at goal rate.    7/25/25 Patient remains intubated. Tolerating tube feedings at goal rate. Receiving 30 units lantus BID and SSI prn for hyperglycemia.     7/29/25: Patient extubated, now on BiPAP at night. RN reports patient tolerating tube feeding at goal rate.     8/1/25: PEG placed yesterday. Consult received to restart tube feedings. Re estimated energy requirements since extubation and changed to diabetic formula s/t hyperglycemia. Also with hyperkalemia, not receiving daily potassium binder.      8/4/25: Tube feedings currently on hold, which per RN, has been happening multiple times day s/t pt's positioning in bed. MD is requesting to hold TF at night s/t BiPAP use, will increase daytime volume to 100ml/hr to infuse ~15hr during the day. Discussed bolus feedings with RN, she does not think patient could safely sit up and remain in place >30 minutes to safely  "change to bolus feeds. Psych is seeing patient, will see their input and monitor patient's ability to safely trial bolus feeds.     Anthropometrics    Height: 5' 8" (172.7 cm), Height Method: Estimated  Last Weight: 105 kg (231 lb 7.7 oz) (08/04/25 0600), Weight Method: Bed Scale  BMI (Calculated): 35.2  BMI Classification: obese grade II (BMI 35-39.9)        Ideal Body Weight (IBW), Male: 154 lb     % Ideal Body Weight, Male (lb): 175.08 %                          Usual Weight Provided By: EMR weight history    Wt Readings from Last 5 Encounters:   08/04/25 105 kg (231 lb 7.7 oz)     Weight Change(s) Since Admission:   8/4/25 -8.4kg from stated admit wt (113.4kg); possible weight loss related to fluid fluctuations; repeat NFPA on follow-up  7/25/25 no updated weights   Wt Readings from Last 1 Encounters:   08/04/25 0600 105 kg (231 lb 7.7 oz)   08/01/25 1350 104.5 kg (230 lb 6.1 oz)   07/18/25 0600 115.5 kg (254 lb 10.1 oz)   07/13/25 0800 122.3 kg (269 lb 10 oz)   07/05/25 0605 113.4 kg (250 lb)   Admit Weight: 113.4 kg (250 lb) (07/05/25 0605), Weight Method: Stated    Estimated Needs    Weight Used For Calorie Calculations: 104.5 kg (230 lb 6.1 oz)  Energy Calorie Requirements (kcal): 6657-9775 kcals/d (MSJ x 1.1-1.2 SF)  Energy Need Method: [facility]  Weight Used For Protein Calculations: 104.5 kg (230 lb 6.1 oz)  Protein Requirements:  g/d (0.8-1 g/kg; CKD, DM)  Fluid Requirements (mL): 9010-2874 ml fl/d (1 ml/kcal)  CHO Requirement: 222-242 g carbohydrates daily (45% calories)     Enteral Nutrition   8/4/25 on hold   Formula: Diabetisource AC  Rate/Volume: 80ml/hr  Water Flushes: 50ml q3hr  Additives/Modulars: none at this time  Route: PEG tube  Method: continuous  Total Nutrition Provided by Tube Feeding, Additives, and Flushes:  Calories Provided   1920 kcal/d, 97% needs   Protein Provided   96 g/d, 100% needs   Fluid Provided   1612 ml/d, 82% needs   Continuous feeding calculations based on " estimated 20 hr/d run time unless otherwise stated.     Parenteral Nutrition Patient not receiving parenteral nutrition support at this time.    Evaluation of Received Nutrient Intake    Calories: not meeting estimated needs  Protein: not meeting estimated needs    Patient Education Not applicable.    Nutrition Diagnosis     PES: Inadequate energy intake related to inability to consume sufficient nutrients as evidenced by on mechanical ventilation, need for NPO status. (active)   PES: Inadequate oral intake related to acute illness as evidenced by on mechanical ventilation since admit (resolved), NPO s/p PEG. (active)   PES: N/A            Nutrition Interventions     Intervention(s): Enteral nutrition management  Intervention(s):      Goal: Meet greater than 80% of nutritional needs by follow-up. (goal not met)  Goal: Tolerate enteral feeding at goal rate by follow-up. (goal not met)    Nutrition Goals & Monitoring     Dietitian will monitor: energy intake, enteral nutrition intake, weight, electrolyte/renal panel, glucose/endocrine profile, and gastrointestinal profile  Discharge planning: too early to determine; pending clinical course  Nutrition Risk/Follow-Up: patient at increased nutrition risk; dietitian will follow-up twice weekly   Please consult if re-assessment needed sooner.

## 2025-08-05 LAB
POCT GLUCOSE: 102 MG/DL (ref 70–110)
POCT GLUCOSE: 123 MG/DL (ref 70–110)
POCT GLUCOSE: 158 MG/DL (ref 70–110)
POCT GLUCOSE: 205 MG/DL (ref 70–110)

## 2025-08-05 PROCEDURE — 25000003 PHARM REV CODE 250

## 2025-08-05 PROCEDURE — 94640 AIRWAY INHALATION TREATMENT: CPT

## 2025-08-05 PROCEDURE — 99900035 HC TECH TIME PER 15 MIN (STAT)

## 2025-08-05 PROCEDURE — 27000221 HC OXYGEN, UP TO 24 HOURS

## 2025-08-05 PROCEDURE — 11000001 HC ACUTE MED/SURG PRIVATE ROOM

## 2025-08-05 PROCEDURE — 94761 N-INVAS EAR/PLS OXIMETRY MLT: CPT

## 2025-08-05 PROCEDURE — 94668 MNPJ CHEST WALL SBSQ: CPT

## 2025-08-05 PROCEDURE — 25000003 PHARM REV CODE 250: Performed by: INTERNAL MEDICINE

## 2025-08-05 PROCEDURE — 97530 THERAPEUTIC ACTIVITIES: CPT | Mod: CQ

## 2025-08-05 PROCEDURE — 94760 N-INVAS EAR/PLS OXIMETRY 1: CPT

## 2025-08-05 PROCEDURE — 63600175 PHARM REV CODE 636 W HCPCS: Performed by: INTERNAL MEDICINE

## 2025-08-05 PROCEDURE — 97110 THERAPEUTIC EXERCISES: CPT

## 2025-08-05 PROCEDURE — 25000242 PHARM REV CODE 250 ALT 637 W/ HCPCS: Performed by: INTERNAL MEDICINE

## 2025-08-05 PROCEDURE — 97530 THERAPEUTIC ACTIVITIES: CPT

## 2025-08-05 RX ADMIN — INSULIN ASPART 2 UNITS: 100 INJECTION, SOLUTION INTRAVENOUS; SUBCUTANEOUS at 12:08

## 2025-08-05 RX ADMIN — INSULIN GLARGINE 30 UNITS: 100 INJECTION, SOLUTION SUBCUTANEOUS at 09:08

## 2025-08-05 RX ADMIN — SODIUM CHLORIDE SOLN NEBU 3% 4 ML: 3 NEBU SOLN at 09:08

## 2025-08-05 RX ADMIN — FAMOTIDINE 20 MG: 20 TABLET, FILM COATED ORAL at 09:08

## 2025-08-05 RX ADMIN — AMLODIPINE BESYLATE 5 MG: 5 TABLET ORAL at 08:08

## 2025-08-05 RX ADMIN — FAMOTIDINE 20 MG: 20 TABLET, FILM COATED ORAL at 08:08

## 2025-08-05 RX ADMIN — SODIUM CHLORIDE SOLN NEBU 3% 4 ML: 3 NEBU SOLN at 03:08

## 2025-08-05 RX ADMIN — POLYETHYLENE GLYCOL 3350 17 G: 17 POWDER, FOR SOLUTION ORAL at 08:08

## 2025-08-05 RX ADMIN — GLYCOPYRROLATE 1 MG: 1 TABLET ORAL at 08:08

## 2025-08-05 RX ADMIN — ENOXAPARIN SODIUM 40 MG: 40 INJECTION SUBCUTANEOUS at 05:08

## 2025-08-05 RX ADMIN — GLYCOPYRROLATE 1 MG: 1 TABLET ORAL at 02:08

## 2025-08-05 RX ADMIN — SENNOSIDES, DOCUSATE SODIUM 2 TABLET: 8.6; 5 TABLET ORAL at 08:08

## 2025-08-05 RX ADMIN — GLYCOPYRROLATE 1 MG: 1 TABLET ORAL at 09:08

## 2025-08-05 NOTE — PROGRESS NOTES
8/5/2025  Charly Padilla   1955   [id number]        Psychiatry Progress Note       SUBJECTIVE:   Charly Padilla is a 69 y.o. male with a past medical history that includes CVA with right-sided hemiparesis, T2DM, CKD2, HTN, and HLD who presented to [facility] ED on 07/05/25 from [facility] for fall when trying to get out of the bed. Also reported to have pulled out his own mcgill the day prior which was placed about a week prior for urinary retention. While in ER, he was reported as becoming more somnolent with poor overall respiratory effort and decreasing level of consciousness. ABG revealed acute respiratory acidosis. Was intubated and admitted to ICU. CT brain obtained in ED reveals chronic microvascular ischemic changes with no acute intracranial abnormalities. Was extubated on 07/16 and reintubated on 07/18. Last extubated on 07/25/25. Underwent PEG placement on 07/31.     Currently sitting up in a chair asleep, easily awoken, but impaired attention, quickly falling back to sleep, and not participating in evaluation. No combative or otherwise inappropriate behavior in last twenty-four hours.        Current Medications:   Scheduled Meds:    amLODIPine  5 mg Per NG tube Daily    enoxparin  40 mg Subcutaneous Q24H (prophylaxis, 1700)    famotidine  20 mg Per NG tube BID    glycopyrrolate  1 mg Per OG tube TID    insulin glargine U-100  30 Units Subcutaneous BID    polyethylene glycol  17 g Per NG tube BID    scopolamine  1 patch Transdermal Q3 Days    senna-docusate  2 tablet Per NG tube Daily    sodium chloride 3%  4 mL Nebulization Q8H      PRN Meds:   Current Facility-Administered Medications:     acetaminophen, 650 mg, Per NG tube, Q6H PRN    dextrose 50%, 12.5 g, Intravenous, PRN    dextrose 50%, 12.5 g, Intravenous, PRN    fentaNYL, 50 mcg, Intravenous, Q3H PRN    glucagon (human recombinant), 1 mg, Intramuscular, PRN    glucagon (human recombinant), 1 mg, Intramuscular, PRN    haloperidol lactate, 5 mg,  Intravenous, Q6H PRN    hydrALAZINE, 10 mg, Intravenous, Q6H PRN    insulin aspart U-100, 0-10 Units, Subcutaneous, Q6H PRN    labetalol, 20 mg, Intravenous, Q6H PRN    oxyCODONE, 5 mg, Per NG tube, Q6H PRN    Flushing PICC/Midline Protocol, , , Until Discontinued **AND** sodium chloride 0.9%, 10 mL, Intravenous, Q12H PRN    traZODone, 50 mg, Per G Tube, Nightly PRN   Psychotherapeutics (From admission, onward)      Start     Stop Route Frequency Ordered    08/04/25 1742  traZODone tablet 50 mg         -- PER G TUBE Nightly PRN 08/04/25 1642    08/04/25 1742  haloperidol lactate injection 5 mg         -- IV Every 6 hours PRN 08/04/25 1642            Allergies:   Review of patient's allergies indicates:  No Known Allergies     OBJECTIVE:   Vitals   Vitals:    08/05/25 1054   BP: 113/76   Pulse: 92   Resp:    Temp: 96.6 °F (35.9 °C)        Labs/Imaging/Studies:   Recent Results (from the past 36 hours)   EKG 12-lead    Collection Time: 08/04/25  3:28 AM   Result Value Ref Range    QRS Duration 76 ms    [facility] QTC Calculation 430 ms   POCT glucose    Collection Time: 08/04/25  8:54 AM   Result Value Ref Range    POCT Glucose 174 (H) 70 - 110 mg/dL   POCT glucose    Collection Time: 08/04/25 12:04 PM   Result Value Ref Range    POCT Glucose 206 (H) 70 - 110 mg/dL   POCT glucose    Collection Time: 08/04/25  5:45 PM   Result Value Ref Range    POCT Glucose 149 (H) 70 - 110 mg/dL   POCT glucose    Collection Time: 08/04/25  8:35 PM   Result Value Ref Range    POCT Glucose 124 (H) 70 - 110 mg/dL   POCT glucose    Collection Time: 08/05/25  5:28 AM   Result Value Ref Range    POCT Glucose 102 70 - 110 mg/dL   POCT glucose    Collection Time: 08/05/25 11:51 AM   Result Value Ref Range    POCT Glucose 158 (H) 70 - 110 mg/dL        Psychiatric Mental Status Exam:  General Appearance: appears stated age, dressed in hospital garb, in no acute distress, sitting in chair  Arousal: drowsy  Behavior: reluctant to participate, poor eye  contact  Movements and Motor Activity: no tics, no tremors, no akathisia, no dystonia, no evidence of tardive dyskinesia  Orientation: Unable to Assess  Speech: mute  Mood: Guarded  Affect: blunted  Thought Process: Unable to Assess  Associations: Unable to Assess  Thought Content and Perceptions: Unable to Assess  Recent and Remote Memory: Unable to Assess; per interview/observation with patient  Attention and Concentration: impaired; per interview/observation with patient  Fund of Knowledge: Unable to Assess; based on history, vocabulary, fund of knowledge, syntax, grammar, and content  Insight: impaired; based on understanding of severity of illness and HPI  Judgment: impaired; based on patient's behavior and HPI    ASSESSMENT/PLAN:   Problems Addressed/Diagnoses:  Agitation  Unspecified cognitive disorder    Plan:  Medication Management  Haloperidol 5mg IV Q6hr PRN agitation  Trazodone 50mg PO QHS PRN insomnia  Legal  PEC not recommended. Low risk of imminent harm to self or others.  Recommend delirium precautions  Psychiatry will sign-off        Lorenzo Pastrana

## 2025-08-05 NOTE — PT/OT/SLP PROGRESS
Occupational Therapy   Treatment    Name: Charly Padilla  MRN: [id number]    Recommendations:     Recommended therapy intensity at discharge: Moderate Intensity Therapy   Discharge Equipment Recommendations:  to be determined by next level of care  Barriers to discharge:  Other (Comment) (placement)    Assessment:     Charly Padilla is a 69 y.o. male with a medical diagnosis of Acute hypoxic hypercapnic respiratory failure requiring intubation 7/5-7/16 and reintubation 7/18-7/25, aspiration pneumonia, hx of CVA with R-hemiparesis.  He presents with fair tolerance for today's session with improved command following, verbalizations, attention to task, and mobility. Performance deficits affecting function are weakness, impaired endurance, impaired self care skills, impaired functional mobility, gait instability, impaired balance, impaired cognition, decreased upper extremity function, decreased lower extremity function, decreased safety awareness, pain, abnormal tone, impaired coordination, impaired fine motor, impaired cardiopulmonary response to activity. Patient demo's improvement from previous session as evidenced by tolerance sit <> stand with max A x2, and completing lateral step transfer from EOB >chair with max A x2 with B HHA.     DME Justification: No DME recommended requiring DME justifications    Rehab Prognosis:  Fair; patient would benefit from acute skilled OT services to address these deficits and reach maximum level of function.       Plan:     Patient to be seen 3 x/week to address the above listed problems via self-care/home management, therapeutic activities, therapeutic exercises, neuromuscular re-education, wheelchair management/training  Plan of Care Expires: 08/25/25  Plan of Care Reviewed with: patient    Subjective     Pain/Comfort:  Pain Rating 1: other (see comments) (patient with noted grimacing with R UE PROM; not formally rated)    Objective:     Communicated with: RN prior to session.  Patient  "found right sidelying with blood pressure cuff, mcgill catheter, oxygen, peripheral IV, pulse ox (continuous), telemetry, PEG Tube upon OT entry to room.    General Precautions: Standard, fall, NPO    Orthopedic Precautions:N/A  Braces: N/A  Respiratory Status: Nasal cannula, flow 3 L/min  Vital Signs: Sp02: ranging low 90s throughout today's session     Occupational Performance:     Functional Mobility/Transfers:  Bed mobility:    Supine to Sit: maximal assistance and of 2 persons  Transfers: Sit to Stand: maximal assistance and of 2 persons with hand-held assist  Bed to Chair: maximal assistance and of 2 persons with hand-held assist using Step Transfer    Activities of Daily Living:  Grooming: minimum assistance Patient complete face washing and applied lotion to B hands in supported short sitting EOB with min A for thoroughness  Lower Body Dressing: total assistance Don B socks supine in bed     Balance:   Static Sitting Balance: One UE support: Patient tolerate static short sitting EOB 1x ~10 minutes req mod A for neutral trunk positioning secondary to significant L lateral leaning.     Therapeutic Activities:  Patient complete 2 trials of L UE functional reaching for household objects  in supported short sitting EOB ~24" from chest across midline with focus on improved R attention , trunk rotation, and functional reaching; req min A for proximal stability.     Therapeutic Exercise:  Patient complete the following therex (unilateral forearm prop with contralateral UE overhead reaching 1x1 alternating R/ L UE, R UE PROM 1x4 in all planes) in supported short sitting EOB with focus on improved trunk rotation, R UE weightbearing, B UE strengthening, AROM, and proximal stability needed for engagement in BADLs; req max A for hand over hand assist for thoroughness.     Therapeutic Positioning    OT interventions performed during the course of today's session in an effort to prevent and/or reduce acquired pressure " injuries:   Positioning recommendations were communicated to care team     [facility] [address] [facility] 6 Click ADL: 8    Co-Treatment: Yes, due to High complexity requires 2 sets of skilled hands    Patient Education:  Patient provided with verbal education education regarding OT role/goals/POC, fall prevention, safety awareness, Discharge/DME recommendations, pressure ulcer prevention, and home exercise program .  Additional teaching is warranted.      Patient left up in chair with all lines intact, call button in reach, geomat cushion, and RN notified. Haresh in place.    GOALS:   Multidisciplinary Problems       Occupational Therapy Goals          Problem: Occupational Therapy    Goal Priority Disciplines Outcome Interventions   Occupational Therapy Goal     OT, PT/OT Progressing    Description: LTG: Pt will perform basic ADLs and ADL t/fs with min A using LRAD by d/c.    STG: to be met by 8/11  1. Pt will follow >80% of simple one step commands  2. Pt will perform grooming EOB with min A.   3. Pt will perform functional grasp/reach/release of items with L UE >80% of trials in prep for increased participation in ADL tasks.   4. Pt will perform sit<>stand with mod A x 2 in prep for ADL t/fs.                        Time Tracking:     OT Date of Treatment: 08/05/25  OT Start Time: 1152  OT Stop Time: 1223  OT Total Time (min): 31 min    Billable Minutes:Therapeutic Activity 20  Therapeutic Exercise 11    OT/EDILBERTO: OT     Number of EDILBERTO visits since last OT visit: 3    8/5/2025

## 2025-08-05 NOTE — PT/OT/SLP PROGRESS
Physical Therapy Treatment    Patient Name:  Charly Padilla   MRN:  [id number]    Recommendations:     Discharge therapy intensity: Moderate Intensity Therapy   Discharge Equipment Recommendations: to be determined by next level of care  Barriers to discharge: Impaired mobility    Assessment:     Charly Padilla is a 69 y.o. male admitted with a medical diagnosis of Acute hypoxic hypercapnic respiratory failure requiring intubation 7/5-7/16 and reintubation 7/18-7/25, aspiration pneumonia, hx of CVA with R-hemiparesis.  He presents with the following impairments/functional limitations: weakness, impaired endurance, impaired balance, impaired self care skills, impaired functional mobility, impaired cognition, decreased safety awareness, decreased lower extremity function, decreased upper extremity function, impaired coordination, decreased coordination.     Pt tolerated session well. Pt able to sit EOB mostly ModA for static sitting balance. Pt stood x2 from EOB with MaxA x2 then step t/f to the R to the chair. Pt speaking some during session and appeared to be in a happy mood. Will cont to progress.     DME Justification:  No DME recommended requiring DME justifications    Rehab Prognosis: Fair; patient would benefit from acute skilled PT services to address these deficits and reach maximum level of function.    Recent Surgery: Procedure(s) (LRB):  PEG (N/A) 5 Days Post-Op    Plan:     During this hospitalization, patient would benefit from acute PT services 3 x/week to address the identified rehab impairments via therapeutic activities, therapeutic exercises, neuromuscular re-education and progress toward the following goals:    Plan of Care Expires:  08/28/25    Subjective     Chief Complaint: none stated  Patient/Family Comments/goals: none stated  Pain/Comfort:  Pain Rating 1:  (not rated, grimiced with R hand movement)  Location - Side 1: Right  Location 1: hand  Pain Addressed 1: Distraction, Reposition      Objective:      Communicated with nsg prior to session.  Patient found right sidelying with pulse ox (continuous), telemetry, oxygen, peripheral IV, mcgill catheter, PEG Tube upon PT entry to room.     General Precautions: Standard, fall, NPO  Orthopedic Precautions: N/A  Braces: N/A  Respiratory Status: Nasal cannula, flow 3 L/min  Blood Pressure: NT  Skin Integrity: Visible skin intact      Functional Mobility:  Bed Mobility:     Scooting: maximal assistance  Supine to Sit: maximal assistance and of 2 persons  Transfers:     Sit to Stand:  maximal assistance and of 2 persons with no AD  Bed to Chair: maximal assistance and of 2 persons with  no AD  using  Step Transfer  Balance: Static sitting Joanna-MaxA    Therapeutic Activities/Exercises:      Co-Treatment: Yes, due to High complexity requires 2 sets of skilled hands    Education:  Patient provided with verbal education education regarding PT role/goals/POC, fall prevention, safety awareness, and discharge/DME recommendations.  Additional teaching is warranted.     Patient left up in chair with all lines intact, call button in reach, wedge under L side, geomat cushion, jim pad in place, nsg notified, and wedge under knees and posey vest donned reapplied      GOALS:   Multidisciplinary Problems       Physical Therapy Goals          Problem: Physical Therapy    Goal Priority Disciplines Outcome Interventions   Physical Therapy Goal     PT, PT/OT Progressing    Description: Goals to be met by: 25     Patient will increase functional independence with mobility by performin. Supine to sit with Moderate Assistance  2. Sit to stand transfer with Moderate Assistance  3. Bed to chair transfer with Moderate Assistance using Least Restrictive Assistive Device  4. Sitting at edge of bed x10 minutes with Minimal Assistance  5. Pt will follow 5/5 motor commands for LLE.  6. Gait TBD as pt progresses                         Time Tracking:     PT Received On: 25  PT Start  Time: 1151     PT Stop Time: 1223  PT Total Time (min): 32 min     Billable Minutes: Therapeutic Activity 2    Treatment Type: Treatment  PT/PTA: PTA     Number of PTA visits since last PT visit: 3     08/05/2025     23.9

## 2025-08-05 NOTE — PROGRESS NOTES
[facility] Hospital Medicine Progress Note        Chief Complaint: Inpatient Follow-up     HPI:   69 y.o. male with CVA with right-sided hemiparesis, DM2, neuropathy, CKD 2, hypertension, HLD,  presented on 7/5/2025 from nursing facility with increased generalized weakness.   He is reported as having a Benítez catheter placed about 1 week ago prior to presentation for urinary retention. Was found to be in acute hypoxic respiratory failure.  Admitted to ICU.  Patient has now completed antibiotics.       Review of hospital course patient was intubated and mechanical ventilation on 07/05/2025, had echocardiogram which showed EF 45 50% with mild valvular heart disease, extubated on 07/16 reintubated on 07/18 and then lastly extubated on 07/25/2025.       Downgraded from ICU to Medicine Services on 7/27/25. No acute distress, nonverbal, with right hemiplegia, he does track with his eyes. He does not follow simple commands appropriately, he has an NG tube with tube feeding free water flush. Underwent  PEG placement 7/31; tube feeds initiated    Interval Hx:  No issues overnight.  Resting comfortably in bed.   in place.  No agitation.  Tolerating tube feeds    Objective/physical exam:  General: In no acute distress, afebrile  Chest: Clear to auscultation bilaterally  Heart: RRR, +S1, S2, no appreciable murmur  Abdomen: Soft, nontender, BS +  MSK: Warm, no lower extremity edema, no clubbing or cyanosis  Neurologic: Alert, mittens in place,     VITAL SIGNS: 24 HRS MIN & MAX LAST   Temp  Min: 97.5 °F (36.4 °C)  Max: 98.4 °F (36.9 °C) 98.4 °F (36.9 °C)   BP  Min: 113/61  Max: 122/61 122/61   Pulse  Min: 65  Max: 94  92   Resp  Min: 15  Max: 21 18   SpO2  Min: 92 %  Max: 100 % 100 %       Recent Labs   Lab 07/30/25  0246 07/31/25  0156 08/03/25  0351   WBC 8.96 8.98 10.12   RBC 4.68* 4.82 4.61*   HGB 13.2* 13.8* 13.3*   HCT 42.7 43.4 41.2*   MCV 91.2 90.0 89.4   MCH 28.2 28.6 28.9   MCHC 30.9*  31.8* 32.3*   RDW 13.8 13.4 13.6    242 222   MPV 10.9* 10.2 9.9       Recent Labs   Lab 07/30/25  0246 07/31/25  0156 08/03/25  0351   * 133* 132*   K 5.3* 5.4* 5.1   CL 98 98 97*   CO2 26 26 29   BUN 32.3* 29.1* 33.9*   CREATININE 1.04 0.93 1.01   * 175* 189*   CALCIUM 9.6 9.9 9.5   MG 1.60  --  1.90   ALBUMIN  --   --  2.9*   PROT  --   --  8.1*   ALKPHOS  --   --  71   ALT  --   --  12   AST  --   --  11   BILITOT  --   --  0.4          Microbiology Results (last 7 days)       Procedure Component Value Units Date/Time    Blood Culture [[id number]]  (Normal) Collected: 07/28/25 1229    Order Status: Completed Specimen: Blood Updated: 08/02/25 1300     Blood Culture No Growth at 5 days    Blood Culture [[id number]]  (Normal) Collected: 07/28/25 1229    Order Status: Completed Specimen: Blood Updated: 08/02/25 1300     Blood Culture No Growth at 5 days             Radiology:  CT Head Without Contrast  Narrative: EXAMINATION:  CT HEAD WITHOUT CONTRAST    CLINICAL HISTORY:  Mental status change, unknown cause;    TECHNIQUE:  CT imaging of the head performed from the skull base to the vertex without intravenous contrast.  DLP 1558 mGycm. Automatic exposure control, adjustment of mA/kV or iterative reconstruction technique was used to reduce radiation.    COMPARISON:  5 July 2025    FINDINGS:  There is no acute cortical infarct, hemorrhage or mass lesion.  No new parenchymal attenuation abnormality.  Ventricular size is stable.  There are vascular calcifications.    Paranasal sinuses are clear.  There is bilateral mastoid air cell fluid.  Impression: No acute intracranial findings or significant interval change compared to earlier this month.    Electronically signed by: Minh Sy  Date:    07/28/2025  Time:    18:04  X-Ray Chest 1 View  Narrative: EXAMINATION:  XR CHEST 1 VIEW    CLINICAL HISTORY:  Fever.    COMPARISON:  22 July 2025    FINDINGS:  Frontal view of the chest was obtained.  Endotracheal tube and right PICC have been removed.  Enteric tube remains in place.  Heart is mildly enlarged.  Suspect some mild atelectasis.  No pneumothorax seen.  Impression: Suspect some mild atelectasis.    Electronically signed by: Minh Sy  Date:    07/28/2025  Time:    12:58        Medications:  Scheduled Meds:   amLODIPine  5 mg Per NG tube Daily    enoxparin  40 mg Subcutaneous Q24H (prophylaxis, 1700)    famotidine  20 mg Per NG tube BID    glycopyrrolate  1 mg Per OG tube TID    insulin glargine U-100  30 Units Subcutaneous BID    polyethylene glycol  17 g Per NG tube BID    scopolamine  1 patch Transdermal Q3 Days    senna-docusate  2 tablet Per NG tube Daily    sodium chloride 3%  4 mL Nebulization Q8H     Continuous Infusions:  PRN Meds:.  Current Facility-Administered Medications:     acetaminophen, 650 mg, Per NG tube, Q6H PRN    dextrose 50%, 12.5 g, Intravenous, PRN    dextrose 50%, 12.5 g, Intravenous, PRN    fentaNYL, 50 mcg, Intravenous, Q3H PRN    glucagon (human recombinant), 1 mg, Intramuscular, PRN    glucagon (human recombinant), 1 mg, Intramuscular, PRN    haloperidol lactate, 5 mg, Intravenous, Q6H PRN    hydrALAZINE, 10 mg, Intravenous, Q6H PRN    insulin aspart U-100, 0-10 Units, Subcutaneous, Q6H PRN    labetalol, 20 mg, Intravenous, Q6H PRN    oxyCODONE, 5 mg, Per NG tube, Q6H PRN    Flushing PICC/Midline Protocol, , , Until Discontinued **AND** sodium chloride 0.9%, 10 mL, Intravenous, Q12H PRN    traZODone, 50 mg, Per G Tube, Nightly PRN    Nutrition:  Nutrition consulted. Most recent weight and BMI monitored-     Measurements:  Wt Readings from Last 1 Encounters:   08/05/25 102.9 kg (226 lb 13.7 oz)   Body mass index is 34.49 kg/m².    Patient has been screened and assessed by RD.    Malnutrition Type:  Context:    Level: other (see comments) (Does not meet criteria)    Malnutrition Characteristic Summary:       Interventions/Recommendations (treatment strategy):            Assessment/Plan:   Acute hypoxic respiratory failure  OP dysphagia, s/p PEG 7/31  Aspiration pneumonia, resolved  VAN on CKD II  Urinary retention with indwelling Benítez  h/o CVA with right hemiparesis  DM II   Peripheral Neuropathy  Essential Hypertension    Tolerating tube feeds   Psych:  Prn Haldol  Case management working on SNF placement   PT/OT: Moderate intensity therapy  Completed all antibiotics.    Medically stable once accepted      Oren Butterfield MD   08/05/2025     All diagnosis and differential diagnosis have been reviewed; assessment and plan has been documented; I have personally reviewed the labs and test results that are presently available; I have reviewed the patients medication list; I have reviewed the consulting providers response and recommendations. I have reviewed or attempted to review medical records based upon their availability    All of the patient's questions have been  addressed and answered. Patient's is agreeable to the above stated plan. I will continue to monitor closely and make adjustments to medical management as needed.  _____________________________________________________________________

## 2025-08-05 NOTE — PLAN OF CARE
Problem: Infection  Goal: Absence of Infection Signs and Symptoms  Outcome: Progressing     Problem: Adult Inpatient Plan of Care  Goal: Plan of Care Review  Outcome: Progressing     Problem: Adult Inpatient Plan of Care  Goal: Patient-Specific Goal (Individualized)  Outcome: Progressing     Problem: Adult Inpatient Plan of Care  Goal: Absence of Hospital-Acquired Illness or Injury  Outcome: Progressing     Problem: Skin Injury Risk Increased  Goal: Skin Health and Integrity  Outcome: Progressing     Problem: Fall Injury Risk  Goal: Absence of Fall and Fall-Related Injury  Outcome: Progressing     Problem: Bariatric Environmental Safety  Goal: Safety Maintained with Care  Outcome: Progressing     Problem: Enteral Nutrition  Goal: Absence of Aspiration Signs and Symptoms  Outcome: Progressing     Problem: Enteral Nutrition  Goal: Safe, Effective Therapy Delivery  Outcome: Progressing     Problem: Enteral Nutrition  Goal: Feeding Tolerance  Outcome: Progressing     Problem: Diabetes Comorbidity  Goal: Blood Glucose Level Within Targeted Range  Outcome: Progressing

## 2025-08-06 LAB
POCT GLUCOSE: 112 MG/DL (ref 70–110)
POCT GLUCOSE: 125 MG/DL (ref 70–110)
POCT GLUCOSE: 78 MG/DL (ref 70–110)
POCT GLUCOSE: 89 MG/DL (ref 70–110)

## 2025-08-06 PROCEDURE — 27000221 HC OXYGEN, UP TO 24 HOURS

## 2025-08-06 PROCEDURE — 25000003 PHARM REV CODE 250

## 2025-08-06 PROCEDURE — 27100171 HC OXYGEN HIGH FLOW UP TO 24 HOURS

## 2025-08-06 PROCEDURE — 25000003 PHARM REV CODE 250: Performed by: CLINICAL NURSE SPECIALIST

## 2025-08-06 PROCEDURE — 25000003 PHARM REV CODE 250: Performed by: INTERNAL MEDICINE

## 2025-08-06 PROCEDURE — 99900031 HC PATIENT EDUCATION (STAT)

## 2025-08-06 PROCEDURE — 97530 THERAPEUTIC ACTIVITIES: CPT

## 2025-08-06 PROCEDURE — 99900035 HC TECH TIME PER 15 MIN (STAT)

## 2025-08-06 PROCEDURE — 97535 SELF CARE MNGMENT TRAINING: CPT

## 2025-08-06 PROCEDURE — 94660 CPAP INITIATION&MGMT: CPT

## 2025-08-06 PROCEDURE — 11000001 HC ACUTE MED/SURG PRIVATE ROOM

## 2025-08-06 PROCEDURE — 94760 N-INVAS EAR/PLS OXIMETRY 1: CPT

## 2025-08-06 PROCEDURE — 97116 GAIT TRAINING THERAPY: CPT

## 2025-08-06 PROCEDURE — 63600175 PHARM REV CODE 636 W HCPCS: Performed by: INTERNAL MEDICINE

## 2025-08-06 RX ORDER — SODIUM CHLORIDE FOR INHALATION 3 %
4 VIAL, NEBULIZER (ML) INHALATION EVERY 6 HOURS PRN
Status: DISCONTINUED | OUTPATIENT
Start: 2025-08-06 | End: 2025-08-07 | Stop reason: HOSPADM

## 2025-08-06 RX ADMIN — ENOXAPARIN SODIUM 40 MG: 40 INJECTION SUBCUTANEOUS at 05:08

## 2025-08-06 RX ADMIN — POLYETHYLENE GLYCOL 3350 17 G: 17 POWDER, FOR SOLUTION ORAL at 09:08

## 2025-08-06 RX ADMIN — SENNOSIDES, DOCUSATE SODIUM 2 TABLET: 8.6; 5 TABLET ORAL at 10:08

## 2025-08-06 RX ADMIN — AMLODIPINE BESYLATE 5 MG: 5 TABLET ORAL at 10:08

## 2025-08-06 RX ADMIN — TRAZODONE HYDROCHLORIDE 50 MG: 50 TABLET ORAL at 08:08

## 2025-08-06 RX ADMIN — GLYCOPYRROLATE 1 MG: 1 TABLET ORAL at 10:08

## 2025-08-06 RX ADMIN — GLYCOPYRROLATE 1 MG: 1 TABLET ORAL at 08:08

## 2025-08-06 RX ADMIN — POLYETHYLENE GLYCOL 3350 17 G: 17 POWDER, FOR SOLUTION ORAL at 10:08

## 2025-08-06 RX ADMIN — GLYCOPYRROLATE 1 MG: 1 TABLET ORAL at 05:08

## 2025-08-06 RX ADMIN — FAMOTIDINE 20 MG: 20 TABLET, FILM COATED ORAL at 08:08

## 2025-08-06 RX ADMIN — SCOPOLAMINE 1 PATCH: 1 PATCH TRANSDERMAL at 10:08

## 2025-08-06 RX ADMIN — FAMOTIDINE 20 MG: 20 TABLET, FILM COATED ORAL at 10:08

## 2025-08-06 NOTE — PT/OT/SLP PROGRESS
Occupational Therapy   Treatment    Name: Charly Padilla  MRN: [id number]    Recommendations:     Recommended therapy intensity at discharge: Moderate Intensity Therapy   Discharge Equipment Recommendations:  to be determined by next level of care  Barriers to discharge:  Other (Comment) (placement)    Assessment:     Charly Padilla is a 69 y.o. male with a medical diagnosis of Acute hypoxic hypercapnic respiratory failure requiring intubation 7/5-7/16 and reintubation 7/18-7/25, aspiration pneumonia, hx of CVA with R-hemiparesis.  He presents with fair tolerance for today's session with improved command following, verbalizations, attention to task, and mobility. Performance deficits affecting function are weakness, impaired endurance, impaired self care skills, impaired functional mobility, gait instability, impaired balance, impaired cognition, decreased upper extremity function, decreased lower extremity function, decreased safety awareness, pain, abnormal tone, impaired coordination, impaired fine motor, impaired cardiopulmonary response to activity. Patient demo's improvement from previous session as evidenced by tolerance sit <> stand with mod A x2 w/ RW, and completing lateral step transfer from EOB >chair with max A x2 with RW.    DME Justification: No DME recommended requiring DME justifications    Rehab Prognosis:  Fair; patient would benefit from acute skilled OT services to address these deficits and reach maximum level of function.       Plan:     Patient to be seen 3 x/week to address the above listed problems via self-care/home management, therapeutic activities, therapeutic exercises, wheelchair management/training  Plan of Care Expires: 08/25/25  Plan of Care Reviewed with: patient    Subjective     Pain/Comfort:  Pain Rating 1: other (see comments) (patient with noted grimacing with R UE PROM; not formally rated)  Location - Side 1: Right  Location - Orientation 1: upper  Location 1: hand  Pain Addressed  1: Distraction, Reposition    Objective:     Communicated with: RN prior to session.  Patient found right sidelying with bed alarm, blood pressure cuff, PEG Tube, oxygen, peripheral IV, pulse ox (continuous), telemetry, Other (comments) (FUNMILAYO zepeda) upon OT entry to room.    General Precautions: Standard, fall, NPO    Orthopedic Precautions:N/A  Braces: N/A  Respiratory Status: Nasal cannula, flow 3 L/min  Vital Signs: Blood Pressure: 117/77 short sitting in recliner following transfer; SPO2 ranging 96-98% on 3L O2 NC     Occupational Performance:     Functional Mobility/Transfers:  Bed mobility:    Supine to Sit: maximal assistance and of 2 persons  Transfers: Sit to Stand: patient complete 2 trials of sit <> stand with RW (1x from EOB with mod A x2, 1x from recliner with max A x2) with rolling walker  Bed to Chair: maximal assistance and of 2 persons with rolling walker using Step Transfer    Activities of Daily Living:  Grooming: supervision Patient complete face washing in supported long sitting in recliner with setup for item retrieval and min tactile cues for thoroughness  Lower Body Dressing: total assistance Don B socks short sitting EOB    Balance:   Static Sitting Balance: One UE support: Fair: Patient able to maintain balance with handhold support; may require occasional minimal assistance.    Therapeutic Activities:  Patient complete ~2 ft lateral steps from EOB > chair with RW req max A x2. Patient encouraged to attempt second trial of functional mobility with RW with limited R <> L LE weightshifting and R lateral leaning.     Therapeutic Exercise:  Patient complete 1 trial of unilateral R forearm prop weightbearing (sustain ~1 minute) and R UE PROM 1x6 in all planes in supported short sitting EOB with focus on improved trunk rotation, R UE weightbearing, B UE strengthening, AROM, and proximal stability needed for engagement in BADLs; req max A for hand over hand assist for thoroughness.     Patient with  increased tone noted from elbow distally with increased grimacing noted with 1st digit passive range of motion with atrophy of 1st webspace/ thenar eminence. Patient to possibly benefit from resting hand splint to reduce risk of contractures, improve wrist support, and improved webspace positioning; to continue to address.     [facility] 6 Click ADL: 8    Co-Treatment: Yes, due to High complexity requires 2 sets of skilled hands    Patient Education:  Patient provided with verbal education education regarding OT role/goals/POC, fall prevention, safety awareness, Discharge/DME recommendations, and home exercise program .  Additional teaching is warranted.      Patient left up in chair with all lines intact, call button in reach, wedge under L side, chair alarm on, jim pad in place, RN notified, and posey vest in place.    GOALS:   Multidisciplinary Problems       Occupational Therapy Goals          Problem: Occupational Therapy    Goal Priority Disciplines Outcome Interventions   Occupational Therapy Goal     OT, PT/OT Progressing    Description: LTG: Pt will perform basic ADLs and ADL t/fs with min A using LRAD by d/c.    STG: to be met by 8/11  1. Pt will follow >80% of simple one step commands  2. Pt will perform grooming EOB with min A.   3. Pt will perform functional grasp/reach/release of items with L UE >80% of trials in prep for increased participation in ADL tasks.   4. Pt will perform sit<>stand with mod A x 2 in prep for ADL t/fs.                        Time Tracking:     OT Date of Treatment: 08/06/25  OT Start Time: 1052  OT Stop Time: 1126  OT Total Time (min): 34 min    Billable Minutes:Self Care/Home Management 13  Therapeutic Activity 21    OT/EDILBERTO: OT     Number of EDILBERTO visits since last OT visit: 4    8/6/2025

## 2025-08-06 NOTE — PROGRESS NOTES
[facility] Hospital Medicine Progress Note        Chief Complaint: Inpatient Follow-up     HPI:   69 y.o. male with CVA with right-sided hemiparesis, DM2, neuropathy, CKD 2, hypertension, HLD,  presented on 7/5/2025 from nursing facility with increased generalized weakness.   He is reported as having a Benítez catheter placed about 1 week ago prior to presentation for urinary retention. Was found to be in acute hypoxic respiratory failure.  Admitted to ICU.  Patient has now completed antibiotics.       Review of hospital course patient was intubated and mechanical ventilation on 07/05/2025, had echocardiogram which showed EF 45 50% with mild valvular heart disease, extubated on 07/16 reintubated on 07/18 and then lastly extubated on 07/25/2025.       Downgraded from ICU to Medicine Services on 7/27/25. No acute distress, nonverbal, with right hemiplegia, he does track with his eyes. He does not follow simple commands appropriately, he has an NG tube with tube feeding free water flush. Underwent  PEG placement 7/31; tube feeds initiated     Interval Hx:  No agitation issues.  Vitals stable.  Objective/physical exam:  General: In no acute distress, afebrile  Chest: Clear to auscultation bilaterally  Heart: RRR, +S1, S2, no appreciable murmur  Abdomen: Soft, nontender, BS +  MSK: Warm, no lower extremity edema, no clubbing or cyanosis  Neurologic: Alert, mittens in place,     VITAL SIGNS: 24 HRS MIN & MAX LAST   Temp  Min: 96.6 °F (35.9 °C)  Max: 98.4 °F (36.9 °C) 98.3 °F (36.8 °C)   BP  Min: 113/76  Max: 135/86 123/79   Pulse  Min: 43  Max: 110  90   Resp  Min: 12  Max: 18 17   SpO2  Min: 94 %  Max: 99 % 99 %       Recent Labs   Lab 07/31/25  0156 08/03/25  0351   WBC 8.98 10.12   RBC 4.82 4.61*   HGB 13.8* 13.3*   HCT 43.4 41.2*   MCV 90.0 89.4   MCH 28.6 28.9   MCHC 31.8* 32.3*   RDW 13.4 13.6    222   MPV 10.2 9.9       Recent Labs   Lab 07/31/25  0156 08/03/25  0351   *  132*   K 5.4* 5.1   CL 98 97*   CO2 26 29   BUN 29.1* 33.9*   CREATININE 0.93 1.01   * 189*   CALCIUM 9.9 9.5   MG  --  1.90   ALBUMIN  --  2.9*   PROT  --  8.1*   ALKPHOS  --  71   ALT  --  12   AST  --  11   BILITOT  --  0.4          Microbiology Results (last 7 days)       Procedure Component Value Units Date/Time    Blood Culture [[id number]]  (Normal) Collected: 07/28/25 1229    Order Status: Completed Specimen: Blood Updated: 08/02/25 1300     Blood Culture No Growth at 5 days    Blood Culture [[id number]]  (Normal) Collected: 07/28/25 1229    Order Status: Completed Specimen: Blood Updated: 08/02/25 1300     Blood Culture No Growth at 5 days             Radiology:  CT Head Without Contrast  Narrative: EXAMINATION:  CT HEAD WITHOUT CONTRAST    CLINICAL HISTORY:  Mental status change, unknown cause;    TECHNIQUE:  CT imaging of the head performed from the skull base to the vertex without intravenous contrast.  DLP 1558 mGycm. Automatic exposure control, adjustment of mA/kV or iterative reconstruction technique was used to reduce radiation.    COMPARISON:  5 July 2025    FINDINGS:  There is no acute cortical infarct, hemorrhage or mass lesion.  No new parenchymal attenuation abnormality.  Ventricular size is stable.  There are vascular calcifications.    Paranasal sinuses are clear.  There is bilateral mastoid air cell fluid.  Impression: No acute intracranial findings or significant interval change compared to earlier this month.    Electronically signed by: Minh Sy  Date:    07/28/2025  Time:    18:04  X-Ray Chest 1 View  Narrative: EXAMINATION:  XR CHEST 1 VIEW    CLINICAL HISTORY:  Fever.    COMPARISON:  22 July 2025    FINDINGS:  Frontal view of the chest was obtained. Endotracheal tube and right PICC have been removed.  Enteric tube remains in place.  Heart is mildly enlarged.  Suspect some mild atelectasis.  No pneumothorax seen.  Impression: Suspect some mild atelectasis.    Electronically  signed by: Minh Sy  Date:    07/28/2025  Time:    12:58        Medications:  Scheduled Meds:   amLODIPine  5 mg Per NG tube Daily    enoxparin  40 mg Subcutaneous Q24H (prophylaxis, 1700)    famotidine  20 mg Per NG tube BID    glycopyrrolate  1 mg Per OG tube TID    insulin glargine U-100  30 Units Subcutaneous BID    polyethylene glycol  17 g Per NG tube BID    scopolamine  1 patch Transdermal Q3 Days    senna-docusate  2 tablet Per NG tube Daily    sodium chloride 3%  4 mL Nebulization Q8H     Continuous Infusions:  PRN Meds:.  Current Facility-Administered Medications:     acetaminophen, 650 mg, Per NG tube, Q6H PRN    dextrose 50%, 12.5 g, Intravenous, PRN    dextrose 50%, 12.5 g, Intravenous, PRN    fentaNYL, 50 mcg, Intravenous, Q3H PRN    glucagon (human recombinant), 1 mg, Intramuscular, PRN    glucagon (human recombinant), 1 mg, Intramuscular, PRN    haloperidol lactate, 5 mg, Intravenous, Q6H PRN    hydrALAZINE, 10 mg, Intravenous, Q6H PRN    insulin aspart U-100, 0-10 Units, Subcutaneous, Q6H PRN    labetalol, 20 mg, Intravenous, Q6H PRN    oxyCODONE, 5 mg, Per NG tube, Q6H PRN    Flushing PICC/Midline Protocol, , , Until Discontinued **AND** sodium chloride 0.9%, 10 mL, Intravenous, Q12H PRN    traZODone, 50 mg, Per G Tube, Nightly PRN    Nutrition:  Nutrition consulted. Most recent weight and BMI monitored-     Measurements:  Wt Readings from Last 1 Encounters:   08/06/25 102.4 kg (225 lb 12 oz)   Body mass index is 34.33 kg/m².    Patient has been screened and assessed by RD.    Malnutrition Type:  Context:    Level: other (see comments) (Does not meet criteria)    Malnutrition Characteristic Summary:       Interventions/Recommendations (treatment strategy):           Assessment/Plan:   Acute hypoxic respiratory failure  OP dysphagia, s/p PEG 7/31  Aspiration pneumonia, resolved  VAN on CKD II  Urinary retention with indwelling Benítez  h/o CVA with right hemiparesis  DM II   Peripheral  Neuropathy  Essential Hypertension     Tolerating tube feeds   Psych:  Sadia Melgar, re-evaluated yesterday.  No changes  Case management working on SNF placement   PT/OT: Moderate intensity therapy  Completed all antibiotics.    Medically stable once accepted      Oren Butterfield MD   08/06/2025     All diagnosis and differential diagnosis have been reviewed; assessment and plan has been documented; I have personally reviewed the labs and test results that are presently available; I have reviewed the patients medication list; I have reviewed the consulting providers response and recommendations. I have reviewed or attempted to review medical records based upon their availability    All of the patient's questions have been  addressed and answered. Patient's is agreeable to the above stated plan. I will continue to monitor closely and make adjustments to medical management as needed.  _____________________________________________________________________

## 2025-08-06 NOTE — PT/OT/SLP PROGRESS
Physical Therapy Treatment    Patient Name:  Charly Padilla   MRN:  [id number]    Recommendations:     Discharge therapy intensity: Moderate Intensity Therapy   Discharge Equipment Recommendations: to be determined by next level of care  Barriers to discharge: Ongoing medical needs    Assessment:     Charly Padilla is a 69 y.o. male admitted with a medical diagnosis of Acute hypoxic hypercapnic respiratory failure requiring intubation 7/5-7/16 and reintubation 7/18-7/25, aspiration pneumonia, hx of CVA with R-hemiparesis.  He presents with the following impairments/functional limitations: weakness, impaired endurance, impaired balance, impaired self care skills, impaired functional mobility, impaired cognition, decreased safety awareness, decreased lower extremity function, decreased upper extremity function, impaired coordination, decreased coordination . Pt continues to improve in sitting balance and overall mobility, attempted pre-gait activities but due to limited ability to weight shift pt unable to progress to ambulation today. Able to use walker to t/f to chair with assist from OT for RUE.    DME Justification:  No DME recommended requiring DME justifications    Rehab Prognosis: Good; patient would benefit from acute skilled PT services to address these deficits and reach maximum level of function.    Recent Surgery: Procedure(s) (LRB):  PEG (N/A) 6 Days Post-Op    Plan:     During this hospitalization, patient would benefit from acute PT services 3 x/week to address the identified rehab impairments via gait training, therapeutic activities, therapeutic exercises, neuromuscular re-education and progress toward the following goals:    Plan of Care Expires:  08/28/25    Subjective     Chief Complaint: tired  Patient/Family Comments/goals: PLOF  Pain/Comfort:  Pain Rating 1: 0/10      Objective:     Communicated with RN prior to session.  Patient found HOB elevated with bed alarm, blood pressure cuff, PEG Tube, oxygen,  peripheral IV, pulse ox (continuous), telemetry ((B) mittens) upon PT entry to room.     General Precautions: Standard, fall, NPO  Orthopedic Precautions: N/A  Braces: N/A  Respiratory Status: Nasal cannula, flow 2 L/min  Blood Pressure: NT  Skin Integrity: Visible skin intact      Functional Mobility:  Bed Mobility:     Supine to Sit: maximal assistance and of 2 persons  Transfers:     Sit to Stand:  modAx2 from bed, maxAx2 from recliner with rolling walker  Bed to Chair: maximal assistance and of 2 persons with  rolling walker  using  Stand Pivot  Balance: sitting balance = SBA-CGA this date    Therapeutic Activities/Exercises:  RUE propped sitting on elbow <> midline modA  Pre-gait: lateral weight shifting. With modAx2 and RW, pt able to advance LLE, unable to advance RLE 2/2 hesitancy to weight shift to L side    Co-Treatment: Yes, due to High complexity requires 2 sets of skilled hands    Education:  Patient provided with verbal education education regarding PT role/goals/POC, fall prevention, safety awareness, and discharge/DME recommendations.  Additional teaching is warranted.     Patient left up in chair with all lines intact, call button in reach, chair alarm on, jim pad in place, and RN notified. Sneha hamilton.      GOALS:   Multidisciplinary Problems       Physical Therapy Goals          Problem: Physical Therapy    Goal Priority Disciplines Outcome Interventions   Physical Therapy Goal     PT, PT/OT Progressing    Description: Goals to be met by: 25     Patient will increase functional independence with mobility by performin. Supine to sit with Moderate Assistance  2. Sit to stand transfer with Moderate Assistance  3. Bed to chair transfer with Moderate Assistance using Least Restrictive Assistive Device  4. Sitting at edge of bed x10 minutes with Minimal Assistance  5. Pt will follow 5/5 motor commands for LLE.  6. Gait TBD as pt progresses                         Time Tracking:      PT Received On: 08/06/25  PT Start Time: 1057     PT Stop Time: 1123  PT Total Time (min): 26 min     Billable Minutes: Gait Training 8 and Therapeutic Activity 18    Treatment Type: Treatment  PT/PTA: PT     Number of PTA visits since last PT visit: 4     08/06/2025

## 2025-08-07 VITALS
OXYGEN SATURATION: 93 % | SYSTOLIC BLOOD PRESSURE: 108 MMHG | HEIGHT: 68 IN | DIASTOLIC BLOOD PRESSURE: 64 MMHG | TEMPERATURE: 98 F | BODY MASS INDEX: 34.21 KG/M2 | WEIGHT: 225.75 LBS | HEART RATE: 97 BPM | RESPIRATION RATE: 17 BRPM

## 2025-08-07 LAB — POCT GLUCOSE: 97 MG/DL (ref 70–110)

## 2025-08-07 PROCEDURE — 27100171 HC OXYGEN HIGH FLOW UP TO 24 HOURS

## 2025-08-07 PROCEDURE — 94660 CPAP INITIATION&MGMT: CPT

## 2025-08-07 PROCEDURE — 99900035 HC TECH TIME PER 15 MIN (STAT)

## 2025-08-07 PROCEDURE — 94760 N-INVAS EAR/PLS OXIMETRY 1: CPT

## 2025-08-07 RX ORDER — FAMOTIDINE 20 MG/1
20 TABLET, FILM COATED ORAL 2 TIMES DAILY
Qty: 60 TABLET | Refills: 11 | Status: SHIPPED | OUTPATIENT
Start: 2025-08-07 | End: 2026-08-07

## 2025-08-07 RX ORDER — TRAZODONE HYDROCHLORIDE 50 MG/1
50 TABLET ORAL NIGHTLY PRN
Start: 2025-08-07 | End: 2026-08-07

## 2025-08-07 NOTE — PLAN OF CARE
08/07/25 1521   Final Note   Assessment Type Final Discharge Note   Anticipated Discharge Disposition SNF   Post-Acute Status   Discharge Delays None known at this time     Patient discharged back to [facility]. PEARL Donohue, coordinating transfer.  No further discharge planning needs identified at this time.

## 2025-08-07 NOTE — CONSULTS
Subjective:      Patient ID: Charly Padilla is a 69 y.o. male.    Chief Complaint: Fall (Pt arrives via [facility] from [facility] for fall x 3 today trying to get out of bed. PMH of CVA with L sided deficits. Pt reported pulled his own mcgill out yesterday. Denies blood thinners, head strike, LOC. Denies any pain at this time. GCS 15)    HPI  Review of Systems   Objective:     Physical Exam   Assessment:     1. Generalized weakness    2. Fall    3. VAN (acute kidney injury)    4. Acute urinary retention    5. Acute respiratory failure with hypercapnia    6. Acute respiratory failure    7. Persistent fever    8. Dysphagia, unspecified type    9. QT prolongation           Plan:     Aspiration pneumonia  Patient has pneumonia that is suspected to be bacterial in etiology but the organism is not known. On my initial assessment the patient had the following signs/symptoms of pneumonia: persistent hypoxia  and shortness of breath. The patient does not have a home oxygen requirement.    Antibiotics  , 3 times daily, Both Eyes  Microbiology  Respiratory Culture   Date Value Ref Range Status   07/19/2025 Normal respiratory richard  Final     Blood Culture   Date Value Ref Range Status   07/28/2025 No Growth At 48 Hours  Preliminary   07/28/2025 No Growth At 48 Hours  Preliminary   07/19/2025 No Growth at 5 days  Final   07/19/2025 No Growth at 5 days  Final   07/11/2025 No Growth at 5 days  Final   07/11/2025 No Growth at 5 days  Final        Plan  - Antibiotics as listed above, tailor based on micro data, if able  - Patient is on supplementary oxygen at 2 L/min  - Patient's pneumonia is improving  - GI consulted for PEG    Type 2 diabetes mellitus, without long-term current use of insulin  ISS      Acute respiratory failure with hypercapnia  Patient with Hypoxic Respiratory failure which is Acute on chronic. The patient's respiratory failure is likely due to Obesity Hypoventilation. The patient is not on home oxygen. On my  initial assessment the signs/symptoms of respiratory failure were tachypnea and use of accessory muscles.    Oxygen Requirements in the last 24 hours  Rate of Resp  Min: 16  Max: 18  SpO2  Min: 97 %  Max: 98 %   Flow (L/min) (Oxygen Therapy)  Min: 3  Max: 4  Device (Oxygen Therapy): nasal cannula    Plan  - Chest x-ray was ordered  - ABG was done and showed .  - Will treat underlying cause and assess daily for weaning supplementary oxygen as able  - Patient's respiratory failure is improving  - .  Wocn consult- 68 y/o male in with fall afew days ago along with acute kidney injury urinary retention.   Hz of cva.    He awakes to voice and answers simple questions.    Consulted for fingernail and toenail clipping.  Fingernails long--clipped and filed.   Toenails clipped and filed also.  No problems noted.  Will sign off.

## 2025-08-07 NOTE — CONSULTS
Inpatient Nutrition Assessment    Admit Date: 7/5/2025   Total duration of encounter: 33 days   Patient Age: 69 y.o.    Nutrition Recommendation/Prescription     Cyclic tube feedings from 6am-9pm to allow rest while on BiPAP at night if unable to bolus feed.  Cyclic tube feeding recommendations:  Diabetisource AC goal rate of 100 ml/hr will provide  1800 kcal/d 91% needs  90 g protein/d 100% needs  150 g CHO/d 68% needs  2400 g potassium daily  1224 ml water 62% needs, 85% with current 50 ml q3hr water flushes  calculations based on estimated 15 hour run time       If patient able to sit up for at least 30 minutes, trial bolus tube feedings.   -First bolus volume of 100 ml. Then can increase each proceeding bolus by 50 ml until volume of 250 ml is achieved.   Goal Bolus tube feeding recommendations:  250ml Diabetisource AC 6x daily will provide  1800 kcals/d 91% needs   90 g protein/d 100% needs   150 g CHO/d 68% needs  1224 ml fl/d 62% needs    Free water flush of 50 mL before and after each bolus feed for an additional 600 ml fluid.      -Medical management of hyperglycemia.       Communication of Recommendations: reviewed with nurse    Nutrition Assessment     Malnutrition Assessment/Nutrition-Focused Physical Exam       Malnutrition Level: other (see comments) (Does not meet criteria) (08/07/25 1408)           Orbital Region: well nourished  Upper Arm Region : well nourished        [place] Region (Muscle Loss): well nourished                    Posterior Calf Region (Muscle Loss): well nourished           A minimum of two characteristics is recommended for diagnosis of either severe or non-severe malnutrition.    Chart Review    Reason Seen: follow-up    Malnutrition Screening Tool Results   Have you recently lost weight without trying?: No  Have you been eating poorly because of a decreased appetite?: No   MST Score: 0   Diagnosis:  Acute combined hypoxic and hypercapnic respiratory failure  History of  cerebrovascular disease with right hemiparesis  Acute kidney injury superimposed on stage II chronic kidney disease, obstructive uropathy component.  He remains nonoliguric, daily improvement in BUN/creatinine   Type 2 diabetes mellitus with polyneuropathy, hyperglycemia improved.  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, glaucoma, hypertension, and hyperlipidemia     Scheduled Medications:  amLODIPine, 5 mg, Daily  enoxparin, 40 mg, Q24H (prophylaxis, 1700)  famotidine, 20 mg, BID  glycopyrrolate, 1 mg, TID  insulin glargine U-100, 30 Units, BID  polyethylene glycol, 17 g, BID  scopolamine, 1 patch, Q3 Days  senna-docusate, 2 tablet, Daily    Continuous Infusions:     PRN Medications:  acetaminophen, 650 mg, Q6H PRN  dextrose 50%, 12.5 g, PRN  dextrose 50%, 12.5 g, PRN  fentaNYL, 50 mcg, Q3H PRN  glucagon (human recombinant), 1 mg, PRN  glucagon (human recombinant), 1 mg, PRN  haloperidol lactate, 5 mg, Q6H PRN  hydrALAZINE, 10 mg, Q6H PRN  insulin aspart U-100, 0-10 Units, Q6H PRN  labetalol, 20 mg, Q6H PRN  oxyCODONE, 5 mg, Q6H PRN  sodium chloride 0.9%, 10 mL, Q12H PRN  sodium chloride 3%, 4 mL, Q6H PRN  traZODone, 50 mg, Nightly PRN    Calorie Containing IV Medications: no significant kcals from medications at this time    Recent Labs   Lab 08/03/25  0351   *   K 5.1   CALCIUM 9.5   PHOS 3.5   MG 1.90   CL 97*   CO2 29   BUN 33.9*   CREATININE 1.01   EGFRNORACEVR >60   *   BILITOT 0.4   ALKPHOS 71   ALT 12   AST 11   ALBUMIN 2.9*   WBC 10.12   HGB 13.3*   HCT 41.2*     Nutrition Orders:  Diet NPO Except for: Medication  Tube Feedings/Formulas 100; 1,500; Diabetisource AC; Peg; 50; Every 3 hours    Appetite/Oral Intake: NPO/NPO  Factors Affecting Nutritional Intake: NPO  Social Needs Impacting Access to Food: none identified;  [facility] resident   Food/Yazidi/Cultural Preferences: unable to obtain  Food Allergies: no known food allergies  Last Bowel Movement: 08/07/25  Wound(s):  no partial or full thickness pressure injuries documented at this time.     Comments    7/7/25: Pt intubated on mechanical ventilation, receiving kcals from diprivan. Not currently receiving enteral nutrition. Tube feeding orders updated.     7/11/25 Patient remains on ventilator with propofol, tube feeding at previous goal rate; recommend decreasing tube feeding rate due to overfeeding with propofol currently, will need to increase protein modular.    7/15/25: Patient remains intubated, no longer receiving kcals from propofol. TF put on hold this morning d/t worsening abdominal distention this morning. TF regimen updated for once able to resume feeds.     7/18/25 Tube feeding held currently secondary to suspected aspiration, possibly resume today.    7/22/25 Patient remains intubated, not currently receiving kcals from propofol. Tolerating tube feedings at goal rate.    7/25/25 Patient remains intubated. Tolerating tube feedings at goal rate. Receiving 30 units lantus BID and SSI prn for hyperglycemia.     7/29/25: Patient extubated, now on BiPAP at night. RN reports patient tolerating tube feeding at goal rate.     8/1/25: PEG placed yesterday. Consult received to restart tube feedings. Re estimated energy requirements since extubation and changed to diabetic formula s/t hyperglycemia. Also with hyperkalemia, not receiving daily potassium binder.      8/4/25: Tube feedings currently on hold, which per RN, has been happening multiple times day s/t pt's positioning in bed. MD is requesting to hold TF at night s/t BiPAP use, will increase daytime volume to 100ml/hr to infuse ~15hr during the day. Discussed bolus feedings with RN, she does not think patient could safely sit up and remain in place >30 minutes to safely change to bolus feeds. Psych is seeing patient,  "will see their input and monitor patient's ability to safely trial bolus feeds.     8/7/25: Tube feedings not currently infusing. Per report and RN, pt does tolerate intermittent feeds ( ml/hr rate) during the day but they have not been on consistently yesterday or today. Discussed possibility of trialing bolus feedings if patient is able to sit in chair >30 minutes.     8/7/25:  addendum:  Updated tube feeding order to bolus feeds as per provider (Dr. De León) request.    Anthropometrics    Height: 5' 8" (172.7 cm), Height Method: Estimated  Last Weight: 102.4 kg (225 lb 12 oz) (08/06/25 0553), Weight Method: Bed Scale  BMI (Calculated): 34.3  BMI Classification: obese grade II (BMI 35-39.9)        Ideal Body Weight (IBW), Male: 154 lb     % Ideal Body Weight, Male (lb): 175.08 %                          Usual Weight Provided By: EMR weight history    Wt Readings from Last 5 Encounters:   08/06/25 102.4 kg (225 lb 12 oz)     Weight Change(s) Since Admission:   8/4/25 -8.4kg from stated admit wt (113.4kg); possible weight loss related to fluid fluctuations; repeat NFPA on follow-up  7/25/25 no updated weights   Wt Readings from Last 1 Encounters:   08/06/25 0553 102.4 kg (225 lb 12 oz)   08/05/25 0537 102.9 kg (226 lb 13.7 oz)   08/04/25 0600 105 kg (231 lb 7.7 oz)   08/01/25 1350 104.5 kg (230 lb 6.1 oz)   07/18/25 0600 115.5 kg (254 lb 10.1 oz)   07/13/25 0800 122.3 kg (269 lb 10 oz)   07/05/25 0605 113.4 kg (250 lb)   Admit Weight: 113.4 kg (250 lb) (07/05/25 0605), Weight Method: Stated    Estimated Needs    Weight Used For Calorie Calculations: 104.5 kg (230 lb 6.1 oz)  Energy Calorie Requirements (kcal): 1920-0132 kcals/d (MSJ x 1.1-1.2 SF)  Energy Need Method: [place]-St Jeor  Weight Used For Protein Calculations: 104.5 kg (230 lb 6.1 oz)  Protein Requirements:  g/d (0.8-1 g/kg; CKD, DM)  Fluid Requirements (mL): 4788-1891 ml fl/d (1 ml/kcal)  CHO Requirement: 222-242 g carbohydrates daily (45% " calories)     Enteral Nutrition   8/7/25 not currently infusing  Formula: Diabetisource AC  Rate/Volume: 80ml/hr  Water Flushes: 50ml q3hr  Additives/Modulars: none at this time  Route: PEG tube  Method: continuous  Total Nutrition Provided by Tube Feeding, Additives, and Flushes:  Calories Provided   1920 kcal/d, 97% needs   Protein Provided   96 g/d, 100% needs   Fluid Provided   1612 ml/d, 82% needs   Continuous feeding calculations based on estimated 20 hr/d run time unless otherwise stated.     Parenteral Nutrition Patient not receiving parenteral nutrition support at this time.    Evaluation of Received Nutrient Intake    Calories: not meeting estimated needs  Protein: not meeting estimated needs    Patient Education Not applicable.    Nutrition Diagnosis     PES: Inadequate energy intake related to inability to consume sufficient nutrients as evidenced by on mechanical ventilation, need for NPO status. (active)   PES: Inadequate oral intake related to acute illness as evidenced by on mechanical ventilation since admit (resolved), NPO s/p PEG. (active)   PES: N/A            Nutrition Interventions     Intervention(s): Enteral nutrition management  Intervention(s):      Goal: Meet greater than 80% of nutritional needs by follow-up. (goal not met)  Goal: Tolerate enteral feeding at goal rate by follow-up. (goal not met)    Nutrition Goals & Monitoring     Dietitian will monitor: energy intake, enteral nutrition intake, weight, electrolyte/renal panel, glucose/endocrine profile, and gastrointestinal profile  Discharge planning: tube feeding (bolus vs intermittent feedings as recommended above)  Nutrition Risk/Follow-Up: patient at increased nutrition risk; dietitian will follow-up twice weekly   Please consult if re-assessment needed sooner.

## 2025-08-07 NOTE — PROGRESS NOTES
Gave report to Tyuet at [facility]. All questions asked and answered. Madina will call ambulance contacted.

## 2025-08-07 NOTE — DISCHARGE SUMMARY
[facility] Medicine Discharge Summary    Admit Date: 7/5/2025  Discharge Date and Time: 8/7/202510:07 AM  Admitting Physician: AVIS Team  Discharging Physician: Blue De León MD.  Primary Care Physician: Rob Liu MD      Discharge Diagnoses:  Acute hypoxic respiratory failure: resolved   OP dysphagia, s/p PEG 7/31  Aspiration pneumonia, resolved  VAN on CKD II  Urinary retention with indwelling Benítez  h/o CVA with right hemiparesis  Aphasia   DM II   Peripheral Neuropathy  Essential Hypertension    Hospital Course:   69 y.o. male with CVA with right-sided hemiparesis, DM2, neuropathy, CKD 2, hypertension, HLD,  presented on 7/5/2025 from nursing facility with increased generalized weakness.   He is reported as having a Benítez catheter placed about 1 week ago prior to presentation for urinary retention. Was found to be in acute hypoxic respiratory failure.  Admitted to ICU.  Patient has now completed antibiotics.       Review of hospital course patient was intubated and mechanical ventilation on 07/05/2025, had echocardiogram which showed EF 45 50% with mild valvular heart disease, extubated on 07/16 reintubated on 07/18 and then lastly extubated on 07/25/2025.       Downgraded from ICU to Medicine Services on 7/27/25. No acute distress, nonverbal, with right hemiplegia, he does track with his eyes. He does not follow simple commands appropriately, he has an NG tube with tube feeding free water flush. Underwent  PEG placement 7/31; tube feeds initiated. PT/OT was continued. He was afebrile. Tolerating peg feedings.     Seen by psych team and placed on Trazodone 50 mg q hs. He was stable and at baseline. He was discharged to SNF/NH.    Pt was seen and examined on the day of discharge  Vitals:  VITAL SIGNS: 24 HRS MIN & MAX LAST   Temp  Min: 97.5 °F (36.4 °C)  Max: 98.7 °F (37.1 °C) 97.5 °F (36.4 °C)   BP  Min: 106/67  Max: 139/82 109/70   Pulse  Min: 79  Max: 109   101   Resp  Min: 14  Max: 19 17   SpO2  Min: 95 %  Max: 99 % 95 %       Physical Exam:  Heart RRR  Lungs clear   Abdomen soft and non tender   Obese     Procedures Performed: No admission procedures for hospital encounter.     Significant Diagnostic Studies: See Full reports for all details    Recent Labs   Lab 08/03/25  0351   WBC 10.12   RBC 4.61*   HGB 13.3*   HCT 41.2*   MCV 89.4   MCH 28.9   MCHC 32.3*   RDW 13.6      MPV 9.9       Recent Labs   Lab 08/03/25  0351   *   K 5.1   CL 97*   CO2 29   BUN 33.9*   CREATININE 1.01   *   CALCIUM 9.5   MG 1.90   ALBUMIN 2.9*   PROT 8.1*   ALKPHOS 71   ALT 12   AST 11   BILITOT 0.4        Microbiology Results (last 7 days)       Procedure Component Value Units Date/Time    Blood Culture [[id number]]  (Normal) Collected: 07/28/25 1229    Order Status: Completed Specimen: Blood Updated: 08/02/25 1300     Blood Culture No Growth at 5 days    Blood Culture [[id number]]  (Normal) Collected: 07/28/25 1229    Order Status: Completed Specimen: Blood Updated: 08/02/25 1300     Blood Culture No Growth at 5 days             CT Head Without Contrast  Narrative: EXAMINATION:  CT HEAD WITHOUT CONTRAST    CLINICAL HISTORY:  Mental status change, unknown cause;    TECHNIQUE:  CT imaging of the head performed from the skull base to the vertex without intravenous contrast.  DLP 1558 mGycm. Automatic exposure control, adjustment of mA/kV or iterative reconstruction technique was used to reduce radiation.    COMPARISON:  5 July 2025    FINDINGS:  There is no acute cortical infarct, hemorrhage or mass lesion.  No new parenchymal attenuation abnormality.  Ventricular size is stable.  There are vascular calcifications.    Paranasal sinuses are clear.  There is bilateral mastoid air cell fluid.  Impression: No acute intracranial findings or significant interval change compared to earlier this month.    Electronically signed by: Minh  Kerrie  Date:    07/28/2025  Time:    18:04  X-Ray Chest 1 View  Narrative: EXAMINATION:  XR CHEST 1 VIEW    CLINICAL HISTORY:  Fever.    COMPARISON:  22 July 2025    FINDINGS:  Frontal view of the chest was obtained. Endotracheal tube and right PICC have been removed.  Enteric tube remains in place.  Heart is mildly enlarged.  Suspect some mild atelectasis.  No pneumothorax seen.  Impression: Suspect some mild atelectasis.    Electronically signed by: Minh Sy  Date:    07/28/2025  Time:    12:58         Medication List        START taking these medications      famotidine 20 MG tablet  Commonly known as: PEPCID  1 tablet (20 mg total) by Per G Tube route 2 (two) times daily.     traZODone 50 MG tablet  Commonly known as: DESYREL  1 tablet (50 mg total) by Per G Tube route nightly as needed for Insomnia.            CONTINUE taking these medications      amLODIPine 5 MG tablet  Commonly known as: NORVASC     atorvastatin 20 MG tablet  Commonly known as: LIPITOR     ATROVENT HFA 17 mcg/actuation inhaler  Generic drug: ipratropium     insulin glargine U-100 (Lantus) 100 unit/mL injection     metFORMIN 1000 MG tablet  Commonly known as: GLUCOPHAGE     NAPHCON-A 0.025-0.3 % ophthalmic solution  Generic drug: naphazoline-pheniramine 0.025-0.3%     oxybutynin 10 MG 24 hr tablet  Commonly known as: DITROPAN-XL     polyethylene glycol 17 gram Pwpk  Commonly known as: GLYCOLAX     tamsulosin 0.4 mg Cap  Commonly known as: FLOMAX            STOP taking these medications      diclofenac 75 MG EC tablet  Commonly known as: VOLTAREN     gabapentin 600 MG tablet  Commonly known as: NEURONTIN     glipiZIDE 5 MG tablet  Commonly known as: GLUCOTROL     guaiFENesin 100 mg/5 ml 100 mg/5 mL syrup  Commonly known as: ROBITUSSIN     lisinopriL 20 MG tablet  Commonly known as: PRINIVIL,ZESTRIL     LOKELMA 10 gram packet  Generic drug: sodium zirconium cyclosilicate     methocarbamoL 500 MG Tab  Commonly known as: Robaxin      moxifloxacin 0.5 % ophthalmic solution  Commonly known as: VIGAMOX     QUEtiapine 25 MG Tab  Commonly known as: SEROQUEL     TRADJENTA 5 mg Tab tablet  Generic drug: linaGLIPtin               Where to Get Your Medications        These medications were sent to [facility] - [place], [place] - [address]  [address], [place] [address]      Phone: [phone]   famotidine 20 MG tablet       Information about where to get these medications is not yet available    Ask your nurse or doctor about these medications  traZODone 50 MG tablet          Explained in detail to the patient about the discharge plan, medications, and follow-up visits. Pt understands and agrees with the treatment plan  Discharge Disposition:   SNF/NH  Discharged Condition: stable  Diet-   Dietary Orders (From admission, onward)       Start     Ordered    08/04/25 1454  Tube Feedings/Formulas 100; 1,500; Diabetisource AC; Peg; 50; Every 3 hours  (Tube Feedings/Formulas Order Panel)  Per comments (dietary)        Comments: Run tube feedings over 15 hours, 6am-9pm as tolerated   Question Answer Comment   Formula Rate (mL/hr): 100    Feeding Volume (mL): 1500    Select Formula: Diabetisource AC    Route: Peg    Free water flush volume (mL): 50    Free water flush frequency: Every 3 hours        08/04/25 1454    07/31/25 1435  Diet NPO Except for: Medication  Diet effective now        Question:  Except for:  Answer:  Medication    07/31/25 1435                   Medications Per DC med rec  Activities as tolerated    For further questions contact hospitalist office    Discharge time 33 minutes    For worsening symptoms, chest pain, shortness of breath, increased abdominal pain, high grade fever, stroke or stroke like symptoms, immediately go to the nearest Emergency Room or call 911 as soon as possible.      Blue Jeffery M.D on 8/7/2025. at 10:07 AM.

## 2025-08-08 ENCOUNTER — LAB REQUISITION (OUTPATIENT)
Dept: LAB | Facility: HOSPITAL | Age: 70
End: 2025-08-08
Payer: MEDICARE

## 2025-08-08 DIAGNOSIS — Z29.9 ENCOUNTER FOR PROPHYLACTIC MEASURES, UNSPECIFIED: ICD-10-CM

## 2025-08-08 LAB
ALBUMIN SERPL-MCNC: 3.1 G/DL (ref 3.4–4.8)
ALBUMIN/GLOB SERPL: 0.6 RATIO (ref 1.1–2)
ALP SERPL-CCNC: 74 UNIT/L (ref 40–150)
ALT SERPL-CCNC: 15 UNIT/L (ref 0–55)
ANION GAP SERPL CALC-SCNC: 14 MEQ/L
AST SERPL-CCNC: 17 UNIT/L (ref 11–45)
BASOPHILS # BLD AUTO: 0.03 X10(3)/MCL
BASOPHILS NFR BLD AUTO: 0.3 %
BILIRUB DIRECT SERPL-MCNC: 0.2 MG/DL (ref 0–?)
BILIRUB SERPL-MCNC: 0.6 MG/DL
BUN SERPL-MCNC: 45.6 MG/DL (ref 8.4–25.7)
CALCIUM SERPL-MCNC: 9.1 MG/DL (ref 8.8–10)
CHLORIDE SERPL-SCNC: 97 MMOL/L (ref 98–107)
CHOLEST SERPL-MCNC: 210 MG/DL
CHOLEST/HDLC SERPL: 8 {RATIO} (ref 0–5)
CO2 SERPL-SCNC: 22 MMOL/L (ref 23–31)
CREAT SERPL-MCNC: 2.62 MG/DL (ref 0.72–1.25)
CREAT/UREA NIT SERPL: 17
EOSINOPHIL # BLD AUTO: 0.03 X10(3)/MCL (ref 0–0.9)
EOSINOPHIL NFR BLD AUTO: 0.3 %
ERYTHROCYTE [DISTWIDTH] IN BLOOD BY AUTOMATED COUNT: 13.7 % (ref 11.5–17)
EST. AVERAGE GLUCOSE BLD GHB EST-MCNC: 188.6 MG/DL
GFR SERPLBLD CREATININE-BSD FMLA CKD-EPI: 26 ML/MIN/1.73/M2
GLOBULIN SER-MCNC: 4.9 GM/DL (ref 2.4–3.5)
GLUCOSE SERPL-MCNC: 189 MG/DL (ref 82–115)
HBA1C MFR BLD: 8.2 %
HCT VFR BLD AUTO: 42.8 % (ref 42–52)
HDLC SERPL-MCNC: 25 MG/DL (ref 35–60)
HGB BLD-MCNC: 13.7 G/DL (ref 14–18)
IMM GRANULOCYTES # BLD AUTO: 0.03 X10(3)/MCL (ref 0–0.04)
IMM GRANULOCYTES NFR BLD AUTO: 0.3 %
LDLC SERPL CALC-MCNC: 164 MG/DL (ref 50–140)
LYMPHOCYTES # BLD AUTO: 1.56 X10(3)/MCL (ref 0.6–4.6)
LYMPHOCYTES NFR BLD AUTO: 17.9 %
MAGNESIUM SERPL-MCNC: 2.3 MG/DL (ref 1.6–2.6)
MCH RBC QN AUTO: 28.2 PG (ref 27–31)
MCHC RBC AUTO-ENTMCNC: 32 G/DL (ref 33–36)
MCV RBC AUTO: 88.2 FL (ref 80–94)
MONOCYTES # BLD AUTO: 0.83 X10(3)/MCL (ref 0.1–1.3)
MONOCYTES NFR BLD AUTO: 9.5 %
NEUTROPHILS # BLD AUTO: 6.23 X10(3)/MCL (ref 2.1–9.2)
NEUTROPHILS NFR BLD AUTO: 71.7 %
NRBC BLD AUTO-RTO: 0 %
PLATELET # BLD AUTO: 278 X10(3)/MCL (ref 130–400)
PMV BLD AUTO: 10.6 FL (ref 7.4–10.4)
POTASSIUM SERPL-SCNC: 5.4 MMOL/L (ref 3.5–5.1)
PREALB SERPL-MCNC: 20 MG/DL (ref 16–42)
PROT SERPL-MCNC: 8 GM/DL (ref 5.8–7.6)
RBC # BLD AUTO: 4.85 X10(6)/MCL (ref 4.7–6.1)
SODIUM SERPL-SCNC: 133 MMOL/L (ref 136–145)
TRIGL SERPL-MCNC: 104 MG/DL (ref 34–140)
TSH SERPL-ACNC: 0.44 UIU/ML (ref 0.35–4.94)
VLDLC SERPL CALC-MCNC: 21 MG/DL
WBC # BLD AUTO: 8.71 X10(3)/MCL (ref 4.5–11.5)

## 2025-08-08 PROCEDURE — 82248 BILIRUBIN DIRECT: CPT | Performed by: NURSE PRACTITIONER

## 2025-08-08 PROCEDURE — 80061 LIPID PANEL: CPT | Performed by: NURSE PRACTITIONER

## 2025-08-08 PROCEDURE — 84443 ASSAY THYROID STIM HORMONE: CPT | Performed by: NURSE PRACTITIONER

## 2025-08-08 PROCEDURE — 83735 ASSAY OF MAGNESIUM: CPT | Performed by: NURSE PRACTITIONER

## 2025-08-08 PROCEDURE — 85025 COMPLETE CBC W/AUTO DIFF WBC: CPT | Performed by: NURSE PRACTITIONER

## 2025-08-08 PROCEDURE — 83036 HEMOGLOBIN GLYCOSYLATED A1C: CPT | Performed by: NURSE PRACTITIONER

## 2025-08-08 PROCEDURE — 84134 ASSAY OF PREALBUMIN: CPT | Performed by: NURSE PRACTITIONER

## 2025-08-08 PROCEDURE — 80053 COMPREHEN METABOLIC PANEL: CPT | Performed by: NURSE PRACTITIONER

## 2025-08-09 ENCOUNTER — HOSPITAL ENCOUNTER (INPATIENT)
Facility: HOSPITAL | Age: 70
LOS: 5 days | Discharge: NURSING, REHAB OR LONG-TERM CARE | DRG: 682 | End: 2025-08-14
Attending: EMERGENCY MEDICINE | Admitting: INTERNAL MEDICINE
Payer: MEDICARE

## 2025-08-09 DIAGNOSIS — N17.9 ACUTE KIDNEY INJURY: ICD-10-CM

## 2025-08-09 DIAGNOSIS — S09.90XA CLOSED HEAD INJURY, INITIAL ENCOUNTER: ICD-10-CM

## 2025-08-09 DIAGNOSIS — T14.90XA TRAUMA: ICD-10-CM

## 2025-08-09 DIAGNOSIS — N17.9 ACUTE RENAL FAILURE: ICD-10-CM

## 2025-08-09 DIAGNOSIS — Z97.8 CHRONIC INDWELLING FOLEY CATHETER: ICD-10-CM

## 2025-08-09 DIAGNOSIS — N39.0 URINARY TRACT INFECTION ASSOCIATED WITH INDWELLING URETHRAL CATHETER, INITIAL ENCOUNTER: ICD-10-CM

## 2025-08-09 DIAGNOSIS — R07.9 CHEST PAIN: ICD-10-CM

## 2025-08-09 DIAGNOSIS — I95.9 TRANSIENT HYPOTENSION: ICD-10-CM

## 2025-08-09 DIAGNOSIS — E87.5 HYPERKALEMIA: Primary | ICD-10-CM

## 2025-08-09 DIAGNOSIS — E87.29 HIGH ANION GAP METABOLIC ACIDOSIS: ICD-10-CM

## 2025-08-09 DIAGNOSIS — E87.20 LACTIC ACIDOSIS: ICD-10-CM

## 2025-08-09 DIAGNOSIS — T83.511A URINARY TRACT INFECTION ASSOCIATED WITH INDWELLING URETHRAL CATHETER, INITIAL ENCOUNTER: ICD-10-CM

## 2025-08-09 DIAGNOSIS — S01.112A EYEBROW LACERATION, LEFT, INITIAL ENCOUNTER: ICD-10-CM

## 2025-08-09 LAB
ABORH RETYPE: NORMAL
ACCEPTIBLE SP GR UR QL: 1.02 (ref 1–1.03)
ALBUMIN SERPL-MCNC: 2.8 G/DL (ref 3.4–4.8)
ALBUMIN/GLOB SERPL: 0.6 RATIO (ref 1.1–2)
ALP SERPL-CCNC: 72 UNIT/L (ref 40–150)
ALT SERPL-CCNC: 124 UNIT/L (ref 0–55)
AMORPH URATE CRY URNS QL MICRO: ABNORMAL /UL
AMPHET UR QL SCN: NEGATIVE
ANION GAP SERPL CALC-SCNC: 14 MEQ/L
ANION GAP SERPL CALC-SCNC: 19 MEQ/L
APTT PPP: 25.8 SECONDS (ref 23.2–33.7)
AST SERPL-CCNC: 112 UNIT/L (ref 11–45)
BACTERIA #/AREA URNS AUTO: ABNORMAL /HPF
BARBITURATE SCN PRESENT UR: NEGATIVE
BASE EXCESS BLD CALC-SCNC: -5.1 MMOL/L (ref -2–2)
BASOPHILS # BLD AUTO: 0.03 X10(3)/MCL
BASOPHILS NFR BLD AUTO: 0.3 %
BENZODIAZ UR QL SCN: NEGATIVE
BILIRUB SERPL-MCNC: 0.4 MG/DL
BILIRUB UR QL STRIP.AUTO: NEGATIVE
BLOOD GAS SAMPLE TYPE (OHS): ABNORMAL
BUN SERPL-MCNC: 75.9 MG/DL (ref 8.4–25.7)
BUN SERPL-MCNC: 76.3 MG/DL (ref 8.4–25.7)
CA-I BLD-SCNC: 1.03 MMOL/L (ref 1.12–1.23)
CALCIUM SERPL-MCNC: 8.1 MG/DL (ref 8.8–10)
CALCIUM SERPL-MCNC: 8.8 MG/DL (ref 8.8–10)
CANNABINOIDS UR QL SCN: NEGATIVE
CHLORIDE SERPL-SCNC: 102 MMOL/L (ref 98–107)
CHLORIDE SERPL-SCNC: 97 MMOL/L (ref 98–107)
CK SERPL-CCNC: 1811 U/L (ref 30–200)
CLARITY UR: ABNORMAL
CO2 BLDA-SCNC: 24 MMOL/L
CO2 SERPL-SCNC: 17 MMOL/L (ref 23–31)
CO2 SERPL-SCNC: 17 MMOL/L (ref 23–31)
COCAINE UR QL SCN: NEGATIVE
COHGB MFR BLDA: 2.9 % (ref 0.5–1.5)
COLOR UR AUTO: ABNORMAL
CREAT SERPL-MCNC: 5.33 MG/DL (ref 0.72–1.25)
CREAT SERPL-MCNC: 5.6 MG/DL (ref 0.72–1.25)
CREAT UR-MCNC: 101.9 MG/DL (ref 63–166)
CREAT/UREA NIT SERPL: 14
CREAT/UREA NIT SERPL: 14
EOSINOPHIL # BLD AUTO: 0.06 X10(3)/MCL (ref 0–0.9)
EOSINOPHIL NFR BLD AUTO: 0.6 %
ERYTHROCYTE [DISTWIDTH] IN BLOOD BY AUTOMATED COUNT: 14.3 % (ref 11.5–17)
ETHANOL SERPL-MCNC: <10 MG/DL
FENTANYL UR QL SCN: NEGATIVE
GFR SERPLBLD CREATININE-BSD FMLA CKD-EPI: 10 ML/MIN/1.73/M2
GFR SERPLBLD CREATININE-BSD FMLA CKD-EPI: 11 ML/MIN/1.73/M2
GLOBULIN SER-MCNC: 4.8 GM/DL (ref 2.4–3.5)
GLUCOSE SERPL-MCNC: 143 MG/DL (ref 82–115)
GLUCOSE SERPL-MCNC: 144 MG/DL (ref 82–115)
GLUCOSE UR QL STRIP: ABNORMAL
GRAN CASTS #/AREA URNS LPF: ABNORMAL /LPF
GROUP & RH: NORMAL
HCO3 BLDA-SCNC: 22.4 MMOL/L (ref 22–26)
HCT VFR BLD AUTO: 43.5 % (ref 42–52)
HGB BLD-MCNC: 13.6 G/DL (ref 14–18)
HGB UR QL STRIP: ABNORMAL
HYALINE CASTS #/AREA URNS LPF: ABNORMAL /LPF
IMM GRANULOCYTES # BLD AUTO: 0.03 X10(3)/MCL (ref 0–0.04)
IMM GRANULOCYTES NFR BLD AUTO: 0.3 %
INDIRECT COOMBS: NORMAL
INR PPP: 1.4
KETONES UR QL STRIP: NEGATIVE
LACTATE SERPL-SCNC: 0.8 MMOL/L (ref 0.5–2.2)
LACTATE SERPL-SCNC: 3.8 MMOL/L (ref 0.5–2.2)
LACTATE SERPL-SCNC: 4.8 MMOL/L (ref 0.5–2.2)
LACTATE SERPL-SCNC: 4.9 MMOL/L (ref 0.5–2.2)
LEUKOCYTE ESTERASE UR QL STRIP: 75
LYMPHOCYTES # BLD AUTO: 1.59 X10(3)/MCL (ref 0.6–4.6)
LYMPHOCYTES NFR BLD AUTO: 15.2 %
MCH RBC QN AUTO: 28.5 PG (ref 27–31)
MCHC RBC AUTO-ENTMCNC: 31.3 G/DL (ref 33–36)
MCV RBC AUTO: 91 FL (ref 80–94)
MDMA UR QL SCN: NEGATIVE
METHGB MFR BLDA: 1.3 % (ref 0.4–1.5)
MONOCYTES # BLD AUTO: 0.94 X10(3)/MCL (ref 0.1–1.3)
MONOCYTES NFR BLD AUTO: 9 %
MUCOUS THREADS URNS QL MICRO: ABNORMAL /LPF
NEUTROPHILS # BLD AUTO: 7.79 X10(3)/MCL (ref 2.1–9.2)
NEUTROPHILS NFR BLD AUTO: 74.6 %
NITRITE UR QL STRIP: NEGATIVE
NRBC BLD AUTO-RTO: 0 %
O2 HB BLOOD GAS (OHS): 87.7 % (ref 94–97)
OPIATES UR QL SCN: NEGATIVE
OSMOLALITY UR: 386 MOSM/KG (ref 300–1300)
OXYHGB MFR BLDA: 11.9 G/DL (ref 12–16)
PCO2 BLDA: 51 MMHG (ref 35–45)
PCP UR QL: NEGATIVE
PH BLDA: 7.25 [PH] (ref 7.35–7.45)
PH UR STRIP: 5.5 [PH]
PH UR: 5.5 [PH] (ref 3–11)
PLATELET # BLD AUTO: 206 X10(3)/MCL (ref 130–400)
PMV BLD AUTO: 10 FL (ref 7.4–10.4)
PO2 BLDA: 63 MMHG (ref 80–100)
POCT GLUCOSE: 122 MG/DL (ref 70–110)
POCT GLUCOSE: 150 MG/DL (ref 70–110)
POTASSIUM BLOOD GAS (OHS): 4.6 MMOL/L (ref 3.5–5)
POTASSIUM SERPL-SCNC: 4.9 MMOL/L (ref 3.5–5.1)
POTASSIUM SERPL-SCNC: 5.6 MMOL/L (ref 3.5–5.1)
PROT SERPL-MCNC: 7.6 GM/DL (ref 5.8–7.6)
PROT UR QL STRIP: ABNORMAL
PROT UR STRIP-MCNC: 54.2 MG/DL
PROTHROMBIN TIME: 16.9 SECONDS (ref 12.5–14.5)
RBC # BLD AUTO: 4.78 X10(6)/MCL (ref 4.7–6.1)
RBC #/AREA URNS AUTO: >100 /HPF
SAO2 % BLDA: 87.5 %
SODIUM BLOOD GAS (OHS): 131 MMOL/L (ref 137–145)
SODIUM SERPL-SCNC: 133 MMOL/L (ref 136–145)
SODIUM SERPL-SCNC: 133 MMOL/L (ref 136–145)
SODIUM UR-SCNC: 91 MMOL/L
SP GR UR STRIP.AUTO: 1.02 (ref 1–1.03)
SPECIMEN OUTDATE: NORMAL
SQUAMOUS #/AREA URNS LPF: ABNORMAL /HPF
TROPONIN I SERPL HS-MCNC: 13 NG/L
UROBILINOGEN UR STRIP-ACNC: 2
WBC # BLD AUTO: 10.44 X10(3)/MCL (ref 4.5–11.5)
WBC #/AREA URNS AUTO: ABNORMAL /HPF
WBC CLUMPS UR QL AUTO: ABNORMAL

## 2025-08-09 PROCEDURE — 83935 ASSAY OF URINE OSMOLALITY: CPT | Performed by: STUDENT IN AN ORGANIZED HEALTH CARE EDUCATION/TRAINING PROGRAM

## 2025-08-09 PROCEDURE — G0390 TRAUMA RESPONS W/HOSP CRITI: HCPCS

## 2025-08-09 PROCEDURE — 99285 EMERGENCY DEPT VISIT HI MDM: CPT | Mod: 25

## 2025-08-09 PROCEDURE — 84300 ASSAY OF URINE SODIUM: CPT | Performed by: STUDENT IN AN ORGANIZED HEALTH CARE EDUCATION/TRAINING PROGRAM

## 2025-08-09 PROCEDURE — 25000003 PHARM REV CODE 250: Performed by: CLINICAL NURSE SPECIALIST

## 2025-08-09 PROCEDURE — 84484 ASSAY OF TROPONIN QUANT: CPT | Performed by: EMERGENCY MEDICINE

## 2025-08-09 PROCEDURE — 80053 COMPREHEN METABOLIC PANEL: CPT | Performed by: SURGERY

## 2025-08-09 PROCEDURE — 93005 ELECTROCARDIOGRAM TRACING: CPT

## 2025-08-09 PROCEDURE — 25000003 PHARM REV CODE 250: Performed by: EMERGENCY MEDICINE

## 2025-08-09 PROCEDURE — 63600175 PHARM REV CODE 636 W HCPCS: Performed by: EMERGENCY MEDICINE

## 2025-08-09 PROCEDURE — 82550 ASSAY OF CK (CPK): CPT | Performed by: EMERGENCY MEDICINE

## 2025-08-09 PROCEDURE — 90715 TDAP VACCINE 7 YRS/> IM: CPT | Performed by: SURGERY

## 2025-08-09 PROCEDURE — 81001 URINALYSIS AUTO W/SCOPE: CPT | Performed by: SURGERY

## 2025-08-09 PROCEDURE — 99223 1ST HOSP IP/OBS HIGH 75: CPT | Mod: ,,, | Performed by: SURGERY

## 2025-08-09 PROCEDURE — 99900035 HC TECH TIME PER 15 MIN (STAT)

## 2025-08-09 PROCEDURE — 82570 ASSAY OF URINE CREATININE: CPT | Performed by: STUDENT IN AN ORGANIZED HEALTH CARE EDUCATION/TRAINING PROGRAM

## 2025-08-09 PROCEDURE — 63600175 PHARM REV CODE 636 W HCPCS: Performed by: SURGERY

## 2025-08-09 PROCEDURE — 82077 ASSAY SPEC XCP UR&BREATH IA: CPT | Performed by: SURGERY

## 2025-08-09 PROCEDURE — 83605 ASSAY OF LACTIC ACID: CPT | Performed by: SURGERY

## 2025-08-09 PROCEDURE — 85025 COMPLETE CBC W/AUTO DIFF WBC: CPT | Performed by: SURGERY

## 2025-08-09 PROCEDURE — 86901 BLOOD TYPING SEROLOGIC RH(D): CPT | Performed by: SURGERY

## 2025-08-09 PROCEDURE — 25000003 PHARM REV CODE 250

## 2025-08-09 PROCEDURE — 82803 BLOOD GASES ANY COMBINATION: CPT

## 2025-08-09 PROCEDURE — 99222 1ST HOSP IP/OBS MODERATE 55: CPT | Mod: ,,, | Performed by: STUDENT IN AN ORGANIZED HEALTH CARE EDUCATION/TRAINING PROGRAM

## 2025-08-09 PROCEDURE — 83605 ASSAY OF LACTIC ACID: CPT | Performed by: INTERNAL MEDICINE

## 2025-08-09 PROCEDURE — 85610 PROTHROMBIN TIME: CPT | Performed by: SURGERY

## 2025-08-09 PROCEDURE — 63600175 PHARM REV CODE 636 W HCPCS: Performed by: STUDENT IN AN ORGANIZED HEALTH CARE EDUCATION/TRAINING PROGRAM

## 2025-08-09 PROCEDURE — 63600175 PHARM REV CODE 636 W HCPCS: Performed by: CLINICAL NURSE SPECIALIST

## 2025-08-09 PROCEDURE — 90471 IMMUNIZATION ADMIN: CPT | Performed by: SURGERY

## 2025-08-09 PROCEDURE — 84156 ASSAY OF PROTEIN URINE: CPT | Performed by: STUDENT IN AN ORGANIZED HEALTH CARE EDUCATION/TRAINING PROGRAM

## 2025-08-09 PROCEDURE — 87040 BLOOD CULTURE FOR BACTERIA: CPT | Performed by: EMERGENCY MEDICINE

## 2025-08-09 PROCEDURE — 37799 UNLISTED PX VASCULAR SURGERY: CPT

## 2025-08-09 PROCEDURE — 85730 THROMBOPLASTIN TIME PARTIAL: CPT | Performed by: SURGERY

## 2025-08-09 PROCEDURE — 83605 ASSAY OF LACTIC ACID: CPT | Performed by: EMERGENCY MEDICINE

## 2025-08-09 PROCEDURE — 36415 COLL VENOUS BLD VENIPUNCTURE: CPT | Performed by: STUDENT IN AN ORGANIZED HEALTH CARE EDUCATION/TRAINING PROGRAM

## 2025-08-09 PROCEDURE — 11000001 HC ACUTE MED/SURG PRIVATE ROOM

## 2025-08-09 PROCEDURE — 87184 SC STD DISK METHOD PER PLATE: CPT | Performed by: SURGERY

## 2025-08-09 PROCEDURE — 21400001 HC TELEMETRY ROOM

## 2025-08-09 PROCEDURE — 96360 HYDRATION IV INFUSION INIT: CPT

## 2025-08-09 PROCEDURE — 3E0234Z INTRODUCTION OF SERUM, TOXOID AND VACCINE INTO MUSCLE, PERCUTANEOUS APPROACH: ICD-10-PCS | Performed by: INTERNAL MEDICINE

## 2025-08-09 PROCEDURE — 80307 DRUG TEST PRSMV CHEM ANLYZR: CPT | Performed by: SURGERY

## 2025-08-09 RX ORDER — METFORMIN HYDROCHLORIDE 1000 MG/1
1000 TABLET ORAL 2 TIMES DAILY
Status: ON HOLD | COMMUNITY
Start: 2025-06-25 | End: 2025-08-12

## 2025-08-09 RX ORDER — TAMSULOSIN HYDROCHLORIDE 0.4 MG/1
0.8 CAPSULE ORAL DAILY
COMMUNITY
End: 2025-08-09 | Stop reason: ALTCHOICE

## 2025-08-09 RX ORDER — CEFTRIAXONE 2 G/1
2 INJECTION, POWDER, FOR SOLUTION INTRAMUSCULAR; INTRAVENOUS
Status: COMPLETED | OUTPATIENT
Start: 2025-08-09 | End: 2025-08-09

## 2025-08-09 RX ORDER — IBUPROFEN 200 MG
16 TABLET ORAL
Status: DISCONTINUED | OUTPATIENT
Start: 2025-08-09 | End: 2025-08-14 | Stop reason: HOSPADM

## 2025-08-09 RX ORDER — SODIUM CHLORIDE 0.9 % (FLUSH) 0.9 %
10 SYRINGE (ML) INJECTION EVERY 12 HOURS PRN
Status: DISCONTINUED | OUTPATIENT
Start: 2025-08-09 | End: 2025-08-14 | Stop reason: HOSPADM

## 2025-08-09 RX ORDER — HYDRALAZINE HYDROCHLORIDE 20 MG/ML
10 INJECTION INTRAMUSCULAR; INTRAVENOUS EVERY 6 HOURS PRN
Status: DISCONTINUED | OUTPATIENT
Start: 2025-08-09 | End: 2025-08-14 | Stop reason: HOSPADM

## 2025-08-09 RX ORDER — SODIUM CHLORIDE 9 MG/ML
INJECTION, SOLUTION INTRAVENOUS CONTINUOUS
Status: DISCONTINUED | OUTPATIENT
Start: 2025-08-09 | End: 2025-08-10

## 2025-08-09 RX ORDER — SODIUM CHLORIDE 9 MG/ML
INJECTION, SOLUTION INTRAVENOUS
Status: COMPLETED | OUTPATIENT
Start: 2025-08-09 | End: 2025-08-09

## 2025-08-09 RX ORDER — CALCIUM CARBONATE 200(500)MG
500 TABLET,CHEWABLE ORAL 3 TIMES DAILY PRN
Status: DISCONTINUED | OUTPATIENT
Start: 2025-08-09 | End: 2025-08-14 | Stop reason: HOSPADM

## 2025-08-09 RX ORDER — ACETAMINOPHEN 325 MG/1
650 TABLET ORAL EVERY 6 HOURS PRN
Status: DISCONTINUED | OUTPATIENT
Start: 2025-08-09 | End: 2025-08-14 | Stop reason: HOSPADM

## 2025-08-09 RX ORDER — ONDANSETRON HYDROCHLORIDE 2 MG/ML
4 INJECTION, SOLUTION INTRAVENOUS EVERY 6 HOURS PRN
Status: DISCONTINUED | OUTPATIENT
Start: 2025-08-09 | End: 2025-08-14 | Stop reason: HOSPADM

## 2025-08-09 RX ORDER — BENZONATATE 100 MG/1
100 CAPSULE ORAL 3 TIMES DAILY PRN
Status: DISCONTINUED | OUTPATIENT
Start: 2025-08-09 | End: 2025-08-14 | Stop reason: HOSPADM

## 2025-08-09 RX ORDER — INSULIN GLARGINE 100 [IU]/ML
20 INJECTION, SOLUTION SUBCUTANEOUS NIGHTLY
Status: ON HOLD | COMMUNITY
End: 2025-08-12 | Stop reason: HOSPADM

## 2025-08-09 RX ORDER — ATORVASTATIN CALCIUM 20 MG/1
20 TABLET, FILM COATED ORAL NIGHTLY
COMMUNITY
Start: 2025-06-24

## 2025-08-09 RX ORDER — ENOXAPARIN SODIUM 100 MG/ML
40 INJECTION SUBCUTANEOUS EVERY 24 HOURS
Status: DISCONTINUED | OUTPATIENT
Start: 2025-08-09 | End: 2025-08-10

## 2025-08-09 RX ORDER — ATORVASTATIN CALCIUM 10 MG/1
20 TABLET, FILM COATED ORAL NIGHTLY
Status: DISCONTINUED | OUTPATIENT
Start: 2025-08-09 | End: 2025-08-14 | Stop reason: HOSPADM

## 2025-08-09 RX ORDER — GLUCAGON 1 MG
1 KIT INJECTION
Status: DISCONTINUED | OUTPATIENT
Start: 2025-08-09 | End: 2025-08-14 | Stop reason: HOSPADM

## 2025-08-09 RX ORDER — OXYBUTYNIN CHLORIDE 10 MG/1
10 TABLET, EXTENDED RELEASE ORAL 2 TIMES DAILY
COMMUNITY
Start: 2025-05-19

## 2025-08-09 RX ORDER — DAPAGLIFLOZIN 10 MG/1
10 TABLET, FILM COATED ORAL DAILY
COMMUNITY

## 2025-08-09 RX ORDER — FAMOTIDINE 20 MG/1
20 TABLET, FILM COATED ORAL DAILY
Status: DISCONTINUED | OUTPATIENT
Start: 2025-08-09 | End: 2025-08-14 | Stop reason: HOSPADM

## 2025-08-09 RX ORDER — IBUPROFEN 200 MG
24 TABLET ORAL
Status: DISCONTINUED | OUTPATIENT
Start: 2025-08-09 | End: 2025-08-14 | Stop reason: HOSPADM

## 2025-08-09 RX ORDER — FAMOTIDINE 20 MG/1
20 TABLET, FILM COATED ORAL 2 TIMES DAILY
COMMUNITY

## 2025-08-09 RX ORDER — SODIUM CHLORIDE, SODIUM LACTATE, POTASSIUM CHLORIDE, CALCIUM CHLORIDE 600; 310; 30; 20 MG/100ML; MG/100ML; MG/100ML; MG/100ML
INJECTION, SOLUTION INTRAVENOUS CONTINUOUS
Status: DISCONTINUED | OUTPATIENT
Start: 2025-08-10 | End: 2025-08-11

## 2025-08-09 RX ORDER — TAMSULOSIN HYDROCHLORIDE 0.4 MG/1
0.8 CAPSULE ORAL DAILY
COMMUNITY

## 2025-08-09 RX ORDER — MUPIROCIN 20 MG/G
OINTMENT TOPICAL 2 TIMES DAILY
Status: DISCONTINUED | OUTPATIENT
Start: 2025-08-09 | End: 2025-08-14 | Stop reason: HOSPADM

## 2025-08-09 RX ORDER — CEFTRIAXONE 2 G/1
2 INJECTION, POWDER, FOR SOLUTION INTRAMUSCULAR; INTRAVENOUS
Status: DISCONTINUED | OUTPATIENT
Start: 2025-08-10 | End: 2025-08-12

## 2025-08-09 RX ORDER — AMLODIPINE BESYLATE 5 MG/1
5 TABLET ORAL DAILY
Status: ON HOLD | COMMUNITY
Start: 2025-06-24 | End: 2025-08-12 | Stop reason: HOSPADM

## 2025-08-09 RX ORDER — GUAIFENESIN 100 MG/5ML
200 LIQUID ORAL EVERY 4 HOURS PRN
Status: DISCONTINUED | OUTPATIENT
Start: 2025-08-09 | End: 2025-08-14 | Stop reason: HOSPADM

## 2025-08-09 RX ORDER — INSULIN ASPART 100 [IU]/ML
0-5 INJECTION, SOLUTION INTRAVENOUS; SUBCUTANEOUS
Status: DISCONTINUED | OUTPATIENT
Start: 2025-08-09 | End: 2025-08-14 | Stop reason: HOSPADM

## 2025-08-09 RX ORDER — NALOXONE HCL 0.4 MG/ML
0.02 VIAL (ML) INJECTION
Status: DISCONTINUED | OUTPATIENT
Start: 2025-08-09 | End: 2025-08-14 | Stop reason: HOSPADM

## 2025-08-09 RX ADMIN — SODIUM ZIRCONIUM CYCLOSILICATE 10 G: 10 POWDER, FOR SUSPENSION ORAL at 10:08

## 2025-08-09 RX ADMIN — SODIUM CHLORIDE, POTASSIUM CHLORIDE, SODIUM LACTATE AND CALCIUM CHLORIDE 2000 ML: 600; 310; 30; 20 INJECTION, SOLUTION INTRAVENOUS at 05:08

## 2025-08-09 RX ADMIN — ENOXAPARIN SODIUM 40 MG: 40 INJECTION SUBCUTANEOUS at 04:08

## 2025-08-09 RX ADMIN — SODIUM CHLORIDE, POTASSIUM CHLORIDE, SODIUM LACTATE AND CALCIUM CHLORIDE 1000 ML: 600; 310; 30; 20 INJECTION, SOLUTION INTRAVENOUS at 07:08

## 2025-08-09 RX ADMIN — FAMOTIDINE 20 MG: 20 TABLET, FILM COATED ORAL at 04:08

## 2025-08-09 RX ADMIN — CLOSTRIDIUM TETANI TOXOID ANTIGEN (FORMALDEHYDE INACTIVATED), CORYNEBACTERIUM DIPHTHERIAE TOXOID ANTIGEN (FORMALDEHYDE INACTIVATED), BORDETELLA PERTUSSIS TOXOID ANTIGEN (GLUTARALDEHYDE INACTIVATED), BORDETELLA PERTUSSIS FILAMENTOUS HEMAGGLUTININ ANTIGEN (FORMALDEHYDE INACTIVATED), BORDETELLA PERTUSSIS PERTACTIN ANTIGEN, AND BORDETELLA PERTUSSIS FIMBRIAE 2/3 ANTIGEN 0.5 ML: 5; 2; 2.5; 5; 3; 5 INJECTION, SUSPENSION INTRAMUSCULAR at 06:08

## 2025-08-09 RX ADMIN — SODIUM CHLORIDE 500 ML: 9 INJECTION, SOLUTION INTRAVENOUS at 06:08

## 2025-08-09 RX ADMIN — SODIUM CHLORIDE 500 ML: 9 INJECTION, SOLUTION INTRAVENOUS at 02:08

## 2025-08-09 RX ADMIN — SODIUM BICARBONATE: 84 INJECTION, SOLUTION INTRAVENOUS at 09:08

## 2025-08-09 RX ADMIN — SODIUM CHLORIDE, POTASSIUM CHLORIDE, SODIUM LACTATE AND CALCIUM CHLORIDE 1000 ML: 600; 310; 30; 20 INJECTION, SOLUTION INTRAVENOUS at 10:08

## 2025-08-09 RX ADMIN — SODIUM CHLORIDE: 9 INJECTION, SOLUTION INTRAVENOUS at 11:08

## 2025-08-09 RX ADMIN — SODIUM CHLORIDE: 9 INJECTION, SOLUTION INTRAVENOUS at 02:08

## 2025-08-09 RX ADMIN — CEFTRIAXONE SODIUM 2 G: 2 INJECTION, POWDER, FOR SOLUTION INTRAMUSCULAR; INTRAVENOUS at 10:08

## 2025-08-09 RX ADMIN — ATORVASTATIN CALCIUM 20 MG: 10 TABLET, FILM COATED ORAL at 11:08

## 2025-08-10 LAB
ALBUMIN SERPL-MCNC: 2.5 G/DL (ref 3.4–4.8)
ALBUMIN/GLOB SERPL: 0.6 RATIO (ref 1.1–2)
ALP SERPL-CCNC: 63 UNIT/L (ref 40–150)
ALT SERPL-CCNC: 97 UNIT/L (ref 0–55)
ANION GAP SERPL CALC-SCNC: 8 MEQ/L
AST SERPL-CCNC: 72 UNIT/L (ref 11–45)
BASOPHILS # BLD AUTO: 0.03 X10(3)/MCL
BASOPHILS NFR BLD AUTO: 0.4 %
BILIRUB SERPL-MCNC: 0.4 MG/DL
BUN SERPL-MCNC: 61.1 MG/DL (ref 8.4–25.7)
CALCIUM SERPL-MCNC: 8.3 MG/DL (ref 8.8–10)
CHLORIDE SERPL-SCNC: 102 MMOL/L (ref 98–107)
CK SERPL-CCNC: 1470 U/L (ref 30–200)
CO2 SERPL-SCNC: 26 MMOL/L (ref 23–31)
CREAT SERPL-MCNC: 3.29 MG/DL (ref 0.72–1.25)
CREAT/UREA NIT SERPL: 19
EOSINOPHIL # BLD AUTO: 0.21 X10(3)/MCL (ref 0–0.9)
EOSINOPHIL NFR BLD AUTO: 3 %
ERYTHROCYTE [DISTWIDTH] IN BLOOD BY AUTOMATED COUNT: 14.1 % (ref 11.5–17)
GFR SERPLBLD CREATININE-BSD FMLA CKD-EPI: 20 ML/MIN/1.73/M2
GLOBULIN SER-MCNC: 4.3 GM/DL (ref 2.4–3.5)
GLUCOSE SERPL-MCNC: 147 MG/DL (ref 82–115)
HCT VFR BLD AUTO: 34.7 % (ref 42–52)
HGB BLD-MCNC: 11 G/DL (ref 14–18)
IMM GRANULOCYTES # BLD AUTO: 0.02 X10(3)/MCL (ref 0–0.04)
IMM GRANULOCYTES NFR BLD AUTO: 0.3 %
LYMPHOCYTES # BLD AUTO: 1.74 X10(3)/MCL (ref 0.6–4.6)
LYMPHOCYTES NFR BLD AUTO: 24.8 %
MCH RBC QN AUTO: 27.8 PG (ref 27–31)
MCHC RBC AUTO-ENTMCNC: 31.7 G/DL (ref 33–36)
MCV RBC AUTO: 87.6 FL (ref 80–94)
MONOCYTES # BLD AUTO: 0.81 X10(3)/MCL (ref 0.1–1.3)
MONOCYTES NFR BLD AUTO: 11.6 %
NEUTROPHILS # BLD AUTO: 4.2 X10(3)/MCL (ref 2.1–9.2)
NEUTROPHILS NFR BLD AUTO: 59.9 %
NRBC BLD AUTO-RTO: 0 %
PLATELET # BLD AUTO: 196 X10(3)/MCL (ref 130–400)
PMV BLD AUTO: 10.3 FL (ref 7.4–10.4)
POCT GLUCOSE: 132 MG/DL (ref 70–110)
POCT GLUCOSE: 133 MG/DL (ref 70–110)
POCT GLUCOSE: 136 MG/DL (ref 70–110)
POTASSIUM SERPL-SCNC: 4 MMOL/L (ref 3.5–5.1)
PROT SERPL-MCNC: 6.8 GM/DL (ref 5.8–7.6)
RBC # BLD AUTO: 3.96 X10(6)/MCL (ref 4.7–6.1)
SODIUM SERPL-SCNC: 136 MMOL/L (ref 136–145)
WBC # BLD AUTO: 7.01 X10(3)/MCL (ref 4.5–11.5)

## 2025-08-10 PROCEDURE — 99232 SBSQ HOSP IP/OBS MODERATE 35: CPT | Mod: ,,, | Performed by: STUDENT IN AN ORGANIZED HEALTH CARE EDUCATION/TRAINING PROGRAM

## 2025-08-10 PROCEDURE — 25000003 PHARM REV CODE 250: Performed by: INTERNAL MEDICINE

## 2025-08-10 PROCEDURE — 97162 PT EVAL MOD COMPLEX 30 MIN: CPT

## 2025-08-10 PROCEDURE — 85025 COMPLETE CBC W/AUTO DIFF WBC: CPT | Performed by: CLINICAL NURSE SPECIALIST

## 2025-08-10 PROCEDURE — 63600175 PHARM REV CODE 636 W HCPCS: Performed by: CLINICAL NURSE SPECIALIST

## 2025-08-10 PROCEDURE — 25000003 PHARM REV CODE 250: Performed by: CLINICAL NURSE SPECIALIST

## 2025-08-10 PROCEDURE — 11000001 HC ACUTE MED/SURG PRIVATE ROOM

## 2025-08-10 PROCEDURE — 97166 OT EVAL MOD COMPLEX 45 MIN: CPT

## 2025-08-10 PROCEDURE — 63600175 PHARM REV CODE 636 W HCPCS: Performed by: STUDENT IN AN ORGANIZED HEALTH CARE EDUCATION/TRAINING PROGRAM

## 2025-08-10 PROCEDURE — 94760 N-INVAS EAR/PLS OXIMETRY 1: CPT

## 2025-08-10 PROCEDURE — 82550 ASSAY OF CK (CPK): CPT | Performed by: INTERNAL MEDICINE

## 2025-08-10 PROCEDURE — 36415 COLL VENOUS BLD VENIPUNCTURE: CPT | Performed by: CLINICAL NURSE SPECIALIST

## 2025-08-10 PROCEDURE — 21400001 HC TELEMETRY ROOM

## 2025-08-10 PROCEDURE — 80053 COMPREHEN METABOLIC PANEL: CPT | Performed by: CLINICAL NURSE SPECIALIST

## 2025-08-10 PROCEDURE — 27000221 HC OXYGEN, UP TO 24 HOURS

## 2025-08-10 RX ORDER — ENOXAPARIN SODIUM 100 MG/ML
30 INJECTION SUBCUTANEOUS EVERY 24 HOURS
Status: DISCONTINUED | OUTPATIENT
Start: 2025-08-10 | End: 2025-08-14 | Stop reason: HOSPADM

## 2025-08-10 RX ORDER — MIDODRINE HYDROCHLORIDE 2.5 MG/1
2.5 TABLET ORAL 2 TIMES DAILY WITH MEALS
Status: DISCONTINUED | OUTPATIENT
Start: 2025-08-10 | End: 2025-08-14 | Stop reason: HOSPADM

## 2025-08-10 RX ADMIN — FAMOTIDINE 20 MG: 20 TABLET, FILM COATED ORAL at 09:08

## 2025-08-10 RX ADMIN — ATORVASTATIN CALCIUM 20 MG: 10 TABLET, FILM COATED ORAL at 08:08

## 2025-08-10 RX ADMIN — MIDODRINE HYDROCHLORIDE 2.5 MG: 2.5 TABLET ORAL at 06:08

## 2025-08-10 RX ADMIN — SODIUM CHLORIDE, POTASSIUM CHLORIDE, SODIUM LACTATE AND CALCIUM CHLORIDE: 600; 310; 30; 20 INJECTION, SOLUTION INTRAVENOUS at 11:08

## 2025-08-10 RX ADMIN — SODIUM CHLORIDE, POTASSIUM CHLORIDE, SODIUM LACTATE AND CALCIUM CHLORIDE: 600; 310; 30; 20 INJECTION, SOLUTION INTRAVENOUS at 08:08

## 2025-08-10 RX ADMIN — MUPIROCIN: 20 OINTMENT TOPICAL at 08:08

## 2025-08-10 RX ADMIN — MUPIROCIN: 20 OINTMENT TOPICAL at 09:08

## 2025-08-10 RX ADMIN — ENOXAPARIN SODIUM 30 MG: 30 INJECTION SUBCUTANEOUS at 04:08

## 2025-08-10 RX ADMIN — CEFTRIAXONE SODIUM 2 G: 2 INJECTION, POWDER, FOR SOLUTION INTRAMUSCULAR; INTRAVENOUS at 09:08

## 2025-08-11 LAB
ALBUMIN SERPL-MCNC: 2.6 G/DL (ref 3.4–4.8)
ALBUMIN/GLOB SERPL: 0.6 RATIO (ref 1.1–2)
ALP SERPL-CCNC: 60 UNIT/L (ref 40–150)
ALT SERPL-CCNC: 80 UNIT/L (ref 0–55)
ANION GAP SERPL CALC-SCNC: 9 MEQ/L
AST SERPL-CCNC: 49 UNIT/L (ref 11–45)
BASOPHILS # BLD AUTO: 0.03 X10(3)/MCL
BASOPHILS NFR BLD AUTO: 0.4 %
BILIRUB SERPL-MCNC: 0.5 MG/DL
BUN SERPL-MCNC: 33.4 MG/DL (ref 8.4–25.7)
CALCIUM SERPL-MCNC: 8.6 MG/DL (ref 8.8–10)
CHLORIDE SERPL-SCNC: 102 MMOL/L (ref 98–107)
CK SERPL-CCNC: 629 U/L (ref 30–200)
CO2 SERPL-SCNC: 25 MMOL/L (ref 23–31)
CREAT SERPL-MCNC: 1.48 MG/DL (ref 0.72–1.25)
CREAT/UREA NIT SERPL: 23
EOSINOPHIL # BLD AUTO: 0.17 X10(3)/MCL (ref 0–0.9)
EOSINOPHIL NFR BLD AUTO: 2.5 %
ERYTHROCYTE [DISTWIDTH] IN BLOOD BY AUTOMATED COUNT: 13.9 % (ref 11.5–17)
GFR SERPLBLD CREATININE-BSD FMLA CKD-EPI: 52 ML/MIN/1.73/M2
GLOBULIN SER-MCNC: 4.4 GM/DL (ref 2.4–3.5)
GLUCOSE SERPL-MCNC: 115 MG/DL (ref 82–115)
HCT VFR BLD AUTO: 35.5 % (ref 42–52)
HGB BLD-MCNC: 11.4 G/DL (ref 14–18)
IMM GRANULOCYTES # BLD AUTO: 0.01 X10(3)/MCL (ref 0–0.04)
IMM GRANULOCYTES NFR BLD AUTO: 0.1 %
LYMPHOCYTES # BLD AUTO: 2.18 X10(3)/MCL (ref 0.6–4.6)
LYMPHOCYTES NFR BLD AUTO: 32.5 %
MCH RBC QN AUTO: 28.2 PG (ref 27–31)
MCHC RBC AUTO-ENTMCNC: 32.1 G/DL (ref 33–36)
MCV RBC AUTO: 87.9 FL (ref 80–94)
MONOCYTES # BLD AUTO: 0.89 X10(3)/MCL (ref 0.1–1.3)
MONOCYTES NFR BLD AUTO: 13.3 %
NEUTROPHILS # BLD AUTO: 3.43 X10(3)/MCL (ref 2.1–9.2)
NEUTROPHILS NFR BLD AUTO: 51.2 %
NRBC BLD AUTO-RTO: 0 %
PLATELET # BLD AUTO: 196 X10(3)/MCL (ref 130–400)
PMV BLD AUTO: 10.1 FL (ref 7.4–10.4)
POCT GLUCOSE: 145 MG/DL (ref 70–110)
POTASSIUM SERPL-SCNC: 4.2 MMOL/L (ref 3.5–5.1)
PROT SERPL-MCNC: 7 GM/DL (ref 5.8–7.6)
RBC # BLD AUTO: 4.04 X10(6)/MCL (ref 4.7–6.1)
SODIUM SERPL-SCNC: 136 MMOL/L (ref 136–145)
WBC # BLD AUTO: 6.71 X10(3)/MCL (ref 4.5–11.5)

## 2025-08-11 PROCEDURE — 25000003 PHARM REV CODE 250: Performed by: INTERNAL MEDICINE

## 2025-08-11 PROCEDURE — 63600175 PHARM REV CODE 636 W HCPCS: Performed by: CLINICAL NURSE SPECIALIST

## 2025-08-11 PROCEDURE — 63600175 PHARM REV CODE 636 W HCPCS: Performed by: STUDENT IN AN ORGANIZED HEALTH CARE EDUCATION/TRAINING PROGRAM

## 2025-08-11 PROCEDURE — 97110 THERAPEUTIC EXERCISES: CPT | Mod: CO

## 2025-08-11 PROCEDURE — 27000221 HC OXYGEN, UP TO 24 HOURS

## 2025-08-11 PROCEDURE — 99231 SBSQ HOSP IP/OBS SF/LOW 25: CPT | Mod: ,,, | Performed by: STUDENT IN AN ORGANIZED HEALTH CARE EDUCATION/TRAINING PROGRAM

## 2025-08-11 PROCEDURE — 82550 ASSAY OF CK (CPK): CPT | Performed by: INTERNAL MEDICINE

## 2025-08-11 PROCEDURE — 80053 COMPREHEN METABOLIC PANEL: CPT | Performed by: CLINICAL NURSE SPECIALIST

## 2025-08-11 PROCEDURE — 36415 COLL VENOUS BLD VENIPUNCTURE: CPT | Performed by: CLINICAL NURSE SPECIALIST

## 2025-08-11 PROCEDURE — 97530 THERAPEUTIC ACTIVITIES: CPT | Mod: CQ

## 2025-08-11 PROCEDURE — 25000003 PHARM REV CODE 250: Performed by: CLINICAL NURSE SPECIALIST

## 2025-08-11 PROCEDURE — 21400001 HC TELEMETRY ROOM

## 2025-08-11 PROCEDURE — 85025 COMPLETE CBC W/AUTO DIFF WBC: CPT | Performed by: CLINICAL NURSE SPECIALIST

## 2025-08-11 PROCEDURE — 11000001 HC ACUTE MED/SURG PRIVATE ROOM

## 2025-08-11 RX ADMIN — MIDODRINE HYDROCHLORIDE 2.5 MG: 2.5 TABLET ORAL at 09:08

## 2025-08-11 RX ADMIN — MUPIROCIN: 20 OINTMENT TOPICAL at 09:08

## 2025-08-11 RX ADMIN — ATORVASTATIN CALCIUM 20 MG: 10 TABLET, FILM COATED ORAL at 10:08

## 2025-08-11 RX ADMIN — MUPIROCIN: 20 OINTMENT TOPICAL at 10:08

## 2025-08-11 RX ADMIN — ACETAMINOPHEN 650 MG: 325 TABLET ORAL at 09:08

## 2025-08-11 RX ADMIN — CEFTRIAXONE SODIUM 2 G: 2 INJECTION, POWDER, FOR SOLUTION INTRAMUSCULAR; INTRAVENOUS at 09:08

## 2025-08-11 RX ADMIN — FAMOTIDINE 20 MG: 20 TABLET, FILM COATED ORAL at 09:08

## 2025-08-11 RX ADMIN — MIDODRINE HYDROCHLORIDE 2.5 MG: 2.5 TABLET ORAL at 04:08

## 2025-08-11 RX ADMIN — ENOXAPARIN SODIUM 30 MG: 30 INJECTION SUBCUTANEOUS at 04:08

## 2025-08-12 LAB
ANION GAP SERPL CALC-SCNC: 8 MEQ/L
BACTERIA UR CULT: ABNORMAL
BUN SERPL-MCNC: 20.9 MG/DL (ref 8.4–25.7)
CALCIUM SERPL-MCNC: 9 MG/DL (ref 8.8–10)
CHLORIDE SERPL-SCNC: 100 MMOL/L (ref 98–107)
CO2 SERPL-SCNC: 25 MMOL/L (ref 23–31)
CREAT SERPL-MCNC: 1.2 MG/DL (ref 0.72–1.25)
CREAT/UREA NIT SERPL: 17
GFR SERPLBLD CREATININE-BSD FMLA CKD-EPI: >60 ML/MIN/1.73/M2
GLUCOSE SERPL-MCNC: 117 MG/DL (ref 82–115)
POCT GLUCOSE: 113 MG/DL (ref 70–110)
POCT GLUCOSE: 114 MG/DL (ref 70–110)
POCT GLUCOSE: 126 MG/DL (ref 70–110)
POCT GLUCOSE: 143 MG/DL (ref 70–110)
POTASSIUM SERPL-SCNC: 4.2 MMOL/L (ref 3.5–5.1)
SODIUM SERPL-SCNC: 133 MMOL/L (ref 136–145)

## 2025-08-12 PROCEDURE — 97535 SELF CARE MNGMENT TRAINING: CPT | Mod: CO

## 2025-08-12 PROCEDURE — 21400001 HC TELEMETRY ROOM

## 2025-08-12 PROCEDURE — 27000221 HC OXYGEN, UP TO 24 HOURS

## 2025-08-12 PROCEDURE — 36415 COLL VENOUS BLD VENIPUNCTURE: CPT | Performed by: INTERNAL MEDICINE

## 2025-08-12 PROCEDURE — 25000003 PHARM REV CODE 250: Performed by: INTERNAL MEDICINE

## 2025-08-12 PROCEDURE — 63600175 PHARM REV CODE 636 W HCPCS: Performed by: CLINICAL NURSE SPECIALIST

## 2025-08-12 PROCEDURE — 27000207 HC ISOLATION

## 2025-08-12 PROCEDURE — 63600175 PHARM REV CODE 636 W HCPCS: Performed by: STUDENT IN AN ORGANIZED HEALTH CARE EDUCATION/TRAINING PROGRAM

## 2025-08-12 PROCEDURE — 80048 BASIC METABOLIC PNL TOTAL CA: CPT | Performed by: INTERNAL MEDICINE

## 2025-08-12 PROCEDURE — 25000003 PHARM REV CODE 250: Performed by: CLINICAL NURSE SPECIALIST

## 2025-08-12 PROCEDURE — 97530 THERAPEUTIC ACTIVITIES: CPT | Mod: CQ

## 2025-08-12 RX ORDER — LINEZOLID 600 MG/1
600 TABLET, FILM COATED ORAL EVERY 12 HOURS
Qty: 6 TABLET | Refills: 0 | Status: SHIPPED | OUTPATIENT
Start: 2025-08-12 | End: 2025-08-12 | Stop reason: HOSPADM

## 2025-08-12 RX ORDER — METFORMIN HYDROCHLORIDE 1000 MG/1
500 TABLET ORAL
Qty: 30 TABLET | Refills: 0 | Status: SHIPPED | OUTPATIENT
Start: 2025-08-12 | End: 2025-08-13

## 2025-08-12 RX ORDER — SODIUM CHLORIDE 9 MG/ML
INJECTION, SOLUTION INTRAVENOUS CONTINUOUS
Status: ACTIVE | OUTPATIENT
Start: 2025-08-12 | End: 2025-08-13

## 2025-08-12 RX ORDER — MIDODRINE HYDROCHLORIDE 2.5 MG/1
2.5 TABLET ORAL 2 TIMES DAILY WITH MEALS
Qty: 60 TABLET | Refills: 0 | Status: SHIPPED | OUTPATIENT
Start: 2025-08-12 | End: 2025-08-14

## 2025-08-12 RX ORDER — MIDODRINE HYDROCHLORIDE 2.5 MG/1
2.5 TABLET ORAL 2 TIMES DAILY WITH MEALS
Qty: 60 TABLET | Refills: 0 | Status: SHIPPED | OUTPATIENT
Start: 2025-08-12 | End: 2025-08-12

## 2025-08-12 RX ADMIN — FAMOTIDINE 20 MG: 20 TABLET, FILM COATED ORAL at 08:08

## 2025-08-12 RX ADMIN — ENOXAPARIN SODIUM 30 MG: 30 INJECTION SUBCUTANEOUS at 05:08

## 2025-08-12 RX ADMIN — MIDODRINE HYDROCHLORIDE 2.5 MG: 2.5 TABLET ORAL at 08:08

## 2025-08-12 RX ADMIN — ATORVASTATIN CALCIUM 20 MG: 10 TABLET, FILM COATED ORAL at 09:08

## 2025-08-12 RX ADMIN — MUPIROCIN: 20 OINTMENT TOPICAL at 09:08

## 2025-08-12 RX ADMIN — MUPIROCIN: 20 OINTMENT TOPICAL at 11:08

## 2025-08-12 RX ADMIN — MIDODRINE HYDROCHLORIDE 2.5 MG: 2.5 TABLET ORAL at 05:08

## 2025-08-12 RX ADMIN — SODIUM CHLORIDE: 9 INJECTION, SOLUTION INTRAVENOUS at 05:08

## 2025-08-12 RX ADMIN — CEFTRIAXONE SODIUM 2 G: 2 INJECTION, POWDER, FOR SOLUTION INTRAMUSCULAR; INTRAVENOUS at 11:08

## 2025-08-13 LAB
EST. AVERAGE GLUCOSE BLD GHB EST-MCNC: 188.6 MG/DL
HBA1C MFR BLD: 8.2 %
POCT GLUCOSE: 106 MG/DL (ref 70–110)
POCT GLUCOSE: 138 MG/DL (ref 70–110)
POCT GLUCOSE: 159 MG/DL (ref 70–110)

## 2025-08-13 PROCEDURE — 97530 THERAPEUTIC ACTIVITIES: CPT | Mod: CQ

## 2025-08-13 PROCEDURE — 21400001 HC TELEMETRY ROOM

## 2025-08-13 PROCEDURE — 25000003 PHARM REV CODE 250: Performed by: INTERNAL MEDICINE

## 2025-08-13 PROCEDURE — 83036 HEMOGLOBIN GLYCOSYLATED A1C: CPT | Performed by: INTERNAL MEDICINE

## 2025-08-13 PROCEDURE — 63600175 PHARM REV CODE 636 W HCPCS: Performed by: STUDENT IN AN ORGANIZED HEALTH CARE EDUCATION/TRAINING PROGRAM

## 2025-08-13 PROCEDURE — 97535 SELF CARE MNGMENT TRAINING: CPT | Mod: CO

## 2025-08-13 PROCEDURE — 36415 COLL VENOUS BLD VENIPUNCTURE: CPT | Performed by: INTERNAL MEDICINE

## 2025-08-13 PROCEDURE — 27000207 HC ISOLATION

## 2025-08-13 PROCEDURE — 99900035 HC TECH TIME PER 15 MIN (STAT)

## 2025-08-13 PROCEDURE — 94760 N-INVAS EAR/PLS OXIMETRY 1: CPT

## 2025-08-13 PROCEDURE — 25000003 PHARM REV CODE 250: Performed by: CLINICAL NURSE SPECIALIST

## 2025-08-13 RX ORDER — METFORMIN HYDROCHLORIDE 1000 MG/1
1000 TABLET ORAL 2 TIMES DAILY WITH MEALS
Qty: 60 TABLET | Refills: 0 | Status: SHIPPED | OUTPATIENT
Start: 2025-08-13

## 2025-08-13 RX ADMIN — FAMOTIDINE 20 MG: 20 TABLET, FILM COATED ORAL at 09:08

## 2025-08-13 RX ADMIN — ENOXAPARIN SODIUM 30 MG: 30 INJECTION SUBCUTANEOUS at 04:08

## 2025-08-13 RX ADMIN — ATORVASTATIN CALCIUM 20 MG: 10 TABLET, FILM COATED ORAL at 09:08

## 2025-08-13 RX ADMIN — MIDODRINE HYDROCHLORIDE 2.5 MG: 2.5 TABLET ORAL at 04:08

## 2025-08-13 RX ADMIN — MUPIROCIN: 20 OINTMENT TOPICAL at 09:08

## 2025-08-13 RX ADMIN — MIDODRINE HYDROCHLORIDE 2.5 MG: 2.5 TABLET ORAL at 09:08

## 2025-08-14 VITALS
TEMPERATURE: 98 F | BODY MASS INDEX: 35.27 KG/M2 | HEART RATE: 92 BPM | DIASTOLIC BLOOD PRESSURE: 81 MMHG | WEIGHT: 238.13 LBS | RESPIRATION RATE: 17 BRPM | SYSTOLIC BLOOD PRESSURE: 149 MMHG | HEIGHT: 69 IN | OXYGEN SATURATION: 95 %

## 2025-08-14 LAB
BACTERIA BLD CULT: NORMAL
BACTERIA BLD CULT: NORMAL
POCT GLUCOSE: 125 MG/DL (ref 70–110)
POCT GLUCOSE: 160 MG/DL (ref 70–110)

## 2025-08-14 PROCEDURE — 63600175 PHARM REV CODE 636 W HCPCS: Performed by: STUDENT IN AN ORGANIZED HEALTH CARE EDUCATION/TRAINING PROGRAM

## 2025-08-14 PROCEDURE — 25000003 PHARM REV CODE 250: Performed by: INTERNAL MEDICINE

## 2025-08-14 PROCEDURE — 25000003 PHARM REV CODE 250: Performed by: CLINICAL NURSE SPECIALIST

## 2025-08-14 RX ORDER — MIDODRINE HYDROCHLORIDE 2.5 MG/1
2.5 TABLET ORAL 2 TIMES DAILY WITH MEALS
Qty: 60 TABLET | Refills: 0 | Status: SHIPPED | OUTPATIENT
Start: 2025-08-14

## 2025-08-14 RX ADMIN — ENOXAPARIN SODIUM 30 MG: 30 INJECTION SUBCUTANEOUS at 04:08

## 2025-08-14 RX ADMIN — FAMOTIDINE 20 MG: 20 TABLET, FILM COATED ORAL at 08:08

## 2025-08-14 RX ADMIN — MIDODRINE HYDROCHLORIDE 2.5 MG: 2.5 TABLET ORAL at 04:08

## 2025-08-14 RX ADMIN — MUPIROCIN: 20 OINTMENT TOPICAL at 08:08

## 2025-08-14 RX ADMIN — MIDODRINE HYDROCHLORIDE 2.5 MG: 2.5 TABLET ORAL at 08:08

## 2025-08-15 ENCOUNTER — LAB REQUISITION (OUTPATIENT)
Dept: LAB | Facility: HOSPITAL | Age: 70
End: 2025-08-15
Payer: MEDICARE

## 2025-08-15 DIAGNOSIS — E11.9 TYPE 2 DIABETES MELLITUS WITHOUT COMPLICATIONS: ICD-10-CM

## 2025-08-15 LAB
ALBUMIN SERPL-MCNC: 3.2 G/DL (ref 3.4–4.8)
ALBUMIN/GLOB SERPL: 0.7 RATIO (ref 1.1–2)
ALP SERPL-CCNC: 74 UNIT/L (ref 40–150)
ALT SERPL-CCNC: 46 UNIT/L (ref 0–55)
ANION GAP SERPL CALC-SCNC: 10 MEQ/L
AST SERPL-CCNC: 22 UNIT/L (ref 11–45)
BASOPHILS # BLD AUTO: 0.04 X10(3)/MCL
BASOPHILS NFR BLD AUTO: 0.5 %
BILIRUB SERPL-MCNC: 0.4 MG/DL
BUN SERPL-MCNC: 27.9 MG/DL (ref 8.4–25.7)
CALCIUM SERPL-MCNC: 8.9 MG/DL (ref 8.8–10)
CHLORIDE SERPL-SCNC: 99 MMOL/L (ref 98–107)
CO2 SERPL-SCNC: 25 MMOL/L (ref 23–31)
CREAT SERPL-MCNC: 1.31 MG/DL (ref 0.72–1.25)
CREAT/UREA NIT SERPL: 21
EOSINOPHIL # BLD AUTO: 0.13 X10(3)/MCL (ref 0–0.9)
EOSINOPHIL NFR BLD AUTO: 1.7 %
ERYTHROCYTE [DISTWIDTH] IN BLOOD BY AUTOMATED COUNT: 13.6 % (ref 11.5–17)
EST. AVERAGE GLUCOSE BLD GHB EST-MCNC: 188.6 MG/DL
GFR SERPLBLD CREATININE-BSD FMLA CKD-EPI: 60 ML/MIN/1.73/M2
GLOBULIN SER-MCNC: 4.5 GM/DL (ref 2.4–3.5)
GLUCOSE SERPL-MCNC: 150 MG/DL (ref 82–115)
HBA1C MFR BLD: 8.2 %
HCT VFR BLD AUTO: 41.5 % (ref 42–52)
HGB BLD-MCNC: 13.7 G/DL (ref 14–18)
IMM GRANULOCYTES # BLD AUTO: 0.03 X10(3)/MCL (ref 0–0.04)
IMM GRANULOCYTES NFR BLD AUTO: 0.4 %
LYMPHOCYTES # BLD AUTO: 2.54 X10(3)/MCL (ref 0.6–4.6)
LYMPHOCYTES NFR BLD AUTO: 32.8 %
MCH RBC QN AUTO: 28.5 PG (ref 27–31)
MCHC RBC AUTO-ENTMCNC: 33 G/DL (ref 33–36)
MCV RBC AUTO: 86.3 FL (ref 80–94)
MONOCYTES # BLD AUTO: 0.87 X10(3)/MCL (ref 0.1–1.3)
MONOCYTES NFR BLD AUTO: 11.2 %
NEUTROPHILS # BLD AUTO: 4.13 X10(3)/MCL (ref 2.1–9.2)
NEUTROPHILS NFR BLD AUTO: 53.4 %
NRBC BLD AUTO-RTO: 0 %
PLATELET # BLD AUTO: 266 X10(3)/MCL (ref 130–400)
PMV BLD AUTO: 11.1 FL (ref 7.4–10.4)
POTASSIUM SERPL-SCNC: 4.9 MMOL/L (ref 3.5–5.1)
PREALB SERPL-MCNC: 18.9 MG/DL (ref 16–42)
PROT SERPL-MCNC: 7.7 GM/DL (ref 5.8–7.6)
RBC # BLD AUTO: 4.81 X10(6)/MCL (ref 4.7–6.1)
SODIUM SERPL-SCNC: 134 MMOL/L (ref 136–145)
TSH SERPL-ACNC: 0.66 UIU/ML (ref 0.35–4.94)
WBC # BLD AUTO: 7.74 X10(3)/MCL (ref 4.5–11.5)

## 2025-08-15 PROCEDURE — 84134 ASSAY OF PREALBUMIN: CPT | Performed by: FAMILY MEDICINE

## 2025-08-15 PROCEDURE — 84443 ASSAY THYROID STIM HORMONE: CPT | Performed by: FAMILY MEDICINE

## 2025-08-15 PROCEDURE — 83036 HEMOGLOBIN GLYCOSYLATED A1C: CPT | Performed by: FAMILY MEDICINE

## 2025-08-15 PROCEDURE — 80053 COMPREHEN METABOLIC PANEL: CPT | Performed by: FAMILY MEDICINE

## 2025-08-15 PROCEDURE — 85025 COMPLETE CBC W/AUTO DIFF WBC: CPT | Performed by: FAMILY MEDICINE

## 2025-08-23 ENCOUNTER — PATIENT MESSAGE (OUTPATIENT)
Dept: RESEARCH | Facility: HOSPITAL | Age: 70
End: 2025-08-23
Payer: MEDICARE

## (undated) DEVICE — KIT CANIST SUCTION 1200CC

## (undated) DEVICE — COLLECTION SPECIMEN NEPTUNE

## (undated) DEVICE — KIT SURGICAL COLON .25 1.1OZ

## (undated) DEVICE — TIP SUCTION YANKAUER

## (undated) DEVICE — SOL IRRI STRL WATER 1000ML

## (undated) DEVICE — KIT PEG PULL SAFETY 24FR

## (undated) DEVICE — TUBING O2 FEMALE CONN 13FT

## (undated) DEVICE — BINDER ABDOMINAL UNIV XLN 10IN